# Patient Record
Sex: MALE | Race: WHITE | Employment: UNEMPLOYED | URBAN - METROPOLITAN AREA
[De-identification: names, ages, dates, MRNs, and addresses within clinical notes are randomized per-mention and may not be internally consistent; named-entity substitution may affect disease eponyms.]

---

## 2017-06-16 RX ORDER — ATORVASTATIN CALCIUM 80 MG/1
TABLET, FILM COATED ORAL
Qty: 30 TABLET | Refills: 2 | Status: SHIPPED | OUTPATIENT
Start: 2017-06-16 | End: 2017-06-16

## 2017-06-16 RX ORDER — ATORVASTATIN CALCIUM 80 MG/1
TABLET, FILM COATED ORAL
Qty: 30 TABLET | Refills: 2 | Status: SHIPPED | OUTPATIENT
Start: 2017-06-16

## 2017-06-16 RX ORDER — ATENOLOL 50 MG/1
TABLET ORAL
Qty: 60 TABLET | Refills: 2 | Status: ON HOLD | OUTPATIENT
Start: 2017-06-16 | End: 2019-02-22 | Stop reason: CLARIF

## 2017-06-16 RX ORDER — ATENOLOL 50 MG/1
TABLET ORAL
Qty: 60 TABLET | Refills: 2 | Status: SHIPPED | OUTPATIENT
Start: 2017-06-16 | End: 2017-06-16

## 2017-12-15 ENCOUNTER — HOSPITAL ENCOUNTER (OUTPATIENT)
Dept: PULMONOLOGY | Age: 57
Discharge: HOME | End: 2017-12-15
Attending: FAMILY MEDICINE | Admitting: FAMILY MEDICINE

## 2017-12-15 VITALS — OXYGEN SATURATION: 99 %

## 2017-12-15 DIAGNOSIS — R06.02 SHORTNESS OF BREATH: ICD-10-CM

## 2017-12-15 RX ORDER — ALBUTEROL SULFATE 2.5 MG/3ML
2.5 SOLUTION RESPIRATORY (INHALATION) ONCE
Status: COMPLETED | OUTPATIENT
Start: 2017-12-15 | End: 2017-12-15

## 2017-12-15 RX ADMIN — ALBUTEROL SULFATE 2.5 MG: 2.5 SOLUTION RESPIRATORY (INHALATION) at 14:05

## 2017-12-19 NOTE — PROCEDURES
[address]                  [address] [address]                                PULMONARY FUNCTION    PATIENT NAME: Leydi Colvin SR                 :        1960  MED REC NO:   [id number]                          ROOM:  ACCOUNT NO:   [de-identified]                          ADMIT DATE: 12/15/2017  PROVIDER:     Ar López MD    DATE OF PROCEDURE:  12/15/2017    ORDERING PHYSICIAN:  DO BERNARD Raman DIAGNOSIS:  Shortness of breath. SPIROMETRY:  The FEV1 is 1.65 L or 42% of predicted. The FVC is 2.69 L or  52% of  predicted. The FEV1/FVC ratio is 61%. There is a significant  response to inhaled bronchodilators with improvement in FEV1 by 32%. PLETHYSMOGRAPHY:  The total lung capacity is 7.39 L, which is 97% of  predicted. DIFFUSION CAPACITY:  Diffusion capacity of carbon monoxide is 26.70  mL/min/mmHg, which is 80% of predicted. FLOW VOLUME LOOPS:  The tracings show an obstructive defect pattern. IMPRESSION:  1. There is a severe obstructive lung defect with a significant response  to inhaled bronchodilators. 2.  There is no evidence of a restrictive ventilatory defect. 3.  There is a mild reduction in diffusion capacity. 4.  Overall, his pulmonary function test could be consistent with either  COPD or asthma. Clinical correlation is recommended.         Milli Hirsch MD    D: 2017 12:40:12       T: 2017 14:10:45     [email or web address]  Job#: [id number]     Doc#: [id number]    CC:

## 2017-12-22 ENCOUNTER — OFFICE VISIT (OUTPATIENT)
Dept: CARDIOLOGY CLINIC | Age: 57
End: 2017-12-22

## 2017-12-22 VITALS
HEART RATE: 64 BPM | HEIGHT: 68 IN | DIASTOLIC BLOOD PRESSURE: 84 MMHG | BODY MASS INDEX: 28.79 KG/M2 | SYSTOLIC BLOOD PRESSURE: 120 MMHG | OXYGEN SATURATION: 96 % | WEIGHT: 190 LBS

## 2017-12-22 DIAGNOSIS — E78.00 PURE HYPERCHOLESTEROLEMIA: ICD-10-CM

## 2017-12-22 DIAGNOSIS — I10 ESSENTIAL HYPERTENSION: ICD-10-CM

## 2017-12-22 DIAGNOSIS — I25.10 CORONARY ARTERY DISEASE INVOLVING NATIVE CORONARY ARTERY OF NATIVE HEART WITHOUT ANGINA PECTORIS: Primary | ICD-10-CM

## 2017-12-22 PROCEDURE — 99214 OFFICE O/P EST MOD 30 MIN: CPT | Performed by: INTERNAL MEDICINE

## 2017-12-22 PROCEDURE — G8598 ASA/ANTIPLAT THER USED: HCPCS | Performed by: INTERNAL MEDICINE

## 2017-12-22 PROCEDURE — G8419 CALC BMI OUT NRM PARAM NOF/U: HCPCS | Performed by: INTERNAL MEDICINE

## 2017-12-22 PROCEDURE — G8484 FLU IMMUNIZE NO ADMIN: HCPCS | Performed by: INTERNAL MEDICINE

## 2017-12-22 PROCEDURE — 4004F PT TOBACCO SCREEN RCVD TLK: CPT | Performed by: INTERNAL MEDICINE

## 2017-12-22 PROCEDURE — 3017F COLORECTAL CA SCREEN DOC REV: CPT | Performed by: INTERNAL MEDICINE

## 2017-12-22 PROCEDURE — G8427 DOCREV CUR MEDS BY ELIG CLIN: HCPCS | Performed by: INTERNAL MEDICINE

## 2017-12-22 RX ORDER — HYDROCODONE BITARTRATE AND ACETAMINOPHEN 5; 325 MG/1; MG/1
TABLET ORAL
Status: ON HOLD | COMMUNITY
Start: 2017-11-15 | End: 2019-01-24 | Stop reason: HOSPADM

## 2017-12-22 NOTE — COMMUNICATION BODY
[address]   Cardiac Followup    Referring Provider:  Johann Campbell MD     Chief Complaint   Patient presents with    Coronary Artery Disease        History of Present Illness:    Mr. Loco Cortes presents for CAD, chest pain, HLD. He is feeling well overall. He has restarted smoking. He is not checking his BP at home. He has not been as active and has gained weight. Denies chest pain, shortness of breath, edema, dizziness, palpitations and syncope. Past Medical History:   has a past medical history of Allergic rhinitis; Anxiety; Back pain; CAD (coronary artery disease); CVA (cerebral vascular accident) ([facility] [address] DDD (degenerative disc disease); Depression; Gout; Hypertension; Lumbar radiculopathy; Rheumatoid arthritis(714.0); and Tobacco abuse disorder. Surgical History:   has a past surgical history that includes Appendectomy (1998); Carpal tunnel release (Bilateral, 2000); Patella surgery (Right, 1978); Coronary artery bypass graft (04/22/2016); and Vein Surgery (Left, 04/22/2016). Social History:   reports that he has been smoking Cigarettes. He started smoking about 41 years ago. He has been smoking about 0.00 packs per day for the past 1.00 year. He has never used smokeless tobacco. He reports that he drinks about 1.2 oz of alcohol per week . He reports that he does not use drugs. Family History:  family history includes Cancer in his mother; Glaucoma in his father; High Blood Pressure in his father and mother; Other in his brother. Home Medications:  Prior to Admission medications    Medication Sig Start Date End Date Taking? Authorizing Provider   HYDROcodone-acetaminophen (Duane Gallon) 5-325 MG per tablet TAKE 1 TABLET BY MOUTH TWICE DAILY 11/15/17  Yes Historical Provider, MD   atorvastatin (LIPITOR) 80 MG tablet TAKE 1 TABLET BY MOUTH NIGHTLY 6/16/17  Yes Brandan Malin DO   atenolol (TENORMIN) 50 MG tablet TAKE 1 TABLET BY MOUTH 2 TIMES DAILY.  6/16/17  Yes Brandan Malin DO   VENTOLIN  (90 BASE) MCG/ACT inhaler INHALE 2 PUFFS INTO THE LUNGS EVERY 6 HOURS AS NEEDED SHORTNESS OF BREATH (IF NO RELIEF WITHIN 10 DAYS CALL MD) 4/10/17  Yes Brandan Malin DO   aspirin 325 MG tablet TAKE 1 TABLET BY MOUTH DAILY 3/9/17  Yes Brandan Malin DO   NICOTINE STEP 1 21 MG/24HR PLACE 1 PATCH ONTO THE SKIN DAILY 9/6/16  Yes Brandan Malin DO   allopurinol (ZYLOPRIM) 100 MG tablet TAKE 1 TABLET BY MOUTH DAILY. 6/21/16  Yes Brandan Malin DO   nitroGLYCERIN (NITROSTAT) 0.4 MG SL tablet Place 1 tablet under the tongue every 5 minutes as needed for Chest pain 6/18/16  Yes John Santos MD   traZODone (DESYREL) 100 MG tablet Take 1 tablet by mouth nightly 6/10/16  Yes Brandan Malin DO   gabapentin (NEURONTIN) 300 MG capsule TAKE 1 CAPSULE BY MOUTH 3 TIMES DAILY. 5/13/16  Yes Rosita Calvillo CNP        Allergies:  Tramadol     Review of Systems:   · Constitutional: there has been no unanticipated weight loss. There's been no change in energy level, sleep pattern, or activity level. · Eyes: No visual changes or diplopia. No scleral icterus. · ENT: No Headaches, hearing loss or vertigo. No mouth sores or sore throat. · Cardiovascular: Reviewed in HPI  · Respiratory: No cough or wheezing, no sputum production. No hematemesis. · Gastrointestinal: No abdominal pain, appetite loss, blood in stools. No change in bowel or bladder habits. · Genitourinary: No dysuria, trouble voiding, or hematuria. · Musculoskeletal:  No gait disturbance, weakness or joint complaints. · Integumentary: No rash or pruritis. · Neurological: No headache, diplopia, change in muscle strength, numbness or tingling. No change in gait, balance, coordination, mood, affect, memory, mentation, behavior. · Psychiatric: No anxiety, no depression. · Endocrine: No malaise, fatigue or temperature intolerance. No excessive thirst, fluid intake, or urination. No tremor.   · Hematologic/Lymphatic: No abnormal bruising or bleeding, blood - not recently ck   5. Smoker - started again       Plan:  CMP and Lipid  Smoking cessation discussed  Continue all meds. Increase activity  Monitor BP as OP  F/U with me in 1 year     Thank you for allowing me to participate in the care of this individual.      Janet Palmer.  Ken Santos M.D., Candida Iqbal

## 2017-12-22 NOTE — LETTER
Medication Sig Start Date End Date Taking? Authorizing Provider   HYDROcodone-acetaminophen ([address]) 5-325 MG per tablet TAKE 1 TABLET BY MOUTH TWICE DAILY 11/15/17  Yes Historical Provider, MD   atorvastatin (LIPITOR) 80 MG tablet TAKE 1 TABLET BY MOUTH NIGHTLY 6/16/17  Yes Brandan Malin DO   atenolol (TENORMIN) 50 MG tablet TAKE 1 TABLET BY MOUTH 2 TIMES DAILY. 6/16/17  Yes Brandan Malin DO   VENTOLIN  (90 BASE) MCG/ACT inhaler INHALE 2 PUFFS INTO THE LUNGS EVERY 6 HOURS AS NEEDED SHORTNESS OF BREATH (IF NO RELIEF WITHIN 10 DAYS CALL MD) 4/10/17  Yes Brandan Malin DO   aspirin 325 MG tablet TAKE 1 TABLET BY MOUTH DAILY 3/9/17  Yes Brandan Malin DO   NICOTINE STEP 1 21 MG/24HR PLACE 1 PATCH ONTO THE SKIN DAILY 9/6/16  Yes Brandan Malin DO   allopurinol (ZYLOPRIM) 100 MG tablet TAKE 1 TABLET BY MOUTH DAILY. 6/21/16  Yes Brandan Malin DO   nitroGLYCERIN (NITROSTAT) 0.4 MG SL tablet Place 1 tablet under the tongue every 5 minutes as needed for Chest pain 6/18/16  Yes Zachariah Gayle MD   traZODone (DESYREL) 100 MG tablet Take 1 tablet by mouth nightly 6/10/16  Yes Brandan Malin DO   gabapentin (NEURONTIN) 300 MG capsule TAKE 1 CAPSULE BY MOUTH 3 TIMES DAILY. 5/13/16  Yes Kailash Temple, CHAD        Allergies:  Tramadol     Review of Systems:   · Constitutional: there has been no unanticipated weight loss. There's been no change in energy level, sleep pattern, or activity level. · Eyes: No visual changes or diplopia. No scleral icterus. · ENT: No Headaches, hearing loss or vertigo. No mouth sores or sore throat. · Cardiovascular: Reviewed in HPI  · Respiratory: No cough or wheezing, no sputum production. No hematemesis. · Gastrointestinal: No abdominal pain, appetite loss, blood in stools. No change in bowel or bladder habits. · Genitourinary: No dysuria, trouble voiding, or hematuria. · Musculoskeletal:  No gait disturbance, weakness or joint complaints. · Integumentary: No rash or pruritis. · Neurological: No headache, diplopia, change in muscle strength, numbness or tingling. No change in gait, balance, coordination, mood, affect, memory, mentation, behavior. · Psychiatric: No anxiety, no depression. · Endocrine: No malaise, fatigue or temperature intolerance. No excessive thirst, fluid intake, or urination. No tremor. · Hematologic/Lymphatic: No abnormal bruising or bleeding, blood clots or swollen lymph nodes. · Allergic/Immunologic: No nasal congestion or hives. Physical Examination:    Vitals:    12/22/17 1102   BP: 120/84   Pulse: 64   SpO2: 96%        Constitutional and General Appearance: NAD   Respiratory:  · Normal excursion and expansion without use of accessory muscles  · Resp Auscultation: Normal breath sounds without dullness  Cardiovascular:  · The apical impulses not displaced  · Heart tones are crisp and normal  · Cervical veins are not engorged  · The carotid upstroke is normal in amplitude and contour without delay or bruit  · Normal S1S2, No S3, No Murmur  · Peripheral pulses are symmetrical and full  · There is no clubbing, cyanosis of the extremities. · No edema  · Femoral Arteries: 2+ and equal  · Pedal Pulses: 2+ and equal   Abdomen:  · No masses or tenderness  · Liver/Spleen: No Abnormalities Noted  Neurological/Psychiatric:  · Alert and oriented in all spheres  · Moves all extremities well  · Exhibits normal gait balance and coordination  · No abnormalities of mood, affect, memory, mentation, or behavior are noted    Echo 4/2016   Normal left ventricular systolic function with an estimated ejection   fraction of 55%.   Mild concentric left ventricular hypertrophy.   No regional wall motion abnormalities are seen.   Diastolic filling parameters suggest normal diastolic filing pressure.   MIld mitral and tricuspid regurgitation.   Systolic pulmonary artery pressure (SPAP) is normal and estimated at 28 mmHg   (RA pressure 3 mmHg).  Cath 4/2016  LM 20%  LAD 70% mid  D1 80% mid  Cx 50% prox, 70% mid  OM1 80% prox  RCA 70% mid  LV: anterior hypokinesis, EF 50%  CABG 4/2016  LIMA to LAD, sequential Greater Saphenous VG to Diag 1 then on to OM1, separate single Greater SVG to distal RCA    Assessment:     1. Chest pain - no recurrence   2. NSTEMI (non-ST elevated myocardial infarction) ([address]    3. Coronary artery disease - stable. No angina   4. Pure hypercholesterolemia - not recently ck   5. Smoker - started again       Plan:  CMP and Lipid  Smoking cessation discussed  Continue all meds. Increase activity  Monitor BP as OP  F/U with me in 1 year     Thank you for allowing me to participate in the care of this individual.      Abelardo Briggs. Lolly Caal M.D., Roseanne Nelson      If you have questions, please do not hesitate to call me. I look forward to following Scot along with you.     Sincerely,        Winnie Noland MD

## 2017-12-22 NOTE — PROGRESS NOTES
[address]   Cardiac Followup    Referring Provider:  Dorys Galvan MD     Chief Complaint   Patient presents with    Coronary Artery Disease        History of Present Illness:    Mr. Benny Bull presents for CAD, chest pain, HLD. He is feeling well overall. He has restarted smoking. He is not checking his BP at home. He has not been as active and has gained weight. Denies chest pain, shortness of breath, edema, dizziness, palpitations and syncope. Past Medical History:   has a past medical history of Allergic rhinitis; Anxiety; Back pain; CAD (coronary artery disease); CVA (cerebral vascular accident) ([facility] [address] DDD (degenerative disc disease); Depression; Gout; Hypertension; Lumbar radiculopathy; Rheumatoid arthritis(714.0); and Tobacco abuse disorder. Surgical History:   has a past surgical history that includes Appendectomy (1998); Carpal tunnel release (Bilateral, 2000); Patella surgery (Right, 1978); Coronary artery bypass graft (04/22/2016); and Vein Surgery (Left, 04/22/2016). Social History:   reports that he has been smoking Cigarettes. He started smoking about 41 years ago. He has been smoking about 0.00 packs per day for the past 1.00 year. He has never used smokeless tobacco. He reports that he drinks about 1.2 oz of alcohol per week . He reports that he does not use drugs. Family History:  family history includes Cancer in his mother; Glaucoma in his father; High Blood Pressure in his father and mother; Other in his brother. Home Medications:  Prior to Admission medications    Medication Sig Start Date End Date Taking? Authorizing Provider   HYDROcodone-acetaminophen ([address]) 5-325 MG per tablet TAKE 1 TABLET BY MOUTH TWICE DAILY 11/15/17  Yes Historical Provider, MD   atorvastatin (LIPITOR) 80 MG tablet TAKE 1 TABLET BY MOUTH NIGHTLY 6/16/17  Yes Brandan Malin DO   atenolol (TENORMIN) 50 MG tablet TAKE 1 TABLET BY MOUTH 2 TIMES DAILY.  6/16/17  Yes Brandan Malin DO   VENTOLIN  (90 BASE) MCG/ACT inhaler INHALE 2 PUFFS INTO THE LUNGS EVERY 6 HOURS AS NEEDED SHORTNESS OF BREATH (IF NO RELIEF WITHIN 10 DAYS CALL MD) 4/10/17  Yes Brandan Malin DO   aspirin 325 MG tablet TAKE 1 TABLET BY MOUTH DAILY 3/9/17  Yes Brandan Malin DO   NICOTINE STEP 1 21 MG/24HR PLACE 1 PATCH ONTO THE SKIN DAILY 9/6/16  Yes Brandan Malin DO   allopurinol (ZYLOPRIM) 100 MG tablet TAKE 1 TABLET BY MOUTH DAILY. 6/21/16  Yes Brandan Malin DO   nitroGLYCERIN (NITROSTAT) 0.4 MG SL tablet Place 1 tablet under the tongue every 5 minutes as needed for Chest pain 6/18/16  Yes Lynda Blandon MD   traZODone (DESYREL) 100 MG tablet Take 1 tablet by mouth nightly 6/10/16  Yes Brandan Malin DO   gabapentin (NEURONTIN) 300 MG capsule TAKE 1 CAPSULE BY MOUTH 3 TIMES DAILY. 5/13/16  Yes Parga Haney CNP        Allergies:  Tramadol     Review of Systems:   · Constitutional: there has been no unanticipated weight loss. There's been no change in energy level, sleep pattern, or activity level. · Eyes: No visual changes or diplopia. No scleral icterus. · ENT: No Headaches, hearing loss or vertigo. No mouth sores or sore throat. · Cardiovascular: Reviewed in HPI  · Respiratory: No cough or wheezing, no sputum production. No hematemesis. · Gastrointestinal: No abdominal pain, appetite loss, blood in stools. No change in bowel or bladder habits. · Genitourinary: No dysuria, trouble voiding, or hematuria. · Musculoskeletal:  No gait disturbance, weakness or joint complaints. · Integumentary: No rash or pruritis. · Neurological: No headache, diplopia, change in muscle strength, numbness or tingling. No change in gait, balance, coordination, mood, affect, memory, mentation, behavior. · Psychiatric: No anxiety, no depression. · Endocrine: No malaise, fatigue or temperature intolerance. No excessive thirst, fluid intake, or urination. No tremor.   · Hematologic/Lymphatic: No abnormal bruising or bleeding, blood

## 2018-01-04 ENCOUNTER — HOSPITAL ENCOUNTER (OUTPATIENT)
Dept: OTHER | Age: 58
Discharge: HOME | End: 2018-01-04
Attending: FAMILY MEDICINE | Admitting: FAMILY MEDICINE

## 2018-01-04 DIAGNOSIS — J40 BRONCHITIS: ICD-10-CM

## 2018-06-04 ENCOUNTER — HOSPITAL ENCOUNTER (OUTPATIENT)
Dept: OTHER | Age: 58
Discharge: HOME | End: 2018-06-04
Attending: INTERNAL MEDICINE | Admitting: INTERNAL MEDICINE

## 2018-06-04 DIAGNOSIS — M05.741 RHEUMATOID ARTHRITIS INVOLVING BOTH HANDS WITH POSITIVE RHEUMATOID FACTOR (HCC): ICD-10-CM

## 2018-06-04 DIAGNOSIS — M05.742 RHEUMATOID ARTHRITIS INVOLVING BOTH HANDS WITH POSITIVE RHEUMATOID FACTOR (HCC): ICD-10-CM

## 2018-09-20 DIAGNOSIS — Z80.0 FAMILY HISTORY OF COLON CANCER: Primary | ICD-10-CM

## 2018-10-02 ENCOUNTER — TELEPHONE (OUTPATIENT)
Dept: GASTROENTEROLOGY | Age: 58
End: 2018-10-02

## 2018-10-03 ENCOUNTER — ANESTHESIA (OUTPATIENT)
Dept: ENDOSCOPY | Age: 58
End: 2018-10-03
Payer: MEDICARE

## 2018-10-03 ENCOUNTER — HOSPITAL ENCOUNTER (OUTPATIENT)
Age: 58
Setting detail: OUTPATIENT SURGERY
Discharge: HOME HEALTH | End: 2018-10-03
Attending: INTERNAL MEDICINE | Admitting: INTERNAL MEDICINE
Payer: MEDICARE

## 2018-10-03 ENCOUNTER — ANESTHESIA EVENT (OUTPATIENT)
Dept: ENDOSCOPY | Age: 58
End: 2018-10-03
Payer: MEDICARE

## 2018-10-03 VITALS
DIASTOLIC BLOOD PRESSURE: 80 MMHG | WEIGHT: 192 LBS | BODY MASS INDEX: 26.01 KG/M2 | OXYGEN SATURATION: 99 % | HEART RATE: 55 BPM | RESPIRATION RATE: 18 BRPM | TEMPERATURE: 97.6 F | HEIGHT: 72 IN | SYSTOLIC BLOOD PRESSURE: 139 MMHG

## 2018-10-03 VITALS — OXYGEN SATURATION: 95 % | RESPIRATION RATE: 16 BRPM

## 2018-10-03 PROBLEM — Z12.11 SCREENING FOR COLON CANCER: Status: ACTIVE | Noted: 2018-10-03

## 2018-10-03 PROBLEM — K25.3 ACUTE GASTRIC ULCER WITHOUT HEMORRHAGE OR PERFORATION: Status: RESOLVED | Noted: 2018-10-03 | Resolved: 2018-10-03

## 2018-10-03 PROBLEM — K25.3 ACUTE GASTRIC ULCER WITHOUT HEMORRHAGE OR PERFORATION: Status: ACTIVE | Noted: 2018-10-03

## 2018-10-03 PROBLEM — K29.30 CHRONIC SUPERFICIAL GASTRITIS WITHOUT BLEEDING: Status: ACTIVE | Noted: 2018-10-03

## 2018-10-03 PROBLEM — K63.5 POLYP OF TRANSVERSE COLON: Status: ACTIVE | Noted: 2018-10-03

## 2018-10-03 PROBLEM — K29.30 CHRONIC SUPERFICIAL GASTRITIS WITHOUT BLEEDING: Status: RESOLVED | Noted: 2018-10-03 | Resolved: 2018-10-03

## 2018-10-03 PROCEDURE — 6360000002 HC RX W HCPCS: Performed by: NURSE ANESTHETIST, CERTIFIED REGISTERED

## 2018-10-03 PROCEDURE — 45380 COLONOSCOPY AND BIOPSY: CPT | Performed by: INTERNAL MEDICINE

## 2018-10-03 PROCEDURE — 3700000000 HC ANESTHESIA ATTENDED CARE: Performed by: INTERNAL MEDICINE

## 2018-10-03 PROCEDURE — 3609010300 HC COLONOSCOPY W/BIOPSY SINGLE/MULTIPLE: Performed by: INTERNAL MEDICINE

## 2018-10-03 PROCEDURE — 3700000001 HC ADD 15 MINUTES (ANESTHESIA): Performed by: INTERNAL MEDICINE

## 2018-10-03 PROCEDURE — 2580000003 HC RX 258: Performed by: INTERNAL MEDICINE

## 2018-10-03 PROCEDURE — 88305 TISSUE EXAM BY PATHOLOGIST: CPT

## 2018-10-03 PROCEDURE — 2709999900 HC NON-CHARGEABLE SUPPLY: Performed by: INTERNAL MEDICINE

## 2018-10-03 PROCEDURE — 7100000011 HC PHASE II RECOVERY - ADDTL 15 MIN: Performed by: INTERNAL MEDICINE

## 2018-10-03 PROCEDURE — 2500000003 HC RX 250 WO HCPCS: Performed by: NURSE ANESTHETIST, CERTIFIED REGISTERED

## 2018-10-03 PROCEDURE — 7100000010 HC PHASE II RECOVERY - FIRST 15 MIN: Performed by: INTERNAL MEDICINE

## 2018-10-03 RX ORDER — PROPOFOL 10 MG/ML
INJECTION, EMULSION INTRAVENOUS PRN
Status: DISCONTINUED | OUTPATIENT
Start: 2018-10-03 | End: 2018-10-03 | Stop reason: SDUPTHER

## 2018-10-03 RX ORDER — LIDOCAINE HYDROCHLORIDE 20 MG/ML
INJECTION, SOLUTION EPIDURAL; INFILTRATION; INTRACAUDAL; PERINEURAL PRN
Status: DISCONTINUED | OUTPATIENT
Start: 2018-10-03 | End: 2018-10-03 | Stop reason: SDUPTHER

## 2018-10-03 RX ORDER — SODIUM CHLORIDE, SODIUM LACTATE, POTASSIUM CHLORIDE, CALCIUM CHLORIDE 600; 310; 30; 20 MG/100ML; MG/100ML; MG/100ML; MG/100ML
INJECTION, SOLUTION INTRAVENOUS CONTINUOUS
Status: DISCONTINUED | OUTPATIENT
Start: 2018-10-03 | End: 2018-10-03 | Stop reason: HOSPADM

## 2018-10-03 RX ADMIN — PROPOFOL 200 MG: 10 INJECTION, EMULSION INTRAVENOUS at 10:15

## 2018-10-03 RX ADMIN — SODIUM CHLORIDE, POTASSIUM CHLORIDE, SODIUM LACTATE AND CALCIUM CHLORIDE: 600; 310; 30; 20 INJECTION, SOLUTION INTRAVENOUS at 10:02

## 2018-10-03 RX ADMIN — LIDOCAINE HYDROCHLORIDE 40 MG: 20 INJECTION, SOLUTION EPIDURAL; INFILTRATION; INTRACAUDAL; PERINEURAL at 10:02

## 2018-10-03 RX ADMIN — PROPOFOL 400 MG: 10 INJECTION, EMULSION INTRAVENOUS at 10:02

## 2018-10-03 ASSESSMENT — PAIN - FUNCTIONAL ASSESSMENT: PAIN_FUNCTIONAL_ASSESSMENT: 0-10

## 2018-10-03 ASSESSMENT — PAIN SCALES - GENERAL: PAINLEVEL_OUTOF10: 8

## 2018-10-03 ASSESSMENT — PAIN DESCRIPTION - DESCRIPTORS: DESCRIPTORS: SHARP;ACHING

## 2018-10-03 ASSESSMENT — PAIN DESCRIPTION - PAIN TYPE: TYPE: CHRONIC PAIN

## 2018-10-03 ASSESSMENT — PAIN DESCRIPTION - LOCATION: LOCATION: BACK
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
## 2018-10-03 NOTE — OP NOTE
[address]  [address] [facility]  Phone: [phone]   BRIAN:[phone]    Colonoscopy Procedure Note    Patient: Yovani John  : 1960    Procedure: Colonoscopy with with biopsy    Date:  10/3/2018     Endoscopist:  Rosalinda Scherer MD    Referring Physician:  NENA Guzmán - CNP    Preoperative Diagnosis:  FAMILY HX COLON CA    Postoperative Diagnosis:  See impression    Anesthesia: Anesthesia: MAC  Sedation: see anesthesia notes for details. Start Time: 10:04  Stop Time: 10:32  Withdrawal time: 23 minutes  ASA Class: 2  Mallampati: II (soft palate, uvula, fauces visible)    Indications: This is a 62y.o. year old male who presents today with screening for colon cancer. Patients mother had colon cancer at age 80. Procedure Details  Informed consent was obtained for the procedure, including sedation. Risks of perforation, hemorrhage, adverse drug reaction and aspiration were discussed. The patient was placed in the left lateral decubitus position. Based on the pre-procedure assessment, including review of the patient's medical history, medications, allergies, and review of systems, he had been deemed to be an appropriate candidate for conscious sedation; he was therefore sedated with the medications listed below. The patient was monitored continuously with ECG tracing, pulse oximetry, blood pressure monitoring, and direct observations. rectal examination was performed. The colonoscope was inserted into the rectum and advanced under direct vision to the cecum, which was identified by the ileocecal valve and appendiceal orifice. The quality of the colonic preparation was fair. A careful inspection was made as the colonoscope was withdrawn, including a retroflexed view of the rectum; findings and interventions are described below. Appropriate photodocumentation was obtained. Patient tolerated the procedure well.     Findings:

## 2018-10-04 ENCOUNTER — TELEPHONE (OUTPATIENT)
Dept: GASTROENTEROLOGY | Age: 58
End: 2018-10-04

## 2018-10-15 ENCOUNTER — OFFICE VISIT (OUTPATIENT)
Dept: GASTROENTEROLOGY | Age: 58
End: 2018-10-15
Payer: MEDICARE

## 2018-10-15 VITALS
HEIGHT: 72 IN | DIASTOLIC BLOOD PRESSURE: 90 MMHG | BODY MASS INDEX: 26.84 KG/M2 | SYSTOLIC BLOOD PRESSURE: 150 MMHG | WEIGHT: 198.2 LBS

## 2018-10-15 DIAGNOSIS — R74.8 ABNORMAL LIVER ENZYMES: ICD-10-CM

## 2018-10-15 DIAGNOSIS — D12.3 ADENOMATOUS POLYP OF TRANSVERSE COLON: Primary | ICD-10-CM

## 2018-10-15 PROCEDURE — G8419 CALC BMI OUT NRM PARAM NOF/U: HCPCS | Performed by: INTERNAL MEDICINE

## 2018-10-15 PROCEDURE — 99212 OFFICE O/P EST SF 10 MIN: CPT | Performed by: INTERNAL MEDICINE

## 2018-10-15 PROCEDURE — 4004F PT TOBACCO SCREEN RCVD TLK: CPT | Performed by: INTERNAL MEDICINE

## 2018-10-15 PROCEDURE — G8484 FLU IMMUNIZE NO ADMIN: HCPCS | Performed by: INTERNAL MEDICINE

## 2018-10-15 PROCEDURE — G8427 DOCREV CUR MEDS BY ELIG CLIN: HCPCS | Performed by: INTERNAL MEDICINE

## 2018-10-15 PROCEDURE — 3017F COLORECTAL CA SCREEN DOC REV: CPT | Performed by: INTERNAL MEDICINE

## 2018-10-15 PROCEDURE — G8598 ASA/ANTIPLAT THER USED: HCPCS | Performed by: INTERNAL MEDICINE

## 2018-10-16 ENCOUNTER — TELEPHONE (OUTPATIENT)
Dept: SURGERY | Age: 58
End: 2018-10-16

## 2018-10-16 NOTE — TELEPHONE ENCOUNTER
----- Message from Myra Salmon MD sent at 10/16/2018  9:03 AM EDT -----  HI team    Can you set up an office appointment for Mr. Milton Walsh? Thanks    Mis Evgabbie   ----- Message -----  From: Denise Yu MD  Sent: 10/15/2018   2:56 PM  To: Myra Salmon MD    [address] [place] [address], wanted to refer this pt to you, large transverse colon polyp (tubulovillous adenoma) on screening colonoscopy. Tattooed proximal and distal to polyp, only biopsied. I will place a referral for him. There are pictures in Media section.

## 2018-10-23 ENCOUNTER — TELEPHONE (OUTPATIENT)
Dept: RHEUMATOLOGY | Age: 58
End: 2018-10-23

## 2018-10-24 ENCOUNTER — HOSPITAL ENCOUNTER (OUTPATIENT)
Age: 58
Discharge: HOME | End: 2018-10-24
Payer: MEDICARE

## 2018-10-24 ENCOUNTER — HOSPITAL ENCOUNTER (OUTPATIENT)
Dept: ULTRASOUND IMAGING | Age: 58
Discharge: HOME | End: 2018-10-24
Payer: MEDICARE

## 2018-10-24 DIAGNOSIS — D12.3 ADENOMATOUS POLYP OF TRANSVERSE COLON: ICD-10-CM

## 2018-10-24 DIAGNOSIS — R74.8 ABNORMAL LIVER ENZYMES: ICD-10-CM

## 2018-10-24 LAB
A/G RATIO: 1.5 (ref 1.1–2.2)
ALBUMIN SERPL-MCNC: 4.5 G/DL (ref 3.4–5)
ALP BLD-CCNC: 77 U/L (ref 40–129)
ALT SERPL-CCNC: 34 U/L (ref 10–40)
ANION GAP SERPL CALCULATED.3IONS-SCNC: 14 MMOL/L (ref 3–16)
AST SERPL-CCNC: 31 U/L (ref 15–37)
BILIRUB SERPL-MCNC: 0.7 MG/DL (ref 0–1)
BUN BLDV-MCNC: 8 MG/DL (ref 7–20)
CALCIUM SERPL-MCNC: 9.8 MG/DL (ref 8.3–10.6)
CHLORIDE BLD-SCNC: 96 MMOL/L (ref 99–110)
CO2: 27 MMOL/L (ref 21–32)
CREAT SERPL-MCNC: 0.8 MG/DL (ref 0.9–1.3)
FERRITIN: 153.8 NG/ML (ref 30–400)
GFR AFRICAN AMERICAN: >60
GFR NON-AFRICAN AMERICAN: >60
GLOBULIN: 3 G/DL
GLUCOSE BLD-MCNC: 111 MG/DL (ref 70–99)
HCT VFR BLD CALC: 48.1 % (ref 40.5–52.5)
HEMOGLOBIN: 16 G/DL (ref 13.5–17.5)
IRON SATURATION: 20 % (ref 20–50)
IRON: 70 UG/DL (ref 59–158)
MCH RBC QN AUTO: 33.4 PG (ref 26–34)
MCHC RBC AUTO-ENTMCNC: 33.3 G/DL (ref 31–36)
MCV RBC AUTO: 100.4 FL (ref 80–100)
PDW BLD-RTO: 14 % (ref 12.4–15.4)
PLATELET # BLD: 172 K/UL (ref 135–450)
PMV BLD AUTO: 9.7 FL (ref 5–10.5)
POTASSIUM SERPL-SCNC: 4.1 MMOL/L (ref 3.5–5.1)
RBC # BLD: 4.79 M/UL (ref 4.2–5.9)
SODIUM BLD-SCNC: 137 MMOL/L (ref 136–145)
TOTAL IRON BINDING CAPACITY: 352 UG/DL (ref 260–445)
TOTAL PROTEIN: 7.5 G/DL (ref 6.4–8.2)
WBC # BLD: 6.2 K/UL (ref 4–11)

## 2018-10-24 PROCEDURE — 85027 COMPLETE CBC AUTOMATED: CPT

## 2018-10-24 PROCEDURE — 36415 COLL VENOUS BLD VENIPUNCTURE: CPT

## 2018-10-24 PROCEDURE — 82728 ASSAY OF FERRITIN: CPT

## 2018-10-24 PROCEDURE — 87340 HEPATITIS B SURFACE AG IA: CPT

## 2018-10-24 PROCEDURE — 83550 IRON BINDING TEST: CPT

## 2018-10-24 PROCEDURE — 76705 ECHO EXAM OF ABDOMEN: CPT

## 2018-10-24 PROCEDURE — 86803 HEPATITIS C AB TEST: CPT

## 2018-10-24 PROCEDURE — 80053 COMPREHEN METABOLIC PANEL: CPT

## 2018-10-24 PROCEDURE — 83540 ASSAY OF IRON: CPT

## 2018-10-25 DIAGNOSIS — R76.8 HEPATITIS C ANTIBODY POSITIVE IN BLOOD: Primary | ICD-10-CM

## 2018-10-25 LAB
HEPATITIS B SURFACE ANTIGEN INTERPRETATION: NORMAL
HEPATITIS C ANTIBODY INTERPRETATION: REACTIVE

## 2018-11-02 ENCOUNTER — HOSPITAL ENCOUNTER (OUTPATIENT)
Age: 58
Discharge: HOME | End: 2018-11-02
Payer: MEDICARE

## 2018-11-02 DIAGNOSIS — R76.8 HEPATITIS C ANTIBODY POSITIVE IN BLOOD: ICD-10-CM

## 2018-11-02 PROBLEM — Z12.11 SCREENING FOR COLON CANCER: Status: RESOLVED | Noted: 2018-10-03 | Resolved: 2018-11-02

## 2018-11-02 PROCEDURE — 87902 NFCT AGT GNTYP ALYS HEP C: CPT

## 2018-11-02 PROCEDURE — 87522 HEPATITIS C REVRS TRNSCRPJ: CPT

## 2018-11-02 PROCEDURE — 86704 HEP B CORE ANTIBODY TOTAL: CPT

## 2018-11-04 LAB
HCV QNT BY NAAT IU/ML: NOT DETECTED IU/ML
HCV QNT BY NAAT LOG IU/ML: NOT DETECTED LOG IU/ML
HEPATITIS B CORE TOTAL ANTIBODY: NEGATIVE

## 2018-11-06 LAB — HEPATITIS C GENOTYPE: NORMAL

## 2018-11-19 ENCOUNTER — OFFICE VISIT (OUTPATIENT)
Dept: RHEUMATOLOGY | Age: 58
End: 2018-11-19
Payer: MEDICARE

## 2018-11-19 VITALS
HEIGHT: 72 IN | HEART RATE: 86 BPM | BODY MASS INDEX: 27.5 KG/M2 | DIASTOLIC BLOOD PRESSURE: 84 MMHG | SYSTOLIC BLOOD PRESSURE: 124 MMHG | WEIGHT: 203 LBS | TEMPERATURE: 98.7 F

## 2018-11-19 DIAGNOSIS — Z79.899 HIGH RISK MEDICATION USE: ICD-10-CM

## 2018-11-19 DIAGNOSIS — R76.8 HEPATITIS C ANTIBODY TEST POSITIVE: ICD-10-CM

## 2018-11-19 DIAGNOSIS — M05.79 RHEUMATOID ARTHRITIS INVOLVING MULTIPLE SITES WITH POSITIVE RHEUMATOID FACTOR (HCC): Primary | ICD-10-CM

## 2018-11-19 DIAGNOSIS — M15.9 PRIMARY OSTEOARTHRITIS INVOLVING MULTIPLE JOINTS: ICD-10-CM

## 2018-11-19 PROCEDURE — G8419 CALC BMI OUT NRM PARAM NOF/U: HCPCS | Performed by: INTERNAL MEDICINE

## 2018-11-19 PROCEDURE — 99205 OFFICE O/P NEW HI 60 MIN: CPT | Performed by: INTERNAL MEDICINE

## 2018-11-19 PROCEDURE — 3017F COLORECTAL CA SCREEN DOC REV: CPT | Performed by: INTERNAL MEDICINE

## 2018-11-19 PROCEDURE — G8484 FLU IMMUNIZE NO ADMIN: HCPCS | Performed by: INTERNAL MEDICINE

## 2018-11-19 PROCEDURE — G8427 DOCREV CUR MEDS BY ELIG CLIN: HCPCS | Performed by: INTERNAL MEDICINE

## 2018-11-19 RX ORDER — AMLODIPINE BESYLATE 5 MG/1
5 TABLET ORAL 2 TIMES DAILY
Status: ON HOLD | COMMUNITY
Start: 2017-08-29 | End: 2021-01-01 | Stop reason: HOSPADM

## 2018-11-19 RX ORDER — HYDROXYCHLOROQUINE SULFATE 200 MG/1
200 TABLET, FILM COATED ORAL 2 TIMES DAILY
Qty: 60 TABLET | Refills: 3 | Status: ON HOLD | OUTPATIENT
Start: 2018-11-19 | End: 2019-01-15 | Stop reason: ALTCHOICE
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
## 2018-12-17 NOTE — PROGRESS NOTES
[address]   Cardiac Followup    Referring Provider:  NENA Ventura CNP     Chief Complaint   Patient presents with    1 Year Follow Up     no cardiac complaints        History of Present Illness:    Mr. Dereje Rao presents for CAD, chest pain, HLD. Today he states he has been having leg numbness and leg fatigue when he walks. He has no leg pain. He continues to smoke. He has been trying to quit. He is tolerating his medications. He has not been checking his BP at home. He has dizziness at times, no syncope. Patient currently denies any weight gain, edema, palpitations, chest pain, shortness of breath, and syncope. Past Medical History:   has a past medical history of Allergic rhinitis; Anxiety; Back pain; CAD (coronary artery disease); CVA (cerebral vascular accident) ([facility] [address] DDD (degenerative disc disease); Depression; Gout; Hepatitis C antibody positive in blood; Hyperlipidemia; Hypertension; Lumbar radiculopathy; Rheumatoid arthritis(714.0); and Tobacco abuse disorder. Surgical History:   has a past surgical history that includes Appendectomy (1998); Carpal tunnel release (Bilateral, 2000); Patella surgery (Right, 1978); Coronary artery bypass graft (04/22/2016); Vein Surgery (Left, 04/22/2016); and Colonoscopy (N/A, 10/3/2018). Social History:   reports that he has been smoking Cigarettes. He started smoking about 42 years ago. He has a 0.50 pack-year smoking history. He has never used smokeless tobacco. He reports that he drinks about 8.4 oz of alcohol per week . He reports that he does not use drugs. Family History:  family history includes Cancer in his mother; Glaucoma in his father; High Blood Pressure in his father and mother; Other in his brother. Home Medications:  Prior to Admission medications    Medication Sig Start Date End Date Taking?  Authorizing Provider   amLODIPine (NORVASC) 5 MG tablet Take 5 mg by mouth daily 8/29/17  Yes Historical Provider, MD

## 2018-12-18 ENCOUNTER — OFFICE VISIT (OUTPATIENT)
Dept: CARDIOLOGY CLINIC | Age: 58
End: 2018-12-18
Payer: MEDICARE

## 2018-12-18 VITALS
WEIGHT: 198.4 LBS | DIASTOLIC BLOOD PRESSURE: 78 MMHG | HEIGHT: 71 IN | HEART RATE: 58 BPM | SYSTOLIC BLOOD PRESSURE: 120 MMHG | BODY MASS INDEX: 27.77 KG/M2 | OXYGEN SATURATION: 97 %

## 2018-12-18 DIAGNOSIS — E78.00 PURE HYPERCHOLESTEROLEMIA: ICD-10-CM

## 2018-12-18 DIAGNOSIS — I73.9 CLAUDICATION (HCC): ICD-10-CM

## 2018-12-18 DIAGNOSIS — Z95.1 S/P CABG X 4: ICD-10-CM

## 2018-12-18 DIAGNOSIS — F17.201 TOBACCO DEPENDENCE IN REMISSION: ICD-10-CM

## 2018-12-18 DIAGNOSIS — E34.9 HYPOTESTOSTERONISM: ICD-10-CM

## 2018-12-18 DIAGNOSIS — I25.10 CORONARY ARTERY DISEASE INVOLVING NATIVE CORONARY ARTERY OF NATIVE HEART WITHOUT ANGINA PECTORIS: Primary | ICD-10-CM

## 2018-12-18 PROCEDURE — G8598 ASA/ANTIPLAT THER USED: HCPCS | Performed by: INTERNAL MEDICINE

## 2018-12-18 PROCEDURE — 93922 UPR/L XTREMITY ART 2 LEVELS: CPT | Performed by: INTERNAL MEDICINE

## 2018-12-18 PROCEDURE — G8484 FLU IMMUNIZE NO ADMIN: HCPCS | Performed by: INTERNAL MEDICINE

## 2018-12-18 PROCEDURE — 99214 OFFICE O/P EST MOD 30 MIN: CPT | Performed by: INTERNAL MEDICINE

## 2018-12-18 PROCEDURE — 4004F PT TOBACCO SCREEN RCVD TLK: CPT | Performed by: INTERNAL MEDICINE

## 2018-12-18 PROCEDURE — 3017F COLORECTAL CA SCREEN DOC REV: CPT | Performed by: INTERNAL MEDICINE

## 2018-12-18 PROCEDURE — G8419 CALC BMI OUT NRM PARAM NOF/U: HCPCS | Performed by: INTERNAL MEDICINE

## 2018-12-18 PROCEDURE — G8427 DOCREV CUR MEDS BY ELIG CLIN: HCPCS | Performed by: INTERNAL MEDICINE

## 2018-12-18 NOTE — LETTER
· Eyes: No visual changes or diplopia. No scleral icterus. · ENT: No Headaches, hearing loss or vertigo. No mouth sores or sore throat. · Cardiovascular: Reviewed in HPI  · Respiratory: No cough or wheezing, no sputum production. No hematemesis. · Gastrointestinal: No abdominal pain, appetite loss, blood in stools. No change in bowel or bladder habits. · Genitourinary: No dysuria, trouble voiding, or hematuria. · Musculoskeletal:  No gait disturbance, weakness or joint complaints. · Integumentary: No rash or pruritis. · Neurological: No headache, diplopia, change in muscle strength, numbness or tingling. No change in gait, balance, coordination, mood, affect, memory, mentation, behavior. · Psychiatric: No anxiety, no depression. · Endocrine: No malaise, fatigue or temperature intolerance. No excessive thirst, fluid intake, or urination. No tremor. · Hematologic/Lymphatic: No abnormal bruising or bleeding, blood clots or swollen lymph nodes. · Allergic/Immunologic: No nasal congestion or hives. Physical Examination:    Vitals:    12/18/18 1117   BP: 120/78   Pulse: 58   SpO2: 97%        Constitutional and General Appearance: NAD   Respiratory:  · Normal excursion and expansion without use of accessory muscles  · Resp Auscultation: Normal breath sounds without dullness  Cardiovascular:  · The apical impulses not displaced  · Heart tones are crisp and normal  · Cervical veins are not engorged  · The carotid upstroke is normal in amplitude and contour without delay or bruit  · Normal S1S2, No S3, No Murmur  · Peripheral pulses are symmetrical and full  · There is no clubbing, cyanosis of the extremities.   · No edema  · Femoral Arteries: 2+ and equal  · Pedal Pulses: 2+ and equal   Abdomen:  · No masses or tenderness  · Liver/Spleen: No Abnormalities Noted  Neurological/Psychiatric:  · Alert and oriented in all spheres  · Moves all extremities well  · Exhibits normal gait balance and coordination

## 2018-12-19 DIAGNOSIS — I73.9 CLAUDICATION (HCC): Primary | ICD-10-CM

## 2018-12-31 ENCOUNTER — HOSPITAL ENCOUNTER (OUTPATIENT)
Dept: VASCULAR LAB | Age: 58
Discharge: HOME | End: 2018-12-31
Payer: MEDICARE

## 2018-12-31 ENCOUNTER — HOSPITAL ENCOUNTER (OUTPATIENT)
Age: 58
Discharge: HOME | End: 2018-12-31
Payer: MEDICARE

## 2018-12-31 DIAGNOSIS — I73.9 CLAUDICATION (HCC): ICD-10-CM

## 2018-12-31 DIAGNOSIS — E78.00 PURE HYPERCHOLESTEROLEMIA: ICD-10-CM

## 2018-12-31 LAB
A/G RATIO: 1.5 (ref 1.1–2.2)
ALBUMIN SERPL-MCNC: 4.4 G/DL (ref 3.4–5)
ALP BLD-CCNC: 73 U/L (ref 40–129)
ALT SERPL-CCNC: 39 U/L (ref 10–40)
ANION GAP SERPL CALCULATED.3IONS-SCNC: 12 MMOL/L (ref 3–16)
AST SERPL-CCNC: 37 U/L (ref 15–37)
BILIRUB SERPL-MCNC: 0.6 MG/DL (ref 0–1)
BUN BLDV-MCNC: 6 MG/DL (ref 7–20)
CALCIUM SERPL-MCNC: 9.2 MG/DL (ref 8.3–10.6)
CHLORIDE BLD-SCNC: 97 MMOL/L (ref 99–110)
CHOLESTEROL, TOTAL: 109 MG/DL (ref 0–199)
CO2: 26 MMOL/L (ref 21–32)
CREAT SERPL-MCNC: 0.7 MG/DL (ref 0.9–1.3)
GFR AFRICAN AMERICAN: >60
GFR NON-AFRICAN AMERICAN: >60
GLOBULIN: 2.9 G/DL
GLUCOSE BLD-MCNC: 103 MG/DL (ref 70–99)
HDLC SERPL-MCNC: 44 MG/DL (ref 40–60)
LDL CHOLESTEROL CALCULATED: 43 MG/DL
POTASSIUM SERPL-SCNC: 4.7 MMOL/L (ref 3.5–5.1)
SODIUM BLD-SCNC: 135 MMOL/L (ref 136–145)
TOTAL PROTEIN: 7.3 G/DL (ref 6.4–8.2)
TRIGL SERPL-MCNC: 111 MG/DL (ref 0–150)
VLDLC SERPL CALC-MCNC: 22 MG/DL

## 2018-12-31 PROCEDURE — 93925 LOWER EXTREMITY STUDY: CPT

## 2018-12-31 PROCEDURE — 80061 LIPID PANEL: CPT

## 2018-12-31 PROCEDURE — 80053 COMPREHEN METABOLIC PANEL: CPT

## 2018-12-31 PROCEDURE — 36415 COLL VENOUS BLD VENIPUNCTURE: CPT

## 2019-01-02 ENCOUNTER — TELEPHONE (OUTPATIENT)
Dept: CARDIOLOGY CLINIC | Age: 59
End: 2019-01-02

## 2019-01-02 ENCOUNTER — TELEPHONE (OUTPATIENT)
Dept: GASTROENTEROLOGY | Age: 59
End: 2019-01-02

## 2019-01-02 DIAGNOSIS — R29.91 ABNORMAL FINDING OF LOWER EXTREMITY: Primary | ICD-10-CM

## 2019-01-11 ENCOUNTER — OFFICE VISIT (OUTPATIENT)
Dept: VASCULAR SURGERY | Age: 59
End: 2019-01-11
Payer: MEDICARE

## 2019-01-11 VITALS
WEIGHT: 201 LBS | HEIGHT: 72 IN | BODY MASS INDEX: 27.22 KG/M2 | DIASTOLIC BLOOD PRESSURE: 80 MMHG | SYSTOLIC BLOOD PRESSURE: 138 MMHG

## 2019-01-11 DIAGNOSIS — M79.605 BILATERAL LEG PAIN: Primary | ICD-10-CM

## 2019-01-11 DIAGNOSIS — M79.604 BILATERAL LEG PAIN: Primary | ICD-10-CM

## 2019-01-11 DIAGNOSIS — I73.9 CLAUDICATION (HCC): ICD-10-CM

## 2019-01-11 PROCEDURE — 99243 OFF/OP CNSLTJ NEW/EST LOW 30: CPT | Performed by: SURGERY

## 2019-01-14 ENCOUNTER — TELEPHONE (OUTPATIENT)
Dept: VASCULAR SURGERY | Age: 59
End: 2019-01-14

## 2019-01-15 ENCOUNTER — TELEPHONE (OUTPATIENT)
Dept: VASCULAR SURGERY | Age: 59
End: 2019-01-15

## 2019-01-15 ENCOUNTER — HOSPITAL ENCOUNTER (OUTPATIENT)
Dept: CARDIAC CATH/INVASIVE PROCEDURES | Age: 59
Discharge: HOME | End: 2019-01-15
Attending: SURGERY | Admitting: SURGERY
Payer: MEDICARE

## 2019-01-15 VITALS — WEIGHT: 201 LBS | BODY MASS INDEX: 27.22 KG/M2 | HEIGHT: 72 IN

## 2019-01-15 LAB
ANION GAP SERPL CALCULATED.3IONS-SCNC: 11 MMOL/L (ref 3–16)
BASOPHILS ABSOLUTE: 0.1 K/UL (ref 0–0.2)
BASOPHILS RELATIVE PERCENT: 1 %
BUN BLDV-MCNC: 7 MG/DL (ref 7–20)
CALCIUM SERPL-MCNC: 9.1 MG/DL (ref 8.3–10.6)
CHLORIDE BLD-SCNC: 98 MMOL/L (ref 99–110)
CO2: 27 MMOL/L (ref 21–32)
CREAT SERPL-MCNC: 0.6 MG/DL (ref 0.9–1.3)
EKG ATRIAL RATE: 59 BPM
EKG DIAGNOSIS: NORMAL
EKG P AXIS: -18 DEGREES
EKG P-R INTERVAL: 128 MS
EKG Q-T INTERVAL: 476 MS
EKG QRS DURATION: 84 MS
EKG QTC CALCULATION (BAZETT): 471 MS
EKG R AXIS: 79 DEGREES
EKG T AXIS: 62 DEGREES
EKG VENTRICULAR RATE: 59 BPM
EOSINOPHILS ABSOLUTE: 0.2 K/UL (ref 0–0.6)
EOSINOPHILS RELATIVE PERCENT: 3 %
GFR AFRICAN AMERICAN: >60
GFR NON-AFRICAN AMERICAN: >60
GLUCOSE BLD-MCNC: 102 MG/DL (ref 70–99)
HCT VFR BLD CALC: 48.4 % (ref 40.5–52.5)
HEMOGLOBIN: 16.3 G/DL (ref 13.5–17.5)
INR BLD: 0.85 (ref 0.86–1.14)
LYMPHOCYTES ABSOLUTE: 2.2 K/UL (ref 1–5.1)
LYMPHOCYTES RELATIVE PERCENT: 31.7 %
MCH RBC QN AUTO: 32.9 PG (ref 26–34)
MCHC RBC AUTO-ENTMCNC: 33.6 G/DL (ref 31–36)
MCV RBC AUTO: 97.8 FL (ref 80–100)
MONOCYTES ABSOLUTE: 0.6 K/UL (ref 0–1.3)
MONOCYTES RELATIVE PERCENT: 8.8 %
NEUTROPHILS ABSOLUTE: 3.8 K/UL (ref 1.7–7.7)
NEUTROPHILS RELATIVE PERCENT: 55.5 %
PDW BLD-RTO: 13.6 % (ref 12.4–15.4)
PLATELET # BLD: 183 K/UL (ref 135–450)
PMV BLD AUTO: 8.2 FL (ref 5–10.5)
POTASSIUM SERPL-SCNC: 4.9 MMOL/L (ref 3.5–5.1)
PROTHROMBIN TIME: 9.7 SEC (ref 9.8–13)
RBC # BLD: 4.95 M/UL (ref 4.2–5.9)
SODIUM BLD-SCNC: 136 MMOL/L (ref 136–145)
WBC # BLD: 6.8 K/UL (ref 4–11)

## 2019-01-15 PROCEDURE — 75625 CONTRAST EXAM ABDOMINL AORTA: CPT | Performed by: SURGERY

## 2019-01-15 PROCEDURE — 6360000004 HC RX CONTRAST MEDICATION: Performed by: SURGERY

## 2019-01-15 PROCEDURE — C1887 CATHETER, GUIDING: HCPCS

## 2019-01-15 PROCEDURE — 85025 COMPLETE CBC W/AUTO DIFF WBC: CPT

## 2019-01-15 PROCEDURE — 2709999900 HC NON-CHARGEABLE SUPPLY

## 2019-01-15 PROCEDURE — 85610 PROTHROMBIN TIME: CPT

## 2019-01-15 PROCEDURE — 75716 ARTERY X-RAYS ARMS/LEGS: CPT | Performed by: SURGERY

## 2019-01-15 PROCEDURE — 80048 BASIC METABOLIC PNL TOTAL CA: CPT

## 2019-01-15 PROCEDURE — 6370000000 HC RX 637 (ALT 250 FOR IP)

## 2019-01-15 PROCEDURE — 93010 ELECTROCARDIOGRAM REPORT: CPT | Performed by: INTERNAL MEDICINE

## 2019-01-15 PROCEDURE — 36200 PLACE CATHETER IN AORTA: CPT | Performed by: SURGERY

## 2019-01-15 PROCEDURE — 93005 ELECTROCARDIOGRAM TRACING: CPT | Performed by: SURGERY

## 2019-01-15 PROCEDURE — C1769 GUIDE WIRE: HCPCS

## 2019-01-15 PROCEDURE — 6370000000 HC RX 637 (ALT 250 FOR IP): Performed by: SURGERY

## 2019-01-15 PROCEDURE — 99152 MOD SED SAME PHYS/QHP 5/>YRS: CPT | Performed by: SURGERY

## 2019-01-15 RX ORDER — GABAPENTIN 300 MG/1
300 CAPSULE ORAL 3 TIMES DAILY
Status: DISCONTINUED | OUTPATIENT
Start: 2019-01-15 | End: 2019-01-15 | Stop reason: HOSPADM

## 2019-01-15 RX ORDER — HYDROCODONE BITARTRATE AND ACETAMINOPHEN 5; 325 MG/1; MG/1
1 TABLET ORAL ONCE
Status: COMPLETED | OUTPATIENT
Start: 2019-01-15 | End: 2019-01-15

## 2019-01-15 RX ORDER — NICOTINE 21 MG/24HR
1 PATCH, TRANSDERMAL 24 HOURS TRANSDERMAL DAILY
Status: DISCONTINUED | OUTPATIENT
Start: 2019-01-15 | End: 2019-01-15 | Stop reason: HOSPADM

## 2019-01-15 RX ADMIN — GABAPENTIN 300 MG: 300 CAPSULE ORAL at 13:11

## 2019-01-15 RX ADMIN — HYDROCODONE BITARTRATE AND ACETAMINOPHEN 1 TABLET: 5; 325 TABLET ORAL at 13:11

## 2019-01-15 RX ADMIN — IOPAMIDOL 100 ML: 612 INJECTION, SOLUTION INTRATHECAL at 15:25

## 2019-01-15 ASSESSMENT — PAIN SCALES - GENERAL: PAINLEVEL_OUTOF10: 3

## 2019-01-22 ENCOUNTER — TELEPHONE (OUTPATIENT)
Dept: VASCULAR SURGERY | Age: 59
End: 2019-01-22

## 2019-01-22 ENCOUNTER — ANESTHESIA EVENT (OUTPATIENT)
Dept: OPERATING ROOM | Age: 59
DRG: 181 | End: 2019-01-22
Payer: MEDICARE

## 2019-01-23 ENCOUNTER — ANESTHESIA (OUTPATIENT)
Dept: OPERATING ROOM | Age: 59
DRG: 181 | End: 2019-01-23
Payer: MEDICARE

## 2019-01-23 ENCOUNTER — HOSPITAL ENCOUNTER (INPATIENT)
Age: 59
LOS: 1 days | Discharge: HOME | DRG: 181 | End: 2019-01-24
Attending: SURGERY | Admitting: SURGERY
Payer: MEDICARE

## 2019-01-23 VITALS — SYSTOLIC BLOOD PRESSURE: 146 MMHG | OXYGEN SATURATION: 99 % | DIASTOLIC BLOOD PRESSURE: 94 MMHG

## 2019-01-23 DIAGNOSIS — I73.9 SEVERE CLAUDICATION (HCC): Primary | ICD-10-CM

## 2019-01-23 LAB
ABO/RH: NORMAL
ANTIBODY SCREEN: NORMAL

## 2019-01-23 PROCEDURE — 2580000003 HC RX 258: Performed by: ANESTHESIOLOGY

## 2019-01-23 PROCEDURE — 2580000003 HC RX 258: Performed by: SURGERY

## 2019-01-23 PROCEDURE — 6360000002 HC RX W HCPCS: Performed by: NURSE ANESTHETIST, CERTIFIED REGISTERED

## 2019-01-23 PROCEDURE — 047L04Z DILATION OF LEFT FEMORAL ARTERY WITH DRUG-ELUTING INTRALUMINAL DEVICE, OPEN APPROACH: ICD-10-PCS | Performed by: SURGERY

## 2019-01-23 PROCEDURE — 6370000000 HC RX 637 (ALT 250 FOR IP): Performed by: SURGERY

## 2019-01-23 PROCEDURE — 3700000000 HC ANESTHESIA ATTENDED CARE: Performed by: SURGERY

## 2019-01-23 PROCEDURE — 3600000017 HC SURGERY HYBRID ADDL 15MIN: Performed by: SURGERY

## 2019-01-23 PROCEDURE — 2709999900 HC NON-CHARGEABLE SUPPLY: Performed by: SURGERY

## 2019-01-23 PROCEDURE — 04CL0ZZ EXTIRPATION OF MATTER FROM LEFT FEMORAL ARTERY, OPEN APPROACH: ICD-10-PCS | Performed by: SURGERY

## 2019-01-23 PROCEDURE — C1768 GRAFT, VASCULAR: HCPCS | Performed by: SURGERY

## 2019-01-23 PROCEDURE — 3600000007 HC SURGERY HYBRID BASE: Performed by: SURGERY

## 2019-01-23 PROCEDURE — C1894 INTRO/SHEATH, NON-LASER: HCPCS | Performed by: SURGERY

## 2019-01-23 PROCEDURE — 7100000001 HC PACU RECOVERY - ADDTL 15 MIN: Performed by: SURGERY

## 2019-01-23 PROCEDURE — C1887 CATHETER, GUIDING: HCPCS | Performed by: SURGERY

## 2019-01-23 PROCEDURE — C1769 GUIDE WIRE: HCPCS | Performed by: SURGERY

## 2019-01-23 PROCEDURE — B41F1ZZ FLUOROSCOPY OF RIGHT LOWER EXTREMITY ARTERIES USING LOW OSMOLAR CONTRAST: ICD-10-PCS | Performed by: SURGERY

## 2019-01-23 PROCEDURE — 6360000002 HC RX W HCPCS: Performed by: SURGERY

## 2019-01-23 PROCEDURE — 7100000000 HC PACU RECOVERY - FIRST 15 MIN: Performed by: SURGERY

## 2019-01-23 PROCEDURE — 3700000001 HC ADD 15 MINUTES (ANESTHESIA): Performed by: SURGERY

## 2019-01-23 PROCEDURE — 37226 PR REVSC OPN/PRQ FEM/POP W/STNT/ANGIOP SM VSL: CPT | Performed by: SURGERY

## 2019-01-23 PROCEDURE — 2500000003 HC RX 250 WO HCPCS: Performed by: SURGERY

## 2019-01-23 PROCEDURE — 2500000003 HC RX 250 WO HCPCS: Performed by: NURSE ANESTHETIST, CERTIFIED REGISTERED

## 2019-01-23 PROCEDURE — 86901 BLOOD TYPING SEROLOGIC RH(D): CPT

## 2019-01-23 PROCEDURE — C1725 CATH, TRANSLUMIN NON-LASER: HCPCS | Performed by: SURGERY

## 2019-01-23 PROCEDURE — 2060000000 HC ICU INTERMEDIATE R&B

## 2019-01-23 PROCEDURE — 35371 RECHANNELING OF ARTERY: CPT | Performed by: SURGERY

## 2019-01-23 PROCEDURE — 6360000004 HC RX CONTRAST MEDICATION: Performed by: SURGERY

## 2019-01-23 PROCEDURE — C1876 STENT, NON-COA/NON-COV W/DEL: HCPCS | Performed by: SURGERY

## 2019-01-23 PROCEDURE — 86850 RBC ANTIBODY SCREEN: CPT

## 2019-01-23 PROCEDURE — 04UL0KZ SUPPLEMENT LEFT FEMORAL ARTERY WITH NONAUTOLOGOUS TISSUE SUBSTITUTE, OPEN APPROACH: ICD-10-PCS | Performed by: SURGERY

## 2019-01-23 PROCEDURE — 86900 BLOOD TYPING SEROLOGIC ABO: CPT

## 2019-01-23 DEVICE — IMPLANTABLE DEVICE: Type: IMPLANTABLE DEVICE | Site: GROIN | Status: FUNCTIONAL

## 2019-01-23 DEVICE — XENOSURE BIOLOGIC PATCH, 0.8CM X 8CM, EIFU
Type: IMPLANTABLE DEVICE | Site: GROIN | Status: FUNCTIONAL
Brand: XENOSURE BIOLOGIC PATCH

## 2019-01-23 RX ORDER — ROCURONIUM BROMIDE 10 MG/ML
INJECTION, SOLUTION INTRAVENOUS PRN
Status: DISCONTINUED | OUTPATIENT
Start: 2019-01-23 | End: 2019-01-23 | Stop reason: SDUPTHER

## 2019-01-23 RX ORDER — SODIUM CHLORIDE 0.9 % (FLUSH) 0.9 %
10 SYRINGE (ML) INJECTION PRN
Status: DISCONTINUED | OUTPATIENT
Start: 2019-01-23 | End: 2019-01-24 | Stop reason: HOSPADM

## 2019-01-23 RX ORDER — LABETALOL HYDROCHLORIDE 5 MG/ML
5 INJECTION, SOLUTION INTRAVENOUS EVERY 10 MIN PRN
Status: DISCONTINUED | OUTPATIENT
Start: 2019-01-23 | End: 2019-01-23 | Stop reason: HOSPADM

## 2019-01-23 RX ORDER — ALBUTEROL SULFATE 90 UG/1
2 AEROSOL, METERED RESPIRATORY (INHALATION) EVERY 4 HOURS PRN
Status: DISCONTINUED | OUTPATIENT
Start: 2019-01-23 | End: 2019-01-24 | Stop reason: HOSPADM

## 2019-01-23 RX ORDER — SODIUM CHLORIDE 0.9 % (FLUSH) 0.9 %
10 SYRINGE (ML) INJECTION EVERY 12 HOURS SCHEDULED
Status: DISCONTINUED | OUTPATIENT
Start: 2019-01-23 | End: 2019-01-23 | Stop reason: HOSPADM

## 2019-01-23 RX ORDER — HYDROCODONE BITARTRATE AND ACETAMINOPHEN 5; 325 MG/1; MG/1
2 TABLET ORAL EVERY 4 HOURS PRN
Status: DISCONTINUED | OUTPATIENT
Start: 2019-01-23 | End: 2019-01-24 | Stop reason: HOSPADM

## 2019-01-23 RX ORDER — HYDROMORPHONE HCL 110MG/55ML
PATIENT CONTROLLED ANALGESIA SYRINGE INTRAVENOUS PRN
Status: DISCONTINUED | OUTPATIENT
Start: 2019-01-23 | End: 2019-01-23 | Stop reason: SDUPTHER

## 2019-01-23 RX ORDER — HYDRALAZINE HYDROCHLORIDE 20 MG/ML
5 INJECTION INTRAMUSCULAR; INTRAVENOUS EVERY 10 MIN PRN
Status: DISCONTINUED | OUTPATIENT
Start: 2019-01-23 | End: 2019-01-23 | Stop reason: HOSPADM

## 2019-01-23 RX ORDER — ONDANSETRON 2 MG/ML
4 INJECTION INTRAMUSCULAR; INTRAVENOUS EVERY 6 HOURS PRN
Status: DISCONTINUED | OUTPATIENT
Start: 2019-01-23 | End: 2019-01-24 | Stop reason: HOSPADM

## 2019-01-23 RX ORDER — SODIUM CHLORIDE, SODIUM LACTATE, POTASSIUM CHLORIDE, CALCIUM CHLORIDE 600; 310; 30; 20 MG/100ML; MG/100ML; MG/100ML; MG/100ML
INJECTION, SOLUTION INTRAVENOUS CONTINUOUS
Status: DISCONTINUED | OUTPATIENT
Start: 2019-01-23 | End: 2019-01-23

## 2019-01-23 RX ORDER — ASPIRIN 81 MG/1
81 TABLET ORAL DAILY
Status: DISCONTINUED | OUTPATIENT
Start: 2019-01-23 | End: 2019-01-24 | Stop reason: HOSPADM

## 2019-01-23 RX ORDER — AMLODIPINE BESYLATE 5 MG/1
5 TABLET ORAL 2 TIMES DAILY
Status: DISCONTINUED | OUTPATIENT
Start: 2019-01-23 | End: 2019-01-24 | Stop reason: HOSPADM

## 2019-01-23 RX ORDER — SODIUM CHLORIDE 0.9 % (FLUSH) 0.9 %
10 SYRINGE (ML) INJECTION PRN
Status: DISCONTINUED | OUTPATIENT
Start: 2019-01-23 | End: 2019-01-23 | Stop reason: HOSPADM

## 2019-01-23 RX ORDER — MEPERIDINE HYDROCHLORIDE 50 MG/ML
12.5 INJECTION INTRAMUSCULAR; INTRAVENOUS; SUBCUTANEOUS EVERY 5 MIN PRN
Status: DISCONTINUED | OUTPATIENT
Start: 2019-01-23 | End: 2019-01-23 | Stop reason: HOSPADM

## 2019-01-23 RX ORDER — FENTANYL CITRATE 50 UG/ML
INJECTION, SOLUTION INTRAMUSCULAR; INTRAVENOUS PRN
Status: DISCONTINUED | OUTPATIENT
Start: 2019-01-23 | End: 2019-01-23 | Stop reason: SDUPTHER

## 2019-01-23 RX ORDER — EPHEDRINE SULFATE 50 MG/ML
INJECTION INTRAVENOUS PRN
Status: DISCONTINUED | OUTPATIENT
Start: 2019-01-23 | End: 2019-01-23 | Stop reason: SDUPTHER

## 2019-01-23 RX ORDER — LIDOCAINE HYDROCHLORIDE 20 MG/ML
INJECTION, SOLUTION INFILTRATION; PERINEURAL PRN
Status: DISCONTINUED | OUTPATIENT
Start: 2019-01-23 | End: 2019-01-23 | Stop reason: SDUPTHER

## 2019-01-23 RX ORDER — ATORVASTATIN CALCIUM 80 MG/1
80 TABLET, FILM COATED ORAL NIGHTLY
Status: DISCONTINUED | OUTPATIENT
Start: 2019-01-23 | End: 2019-01-24 | Stop reason: HOSPADM

## 2019-01-23 RX ORDER — LIDOCAINE HYDROCHLORIDE 10 MG/ML
1 INJECTION, SOLUTION EPIDURAL; INFILTRATION; INTRACAUDAL; PERINEURAL
Status: DISCONTINUED | OUTPATIENT
Start: 2019-01-23 | End: 2019-01-23 | Stop reason: HOSPADM

## 2019-01-23 RX ORDER — ACETAMINOPHEN 325 MG/1
650 TABLET ORAL EVERY 4 HOURS PRN
Status: DISCONTINUED | OUTPATIENT
Start: 2019-01-23 | End: 2019-01-24 | Stop reason: HOSPADM

## 2019-01-23 RX ORDER — PROMETHAZINE HYDROCHLORIDE 25 MG/ML
6.25 INJECTION, SOLUTION INTRAMUSCULAR; INTRAVENOUS
Status: DISCONTINUED | OUTPATIENT
Start: 2019-01-23 | End: 2019-01-23 | Stop reason: HOSPADM

## 2019-01-23 RX ORDER — PROPOFOL 10 MG/ML
INJECTION, EMULSION INTRAVENOUS PRN
Status: DISCONTINUED | OUTPATIENT
Start: 2019-01-23 | End: 2019-01-23 | Stop reason: SDUPTHER

## 2019-01-23 RX ORDER — MORPHINE SULFATE 4 MG/ML
4 INJECTION, SOLUTION INTRAMUSCULAR; INTRAVENOUS
Status: DISCONTINUED | OUTPATIENT
Start: 2019-01-23 | End: 2019-01-24 | Stop reason: HOSPADM

## 2019-01-23 RX ORDER — MORPHINE SULFATE 2 MG/ML
2 INJECTION, SOLUTION INTRAMUSCULAR; INTRAVENOUS
Status: DISCONTINUED | OUTPATIENT
Start: 2019-01-23 | End: 2019-01-24 | Stop reason: HOSPADM

## 2019-01-23 RX ORDER — NICOTINE 21 MG/24HR
1 PATCH, TRANSDERMAL 24 HOURS TRANSDERMAL DAILY
Status: DISCONTINUED | OUTPATIENT
Start: 2019-01-23 | End: 2019-01-24 | Stop reason: HOSPADM

## 2019-01-23 RX ORDER — CLOPIDOGREL BISULFATE 75 MG/1
TABLET ORAL
Status: DISPENSED
Start: 2019-01-23 | End: 2019-01-24

## 2019-01-23 RX ORDER — MORPHINE SULFATE 4 MG/ML
2 INJECTION, SOLUTION INTRAMUSCULAR; INTRAVENOUS EVERY 5 MIN PRN
Status: DISCONTINUED | OUTPATIENT
Start: 2019-01-23 | End: 2019-01-23 | Stop reason: HOSPADM

## 2019-01-23 RX ORDER — ONDANSETRON 2 MG/ML
4 INJECTION INTRAMUSCULAR; INTRAVENOUS PRN
Status: DISCONTINUED | OUTPATIENT
Start: 2019-01-23 | End: 2019-01-23 | Stop reason: HOSPADM

## 2019-01-23 RX ORDER — ONDANSETRON 2 MG/ML
INJECTION INTRAMUSCULAR; INTRAVENOUS PRN
Status: DISCONTINUED | OUTPATIENT
Start: 2019-01-23 | End: 2019-01-23 | Stop reason: SDUPTHER

## 2019-01-23 RX ORDER — CLOPIDOGREL BISULFATE 75 MG/1
75 TABLET ORAL DAILY
Status: DISCONTINUED | OUTPATIENT
Start: 2019-01-24 | End: 2019-01-24 | Stop reason: HOSPADM

## 2019-01-23 RX ORDER — HYDROCODONE BITARTRATE AND ACETAMINOPHEN 5; 325 MG/1; MG/1
1 TABLET ORAL EVERY 4 HOURS PRN
Status: DISCONTINUED | OUTPATIENT
Start: 2019-01-23 | End: 2019-01-24 | Stop reason: HOSPADM

## 2019-01-23 RX ORDER — ATENOLOL 25 MG/1
50 TABLET ORAL 2 TIMES DAILY
Status: DISCONTINUED | OUTPATIENT
Start: 2019-01-23 | End: 2019-01-24 | Stop reason: HOSPADM

## 2019-01-23 RX ORDER — HEPARIN SODIUM 1000 [USP'U]/ML
INJECTION, SOLUTION INTRAVENOUS; SUBCUTANEOUS PRN
Status: DISCONTINUED | OUTPATIENT
Start: 2019-01-23 | End: 2019-01-23 | Stop reason: SDUPTHER

## 2019-01-23 RX ORDER — SODIUM CHLORIDE 0.9 % (FLUSH) 0.9 %
10 SYRINGE (ML) INJECTION EVERY 12 HOURS SCHEDULED
Status: DISCONTINUED | OUTPATIENT
Start: 2019-01-23 | End: 2019-01-24 | Stop reason: HOSPADM

## 2019-01-23 RX ORDER — TRAZODONE HYDROCHLORIDE 50 MG/1
150 TABLET ORAL NIGHTLY
Status: DISCONTINUED | OUTPATIENT
Start: 2019-01-23 | End: 2019-01-24 | Stop reason: HOSPADM

## 2019-01-23 RX ORDER — MIDAZOLAM HYDROCHLORIDE 1 MG/ML
INJECTION INTRAMUSCULAR; INTRAVENOUS PRN
Status: DISCONTINUED | OUTPATIENT
Start: 2019-01-23 | End: 2019-01-23 | Stop reason: SDUPTHER

## 2019-01-23 RX ORDER — LABETALOL HYDROCHLORIDE 5 MG/ML
10 INJECTION, SOLUTION INTRAVENOUS
Status: DISCONTINUED | OUTPATIENT
Start: 2019-01-23 | End: 2019-01-24 | Stop reason: HOSPADM

## 2019-01-23 RX ORDER — MORPHINE SULFATE 4 MG/ML
1 INJECTION, SOLUTION INTRAMUSCULAR; INTRAVENOUS EVERY 5 MIN PRN
Status: DISCONTINUED | OUTPATIENT
Start: 2019-01-23 | End: 2019-01-23 | Stop reason: HOSPADM

## 2019-01-23 RX ORDER — CLOPIDOGREL BISULFATE 75 MG/1
150 TABLET ORAL ONCE
Status: DISCONTINUED | OUTPATIENT
Start: 2019-01-23 | End: 2019-01-23 | Stop reason: HOSPADM

## 2019-01-23 RX ORDER — OXYCODONE HYDROCHLORIDE AND ACETAMINOPHEN 5; 325 MG/1; MG/1
2 TABLET ORAL PRN
Status: DISCONTINUED | OUTPATIENT
Start: 2019-01-23 | End: 2019-01-23 | Stop reason: HOSPADM

## 2019-01-23 RX ORDER — CLONIDINE HYDROCHLORIDE 0.1 MG/1
0.1 TABLET ORAL
Status: DISCONTINUED | OUTPATIENT
Start: 2019-01-23 | End: 2019-01-24 | Stop reason: HOSPADM

## 2019-01-23 RX ORDER — GABAPENTIN 300 MG/1
300 CAPSULE ORAL 3 TIMES DAILY
Status: DISCONTINUED | OUTPATIENT
Start: 2019-01-23 | End: 2019-01-24 | Stop reason: HOSPADM

## 2019-01-23 RX ORDER — DIPHENHYDRAMINE HYDROCHLORIDE 50 MG/ML
12.5 INJECTION INTRAMUSCULAR; INTRAVENOUS
Status: DISCONTINUED | OUTPATIENT
Start: 2019-01-23 | End: 2019-01-23 | Stop reason: HOSPADM

## 2019-01-23 RX ORDER — SODIUM CHLORIDE 9 MG/ML
INJECTION, SOLUTION INTRAVENOUS CONTINUOUS
Status: DISCONTINUED | OUTPATIENT
Start: 2019-01-23 | End: 2019-01-24

## 2019-01-23 RX ORDER — OXYCODONE HYDROCHLORIDE AND ACETAMINOPHEN 5; 325 MG/1; MG/1
1 TABLET ORAL PRN
Status: DISCONTINUED | OUTPATIENT
Start: 2019-01-23 | End: 2019-01-23 | Stop reason: HOSPADM

## 2019-01-23 RX ORDER — DEXAMETHASONE SODIUM PHOSPHATE 4 MG/ML
INJECTION, SOLUTION INTRA-ARTICULAR; INTRALESIONAL; INTRAMUSCULAR; INTRAVENOUS; SOFT TISSUE PRN
Status: DISCONTINUED | OUTPATIENT
Start: 2019-01-23 | End: 2019-01-23 | Stop reason: SDUPTHER

## 2019-01-23 RX ORDER — NITROGLYCERIN 0.4 MG/1
0.4 TABLET SUBLINGUAL EVERY 5 MIN PRN
Status: DISCONTINUED | OUTPATIENT
Start: 2019-01-23 | End: 2019-01-24 | Stop reason: HOSPADM

## 2019-01-23 RX ADMIN — ATORVASTATIN CALCIUM 80 MG: 80 TABLET, FILM COATED ORAL at 20:55

## 2019-01-23 RX ADMIN — AMLODIPINE BESYLATE 5 MG: 5 TABLET ORAL at 12:25

## 2019-01-23 RX ADMIN — SUGAMMADEX 200 MG: 100 INJECTION, SOLUTION INTRAVENOUS at 09:32

## 2019-01-23 RX ADMIN — MIDAZOLAM HYDROCHLORIDE 2 MG: 2 INJECTION, SOLUTION INTRAMUSCULAR; INTRAVENOUS at 07:30

## 2019-01-23 RX ADMIN — EPHEDRINE SULFATE 5 MG: 50 INJECTION INTRAVENOUS at 08:33

## 2019-01-23 RX ADMIN — EPHEDRINE SULFATE 5 MG: 50 INJECTION INTRAVENOUS at 09:04

## 2019-01-23 RX ADMIN — DEXAMETHASONE SODIUM PHOSPHATE 8 MG: 4 INJECTION, SOLUTION INTRAMUSCULAR; INTRAVENOUS at 07:47

## 2019-01-23 RX ADMIN — ROCURONIUM BROMIDE 50 MG: 10 SOLUTION INTRAVENOUS at 07:37

## 2019-01-23 RX ADMIN — LIDOCAINE HYDROCHLORIDE 50 MG: 20 INJECTION, SOLUTION INFILTRATION; PERINEURAL at 07:37

## 2019-01-23 RX ADMIN — SODIUM CHLORIDE, POTASSIUM CHLORIDE, SODIUM LACTATE AND CALCIUM CHLORIDE: 600; 310; 30; 20 INJECTION, SOLUTION INTRAVENOUS at 08:49

## 2019-01-23 RX ADMIN — HYDROCODONE BITARTRATE AND ACETAMINOPHEN 1 TABLET: 5; 325 TABLET ORAL at 12:25

## 2019-01-23 RX ADMIN — PROPOFOL 200 MG: 10 INJECTION, EMULSION INTRAVENOUS at 07:37

## 2019-01-23 RX ADMIN — AMLODIPINE BESYLATE 5 MG: 5 TABLET ORAL at 20:55

## 2019-01-23 RX ADMIN — ROCURONIUM BROMIDE 10 MG: 10 SOLUTION INTRAVENOUS at 08:49

## 2019-01-23 RX ADMIN — ROCURONIUM BROMIDE 10 MG: 10 SOLUTION INTRAVENOUS at 09:03

## 2019-01-23 RX ADMIN — SODIUM CHLORIDE: 9 INJECTION, SOLUTION INTRAVENOUS at 12:33

## 2019-01-23 RX ADMIN — CLOPIDOGREL BISULFATE 150 MG: 75 TABLET, FILM COATED ORAL at 12:30

## 2019-01-23 RX ADMIN — ATENOLOL 50 MG: 25 TABLET ORAL at 20:55

## 2019-01-23 RX ADMIN — EPHEDRINE SULFATE 5 MG: 50 INJECTION INTRAVENOUS at 08:42

## 2019-01-23 RX ADMIN — MORPHINE SULFATE 4 MG: 4 INJECTION INTRAVENOUS at 15:08

## 2019-01-23 RX ADMIN — HYDROCODONE BITARTRATE AND ACETAMINOPHEN 2 TABLET: 5; 325 TABLET ORAL at 21:16

## 2019-01-23 RX ADMIN — ROCURONIUM BROMIDE 10 MG: 10 SOLUTION INTRAVENOUS at 08:54

## 2019-01-23 RX ADMIN — GABAPENTIN 300 MG: 300 CAPSULE ORAL at 14:00

## 2019-01-23 RX ADMIN — GABAPENTIN 300 MG: 300 CAPSULE ORAL at 20:56

## 2019-01-23 RX ADMIN — FENTANYL CITRATE 100 MCG: 50 INJECTION INTRAMUSCULAR; INTRAVENOUS at 07:37

## 2019-01-23 RX ADMIN — SODIUM CHLORIDE, POTASSIUM CHLORIDE, SODIUM LACTATE AND CALCIUM CHLORIDE: 600; 310; 30; 20 INJECTION, SOLUTION INTRAVENOUS at 07:31

## 2019-01-23 RX ADMIN — HEPARIN SODIUM 5000 UNITS: 1000 INJECTION, SOLUTION INTRAVENOUS; SUBCUTANEOUS at 08:08

## 2019-01-23 RX ADMIN — TRAZODONE HYDROCHLORIDE 150 MG: 50 TABLET ORAL at 23:28

## 2019-01-23 RX ADMIN — ASPIRIN 81 MG: 81 TABLET, COATED ORAL at 12:25

## 2019-01-23 RX ADMIN — HYDROMORPHONE HYDROCHLORIDE 0.5 MG: 2 INJECTION INTRAMUSCULAR; INTRAVENOUS; SUBCUTANEOUS at 07:55

## 2019-01-23 RX ADMIN — HYDROMORPHONE HYDROCHLORIDE 0.5 MG: 2 INJECTION INTRAMUSCULAR; INTRAVENOUS; SUBCUTANEOUS at 08:54

## 2019-01-23 RX ADMIN — ONDANSETRON 4 MG: 2 INJECTION INTRAMUSCULAR; INTRAVENOUS at 07:47

## 2019-01-23 RX ADMIN — HEPARIN SODIUM 1000 UNITS: 1000 INJECTION, SOLUTION INTRAVENOUS; SUBCUTANEOUS at 08:54

## 2019-01-23 RX ADMIN — Medication 2 G: at 07:47

## 2019-01-23 ASSESSMENT — PULMONARY FUNCTION TESTS
PIF_VALUE: 24
PIF_VALUE: 1
PIF_VALUE: 0
PIF_VALUE: 19
PIF_VALUE: 17
PIF_VALUE: 20
PIF_VALUE: 19
PIF_VALUE: 20
PIF_VALUE: 19
PIF_VALUE: 19
PIF_VALUE: 20
PIF_VALUE: 20
PIF_VALUE: 19
PIF_VALUE: 20
PIF_VALUE: 19
PIF_VALUE: 1
PIF_VALUE: 20
PIF_VALUE: 19
PIF_VALUE: 20
PIF_VALUE: 1
PIF_VALUE: 14
PIF_VALUE: 19
PIF_VALUE: 1
PIF_VALUE: 20
PIF_VALUE: 20
PIF_VALUE: 19
PIF_VALUE: 14
PIF_VALUE: 20
PIF_VALUE: 20
PIF_VALUE: 17
PIF_VALUE: 1
PIF_VALUE: 19
PIF_VALUE: 19
PIF_VALUE: 18
PIF_VALUE: 20
PIF_VALUE: 20
PIF_VALUE: 18
PIF_VALUE: 19
PIF_VALUE: 14
PIF_VALUE: 19
PIF_VALUE: 20
PIF_VALUE: 19
PIF_VALUE: 20
PIF_VALUE: 19
PIF_VALUE: 0
PIF_VALUE: 20
PIF_VALUE: 20
PIF_VALUE: 19
PIF_VALUE: 20
PIF_VALUE: 18
PIF_VALUE: 20
PIF_VALUE: 20
PIF_VALUE: 19
PIF_VALUE: 19
PIF_VALUE: 1
PIF_VALUE: 20
PIF_VALUE: 19
PIF_VALUE: 19
PIF_VALUE: 31
PIF_VALUE: 18
PIF_VALUE: 18
PIF_VALUE: 20
PIF_VALUE: 20
PIF_VALUE: 19
PIF_VALUE: 20
PIF_VALUE: 19
PIF_VALUE: 20
PIF_VALUE: 19
PIF_VALUE: 20
PIF_VALUE: 19
PIF_VALUE: 20
PIF_VALUE: 18
PIF_VALUE: 17
PIF_VALUE: 0
PIF_VALUE: 19
PIF_VALUE: 19
PIF_VALUE: 2
PIF_VALUE: 19
PIF_VALUE: 18
PIF_VALUE: 19
PIF_VALUE: 20
PIF_VALUE: 19
PIF_VALUE: 20
PIF_VALUE: 19
PIF_VALUE: 20
PIF_VALUE: 19
PIF_VALUE: 20
PIF_VALUE: 20
PIF_VALUE: 0
PIF_VALUE: 19
PIF_VALUE: 20
PIF_VALUE: 19
PIF_VALUE: 15
PIF_VALUE: 19
PIF_VALUE: 20
PIF_VALUE: 19
PIF_VALUE: 20
PIF_VALUE: 20
PIF_VALUE: 21
PIF_VALUE: 19
PIF_VALUE: 20
PIF_VALUE: 20
PIF_VALUE: 21
PIF_VALUE: 16
PIF_VALUE: 0
PIF_VALUE: 19

## 2019-01-23 ASSESSMENT — PAIN SCALES - GENERAL
PAINLEVEL_OUTOF10: 4
PAINLEVEL_OUTOF10: 7
PAINLEVEL_OUTOF10: 0
PAINLEVEL_OUTOF10: 4
PAINLEVEL_OUTOF10: 4
PAINLEVEL_OUTOF10: 7
PAINLEVEL_OUTOF10: 6
PAINLEVEL_OUTOF10: 7

## 2019-01-23 ASSESSMENT — PAIN DESCRIPTION - PAIN TYPE
TYPE: CHRONIC PAIN
TYPE: ACUTE PAIN

## 2019-01-23 ASSESSMENT — PAIN - FUNCTIONAL ASSESSMENT: PAIN_FUNCTIONAL_ASSESSMENT: 0-10

## 2019-01-23 ASSESSMENT — PAIN DESCRIPTION - ORIENTATION: ORIENTATION: MID

## 2019-01-23 ASSESSMENT — PAIN DESCRIPTION - DESCRIPTORS: DESCRIPTORS: ACHING;DULL

## 2019-01-23 ASSESSMENT — PAIN DESCRIPTION - FREQUENCY: FREQUENCY: CONTINUOUS

## 2019-01-23 ASSESSMENT — PAIN DESCRIPTION - LOCATION
LOCATION: BACK
LOCATION: GROIN

## 2019-01-24 VITALS
HEART RATE: 65 BPM | SYSTOLIC BLOOD PRESSURE: 118 MMHG | BODY MASS INDEX: 27.22 KG/M2 | OXYGEN SATURATION: 96 % | HEIGHT: 72 IN | WEIGHT: 201 LBS | RESPIRATION RATE: 18 BRPM | DIASTOLIC BLOOD PRESSURE: 79 MMHG | TEMPERATURE: 98.4 F

## 2019-01-24 LAB
HCT VFR BLD CALC: 39.4 % (ref 40.5–52.5)
HEMOGLOBIN: 13.3 G/DL (ref 13.5–17.5)
MCH RBC QN AUTO: 33.2 PG (ref 26–34)
MCHC RBC AUTO-ENTMCNC: 33.7 G/DL (ref 31–36)
MCV RBC AUTO: 98.4 FL (ref 80–100)
PDW BLD-RTO: 14.2 % (ref 12.4–15.4)
PLATELET # BLD: 158 K/UL (ref 135–450)
PMV BLD AUTO: 9.1 FL (ref 5–10.5)
RBC # BLD: 4 M/UL (ref 4.2–5.9)
WBC # BLD: 9.5 K/UL (ref 4–11)

## 2019-01-24 PROCEDURE — 2580000003 HC RX 258: Performed by: SURGERY

## 2019-01-24 PROCEDURE — 99406 BEHAV CHNG SMOKING 3-10 MIN: CPT

## 2019-01-24 PROCEDURE — 6370000000 HC RX 637 (ALT 250 FOR IP): Performed by: SURGERY

## 2019-01-24 PROCEDURE — 36415 COLL VENOUS BLD VENIPUNCTURE: CPT

## 2019-01-24 PROCEDURE — 85027 COMPLETE CBC AUTOMATED: CPT

## 2019-01-24 PROCEDURE — 94664 DEMO&/EVAL PT USE INHALER: CPT

## 2019-01-24 RX ORDER — CLOPIDOGREL BISULFATE 75 MG/1
75 TABLET ORAL DAILY
Qty: 30 TABLET | Refills: 3 | Status: ON HOLD | OUTPATIENT
Start: 2019-01-24 | End: 2019-02-22 | Stop reason: CLARIF

## 2019-01-24 RX ORDER — ASPIRIN 81 MG/1
81 TABLET ORAL DAILY
Qty: 30 TABLET | Refills: 3 | COMMUNITY
Start: 2019-01-25 | End: 2021-01-01

## 2019-01-24 RX ORDER — HYDROCODONE BITARTRATE AND ACETAMINOPHEN 5; 325 MG/1; MG/1
1 TABLET ORAL EVERY 4 HOURS PRN
Qty: 30 TABLET | Refills: 0 | Status: SHIPPED | OUTPATIENT
Start: 2019-01-24 | End: 2019-01-31

## 2019-01-24 RX ADMIN — ASPIRIN 81 MG: 81 TABLET, COATED ORAL at 08:26

## 2019-01-24 RX ADMIN — CLOPIDOGREL BISULFATE 75 MG: 75 TABLET ORAL at 08:27

## 2019-01-24 RX ADMIN — SODIUM CHLORIDE: 9 INJECTION, SOLUTION INTRAVENOUS at 01:38

## 2019-01-24 RX ADMIN — HYDROCODONE BITARTRATE AND ACETAMINOPHEN 1 TABLET: 5; 325 TABLET ORAL at 11:27

## 2019-01-24 RX ADMIN — AMLODIPINE BESYLATE 5 MG: 5 TABLET ORAL at 08:26

## 2019-01-24 RX ADMIN — GABAPENTIN 300 MG: 300 CAPSULE ORAL at 14:41

## 2019-01-24 RX ADMIN — ATENOLOL 50 MG: 25 TABLET ORAL at 08:26

## 2019-01-24 RX ADMIN — HYDROCODONE BITARTRATE AND ACETAMINOPHEN 2 TABLET: 5; 325 TABLET ORAL at 04:51

## 2019-01-24 RX ADMIN — GABAPENTIN 300 MG: 300 CAPSULE ORAL at 08:26

## 2019-01-24 RX ADMIN — HYDROCODONE BITARTRATE AND ACETAMINOPHEN 1 TABLET: 5; 325 TABLET ORAL at 17:28

## 2019-01-24 ASSESSMENT — PAIN SCALES - GENERAL
PAINLEVEL_OUTOF10: 6
PAINLEVEL_OUTOF10: 5
PAINLEVEL_OUTOF10: 5
PAINLEVEL_OUTOF10: 7
PAINLEVEL_OUTOF10: 7
PAINLEVEL_OUTOF10: 5

## 2019-01-24 ASSESSMENT — PAIN DESCRIPTION - LOCATION
LOCATION: GROIN
LOCATION: GROIN

## 2019-01-24 ASSESSMENT — PAIN DESCRIPTION - ORIENTATION
ORIENTATION: RIGHT;LEFT
ORIENTATION: RIGHT;LEFT

## 2019-01-24 ASSESSMENT — PAIN DESCRIPTION - PAIN TYPE
TYPE: CHRONIC PAIN
TYPE: CHRONIC PAIN

## 2019-01-31 ENCOUNTER — TELEPHONE (OUTPATIENT)
Dept: SURGERY | Age: 59
End: 2019-01-31

## 2019-02-01 ENCOUNTER — OFFICE VISIT (OUTPATIENT)
Dept: VASCULAR SURGERY | Age: 59
End: 2019-02-01

## 2019-02-01 VITALS
WEIGHT: 201 LBS | DIASTOLIC BLOOD PRESSURE: 82 MMHG | BODY MASS INDEX: 27.22 KG/M2 | SYSTOLIC BLOOD PRESSURE: 140 MMHG | HEIGHT: 72 IN

## 2019-02-01 DIAGNOSIS — Z09 POSTOP CHECK: ICD-10-CM

## 2019-02-01 DIAGNOSIS — I73.9 CLAUDICATION IN PERIPHERAL VASCULAR DISEASE (HCC): Primary | ICD-10-CM

## 2019-02-01 PROCEDURE — 99024 POSTOP FOLLOW-UP VISIT: CPT | Performed by: SURGERY

## 2019-02-01 RX ORDER — HYDROCODONE BITARTRATE AND ACETAMINOPHEN 5; 325 MG/1; MG/1
1 TABLET ORAL EVERY 4 HOURS PRN
Qty: 30 TABLET | Refills: 0 | Status: SHIPPED | OUTPATIENT
Start: 2019-02-01 | End: 2019-02-08 | Stop reason: SDUPTHER

## 2019-02-08 ENCOUNTER — OFFICE VISIT (OUTPATIENT)
Dept: VASCULAR SURGERY | Age: 59
End: 2019-02-08

## 2019-02-08 VITALS
WEIGHT: 201 LBS | DIASTOLIC BLOOD PRESSURE: 80 MMHG | HEIGHT: 72 IN | SYSTOLIC BLOOD PRESSURE: 170 MMHG | BODY MASS INDEX: 27.22 KG/M2

## 2019-02-08 DIAGNOSIS — Z09 POSTOP CHECK: Primary | ICD-10-CM

## 2019-02-08 DIAGNOSIS — I73.9 CLAUDICATION IN PERIPHERAL VASCULAR DISEASE (HCC): ICD-10-CM

## 2019-02-08 PROCEDURE — 99024 POSTOP FOLLOW-UP VISIT: CPT | Performed by: SURGERY

## 2019-02-08 RX ORDER — HYDROCODONE BITARTRATE AND ACETAMINOPHEN 5; 325 MG/1; MG/1
1 TABLET ORAL EVERY 4 HOURS PRN
Qty: 30 TABLET | Refills: 0 | Status: SHIPPED | OUTPATIENT
Start: 2019-02-08 | End: 2019-02-15

## 2019-02-08 RX ORDER — CEPHALEXIN 250 MG/1
500 CAPSULE ORAL 4 TIMES DAILY
Qty: 28 CAPSULE | Refills: 0 | Status: ON HOLD | OUTPATIENT
Start: 2019-02-08 | End: 2019-02-22 | Stop reason: CLARIF

## 2019-02-15 ENCOUNTER — OFFICE VISIT (OUTPATIENT)
Dept: VASCULAR SURGERY | Age: 59
End: 2019-02-15

## 2019-02-15 VITALS
HEIGHT: 72 IN | DIASTOLIC BLOOD PRESSURE: 80 MMHG | BODY MASS INDEX: 27.22 KG/M2 | WEIGHT: 201 LBS | SYSTOLIC BLOOD PRESSURE: 120 MMHG

## 2019-02-15 DIAGNOSIS — S31.104D NON-HEALING OPEN WOUND OF LEFT GROIN, SUBSEQUENT ENCOUNTER: Primary | ICD-10-CM

## 2019-02-15 PROCEDURE — 99024 POSTOP FOLLOW-UP VISIT: CPT | Performed by: SURGERY

## 2019-02-15 RX ORDER — HYDROCODONE BITARTRATE AND ACETAMINOPHEN 5; 325 MG/1; MG/1
1 TABLET ORAL EVERY 4 HOURS PRN
Qty: 30 TABLET | Refills: 0 | Status: ON HOLD | OUTPATIENT
Start: 2019-02-15 | End: 2019-02-26 | Stop reason: HOSPADM

## 2019-02-20 ENCOUNTER — TELEPHONE (OUTPATIENT)
Dept: VASCULAR SURGERY | Age: 59
End: 2019-02-20

## 2019-02-21 ENCOUNTER — APPOINTMENT (OUTPATIENT)
Dept: CT IMAGING | Age: 59
End: 2019-02-21
Payer: MEDICARE

## 2019-02-21 ENCOUNTER — HOSPITAL ENCOUNTER (EMERGENCY)
Age: 59
Discharge: ANOTHER HOSPITAL | End: 2019-02-21
Attending: EMERGENCY MEDICINE
Payer: MEDICARE

## 2019-02-21 ENCOUNTER — HOSPITAL ENCOUNTER (INPATIENT)
Age: 59
LOS: 5 days | Discharge: HOME | DRG: 721 | End: 2019-02-26
Attending: FAMILY MEDICINE | Admitting: INTERNAL MEDICINE
Payer: MEDICARE

## 2019-02-21 ENCOUNTER — ANESTHESIA EVENT (OUTPATIENT)
Dept: OPERATING ROOM | Age: 59
DRG: 721 | End: 2019-02-21
Payer: MEDICARE

## 2019-02-21 ENCOUNTER — HOSPITAL ENCOUNTER (OUTPATIENT)
Dept: VASCULAR LAB | Age: 59
Discharge: HOME | End: 2019-02-21
Payer: MEDICARE

## 2019-02-21 ENCOUNTER — ANESTHESIA (OUTPATIENT)
Dept: OPERATING ROOM | Age: 59
DRG: 721 | End: 2019-02-21
Payer: MEDICARE

## 2019-02-21 VITALS
OXYGEN SATURATION: 100 % | DIASTOLIC BLOOD PRESSURE: 68 MMHG | SYSTOLIC BLOOD PRESSURE: 104 MMHG | RESPIRATION RATE: 4 BRPM

## 2019-02-21 VITALS
DIASTOLIC BLOOD PRESSURE: 88 MMHG | TEMPERATURE: 98.7 F | BODY MASS INDEX: 27.09 KG/M2 | OXYGEN SATURATION: 99 % | HEIGHT: 72 IN | RESPIRATION RATE: 16 BRPM | HEART RATE: 63 BPM | WEIGHT: 200 LBS | SYSTOLIC BLOOD PRESSURE: 139 MMHG

## 2019-02-21 DIAGNOSIS — S31.109A WOUND OF LEFT GROIN, INITIAL ENCOUNTER: Primary | ICD-10-CM

## 2019-02-21 DIAGNOSIS — B99.9 INFECTION: ICD-10-CM

## 2019-02-21 DIAGNOSIS — S31.104A NON-HEALING OPEN WOUND OF LEFT GROIN, INITIAL ENCOUNTER: ICD-10-CM

## 2019-02-21 DIAGNOSIS — S31.104D NON-HEALING OPEN WOUND OF LEFT GROIN, SUBSEQUENT ENCOUNTER: ICD-10-CM

## 2019-02-21 DIAGNOSIS — L03.314 CELLULITIS OF LEFT GROIN: Primary | ICD-10-CM

## 2019-02-21 DIAGNOSIS — T81.49XA POST-OPERATIVE WOUND ABSCESS: ICD-10-CM

## 2019-02-21 LAB
A/G RATIO: 1.2 (ref 1.1–2.2)
ALBUMIN SERPL-MCNC: 3.8 G/DL (ref 3.4–5)
ALP BLD-CCNC: 77 U/L (ref 40–129)
ALT SERPL-CCNC: 18 U/L (ref 10–40)
ANION GAP SERPL CALCULATED.3IONS-SCNC: 8 MMOL/L (ref 3–16)
AST SERPL-CCNC: 25 U/L (ref 15–37)
BASOPHILS ABSOLUTE: 0.1 K/UL (ref 0–0.2)
BASOPHILS RELATIVE PERCENT: 1.1 %
BILIRUB SERPL-MCNC: 0.9 MG/DL (ref 0–1)
BUN BLDV-MCNC: 8 MG/DL (ref 7–20)
CALCIUM SERPL-MCNC: 9 MG/DL (ref 8.3–10.6)
CHLORIDE BLD-SCNC: 100 MMOL/L (ref 99–110)
CO2: 23 MMOL/L (ref 21–32)
CREAT SERPL-MCNC: 0.8 MG/DL (ref 0.9–1.3)
EOSINOPHILS ABSOLUTE: 0.2 K/UL (ref 0–0.6)
EOSINOPHILS RELATIVE PERCENT: 2.3 %
GFR AFRICAN AMERICAN: >60
GFR NON-AFRICAN AMERICAN: >60
GLOBULIN: 3.3 G/DL
GLUCOSE BLD-MCNC: 133 MG/DL (ref 70–99)
HCT VFR BLD CALC: 48.2 % (ref 40.5–52.5)
HEMOGLOBIN: 16.2 G/DL (ref 13.5–17.5)
LACTIC ACID: 1.3 MMOL/L (ref 0.4–2)
LYMPHOCYTES ABSOLUTE: 1.7 K/UL (ref 1–5.1)
LYMPHOCYTES RELATIVE PERCENT: 24.6 %
MCH RBC QN AUTO: 33.1 PG (ref 26–34)
MCHC RBC AUTO-ENTMCNC: 33.7 G/DL (ref 31–36)
MCV RBC AUTO: 98.3 FL (ref 80–100)
MONOCYTES ABSOLUTE: 0.7 K/UL (ref 0–1.3)
MONOCYTES RELATIVE PERCENT: 9.7 %
NEUTROPHILS ABSOLUTE: 4.3 K/UL (ref 1.7–7.7)
NEUTROPHILS RELATIVE PERCENT: 62.3 %
PDW BLD-RTO: 14.5 % (ref 12.4–15.4)
PLATELET # BLD: 181 K/UL (ref 135–450)
PMV BLD AUTO: 8.5 FL (ref 5–10.5)
POTASSIUM SERPL-SCNC: 4.8 MMOL/L (ref 3.5–5.1)
RBC # BLD: 4.9 M/UL (ref 4.2–5.9)
SODIUM BLD-SCNC: 131 MMOL/L (ref 136–145)
TOTAL PROTEIN: 7.1 G/DL (ref 6.4–8.2)
WBC # BLD: 6.8 K/UL (ref 4–11)

## 2019-02-21 PROCEDURE — 96365 THER/PROPH/DIAG IV INF INIT: CPT

## 2019-02-21 PROCEDURE — 97605 NEG PRS WND THER DME<=50SQCM: CPT | Performed by: SURGERY

## 2019-02-21 PROCEDURE — 0JBM0ZZ EXCISION OF LEFT UPPER LEG SUBCUTANEOUS TISSUE AND FASCIA, OPEN APPROACH: ICD-10-PCS | Performed by: SURGERY

## 2019-02-21 PROCEDURE — 6360000002 HC RX W HCPCS: Performed by: PHYSICIAN ASSISTANT

## 2019-02-21 PROCEDURE — 2580000003 HC RX 258

## 2019-02-21 PROCEDURE — 6360000002 HC RX W HCPCS: Performed by: NURSE ANESTHETIST, CERTIFIED REGISTERED

## 2019-02-21 PROCEDURE — 36415 COLL VENOUS BLD VENIPUNCTURE: CPT

## 2019-02-21 PROCEDURE — 99285 EMERGENCY DEPT VISIT HI MDM: CPT

## 2019-02-21 PROCEDURE — 7100000000 HC PACU RECOVERY - FIRST 15 MIN: Performed by: SURGERY

## 2019-02-21 PROCEDURE — 6360000002 HC RX W HCPCS: Performed by: SURGERY

## 2019-02-21 PROCEDURE — 96375 TX/PRO/DX INJ NEW DRUG ADDON: CPT

## 2019-02-21 PROCEDURE — 6370000000 HC RX 637 (ALT 250 FOR IP): Performed by: SURGERY

## 2019-02-21 PROCEDURE — 83605 ASSAY OF LACTIC ACID: CPT

## 2019-02-21 PROCEDURE — 85025 COMPLETE CBC W/AUTO DIFF WBC: CPT

## 2019-02-21 PROCEDURE — 6370000000 HC RX 637 (ALT 250 FOR IP): Performed by: ANESTHESIOLOGY

## 2019-02-21 PROCEDURE — 87075 CULTR BACTERIA EXCEPT BLOOD: CPT

## 2019-02-21 PROCEDURE — 3700000001 HC ADD 15 MINUTES (ANESTHESIA): Performed by: SURGERY

## 2019-02-21 PROCEDURE — 6370000000 HC RX 637 (ALT 250 FOR IP): Performed by: PHYSICIAN ASSISTANT

## 2019-02-21 PROCEDURE — 6360000002 HC RX W HCPCS: Performed by: ANESTHESIOLOGY

## 2019-02-21 PROCEDURE — 74177 CT ABD & PELVIS W/CONTRAST: CPT

## 2019-02-21 PROCEDURE — 7100000001 HC PACU RECOVERY - ADDTL 15 MIN: Performed by: SURGERY

## 2019-02-21 PROCEDURE — 96367 TX/PROPH/DG ADDL SEQ IV INF: CPT

## 2019-02-21 PROCEDURE — 2580000003 HC RX 258: Performed by: SURGERY

## 2019-02-21 PROCEDURE — 2709999900 HC NON-CHARGEABLE SUPPLY: Performed by: SURGERY

## 2019-02-21 PROCEDURE — 3600000003 HC SURGERY LEVEL 3 BASE: Performed by: SURGERY

## 2019-02-21 PROCEDURE — 3700000000 HC ANESTHESIA ATTENDED CARE: Performed by: SURGERY

## 2019-02-21 PROCEDURE — 6360000004 HC RX CONTRAST MEDICATION: Performed by: EMERGENCY MEDICINE

## 2019-02-21 PROCEDURE — 80053 COMPREHEN METABOLIC PANEL: CPT

## 2019-02-21 PROCEDURE — 2500000003 HC RX 250 WO HCPCS: Performed by: NURSE ANESTHETIST, CERTIFIED REGISTERED

## 2019-02-21 PROCEDURE — 87040 BLOOD CULTURE FOR BACTERIA: CPT

## 2019-02-21 PROCEDURE — 87070 CULTURE OTHR SPECIMN AEROBIC: CPT

## 2019-02-21 PROCEDURE — 11042 DBRDMT SUBQ TIS 1ST 20SQCM/<: CPT | Performed by: SURGERY

## 2019-02-21 PROCEDURE — 2580000003 HC RX 258: Performed by: PHYSICIAN ASSISTANT

## 2019-02-21 PROCEDURE — 3600000013 HC SURGERY LEVEL 3 ADDTL 15MIN: Performed by: SURGERY

## 2019-02-21 PROCEDURE — 2060000000 HC ICU INTERMEDIATE R&B

## 2019-02-21 PROCEDURE — 87205 SMEAR GRAM STAIN: CPT

## 2019-02-21 PROCEDURE — 93926 LOWER EXTREMITY STUDY: CPT

## 2019-02-21 PROCEDURE — 11045 DBRDMT SUBQ TISS EACH ADDL: CPT | Performed by: SURGERY

## 2019-02-21 RX ORDER — ONDANSETRON 2 MG/ML
INJECTION INTRAMUSCULAR; INTRAVENOUS PRN
Status: DISCONTINUED | OUTPATIENT
Start: 2019-02-21 | End: 2019-02-21 | Stop reason: SDUPTHER

## 2019-02-21 RX ORDER — PROPOFOL 10 MG/ML
INJECTION, EMULSION INTRAVENOUS PRN
Status: DISCONTINUED | OUTPATIENT
Start: 2019-02-21 | End: 2019-02-21 | Stop reason: SDUPTHER

## 2019-02-21 RX ORDER — ONDANSETRON 2 MG/ML
4 INJECTION INTRAMUSCULAR; INTRAVENOUS EVERY 6 HOURS PRN
Status: DISCONTINUED | OUTPATIENT
Start: 2019-02-21 | End: 2019-02-26 | Stop reason: HOSPADM

## 2019-02-21 RX ORDER — MEPERIDINE HYDROCHLORIDE 50 MG/ML
12.5 INJECTION INTRAMUSCULAR; INTRAVENOUS; SUBCUTANEOUS EVERY 5 MIN PRN
Status: DISCONTINUED | OUTPATIENT
Start: 2019-02-21 | End: 2019-02-21 | Stop reason: HOSPADM

## 2019-02-21 RX ORDER — LABETALOL HYDROCHLORIDE 5 MG/ML
10 INJECTION, SOLUTION INTRAVENOUS
Status: DISCONTINUED | OUTPATIENT
Start: 2019-02-21 | End: 2019-02-26 | Stop reason: HOSPADM

## 2019-02-21 RX ORDER — GENTAMICIN SULFATE 100 MG/100ML
4 INJECTION, SOLUTION INTRAVENOUS EVERY 8 HOURS
Status: DISCONTINUED | OUTPATIENT
Start: 2019-02-21 | End: 2019-02-21

## 2019-02-21 RX ORDER — MORPHINE SULFATE 2 MG/ML
2 INJECTION, SOLUTION INTRAMUSCULAR; INTRAVENOUS
Status: DISCONTINUED | OUTPATIENT
Start: 2019-02-21 | End: 2019-02-26 | Stop reason: HOSPADM

## 2019-02-21 RX ORDER — DIPHENHYDRAMINE HYDROCHLORIDE 50 MG/ML
12.5 INJECTION INTRAMUSCULAR; INTRAVENOUS
Status: DISCONTINUED | OUTPATIENT
Start: 2019-02-21 | End: 2019-02-21 | Stop reason: HOSPADM

## 2019-02-21 RX ORDER — CLONIDINE HYDROCHLORIDE 0.1 MG/1
0.1 TABLET ORAL
Status: DISCONTINUED | OUTPATIENT
Start: 2019-02-21 | End: 2019-02-26 | Stop reason: HOSPADM

## 2019-02-21 RX ORDER — SODIUM CHLORIDE 0.9 % (FLUSH) 0.9 %
10 SYRINGE (ML) INJECTION EVERY 12 HOURS SCHEDULED
Status: DISCONTINUED | OUTPATIENT
Start: 2019-02-21 | End: 2019-02-26 | Stop reason: HOSPADM

## 2019-02-21 RX ORDER — NICOTINE 21 MG/24HR
1 PATCH, TRANSDERMAL 24 HOURS TRANSDERMAL DAILY
Status: DISCONTINUED | OUTPATIENT
Start: 2019-02-21 | End: 2019-02-21 | Stop reason: HOSPADM

## 2019-02-21 RX ORDER — FENTANYL CITRATE 50 UG/ML
INJECTION, SOLUTION INTRAMUSCULAR; INTRAVENOUS PRN
Status: DISCONTINUED | OUTPATIENT
Start: 2019-02-21 | End: 2019-02-21 | Stop reason: SDUPTHER

## 2019-02-21 RX ORDER — SODIUM CHLORIDE 0.9 % (FLUSH) 0.9 %
SYRINGE (ML) INJECTION
Status: DISCONTINUED
Start: 2019-02-21 | End: 2019-02-21

## 2019-02-21 RX ORDER — 0.9 % SODIUM CHLORIDE 0.9 %
1000 INTRAVENOUS SOLUTION INTRAVENOUS ONCE
Status: COMPLETED | OUTPATIENT
Start: 2019-02-21 | End: 2019-02-21

## 2019-02-21 RX ORDER — LIDOCAINE HYDROCHLORIDE 10 MG/ML
INJECTION, SOLUTION INFILTRATION; PERINEURAL PRN
Status: DISCONTINUED | OUTPATIENT
Start: 2019-02-21 | End: 2019-02-21 | Stop reason: SDUPTHER

## 2019-02-21 RX ORDER — SODIUM CHLORIDE 0.9 % (FLUSH) 0.9 %
10 SYRINGE (ML) INJECTION PRN
Status: DISCONTINUED | OUTPATIENT
Start: 2019-02-21 | End: 2019-02-26 | Stop reason: HOSPADM

## 2019-02-21 RX ORDER — OXYCODONE HYDROCHLORIDE AND ACETAMINOPHEN 5; 325 MG/1; MG/1
2 TABLET ORAL PRN
Status: COMPLETED | OUTPATIENT
Start: 2019-02-21 | End: 2019-02-21

## 2019-02-21 RX ORDER — PROMETHAZINE HYDROCHLORIDE 25 MG/ML
6.25 INJECTION, SOLUTION INTRAMUSCULAR; INTRAVENOUS
Status: DISCONTINUED | OUTPATIENT
Start: 2019-02-21 | End: 2019-02-21 | Stop reason: HOSPADM

## 2019-02-21 RX ORDER — MORPHINE SULFATE 4 MG/ML
4 INJECTION, SOLUTION INTRAMUSCULAR; INTRAVENOUS
Status: DISCONTINUED | OUTPATIENT
Start: 2019-02-21 | End: 2019-02-26 | Stop reason: HOSPADM

## 2019-02-21 RX ORDER — HYDROMORPHONE HCL 110MG/55ML
PATIENT CONTROLLED ANALGESIA SYRINGE INTRAVENOUS PRN
Status: DISCONTINUED | OUTPATIENT
Start: 2019-02-21 | End: 2019-02-21 | Stop reason: SDUPTHER

## 2019-02-21 RX ORDER — DEXAMETHASONE SODIUM PHOSPHATE 4 MG/ML
INJECTION, SOLUTION INTRA-ARTICULAR; INTRALESIONAL; INTRAMUSCULAR; INTRAVENOUS; SOFT TISSUE PRN
Status: DISCONTINUED | OUTPATIENT
Start: 2019-02-21 | End: 2019-02-21 | Stop reason: SDUPTHER

## 2019-02-21 RX ORDER — SODIUM CHLORIDE, SODIUM LACTATE, POTASSIUM CHLORIDE, CALCIUM CHLORIDE 600; 310; 30; 20 MG/100ML; MG/100ML; MG/100ML; MG/100ML
INJECTION, SOLUTION INTRAVENOUS CONTINUOUS
Status: DISCONTINUED | OUTPATIENT
Start: 2019-02-21 | End: 2019-02-21

## 2019-02-21 RX ORDER — OXYCODONE HYDROCHLORIDE AND ACETAMINOPHEN 5; 325 MG/1; MG/1
1 TABLET ORAL PRN
Status: COMPLETED | OUTPATIENT
Start: 2019-02-21 | End: 2019-02-21

## 2019-02-21 RX ORDER — HYDROCODONE BITARTRATE AND ACETAMINOPHEN 5; 325 MG/1; MG/1
1 TABLET ORAL EVERY 4 HOURS PRN
Status: DISCONTINUED | OUTPATIENT
Start: 2019-02-21 | End: 2019-02-26 | Stop reason: HOSPADM

## 2019-02-21 RX ORDER — GLYCOPYRROLATE 0.2 MG/ML
INJECTION INTRAMUSCULAR; INTRAVENOUS PRN
Status: DISCONTINUED | OUTPATIENT
Start: 2019-02-21 | End: 2019-02-21 | Stop reason: SDUPTHER

## 2019-02-21 RX ORDER — SODIUM CHLORIDE, SODIUM LACTATE, POTASSIUM CHLORIDE, CALCIUM CHLORIDE 600; 310; 30; 20 MG/100ML; MG/100ML; MG/100ML; MG/100ML
INJECTION, SOLUTION INTRAVENOUS
Status: COMPLETED
Start: 2019-02-21 | End: 2019-02-21

## 2019-02-21 RX ORDER — MORPHINE SULFATE 4 MG/ML
4 INJECTION, SOLUTION INTRAMUSCULAR; INTRAVENOUS EVERY 30 MIN PRN
Status: DISCONTINUED | OUTPATIENT
Start: 2019-02-21 | End: 2019-02-21 | Stop reason: HOSPADM

## 2019-02-21 RX ORDER — MORPHINE SULFATE 4 MG/ML
1 INJECTION, SOLUTION INTRAMUSCULAR; INTRAVENOUS EVERY 5 MIN PRN
Status: DISCONTINUED | OUTPATIENT
Start: 2019-02-21 | End: 2019-02-21 | Stop reason: HOSPADM

## 2019-02-21 RX ORDER — ACETAMINOPHEN 325 MG/1
650 TABLET ORAL EVERY 4 HOURS PRN
Status: DISCONTINUED | OUTPATIENT
Start: 2019-02-21 | End: 2019-02-26 | Stop reason: HOSPADM

## 2019-02-21 RX ORDER — SODIUM CHLORIDE 9 MG/ML
INJECTION, SOLUTION INTRAVENOUS CONTINUOUS
Status: DISCONTINUED | OUTPATIENT
Start: 2019-02-21 | End: 2019-02-24

## 2019-02-21 RX ORDER — LABETALOL HYDROCHLORIDE 5 MG/ML
5 INJECTION, SOLUTION INTRAVENOUS EVERY 10 MIN PRN
Status: DISCONTINUED | OUTPATIENT
Start: 2019-02-21 | End: 2019-02-21 | Stop reason: HOSPADM

## 2019-02-21 RX ORDER — MORPHINE SULFATE 4 MG/ML
2 INJECTION, SOLUTION INTRAMUSCULAR; INTRAVENOUS EVERY 5 MIN PRN
Status: DISCONTINUED | OUTPATIENT
Start: 2019-02-21 | End: 2019-02-21 | Stop reason: HOSPADM

## 2019-02-21 RX ORDER — HYDRALAZINE HYDROCHLORIDE 20 MG/ML
5 INJECTION INTRAMUSCULAR; INTRAVENOUS EVERY 10 MIN PRN
Status: DISCONTINUED | OUTPATIENT
Start: 2019-02-21 | End: 2019-02-21 | Stop reason: HOSPADM

## 2019-02-21 RX ORDER — ONDANSETRON 2 MG/ML
4 INJECTION INTRAMUSCULAR; INTRAVENOUS
Status: DISCONTINUED | OUTPATIENT
Start: 2019-02-21 | End: 2019-02-21 | Stop reason: HOSPADM

## 2019-02-21 RX ORDER — HYDROCODONE BITARTRATE AND ACETAMINOPHEN 5; 325 MG/1; MG/1
2 TABLET ORAL EVERY 4 HOURS PRN
Status: DISCONTINUED | OUTPATIENT
Start: 2019-02-21 | End: 2019-02-26 | Stop reason: HOSPADM

## 2019-02-21 RX ORDER — ONDANSETRON 2 MG/ML
4 INJECTION INTRAMUSCULAR; INTRAVENOUS ONCE
Status: COMPLETED | OUTPATIENT
Start: 2019-02-21 | End: 2019-02-21

## 2019-02-21 RX ORDER — ROCURONIUM BROMIDE 10 MG/ML
INJECTION, SOLUTION INTRAVENOUS PRN
Status: DISCONTINUED | OUTPATIENT
Start: 2019-02-21 | End: 2019-02-21 | Stop reason: SDUPTHER

## 2019-02-21 RX ADMIN — HYDROMORPHONE HYDROCHLORIDE 0.5 MG: 2 INJECTION INTRAMUSCULAR; INTRAVENOUS; SUBCUTANEOUS at 14:31

## 2019-02-21 RX ADMIN — LIDOCAINE HYDROCHLORIDE 40 MG: 10 INJECTION, SOLUTION INFILTRATION; PERINEURAL at 14:57

## 2019-02-21 RX ADMIN — SODIUM CHLORIDE, POTASSIUM CHLORIDE, SODIUM LACTATE AND CALCIUM CHLORIDE: 600; 310; 30; 20 INJECTION, SOLUTION INTRAVENOUS at 14:39

## 2019-02-21 RX ADMIN — ROCURONIUM BROMIDE 40 MG: 10 SOLUTION INTRAVENOUS at 14:57

## 2019-02-21 RX ADMIN — GLYCOPYRROLATE 0.2 MG: 0.2 INJECTION, SOLUTION INTRAMUSCULAR; INTRAVENOUS at 15:10

## 2019-02-21 RX ADMIN — ONDANSETRON 4 MG: 2 INJECTION INTRAMUSCULAR; INTRAVENOUS at 15:04

## 2019-02-21 RX ADMIN — VANCOMYCIN HYDROCHLORIDE 1000 MG: 1 INJECTION, POWDER, LYOPHILIZED, FOR SOLUTION INTRAVENOUS at 10:50

## 2019-02-21 RX ADMIN — HYDROCODONE BITARTRATE AND ACETAMINOPHEN 2 TABLET: 5; 325 TABLET ORAL at 21:15

## 2019-02-21 RX ADMIN — HYDROMORPHONE HYDROCHLORIDE 0.5 MG: 2 INJECTION INTRAMUSCULAR; INTRAVENOUS; SUBCUTANEOUS at 15:15

## 2019-02-21 RX ADMIN — PIPERACILLIN SODIUM AND TAZOBACTAM SODIUM 3.38 G: 3; .375 INJECTION, POWDER, LYOPHILIZED, FOR SOLUTION INTRAVENOUS at 10:11

## 2019-02-21 RX ADMIN — DEXAMETHASONE SODIUM PHOSPHATE 4 MG: 4 INJECTION, SOLUTION INTRAMUSCULAR; INTRAVENOUS at 15:04

## 2019-02-21 RX ADMIN — HYDROMORPHONE HYDROCHLORIDE 0.5 MG: 1 INJECTION, SOLUTION INTRAMUSCULAR; INTRAVENOUS; SUBCUTANEOUS at 16:07

## 2019-02-21 RX ADMIN — SODIUM CHLORIDE: 9 INJECTION, SOLUTION INTRAVENOUS at 17:58

## 2019-02-21 RX ADMIN — SUGAMMADEX 200 MG: 100 INJECTION, SOLUTION INTRAVENOUS at 15:31

## 2019-02-21 RX ADMIN — IOPAMIDOL 75 ML: 755 INJECTION, SOLUTION INTRAVENOUS at 12:25

## 2019-02-21 RX ADMIN — ONDANSETRON 4 MG: 2 INJECTION INTRAMUSCULAR; INTRAVENOUS at 10:10

## 2019-02-21 RX ADMIN — PIPERACILLIN SODIUM,TAZOBACTAM SODIUM 3.38 G: 3; .375 INJECTION, POWDER, FOR SOLUTION INTRAVENOUS at 17:57

## 2019-02-21 RX ADMIN — SODIUM CHLORIDE, SODIUM LACTATE, POTASSIUM CHLORIDE, CALCIUM CHLORIDE: 600; 310; 30; 20 INJECTION, SOLUTION INTRAVENOUS at 14:39

## 2019-02-21 RX ADMIN — OXYCODONE AND ACETAMINOPHEN 2 TABLET: 5; 325 TABLET ORAL at 16:14

## 2019-02-21 RX ADMIN — HYDROMORPHONE HYDROCHLORIDE 0.5 MG: 1 INJECTION, SOLUTION INTRAMUSCULAR; INTRAVENOUS; SUBCUTANEOUS at 15:59

## 2019-02-21 RX ADMIN — FENTANYL CITRATE 100 MCG: 50 INJECTION INTRAMUSCULAR; INTRAVENOUS at 14:57

## 2019-02-21 RX ADMIN — PROPOFOL 200 MG: 10 INJECTION, EMULSION INTRAVENOUS at 14:57

## 2019-02-21 RX ADMIN — MORPHINE SULFATE 4 MG: 4 INJECTION INTRAVENOUS at 10:10

## 2019-02-21 RX ADMIN — SODIUM CHLORIDE 1000 ML: 9 INJECTION, SOLUTION INTRAVENOUS at 10:10

## 2019-02-21 ASSESSMENT — PULMONARY FUNCTION TESTS
PIF_VALUE: 14
PIF_VALUE: 11
PIF_VALUE: 14
PIF_VALUE: 5
PIF_VALUE: 15
PIF_VALUE: 14
PIF_VALUE: 0
PIF_VALUE: 14
PIF_VALUE: 15
PIF_VALUE: 14
PIF_VALUE: 15
PIF_VALUE: 14
PIF_VALUE: 0
PIF_VALUE: 0
PIF_VALUE: 14
PIF_VALUE: 14
PIF_VALUE: 0
PIF_VALUE: 16
PIF_VALUE: 0
PIF_VALUE: 1
PIF_VALUE: 14
PIF_VALUE: 14
PIF_VALUE: 13
PIF_VALUE: 14
PIF_VALUE: 11
PIF_VALUE: 2
PIF_VALUE: 15
PIF_VALUE: 3
PIF_VALUE: 18
PIF_VALUE: 14
PIF_VALUE: 2
PIF_VALUE: 14
PIF_VALUE: 16
PIF_VALUE: 13

## 2019-02-21 ASSESSMENT — PAIN DESCRIPTION - PAIN TYPE
TYPE: ACUTE PAIN
TYPE: SURGICAL PAIN
TYPE: ACUTE PAIN
TYPE: SURGICAL PAIN;CHRONIC PAIN
TYPE: ACUTE PAIN

## 2019-02-21 ASSESSMENT — PAIN SCALES - GENERAL
PAINLEVEL_OUTOF10: 5
PAINLEVEL_OUTOF10: 8
PAINLEVEL_OUTOF10: 7
PAINLEVEL_OUTOF10: 5
PAINLEVEL_OUTOF10: 7
PAINLEVEL_OUTOF10: 8
PAINLEVEL_OUTOF10: 5
PAINLEVEL_OUTOF10: 6

## 2019-02-21 ASSESSMENT — PAIN DESCRIPTION - LOCATION
LOCATION: GROIN
LOCATION: LEG
LOCATION: LEG
LOCATION: GROIN
LOCATION: GROIN;GENERALIZED

## 2019-02-21 ASSESSMENT — PAIN DESCRIPTION - ORIENTATION
ORIENTATION: LEFT
ORIENTATION: LEFT;INNER;UPPER
ORIENTATION: LEFT;UPPER;INNER

## 2019-02-21 ASSESSMENT — ENCOUNTER SYMPTOMS
RHINORRHEA: 0
VOMITING: 0
DIARRHEA: 0
SHORTNESS OF BREATH: 0
NAUSEA: 0
COUGH: 0
ABDOMINAL PAIN: 0

## 2019-02-21 ASSESSMENT — PAIN DESCRIPTION - PROGRESSION: CLINICAL_PROGRESSION: NOT CHANGED

## 2019-02-22 LAB
HCT VFR BLD CALC: 41.8 % (ref 40.5–52.5)
HEMOGLOBIN: 14.2 G/DL (ref 13.5–17.5)
MCH RBC QN AUTO: 33.2 PG (ref 26–34)
MCHC RBC AUTO-ENTMCNC: 34 G/DL (ref 31–36)
MCV RBC AUTO: 97.6 FL (ref 80–100)
PDW BLD-RTO: 14.2 % (ref 12.4–15.4)
PLATELET # BLD: 176 K/UL (ref 135–450)
PMV BLD AUTO: 8.6 FL (ref 5–10.5)
RBC # BLD: 4.28 M/UL (ref 4.2–5.9)
VANCOMYCIN TROUGH: 10.5 UG/ML (ref 10–20)
WBC # BLD: 7.4 K/UL (ref 4–11)

## 2019-02-22 PROCEDURE — 6370000000 HC RX 637 (ALT 250 FOR IP): Performed by: INTERNAL MEDICINE

## 2019-02-22 PROCEDURE — 36415 COLL VENOUS BLD VENIPUNCTURE: CPT

## 2019-02-22 PROCEDURE — 85027 COMPLETE CBC AUTOMATED: CPT

## 2019-02-22 PROCEDURE — 94640 AIRWAY INHALATION TREATMENT: CPT

## 2019-02-22 PROCEDURE — 80202 ASSAY OF VANCOMYCIN: CPT

## 2019-02-22 PROCEDURE — 2060000000 HC ICU INTERMEDIATE R&B

## 2019-02-22 PROCEDURE — 6370000000 HC RX 637 (ALT 250 FOR IP): Performed by: SURGERY

## 2019-02-22 PROCEDURE — 6360000002 HC RX W HCPCS: Performed by: SURGERY

## 2019-02-22 PROCEDURE — 2580000003 HC RX 258: Performed by: SURGERY

## 2019-02-22 PROCEDURE — 2500000003 HC RX 250 WO HCPCS: Performed by: SURGERY

## 2019-02-22 PROCEDURE — 97606 NEG PRS WND THER DME>50 SQCM: CPT

## 2019-02-22 RX ORDER — NITROGLYCERIN 0.4 MG/1
0.4 TABLET SUBLINGUAL EVERY 5 MIN PRN
Status: DISCONTINUED | OUTPATIENT
Start: 2019-02-22 | End: 2019-02-26 | Stop reason: HOSPADM

## 2019-02-22 RX ORDER — CLOPIDOGREL BISULFATE 75 MG/1
75 TABLET ORAL DAILY
Status: DISCONTINUED | OUTPATIENT
Start: 2019-02-22 | End: 2019-02-23

## 2019-02-22 RX ORDER — CALCIUM CARBONATE 200(500)MG
500 TABLET,CHEWABLE ORAL 3 TIMES DAILY PRN
Status: DISCONTINUED | OUTPATIENT
Start: 2019-02-22 | End: 2019-02-26 | Stop reason: HOSPADM

## 2019-02-22 RX ORDER — ATORVASTATIN CALCIUM 80 MG/1
80 TABLET, FILM COATED ORAL NIGHTLY
Status: DISCONTINUED | OUTPATIENT
Start: 2019-02-22 | End: 2019-02-26 | Stop reason: HOSPADM

## 2019-02-22 RX ORDER — HYDROXYCHLOROQUINE SULFATE 200 MG/1
200 TABLET, FILM COATED ORAL 2 TIMES DAILY
Status: ON HOLD | COMMUNITY
End: 2021-01-01

## 2019-02-22 RX ORDER — NICOTINE 21 MG/24HR
1 PATCH, TRANSDERMAL 24 HOURS TRANSDERMAL DAILY
Status: DISCONTINUED | OUTPATIENT
Start: 2019-02-22 | End: 2019-02-26 | Stop reason: HOSPADM

## 2019-02-22 RX ORDER — ALBUTEROL SULFATE 90 UG/1
2 AEROSOL, METERED RESPIRATORY (INHALATION)
COMMUNITY
End: 2021-01-01

## 2019-02-22 RX ORDER — HEPARIN SODIUM 5000 [USP'U]/ML
5000 INJECTION, SOLUTION INTRAVENOUS; SUBCUTANEOUS EVERY 8 HOURS SCHEDULED
Status: DISCONTINUED | OUTPATIENT
Start: 2019-02-23 | End: 2019-02-26 | Stop reason: HOSPADM

## 2019-02-22 RX ORDER — AMLODIPINE BESYLATE 5 MG/1
5 TABLET ORAL 2 TIMES DAILY
Status: DISCONTINUED | OUTPATIENT
Start: 2019-02-22 | End: 2019-02-26 | Stop reason: HOSPADM

## 2019-02-22 RX ORDER — NICOTINE 21 MG/24HR
1 PATCH, TRANSDERMAL 24 HOURS TRANSDERMAL DAILY
Status: DISCONTINUED | OUTPATIENT
Start: 2019-02-22 | End: 2019-02-22

## 2019-02-22 RX ORDER — ASPIRIN 81 MG/1
81 TABLET ORAL DAILY
Status: DISCONTINUED | OUTPATIENT
Start: 2019-02-23 | End: 2019-02-26 | Stop reason: HOSPADM

## 2019-02-22 RX ORDER — GABAPENTIN 300 MG/1
300 CAPSULE ORAL 3 TIMES DAILY
Status: DISCONTINUED | OUTPATIENT
Start: 2019-02-22 | End: 2019-02-26 | Stop reason: HOSPADM

## 2019-02-22 RX ORDER — ATENOLOL 25 MG/1
50 TABLET ORAL DAILY
Status: DISCONTINUED | OUTPATIENT
Start: 2019-02-22 | End: 2019-02-26 | Stop reason: HOSPADM

## 2019-02-22 RX ORDER — TRAZODONE HYDROCHLORIDE 50 MG/1
150 TABLET ORAL NIGHTLY PRN
Status: DISCONTINUED | OUTPATIENT
Start: 2019-02-22 | End: 2019-02-26 | Stop reason: HOSPADM

## 2019-02-22 RX ORDER — ATENOLOL 25 MG/1
25 TABLET ORAL DAILY
Status: ON HOLD | COMMUNITY
End: 2019-02-26

## 2019-02-22 RX ORDER — POLYETHYLENE GLYCOL 3350 17 G
4 POWDER IN PACKET (EA) ORAL PRN
COMMUNITY
End: 2020-02-20 | Stop reason: ALTCHOICE

## 2019-02-22 RX ORDER — VARENICLINE TARTRATE 1 MG/1
1 TABLET, FILM COATED ORAL 2 TIMES DAILY
Status: ON HOLD | COMMUNITY
End: 2019-02-22 | Stop reason: CLARIF

## 2019-02-22 RX ADMIN — HYDROCODONE BITARTRATE AND ACETAMINOPHEN 2 TABLET: 5; 325 TABLET ORAL at 02:46

## 2019-02-22 RX ADMIN — GABAPENTIN 300 MG: 300 CAPSULE ORAL at 12:24

## 2019-02-22 RX ADMIN — PIPERACILLIN SODIUM,TAZOBACTAM SODIUM 3.38 G: 3; .375 INJECTION, POWDER, FOR SOLUTION INTRAVENOUS at 09:35

## 2019-02-22 RX ADMIN — HYDROCODONE BITARTRATE AND ACETAMINOPHEN 2 TABLET: 5; 325 TABLET ORAL at 08:48

## 2019-02-22 RX ADMIN — VANCOMYCIN HYDROCHLORIDE 1250 MG: 10 INJECTION, POWDER, LYOPHILIZED, FOR SOLUTION INTRAVENOUS at 23:24

## 2019-02-22 RX ADMIN — Medication 10 ML: at 20:57

## 2019-02-22 RX ADMIN — PIPERACILLIN SODIUM,TAZOBACTAM SODIUM 3.38 G: 3; .375 INJECTION, POWDER, FOR SOLUTION INTRAVENOUS at 02:43

## 2019-02-22 RX ADMIN — HYDROCODONE BITARTRATE AND ACETAMINOPHEN 2 TABLET: 5; 325 TABLET ORAL at 20:55

## 2019-02-22 RX ADMIN — CLOPIDOGREL BISULFATE 75 MG: 75 TABLET ORAL at 12:25

## 2019-02-22 RX ADMIN — VANCOMYCIN HYDROCHLORIDE 1250 MG: 10 INJECTION, POWDER, LYOPHILIZED, FOR SOLUTION INTRAVENOUS at 00:17

## 2019-02-22 RX ADMIN — Medication 10 ML: at 09:16

## 2019-02-22 RX ADMIN — ATORVASTATIN CALCIUM 80 MG: 80 TABLET, FILM COATED ORAL at 20:55

## 2019-02-22 RX ADMIN — MICONAZOLE NITRATE: 2 POWDER TOPICAL at 09:37

## 2019-02-22 RX ADMIN — Medication 2 PUFF: at 21:32

## 2019-02-22 RX ADMIN — AMLODIPINE BESYLATE 5 MG: 5 TABLET ORAL at 20:55

## 2019-02-22 RX ADMIN — GABAPENTIN 300 MG: 300 CAPSULE ORAL at 20:55

## 2019-02-22 RX ADMIN — HYDROCODONE BITARTRATE AND ACETAMINOPHEN 2 TABLET: 5; 325 TABLET ORAL at 14:22

## 2019-02-22 RX ADMIN — PIPERACILLIN SODIUM,TAZOBACTAM SODIUM 3.38 G: 3; .375 INJECTION, POWDER, FOR SOLUTION INTRAVENOUS at 17:48

## 2019-02-22 RX ADMIN — MICONAZOLE NITRATE: 2 POWDER TOPICAL at 00:17

## 2019-02-22 RX ADMIN — VANCOMYCIN HYDROCHLORIDE 1250 MG: 10 INJECTION, POWDER, LYOPHILIZED, FOR SOLUTION INTRAVENOUS at 12:06

## 2019-02-22 ASSESSMENT — PAIN DESCRIPTION - PAIN TYPE
TYPE: SURGICAL PAIN;CHRONIC PAIN
TYPE: SURGICAL PAIN;CHRONIC PAIN

## 2019-02-22 ASSESSMENT — PAIN SCALES - GENERAL
PAINLEVEL_OUTOF10: 7
PAINLEVEL_OUTOF10: 7
PAINLEVEL_OUTOF10: 5
PAINLEVEL_OUTOF10: 6
PAINLEVEL_OUTOF10: 7

## 2019-02-22 ASSESSMENT — PAIN DESCRIPTION - LOCATION
LOCATION: GROIN;GENERALIZED
LOCATION: GROIN;GENERALIZED

## 2019-02-22 ASSESSMENT — PAIN DESCRIPTION - ORIENTATION: ORIENTATION: LEFT

## 2019-02-23 LAB
ALBUMIN SERPL-MCNC: 3.3 G/DL (ref 3.4–5)
ANION GAP SERPL CALCULATED.3IONS-SCNC: 10 MMOL/L (ref 3–16)
BUN BLDV-MCNC: 7 MG/DL (ref 7–20)
CALCIUM SERPL-MCNC: 8 MG/DL (ref 8.3–10.6)
CHLORIDE BLD-SCNC: 92 MMOL/L (ref 99–110)
CO2: 25 MMOL/L (ref 21–32)
CREAT SERPL-MCNC: 0.8 MG/DL (ref 0.9–1.3)
GFR AFRICAN AMERICAN: >60
GFR NON-AFRICAN AMERICAN: >60
GLUCOSE BLD-MCNC: 115 MG/DL (ref 70–99)
PHOSPHORUS: 2.6 MG/DL (ref 2.5–4.9)
POTASSIUM SERPL-SCNC: 3.8 MMOL/L (ref 3.5–5.1)
SODIUM BLD-SCNC: 127 MMOL/L (ref 136–145)

## 2019-02-23 PROCEDURE — 2060000000 HC ICU INTERMEDIATE R&B

## 2019-02-23 PROCEDURE — 6370000000 HC RX 637 (ALT 250 FOR IP): Performed by: SURGERY

## 2019-02-23 PROCEDURE — 6370000000 HC RX 637 (ALT 250 FOR IP): Performed by: INTERNAL MEDICINE

## 2019-02-23 PROCEDURE — 36415 COLL VENOUS BLD VENIPUNCTURE: CPT

## 2019-02-23 PROCEDURE — 80069 RENAL FUNCTION PANEL: CPT

## 2019-02-23 PROCEDURE — 94640 AIRWAY INHALATION TREATMENT: CPT

## 2019-02-23 PROCEDURE — 6360000002 HC RX W HCPCS: Performed by: SURGERY

## 2019-02-23 PROCEDURE — 6360000002 HC RX W HCPCS: Performed by: INTERNAL MEDICINE

## 2019-02-23 PROCEDURE — 2580000003 HC RX 258: Performed by: SURGERY

## 2019-02-23 RX ADMIN — HYDROCODONE BITARTRATE AND ACETAMINOPHEN 2 TABLET: 5; 325 TABLET ORAL at 21:55

## 2019-02-23 RX ADMIN — GABAPENTIN 300 MG: 300 CAPSULE ORAL at 15:04

## 2019-02-23 RX ADMIN — AMLODIPINE BESYLATE 5 MG: 5 TABLET ORAL at 07:59

## 2019-02-23 RX ADMIN — SODIUM CHLORIDE: 9 INJECTION, SOLUTION INTRAVENOUS at 18:22

## 2019-02-23 RX ADMIN — PIPERACILLIN SODIUM,TAZOBACTAM SODIUM 3.38 G: 3; .375 INJECTION, POWDER, FOR SOLUTION INTRAVENOUS at 11:52

## 2019-02-23 RX ADMIN — HYDROCODONE BITARTRATE AND ACETAMINOPHEN 2 TABLET: 5; 325 TABLET ORAL at 07:59

## 2019-02-23 RX ADMIN — GABAPENTIN 300 MG: 300 CAPSULE ORAL at 07:59

## 2019-02-23 RX ADMIN — VANCOMYCIN HYDROCHLORIDE 1250 MG: 10 INJECTION, POWDER, LYOPHILIZED, FOR SOLUTION INTRAVENOUS at 10:12

## 2019-02-23 RX ADMIN — HEPARIN SODIUM 5000 UNITS: 5000 INJECTION INTRAVENOUS; SUBCUTANEOUS at 21:54

## 2019-02-23 RX ADMIN — ATORVASTATIN CALCIUM 80 MG: 80 TABLET, FILM COATED ORAL at 21:55

## 2019-02-23 RX ADMIN — ASPIRIN 81 MG: 81 TABLET, COATED ORAL at 07:59

## 2019-02-23 RX ADMIN — TIOTROPIUM BROMIDE 18 MCG: 18 CAPSULE ORAL; RESPIRATORY (INHALATION) at 08:35

## 2019-02-23 RX ADMIN — ACETAMINOPHEN 650 MG: 325 TABLET ORAL at 03:14

## 2019-02-23 RX ADMIN — HYDROCODONE BITARTRATE AND ACETAMINOPHEN 2 TABLET: 5; 325 TABLET ORAL at 17:27

## 2019-02-23 RX ADMIN — AMLODIPINE BESYLATE 5 MG: 5 TABLET ORAL at 21:54

## 2019-02-23 RX ADMIN — HYDROCODONE BITARTRATE AND ACETAMINOPHEN 2 TABLET: 5; 325 TABLET ORAL at 03:24

## 2019-02-23 RX ADMIN — PIPERACILLIN SODIUM,TAZOBACTAM SODIUM 3.38 G: 3; .375 INJECTION, POWDER, FOR SOLUTION INTRAVENOUS at 03:09

## 2019-02-23 RX ADMIN — Medication 2 PUFF: at 08:35

## 2019-02-23 RX ADMIN — HEPARIN SODIUM 5000 UNITS: 5000 INJECTION INTRAVENOUS; SUBCUTANEOUS at 15:04

## 2019-02-23 RX ADMIN — ATENOLOL 50 MG: 25 TABLET ORAL at 07:59

## 2019-02-23 RX ADMIN — SODIUM CHLORIDE: 9 INJECTION, SOLUTION INTRAVENOUS at 01:20

## 2019-02-23 RX ADMIN — HEPARIN SODIUM 5000 UNITS: 5000 INJECTION INTRAVENOUS; SUBCUTANEOUS at 05:04

## 2019-02-23 RX ADMIN — PIPERACILLIN SODIUM,TAZOBACTAM SODIUM 3.38 G: 3; .375 INJECTION, POWDER, FOR SOLUTION INTRAVENOUS at 17:27

## 2019-02-23 RX ADMIN — HYDROCODONE BITARTRATE AND ACETAMINOPHEN 2 TABLET: 5; 325 TABLET ORAL at 13:17

## 2019-02-23 RX ADMIN — VANCOMYCIN HYDROCHLORIDE 1250 MG: 10 INJECTION, POWDER, LYOPHILIZED, FOR SOLUTION INTRAVENOUS at 21:55

## 2019-02-23 RX ADMIN — GABAPENTIN 300 MG: 300 CAPSULE ORAL at 21:54

## 2019-02-23 RX ADMIN — MICONAZOLE NITRATE: 2 POWDER TOPICAL at 08:05

## 2019-02-23 RX ADMIN — Medication 2 PUFF: at 19:18

## 2019-02-23 ASSESSMENT — PAIN SCALES - GENERAL
PAINLEVEL_OUTOF10: 7
PAINLEVEL_OUTOF10: 4
PAINLEVEL_OUTOF10: 7
PAINLEVEL_OUTOF10: 4
PAINLEVEL_OUTOF10: 3
PAINLEVEL_OUTOF10: 7
PAINLEVEL_OUTOF10: 0
PAINLEVEL_OUTOF10: 7
PAINLEVEL_OUTOF10: 7

## 2019-02-23 ASSESSMENT — PAIN DESCRIPTION - DESCRIPTORS: DESCRIPTORS: ACHING

## 2019-02-23 ASSESSMENT — PAIN DESCRIPTION - FREQUENCY
FREQUENCY: CONTINUOUS
FREQUENCY: CONTINUOUS

## 2019-02-23 ASSESSMENT — PAIN DESCRIPTION - ORIENTATION: ORIENTATION: LOWER

## 2019-02-23 ASSESSMENT — PAIN DESCRIPTION - PAIN TYPE
TYPE: CHRONIC PAIN

## 2019-02-23 ASSESSMENT — PAIN DESCRIPTION - PROGRESSION: CLINICAL_PROGRESSION: NOT CHANGED

## 2019-02-23 ASSESSMENT — PAIN DESCRIPTION - LOCATION
LOCATION: SHOULDER
LOCATION: BACK;SHOULDER
LOCATION: SHOULDER
LOCATION: BACK;SHOULDER
LOCATION: BACK;SHOULDER

## 2019-02-24 LAB
ALBUMIN SERPL-MCNC: 3 G/DL (ref 3.4–5)
ANION GAP SERPL CALCULATED.3IONS-SCNC: 10 MMOL/L (ref 3–16)
BUN BLDV-MCNC: 5 MG/DL (ref 7–20)
CALCIUM SERPL-MCNC: 7.9 MG/DL (ref 8.3–10.6)
CHLORIDE BLD-SCNC: 103 MMOL/L (ref 99–110)
CO2: 23 MMOL/L (ref 21–32)
CREAT SERPL-MCNC: 0.6 MG/DL (ref 0.9–1.3)
GFR AFRICAN AMERICAN: >60
GFR NON-AFRICAN AMERICAN: >60
GLUCOSE BLD-MCNC: 110 MG/DL (ref 70–99)
PHOSPHORUS: 2.8 MG/DL (ref 2.5–4.9)
POTASSIUM SERPL-SCNC: 3.5 MMOL/L (ref 3.5–5.1)
SODIUM BLD-SCNC: 136 MMOL/L (ref 136–145)

## 2019-02-24 PROCEDURE — 80069 RENAL FUNCTION PANEL: CPT

## 2019-02-24 PROCEDURE — 6360000002 HC RX W HCPCS: Performed by: SURGERY

## 2019-02-24 PROCEDURE — 6370000000 HC RX 637 (ALT 250 FOR IP): Performed by: INTERNAL MEDICINE

## 2019-02-24 PROCEDURE — 36415 COLL VENOUS BLD VENIPUNCTURE: CPT

## 2019-02-24 PROCEDURE — 2060000000 HC ICU INTERMEDIATE R&B

## 2019-02-24 PROCEDURE — 6370000000 HC RX 637 (ALT 250 FOR IP): Performed by: SURGERY

## 2019-02-24 PROCEDURE — 94640 AIRWAY INHALATION TREATMENT: CPT

## 2019-02-24 PROCEDURE — 6360000002 HC RX W HCPCS: Performed by: INTERNAL MEDICINE

## 2019-02-24 PROCEDURE — 2580000003 HC RX 258: Performed by: SURGERY

## 2019-02-24 RX ADMIN — TIOTROPIUM BROMIDE 18 MCG: 18 CAPSULE ORAL; RESPIRATORY (INHALATION) at 09:16

## 2019-02-24 RX ADMIN — ATENOLOL 50 MG: 25 TABLET ORAL at 08:00

## 2019-02-24 RX ADMIN — HYDROCODONE BITARTRATE AND ACETAMINOPHEN 2 TABLET: 5; 325 TABLET ORAL at 08:00

## 2019-02-24 RX ADMIN — ATORVASTATIN CALCIUM 80 MG: 80 TABLET, FILM COATED ORAL at 21:51

## 2019-02-24 RX ADMIN — HEPARIN SODIUM 5000 UNITS: 5000 INJECTION INTRAVENOUS; SUBCUTANEOUS at 05:32

## 2019-02-24 RX ADMIN — PIPERACILLIN SODIUM,TAZOBACTAM SODIUM 3.38 G: 3; .375 INJECTION, POWDER, FOR SOLUTION INTRAVENOUS at 02:42

## 2019-02-24 RX ADMIN — HYDROCODONE BITARTRATE AND ACETAMINOPHEN 2 TABLET: 5; 325 TABLET ORAL at 02:43

## 2019-02-24 RX ADMIN — ASPIRIN 81 MG: 81 TABLET, COATED ORAL at 08:01

## 2019-02-24 RX ADMIN — Medication 10 ML: at 21:54

## 2019-02-24 RX ADMIN — Medication 2 PUFF: at 09:16

## 2019-02-24 RX ADMIN — Medication 2 PUFF: at 20:01

## 2019-02-24 RX ADMIN — AMLODIPINE BESYLATE 5 MG: 5 TABLET ORAL at 08:00

## 2019-02-24 RX ADMIN — MICONAZOLE NITRATE: 2 POWDER TOPICAL at 08:01

## 2019-02-24 RX ADMIN — HYDROCODONE BITARTRATE AND ACETAMINOPHEN 2 TABLET: 5; 325 TABLET ORAL at 21:52

## 2019-02-24 RX ADMIN — HEPARIN SODIUM 5000 UNITS: 5000 INJECTION INTRAVENOUS; SUBCUTANEOUS at 14:42

## 2019-02-24 RX ADMIN — GABAPENTIN 300 MG: 300 CAPSULE ORAL at 08:00

## 2019-02-24 RX ADMIN — GABAPENTIN 300 MG: 300 CAPSULE ORAL at 14:42

## 2019-02-24 RX ADMIN — HEPARIN SODIUM 5000 UNITS: 5000 INJECTION INTRAVENOUS; SUBCUTANEOUS at 21:52

## 2019-02-24 RX ADMIN — HYDROCODONE BITARTRATE AND ACETAMINOPHEN 2 TABLET: 5; 325 TABLET ORAL at 13:55

## 2019-02-24 RX ADMIN — AMLODIPINE BESYLATE 5 MG: 5 TABLET ORAL at 21:51

## 2019-02-24 RX ADMIN — GABAPENTIN 300 MG: 300 CAPSULE ORAL at 21:51

## 2019-02-24 ASSESSMENT — PAIN SCALES - GENERAL
PAINLEVEL_OUTOF10: 5
PAINLEVEL_OUTOF10: 6
PAINLEVEL_OUTOF10: 0
PAINLEVEL_OUTOF10: 7
PAINLEVEL_OUTOF10: 6
PAINLEVEL_OUTOF10: 6
PAINLEVEL_OUTOF10: 5
PAINLEVEL_OUTOF10: 5
PAINLEVEL_OUTOF10: 6
PAINLEVEL_OUTOF10: 5
PAINLEVEL_OUTOF10: 8

## 2019-02-24 ASSESSMENT — PAIN DESCRIPTION - LOCATION: LOCATION: SHOULDER;BACK;INCISION

## 2019-02-24 ASSESSMENT — PAIN DESCRIPTION - PAIN TYPE: TYPE: CHRONIC PAIN;SURGICAL PAIN

## 2019-02-25 LAB
ALBUMIN SERPL-MCNC: 3.2 G/DL (ref 3.4–5)
ANION GAP SERPL CALCULATED.3IONS-SCNC: 7 MMOL/L (ref 3–16)
BUN BLDV-MCNC: 3 MG/DL (ref 7–20)
CALCIUM SERPL-MCNC: 8.3 MG/DL (ref 8.3–10.6)
CHLORIDE BLD-SCNC: 104 MMOL/L (ref 99–110)
CO2: 23 MMOL/L (ref 21–32)
CREAT SERPL-MCNC: <0.5 MG/DL (ref 0.9–1.3)
GFR AFRICAN AMERICAN: >60
GFR NON-AFRICAN AMERICAN: >60
GLUCOSE BLD-MCNC: 110 MG/DL (ref 70–99)
HCT VFR BLD CALC: 38.4 % (ref 40.5–52.5)
HEMOGLOBIN: 12.4 G/DL (ref 13.5–17.5)
MCH RBC QN AUTO: 32.1 PG (ref 26–34)
MCHC RBC AUTO-ENTMCNC: 32.3 G/DL (ref 31–36)
MCV RBC AUTO: 99.3 FL (ref 80–100)
PDW BLD-RTO: 14.8 % (ref 12.4–15.4)
PHOSPHORUS: 3 MG/DL (ref 2.5–4.9)
PLATELET # BLD: 147 K/UL (ref 135–450)
PMV BLD AUTO: 9.3 FL (ref 5–10.5)
POTASSIUM SERPL-SCNC: 3.9 MMOL/L (ref 3.5–5.1)
RBC # BLD: 3.87 M/UL (ref 4.2–5.9)
SODIUM BLD-SCNC: 134 MMOL/L (ref 136–145)
WBC # BLD: 4 K/UL (ref 4–11)

## 2019-02-25 PROCEDURE — 6360000002 HC RX W HCPCS: Performed by: INTERNAL MEDICINE

## 2019-02-25 PROCEDURE — 2060000000 HC ICU INTERMEDIATE R&B

## 2019-02-25 PROCEDURE — 80069 RENAL FUNCTION PANEL: CPT

## 2019-02-25 PROCEDURE — 97162 PT EVAL MOD COMPLEX 30 MIN: CPT

## 2019-02-25 PROCEDURE — 6370000000 HC RX 637 (ALT 250 FOR IP): Performed by: INTERNAL MEDICINE

## 2019-02-25 PROCEDURE — 97167 OT EVAL HIGH COMPLEX 60 MIN: CPT

## 2019-02-25 PROCEDURE — 36415 COLL VENOUS BLD VENIPUNCTURE: CPT

## 2019-02-25 PROCEDURE — 94640 AIRWAY INHALATION TREATMENT: CPT

## 2019-02-25 PROCEDURE — 97535 SELF CARE MNGMENT TRAINING: CPT

## 2019-02-25 PROCEDURE — 2580000003 HC RX 258: Performed by: SURGERY

## 2019-02-25 PROCEDURE — 6370000000 HC RX 637 (ALT 250 FOR IP): Performed by: SURGERY

## 2019-02-25 PROCEDURE — 85027 COMPLETE CBC AUTOMATED: CPT

## 2019-02-25 PROCEDURE — 97530 THERAPEUTIC ACTIVITIES: CPT

## 2019-02-25 PROCEDURE — 6360000002 HC RX W HCPCS: Performed by: SURGERY

## 2019-02-25 RX ADMIN — HEPARIN SODIUM 5000 UNITS: 5000 INJECTION INTRAVENOUS; SUBCUTANEOUS at 05:41

## 2019-02-25 RX ADMIN — ATORVASTATIN CALCIUM 80 MG: 80 TABLET, FILM COATED ORAL at 22:08

## 2019-02-25 RX ADMIN — HYDROCODONE BITARTRATE AND ACETAMINOPHEN 2 TABLET: 5; 325 TABLET ORAL at 11:17

## 2019-02-25 RX ADMIN — ATENOLOL 50 MG: 25 TABLET ORAL at 08:29

## 2019-02-25 RX ADMIN — Medication 10 ML: at 08:33

## 2019-02-25 RX ADMIN — Medication 10 ML: at 22:09

## 2019-02-25 RX ADMIN — HYDROCODONE BITARTRATE AND ACETAMINOPHEN 2 TABLET: 5; 325 TABLET ORAL at 05:40

## 2019-02-25 RX ADMIN — GABAPENTIN 300 MG: 300 CAPSULE ORAL at 22:08

## 2019-02-25 RX ADMIN — Medication 2 PUFF: at 21:07

## 2019-02-25 RX ADMIN — GABAPENTIN 300 MG: 300 CAPSULE ORAL at 08:29

## 2019-02-25 RX ADMIN — HEPARIN SODIUM 5000 UNITS: 5000 INJECTION INTRAVENOUS; SUBCUTANEOUS at 23:39

## 2019-02-25 RX ADMIN — Medication 2 PUFF: at 08:57

## 2019-02-25 RX ADMIN — AMLODIPINE BESYLATE 5 MG: 5 TABLET ORAL at 08:29

## 2019-02-25 RX ADMIN — GABAPENTIN 300 MG: 300 CAPSULE ORAL at 14:29

## 2019-02-25 RX ADMIN — MORPHINE SULFATE 2 MG: 2 INJECTION, SOLUTION INTRAMUSCULAR; INTRAVENOUS at 09:31

## 2019-02-25 RX ADMIN — HYDROCODONE BITARTRATE AND ACETAMINOPHEN 2 TABLET: 5; 325 TABLET ORAL at 22:03

## 2019-02-25 RX ADMIN — AMLODIPINE BESYLATE 5 MG: 5 TABLET ORAL at 22:08

## 2019-02-25 RX ADMIN — MICONAZOLE NITRATE: 2 POWDER TOPICAL at 08:30

## 2019-02-25 RX ADMIN — HEPARIN SODIUM 5000 UNITS: 5000 INJECTION INTRAVENOUS; SUBCUTANEOUS at 14:29

## 2019-02-25 RX ADMIN — TIOTROPIUM BROMIDE 18 MCG: 18 CAPSULE ORAL; RESPIRATORY (INHALATION) at 08:57

## 2019-02-25 RX ADMIN — HYDROCODONE BITARTRATE AND ACETAMINOPHEN 2 TABLET: 5; 325 TABLET ORAL at 16:05

## 2019-02-25 RX ADMIN — ASPIRIN 81 MG: 81 TABLET, COATED ORAL at 08:29

## 2019-02-25 ASSESSMENT — PAIN SCALES - GENERAL
PAINLEVEL_OUTOF10: 4
PAINLEVEL_OUTOF10: 6
PAINLEVEL_OUTOF10: 3
PAINLEVEL_OUTOF10: 6
PAINLEVEL_OUTOF10: 0
PAINLEVEL_OUTOF10: 4
PAINLEVEL_OUTOF10: 7
PAINLEVEL_OUTOF10: 6
PAINLEVEL_OUTOF10: 7
PAINLEVEL_OUTOF10: 2
PAINLEVEL_OUTOF10: 7
PAINLEVEL_OUTOF10: 7
PAINLEVEL_OUTOF10: 5
PAINLEVEL_OUTOF10: 8

## 2019-02-25 ASSESSMENT — PAIN DESCRIPTION - DESCRIPTORS: DESCRIPTORS: BURNING

## 2019-02-25 ASSESSMENT — PAIN DESCRIPTION - PAIN TYPE: TYPE: SURGICAL PAIN

## 2019-02-26 VITALS
HEART RATE: 60 BPM | SYSTOLIC BLOOD PRESSURE: 129 MMHG | RESPIRATION RATE: 17 BRPM | HEIGHT: 72 IN | WEIGHT: 202.38 LBS | BODY MASS INDEX: 27.41 KG/M2 | TEMPERATURE: 98.2 F | DIASTOLIC BLOOD PRESSURE: 76 MMHG | OXYGEN SATURATION: 97 %

## 2019-02-26 LAB
ALBUMIN SERPL-MCNC: 3.5 G/DL (ref 3.4–5)
ANAEROBIC CULTURE: NORMAL
ANION GAP SERPL CALCULATED.3IONS-SCNC: 10 MMOL/L (ref 3–16)
BLOOD CULTURE, ROUTINE: NORMAL
BUN BLDV-MCNC: 6 MG/DL (ref 7–20)
CALCIUM SERPL-MCNC: 8.3 MG/DL (ref 8.3–10.6)
CHLORIDE BLD-SCNC: 101 MMOL/L (ref 99–110)
CO2: 23 MMOL/L (ref 21–32)
CREAT SERPL-MCNC: 0.6 MG/DL (ref 0.9–1.3)
CULTURE SURGICAL: NORMAL
CULTURE, BLOOD 2: NORMAL
GFR AFRICAN AMERICAN: >60
GFR NON-AFRICAN AMERICAN: >60
GLUCOSE BLD-MCNC: 115 MG/DL (ref 70–99)
GRAM STAIN RESULT: NORMAL
PHOSPHORUS: 3.1 MG/DL (ref 2.5–4.9)
POTASSIUM SERPL-SCNC: 3.5 MMOL/L (ref 3.5–5.1)
SODIUM BLD-SCNC: 134 MMOL/L (ref 136–145)

## 2019-02-26 PROCEDURE — 80069 RENAL FUNCTION PANEL: CPT

## 2019-02-26 PROCEDURE — 6370000000 HC RX 637 (ALT 250 FOR IP): Performed by: INTERNAL MEDICINE

## 2019-02-26 PROCEDURE — 6370000000 HC RX 637 (ALT 250 FOR IP): Performed by: SURGERY

## 2019-02-26 PROCEDURE — 94640 AIRWAY INHALATION TREATMENT: CPT

## 2019-02-26 PROCEDURE — 6360000002 HC RX W HCPCS: Performed by: INTERNAL MEDICINE

## 2019-02-26 PROCEDURE — 36415 COLL VENOUS BLD VENIPUNCTURE: CPT

## 2019-02-26 PROCEDURE — 2580000003 HC RX 258: Performed by: SURGERY

## 2019-02-26 PROCEDURE — 2709999900 HC NON-CHARGEABLE SUPPLY

## 2019-02-26 RX ORDER — NICOTINE 21 MG/24HR
1 PATCH, TRANSDERMAL 24 HOURS TRANSDERMAL DAILY
Qty: 30 PATCH | Refills: 3 | Status: SHIPPED | OUTPATIENT
Start: 2019-02-27 | End: 2021-01-01

## 2019-02-26 RX ORDER — ATENOLOL 50 MG/1
50 TABLET ORAL DAILY
Qty: 30 TABLET | Refills: 3 | Status: ON HOLD | OUTPATIENT
Start: 2019-02-26 | End: 2021-01-01

## 2019-02-26 RX ORDER — HYDROCODONE BITARTRATE AND ACETAMINOPHEN 5; 325 MG/1; MG/1
1 TABLET ORAL EVERY 6 HOURS PRN
Qty: 12 TABLET | Refills: 0 | Status: SHIPPED | OUTPATIENT
Start: 2019-02-26 | End: 2019-03-01

## 2019-02-26 RX ADMIN — GABAPENTIN 300 MG: 300 CAPSULE ORAL at 09:27

## 2019-02-26 RX ADMIN — HYDROCODONE BITARTRATE AND ACETAMINOPHEN 2 TABLET: 5; 325 TABLET ORAL at 09:27

## 2019-02-26 RX ADMIN — GABAPENTIN 300 MG: 300 CAPSULE ORAL at 15:56

## 2019-02-26 RX ADMIN — HYDROCODONE BITARTRATE AND ACETAMINOPHEN 2 TABLET: 5; 325 TABLET ORAL at 04:55

## 2019-02-26 RX ADMIN — MICONAZOLE NITRATE: 2 POWDER TOPICAL at 09:28

## 2019-02-26 RX ADMIN — HEPARIN SODIUM 5000 UNITS: 5000 INJECTION INTRAVENOUS; SUBCUTANEOUS at 07:53

## 2019-02-26 RX ADMIN — ASPIRIN 81 MG: 81 TABLET, COATED ORAL at 09:27

## 2019-02-26 RX ADMIN — HEPARIN SODIUM 5000 UNITS: 5000 INJECTION INTRAVENOUS; SUBCUTANEOUS at 15:57

## 2019-02-26 RX ADMIN — Medication 2 PUFF: at 09:03

## 2019-02-26 RX ADMIN — TIOTROPIUM BROMIDE 18 MCG: 18 CAPSULE ORAL; RESPIRATORY (INHALATION) at 09:03

## 2019-02-26 RX ADMIN — AMLODIPINE BESYLATE 5 MG: 5 TABLET ORAL at 09:27

## 2019-02-26 RX ADMIN — Medication 10 ML: at 09:27

## 2019-02-26 RX ADMIN — HYDROCODONE BITARTRATE AND ACETAMINOPHEN 2 TABLET: 5; 325 TABLET ORAL at 15:56

## 2019-02-26 RX ADMIN — ATENOLOL 50 MG: 25 TABLET ORAL at 09:27

## 2019-02-26 ASSESSMENT — PAIN SCALES - GENERAL
PAINLEVEL_OUTOF10: 7
PAINLEVEL_OUTOF10: 5

## 2019-02-26 ASSESSMENT — PAIN DESCRIPTION - ORIENTATION
ORIENTATION: LEFT
ORIENTATION: LEFT

## 2019-02-26 ASSESSMENT — PAIN DESCRIPTION - PAIN TYPE
TYPE: ACUTE PAIN
TYPE: ACUTE PAIN

## 2019-02-26 ASSESSMENT — PAIN DESCRIPTION - LOCATION
LOCATION: RIB CAGE
LOCATION: LEG

## 2019-03-01 ENCOUNTER — OFFICE VISIT (OUTPATIENT)
Dept: VASCULAR SURGERY | Age: 59
End: 2019-03-01

## 2019-03-01 VITALS
BODY MASS INDEX: 27.22 KG/M2 | SYSTOLIC BLOOD PRESSURE: 140 MMHG | HEIGHT: 72 IN | WEIGHT: 201 LBS | DIASTOLIC BLOOD PRESSURE: 80 MMHG

## 2019-03-01 DIAGNOSIS — S31.109D OPEN WOUND OF GROIN, SUBSEQUENT ENCOUNTER: Primary | ICD-10-CM

## 2019-03-01 PROCEDURE — 99024 POSTOP FOLLOW-UP VISIT: CPT | Performed by: SURGERY

## 2019-03-01 RX ORDER — HYDROCODONE BITARTRATE AND ACETAMINOPHEN 5; 325 MG/1; MG/1
1 TABLET ORAL EVERY 6 HOURS PRN
Qty: 28 TABLET | Refills: 0 | Status: SHIPPED | OUTPATIENT
Start: 2019-03-01 | End: 2019-03-08

## 2019-03-22 ENCOUNTER — OFFICE VISIT (OUTPATIENT)
Dept: VASCULAR SURGERY | Age: 59
End: 2019-03-22

## 2019-03-22 VITALS
SYSTOLIC BLOOD PRESSURE: 140 MMHG | WEIGHT: 199 LBS | BODY MASS INDEX: 26.95 KG/M2 | HEIGHT: 72 IN | DIASTOLIC BLOOD PRESSURE: 80 MMHG

## 2019-03-22 DIAGNOSIS — S31.109D WOUND OF LEFT GROIN, SUBSEQUENT ENCOUNTER: Primary | ICD-10-CM

## 2019-03-22 DIAGNOSIS — Z09 POSTOP CHECK: ICD-10-CM

## 2019-03-22 PROCEDURE — 99024 POSTOP FOLLOW-UP VISIT: CPT | Performed by: SURGERY

## 2019-03-25 ENCOUNTER — TELEPHONE (OUTPATIENT)
Dept: VASCULAR SURGERY | Age: 59
End: 2019-03-25

## 2019-04-05 ENCOUNTER — OFFICE VISIT (OUTPATIENT)
Dept: VASCULAR SURGERY | Age: 59
End: 2019-04-05

## 2019-04-05 VITALS
SYSTOLIC BLOOD PRESSURE: 120 MMHG | DIASTOLIC BLOOD PRESSURE: 72 MMHG | HEIGHT: 72 IN | WEIGHT: 199 LBS | BODY MASS INDEX: 26.95 KG/M2

## 2019-04-05 DIAGNOSIS — Z98.890 STATUS POST VASCULAR SURGERY: Primary | ICD-10-CM

## 2019-04-05 PROCEDURE — 99024 POSTOP FOLLOW-UP VISIT: CPT | Performed by: SURGERY

## 2019-04-05 NOTE — PROGRESS NOTES
Outpatient Post-Op Visit  PATIENT NAME: Scot IBARRA Sr Bobby Mendoza DATE: 4/5/2019    SUBJECTIVE:    Pt seen in f/u after LLE revasc and chronic groin wound. .     OBJECTIVE:   VITALS:  /72 (Site: Left Upper Arm)   Ht 6' (1.829 m)   Wt 199 lb (90.3 kg)   BMI 26.99 kg/m²                   INCISION: L groin nearly completely healed. Palpable PT pulse          ASSESSMENT AND PLAN:  62 y.o. male status post LLE revasc. Will schedule for Duplex of LLE in 3 months. Ok to leave incision open to air.       Bobby Crow MD, FACS

## 2019-08-22 ENCOUNTER — TELEPHONE (OUTPATIENT)
Dept: VASCULAR SURGERY | Age: 59
End: 2019-08-22

## 2019-08-23 ENCOUNTER — OFFICE VISIT (OUTPATIENT)
Dept: GASTROENTEROLOGY | Age: 59
End: 2019-08-23
Payer: MEDICARE

## 2019-08-23 VITALS
HEIGHT: 72 IN | BODY MASS INDEX: 26.28 KG/M2 | WEIGHT: 194 LBS | DIASTOLIC BLOOD PRESSURE: 64 MMHG | SYSTOLIC BLOOD PRESSURE: 118 MMHG

## 2019-08-23 DIAGNOSIS — D12.3 ADENOMATOUS POLYP OF TRANSVERSE COLON: Primary | ICD-10-CM

## 2019-08-23 PROCEDURE — 4004F PT TOBACCO SCREEN RCVD TLK: CPT | Performed by: INTERNAL MEDICINE

## 2019-08-23 PROCEDURE — G8419 CALC BMI OUT NRM PARAM NOF/U: HCPCS | Performed by: INTERNAL MEDICINE

## 2019-08-23 PROCEDURE — G8427 DOCREV CUR MEDS BY ELIG CLIN: HCPCS | Performed by: INTERNAL MEDICINE

## 2019-08-23 PROCEDURE — G8598 ASA/ANTIPLAT THER USED: HCPCS | Performed by: INTERNAL MEDICINE

## 2019-08-23 PROCEDURE — 3017F COLORECTAL CA SCREEN DOC REV: CPT | Performed by: INTERNAL MEDICINE

## 2019-08-23 PROCEDURE — 99213 OFFICE O/P EST LOW 20 MIN: CPT | Performed by: INTERNAL MEDICINE
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
## 2019-08-28 ENCOUNTER — HOSPITAL ENCOUNTER (OUTPATIENT)
Dept: ENDOSCOPY | Age: 59
Discharge: HOME | End: 2019-08-28
Payer: MEDICARE

## 2019-08-28 PROCEDURE — 91200 LIVER ELASTOGRAPHY: CPT | Performed by: INTERNAL MEDICINE

## 2019-08-28 PROCEDURE — 91200 LIVER ELASTOGRAPHY: CPT

## 2019-08-30 ENCOUNTER — TELEPHONE (OUTPATIENT)
Dept: GASTROENTEROLOGY | Age: 59
End: 2019-08-30

## 2020-02-14 ENCOUNTER — OFFICE VISIT (OUTPATIENT)
Dept: SURGERY | Age: 60
End: 2020-02-14
Payer: MEDICARE

## 2020-02-14 VITALS — HEIGHT: 69 IN | WEIGHT: 192.4 LBS | BODY MASS INDEX: 28.5 KG/M2

## 2020-02-14 PROCEDURE — G8427 DOCREV CUR MEDS BY ELIG CLIN: HCPCS | Performed by: SURGERY

## 2020-02-14 PROCEDURE — G8484 FLU IMMUNIZE NO ADMIN: HCPCS | Performed by: SURGERY

## 2020-02-14 PROCEDURE — G8419 CALC BMI OUT NRM PARAM NOF/U: HCPCS | Performed by: SURGERY

## 2020-02-14 PROCEDURE — 99244 OFF/OP CNSLTJ NEW/EST MOD 40: CPT | Performed by: SURGERY

## 2020-02-14 NOTE — PROGRESS NOTES
Type of Exam: Initial       FINDINGS:   Lower Chest: No evidence of pneumonia or other acute findings.       Organs: No acute abnormality of the organs of the abdomen.  No evidence of   pancreatitis.  No ureteral stone or hydronephrosis.       GI/Bowel: No obstruction.  Sigmoid diverticulosis is noted.  No acute   findings.       Pelvis: No acute abnormality of the pelvis. Normal appearance of the bladder.       Peritoneum/Retroperitoneum: No free air, free fluid or abscess.       Bones/Soft Tissues: In the soft tissues of the left anterior thigh just   superficial to the femoral artery stent there is a fluid collection measuring   5.1 x 3.6 cm within it enhancement. \"    CBC 2- reviewed : slight anemia     Dr. Lida Odom OP NOTE reviewed from 10-3-2018 and showed distal transverse polyp. Mr. Cate Martinez brought color photos with him as well which were reviewed and show a polyp        Assessment:     Colon polyp    Plan:     Extensive discussion regarding treatment of colon polyp with patient. Reviewed our handout \"Laparoscopic Colorectal Surgery\". Discussed risks include chance of leak requiring take back and temporary ostomy. Discussed he is at higher risk of leak given tobacco use and vascular disease. Discussed risks of alcohol withdrawal and delirium tremens. Discussed risks listed within our handout which include bleeding, infection, damage to nearby organs/structures. Discussed risks of heart attack, stroke, death. Discussed usual period of time for ileostomy would be 3-6 months but sometimes longer if complications. Discussed chance he needs additional treatment if there is cancer. Discussed laparoscopic approach and explained occasional need for open / larger incision. All questions answered.  Patient elects to proceed    Sabina Hernandez M.D.  2/14/20   1:01 PM

## 2020-02-18 ENCOUNTER — TELEPHONE (OUTPATIENT)
Dept: SURGERY | Age: 60
End: 2020-02-18

## 2020-02-18 ENCOUNTER — TELEPHONE (OUTPATIENT)
Dept: CARDIOLOGY CLINIC | Age: 60
End: 2020-02-18

## 2020-02-18 RX ORDER — NEOMYCIN SULFATE 500 MG/1
TABLET ORAL
Qty: 6 TABLET | Refills: 0 | Status: ON HOLD | OUTPATIENT
Start: 2020-02-18 | End: 2020-04-01 | Stop reason: HOSPADM

## 2020-02-18 RX ORDER — METRONIDAZOLE 500 MG/1
TABLET ORAL
Qty: 3 TABLET | Refills: 0 | Status: ON HOLD | OUTPATIENT
Start: 2020-02-18 | End: 2020-04-01 | Stop reason: HOSPADM

## 2020-02-19 NOTE — PROGRESS NOTES
[facility] [employer] / [facility] ([facility]) [address] [facility], [address]    Acknowledgment of Informed Consent for Surgical or Medical Procedure and Sedation  I agree to allow doctor(s) Jong Pérez and his/her associates or assistants, including residents and/or other qualified medical practitioner to perform the following medical treatment or procedure and to administer or direct the administration of sedation as necessary:  Procedure(s): [address]  My doctor has explained the following regarding the proposed procedure:   the explanation of the procedure   the benefits of the procedure   the potential problems that might occur during recuperation   the risks and side effects of the procedure which could include but are not limited to severe blood loss, infection, stroke or death   the benefits, risks and side effect of alternative procedures including the consequences of declining this procedure or any alternative procedures   the likelihood of achieving satisfactory results. I acknowledge no guarantee or assurance has been made to me regarding the results. I understand that during the course of this treatment/procedure, unforeseen conditions can occur which require an additional or different procedure. I agree to allow my physician or assistants to perform such extension of the original procedure as they may find necessary. I understand that sedation will often result in temporary impairment of memory and fine motor skills and that sedation can occasionally progress to a state of deep sedation or general anesthesia. I understand the risks of anesthesia for surgery include, but are not limited to, sore throat, hoarseness, injury to face, mouth, or teeth; nausea; headache; injury to blood vessels or nerves; death, brain damage, or paralysis.     I understand that if I have a Limitation of Treatment order in effect during my hospitalization, the order may or may not be in effect during this procedure. I give my doctor permission to give me blood or blood products. I understand that there are risks with receiving blood such as hepatitis, AIDS, fever, or allergic reaction. I acknowledge that the risks, benefits, and alternatives of this treatment have been explained to me and that no express or implied warranty has been given by the hospital, any blood bank, or any person or entity as to the blood or blood components transfused. At the discretion of my doctor, I agree to allow observers, equipment/product representatives and allow photographing, and/or televising of the procedure, provided my name or identity is maintained confidentially. I agree the hospital may dispose of or use for scientific or educational purposes any tissue, fluid, or body parts which may be removed.     ________________________________Date________Time______ am/pm  ([place])  Patient or Signature of Closest Relative or Legal Guardian    ________________________________Date________Time______am/pm      Page 1 of  1  Witness

## 2020-02-20 RX ORDER — HYDROCODONE BITARTRATE AND ACETAMINOPHEN 7.5; 3 MG/1; MG/1
7.5 TABLET ORAL 3 TIMES DAILY PRN
Status: ON HOLD | COMMUNITY
End: 2021-01-01 | Stop reason: SDUPTHER

## 2020-02-20 NOTE — PROGRESS NOTES
[facility] PRE-SURGICAL TESTING INSTRUCTIONS                              PRIOR TO PROCEDURE DATE:  1. Please follow any guidelines/instructions prior to your procedure as advised by your surgeon. 2. Arrange for someone to drive you home and be with you for the first 24 hours after discharge for your safety after your procedure for which you received sedation. Ensure it is someone we can share information with regarding your discharge. 3. You must contact your surgeon for instructions IF:   You are taking any blood thinners, aspirin, anti-inflammatory or vitamin E.   There is a change in your physical condition such as a cold, fever, rash, cuts, sores or any other infection, especially near your surgical site. 4. Do not drink alcohol the day before or day of your procedure. 5. A Pre-op History and Physical for surgery MUST be completed by your Physician or Urgent Care within 30 days of your procedure date. Please bring a copy with you on the day of your procedure and along with any other testing performed. THE DAY OF YOUR PROCEDURE:  1. Follow instructions for ARRIVAL TIME as DIRECTED BY YOUR SURGEON. I    2. Enter the MAIN entrance from [place] and follow the signs to the free [employer] parking (offered free of charge 6am-5pm). 3. Enter the Main Entrance of the hospital (do not enter from the lower level of the parking garage). Upon entrance, check in with the  at the main desk on your left. If no one is available at the desk, proceed into the [facility] Waiting Room and go through the door directly into the [facility]. There is a Check-in desk ACROSS from Room 5 (marked with a sign hanging from the ceiling). The phone number for the surgery center is [phone]. 4. Please call [phone] option #2 option #2 if you have not been preregistered yet. On the day of your procedure bring your insurance card and photo ID.  You will be

## 2020-03-11 ENCOUNTER — OFFICE VISIT (OUTPATIENT)
Dept: CARDIOLOGY CLINIC | Age: 60
End: 2020-03-11
Payer: MEDICARE

## 2020-03-11 VITALS
BODY MASS INDEX: 28.41 KG/M2 | SYSTOLIC BLOOD PRESSURE: 114 MMHG | DIASTOLIC BLOOD PRESSURE: 72 MMHG | HEART RATE: 75 BPM | OXYGEN SATURATION: 98 % | WEIGHT: 191.8 LBS | HEIGHT: 69 IN

## 2020-03-11 PROCEDURE — 99214 OFFICE O/P EST MOD 30 MIN: CPT | Performed by: INTERNAL MEDICINE

## 2020-03-11 PROCEDURE — G8419 CALC BMI OUT NRM PARAM NOF/U: HCPCS | Performed by: INTERNAL MEDICINE

## 2020-03-11 PROCEDURE — 4004F PT TOBACCO SCREEN RCVD TLK: CPT | Performed by: INTERNAL MEDICINE

## 2020-03-11 PROCEDURE — 93000 ELECTROCARDIOGRAM COMPLETE: CPT | Performed by: INTERNAL MEDICINE

## 2020-03-11 PROCEDURE — 3017F COLORECTAL CA SCREEN DOC REV: CPT | Performed by: INTERNAL MEDICINE

## 2020-03-11 PROCEDURE — G8484 FLU IMMUNIZE NO ADMIN: HCPCS | Performed by: INTERNAL MEDICINE

## 2020-03-11 PROCEDURE — G8427 DOCREV CUR MEDS BY ELIG CLIN: HCPCS | Performed by: INTERNAL MEDICINE

## 2020-03-11 NOTE — PROGRESS NOTES
Murmur  · Peripheral pulses are symmetrical and full  · There is no clubbing, cyanosis of the extremities. · No edema  · Femoral Arteries: 2+ and equal  · Pedal Pulses: 2+ and equal   Abdomen:  · No masses or tenderness  · Liver/Spleen: No Abnormalities Noted  Neurological/Psychiatric:  · Alert and oriented in all spheres  · Moves all extremities well  · Exhibits normal gait balance and coordination  · No abnormalities of mood, affect, memory, mentation, or behavior are noted    Echo 4/2016   Normal left ventricular systolic function with an estimated ejection   fraction of 55%.   Mild concentric left ventricular hypertrophy.   No regional wall motion abnormalities are seen.   Diastolic filling parameters suggest normal diastolic filing pressure.   MIld mitral and tricuspid regurgitation.   Systolic pulmonary artery pressure (SPAP) is normal and estimated at 28 mmHg   (RA pressure 3 mmHg).    Cath 4/2016  LM 20%  LAD 70% mid  D1 80% mid  Cx 50% prox, 70% mid  OM1 80% prox  RCA 70% mid  LV: anterior hypokinesis, EF 50%    CABG 4/2016  LIMA to LAD, sequential Greater Saphenous VG to Diag 1 then on to OM1, separate single Greater SVG to distal RCA    Arterial dopplers 2/21/19  Summary    1. There is no evidence of a pseudoaneurysm present in the left groin. 2. There is no evidence of deep venous thrombosis in the left common femoral  vein. 3. There is a large fluid collection seen in the left groin. Assessment:     1. Chest pain - no recurrence   2. NSTEMI (non-ST elevated myocardial infarction) ([address]    3. Coronary artery disease - stable. No angina   4. Pure hypercholesterolemia - not recently ck   5. Smoker - started again   Bradycardia - resolved  Preoperative cardiac risk assessment for colectomy.  Colon CA    Plan:  Smoking cessation discussed  Lipid panel  Echocardiogram prior to clearance for surgery   Continue current medications   Check blood pressure at home weekly  Regular exercise and following a healthy diet encouraged   Follow up with me in 1 year     Scribe's attestation: This note was scribed in the presence of Dr. Ras Mcclendon M.D. By Shannen Bradley RN    The scribes documentation has been prepared under my direction and personally reviewed by me in its entirety. I confirm that the note above accurately reflects all work, treatment, procedures, and medical decision making performed by me. Dr. Ras Mcclendon MD      Thank you for allowing me to participate in the care of this individual.        Gordon Shah.  Jignesh Brar M.D., [place]

## 2020-03-12 ENCOUNTER — TELEPHONE (OUTPATIENT)
Dept: CARDIOLOGY CLINIC | Age: 60
End: 2020-03-12

## 2020-03-12 ENCOUNTER — HOSPITAL ENCOUNTER (OUTPATIENT)
Dept: CARDIOLOGY | Age: 60
Discharge: HOME | End: 2020-03-12
Payer: MEDICARE

## 2020-03-12 ENCOUNTER — TELEPHONE (OUTPATIENT)
Dept: SURGERY | Age: 60
End: 2020-03-12

## 2020-03-12 ENCOUNTER — ANESTHESIA EVENT (OUTPATIENT)
Dept: OPERATING ROOM | Age: 60
DRG: 231 | End: 2020-03-12
Payer: MEDICARE

## 2020-03-12 LAB
LV EF: 58 %
LVEF MODALITY: NORMAL

## 2020-03-12 PROCEDURE — C8929 TTE W OR WO FOL WCON,DOPPLER: HCPCS

## 2020-03-12 PROCEDURE — 6360000004 HC RX CONTRAST MEDICATION: Performed by: NURSE PRACTITIONER

## 2020-03-12 RX ADMIN — PERFLUTREN 2.2 MG: 6.52 INJECTION, SUSPENSION INTRAVENOUS at 12:27

## 2020-03-12 NOTE — TELEPHONE ENCOUNTER
Spoke with PAT. Per [facility]- echo looks good. Heart function is normal. Patient is ok for surgery. Patient called to make aware.

## 2020-03-12 NOTE — TELEPHONE ENCOUNTER
Spoke with Rich Chaney. They can fit patient in at 11:45 for an echo today. I left a message for patient to be here at 11:30 and ask that he call back to confirm that he can be here.

## 2020-03-13 ENCOUNTER — HOSPITAL ENCOUNTER (INPATIENT)
Age: 60
LOS: 19 days | Discharge: HOME HEALTH | DRG: 231 | End: 2020-04-01
Attending: SURGERY | Admitting: SURGERY
Payer: MEDICARE

## 2020-03-13 ENCOUNTER — ANESTHESIA (OUTPATIENT)
Dept: OPERATING ROOM | Age: 60
DRG: 231 | End: 2020-03-13
Payer: MEDICARE

## 2020-03-13 VITALS — OXYGEN SATURATION: 100 % | DIASTOLIC BLOOD PRESSURE: 70 MMHG | TEMPERATURE: 97.9 F | SYSTOLIC BLOOD PRESSURE: 115 MMHG

## 2020-03-13 PROBLEM — C18.9 COLON CANCER (HCC): Status: ACTIVE | Noted: 2020-03-13

## 2020-03-13 LAB
A/G RATIO: 1.4 (ref 1.1–2.2)
ABO/RH: NORMAL
ALBUMIN SERPL-MCNC: 4.3 G/DL (ref 3.4–5)
ALP BLD-CCNC: 85 U/L (ref 40–129)
ALT SERPL-CCNC: 29 U/L (ref 10–40)
ANION GAP SERPL CALCULATED.3IONS-SCNC: 18 MMOL/L (ref 3–16)
ANTIBODY SCREEN: NORMAL
AST SERPL-CCNC: 31 U/L (ref 15–37)
BILIRUB SERPL-MCNC: 1.2 MG/DL (ref 0–1)
BUN BLDV-MCNC: 5 MG/DL (ref 7–20)
CALCIUM SERPL-MCNC: 9.2 MG/DL (ref 8.3–10.6)
CHLORIDE BLD-SCNC: 97 MMOL/L (ref 99–110)
CO2: 19 MMOL/L (ref 21–32)
CREAT SERPL-MCNC: 0.7 MG/DL (ref 0.9–1.3)
GFR AFRICAN AMERICAN: >60
GFR NON-AFRICAN AMERICAN: >60
GLOBULIN: 3.1 G/DL
GLUCOSE BLD-MCNC: 120 MG/DL (ref 70–99)
HCT VFR BLD CALC: 45.6 % (ref 40.5–52.5)
HEMOGLOBIN: 15.3 G/DL (ref 13.5–17.5)
INR BLD: 0.94 (ref 0.86–1.14)
MCH RBC QN AUTO: 33.3 PG (ref 26–34)
MCHC RBC AUTO-ENTMCNC: 33.5 G/DL (ref 31–36)
MCV RBC AUTO: 99.4 FL (ref 80–100)
PDW BLD-RTO: 13.2 % (ref 12.4–15.4)
PLATELET # BLD: 186 K/UL (ref 135–450)
PMV BLD AUTO: 8.6 FL (ref 5–10.5)
POTASSIUM SERPL-SCNC: 4.1 MMOL/L (ref 3.5–5.1)
PROTHROMBIN TIME: 10.9 SEC (ref 10–13.2)
RBC # BLD: 4.58 M/UL (ref 4.2–5.9)
SODIUM BLD-SCNC: 134 MMOL/L (ref 136–145)
TOTAL PROTEIN: 7.4 G/DL (ref 6.4–8.2)
WBC # BLD: 6.5 K/UL (ref 4–11)

## 2020-03-13 PROCEDURE — 2720000010 HC SURG SUPPLY STERILE: Performed by: SURGERY

## 2020-03-13 PROCEDURE — 2580000003 HC RX 258: Performed by: ANESTHESIOLOGY

## 2020-03-13 PROCEDURE — 6360000002 HC RX W HCPCS: Performed by: ANESTHESIOLOGY

## 2020-03-13 PROCEDURE — 86901 BLOOD TYPING SEROLOGIC RH(D): CPT

## 2020-03-13 PROCEDURE — 2500000003 HC RX 250 WO HCPCS: Performed by: NURSE ANESTHETIST, CERTIFIED REGISTERED

## 2020-03-13 PROCEDURE — 2500000003 HC RX 250 WO HCPCS: Performed by: SURGERY

## 2020-03-13 PROCEDURE — 94667 MNPJ CHEST WALL 1ST: CPT

## 2020-03-13 PROCEDURE — 2580000003 HC RX 258: Performed by: SURGERY

## 2020-03-13 PROCEDURE — 44213 LAP MOBIL SPLENIC FL ADD-ON: CPT | Performed by: SURGERY

## 2020-03-13 PROCEDURE — 44204 LAPARO PARTIAL COLECTOMY: CPT | Performed by: SURGERY

## 2020-03-13 PROCEDURE — 3700000001 HC ADD 15 MINUTES (ANESTHESIA): Performed by: SURGERY

## 2020-03-13 PROCEDURE — 6360000002 HC RX W HCPCS: Performed by: NURSE ANESTHETIST, CERTIFIED REGISTERED

## 2020-03-13 PROCEDURE — 6360000002 HC RX W HCPCS: Performed by: SURGERY

## 2020-03-13 PROCEDURE — 85610 PROTHROMBIN TIME: CPT

## 2020-03-13 PROCEDURE — 86900 BLOOD TYPING SEROLOGIC ABO: CPT

## 2020-03-13 PROCEDURE — 2500000003 HC RX 250 WO HCPCS: Performed by: ANESTHESIOLOGY

## 2020-03-13 PROCEDURE — 3700000000 HC ANESTHESIA ATTENDED CARE: Performed by: SURGERY

## 2020-03-13 PROCEDURE — 6360000002 HC RX W HCPCS

## 2020-03-13 PROCEDURE — C1758 CATHETER, URETERAL: HCPCS | Performed by: SURGERY

## 2020-03-13 PROCEDURE — 86850 RBC ANTIBODY SCREEN: CPT

## 2020-03-13 PROCEDURE — 2580000003 HC RX 258: Performed by: STUDENT IN AN ORGANIZED HEALTH CARE EDUCATION/TRAINING PROGRAM

## 2020-03-13 PROCEDURE — 94761 N-INVAS EAR/PLS OXIMETRY MLT: CPT

## 2020-03-13 PROCEDURE — 49905 OMENTAL FLAP INTRA-ABDOM: CPT | Performed by: SURGERY

## 2020-03-13 PROCEDURE — 80053 COMPREHEN METABOLIC PANEL: CPT

## 2020-03-13 PROCEDURE — 3600000014 HC SURGERY LEVEL 4 ADDTL 15MIN: Performed by: SURGERY

## 2020-03-13 PROCEDURE — 2709999900 HC NON-CHARGEABLE SUPPLY: Performed by: SURGERY

## 2020-03-13 PROCEDURE — 94640 AIRWAY INHALATION TREATMENT: CPT

## 2020-03-13 PROCEDURE — 0DBL4ZZ EXCISION OF TRANSVERSE COLON, PERCUTANEOUS ENDOSCOPIC APPROACH: ICD-10-PCS | Performed by: SURGERY

## 2020-03-13 PROCEDURE — 6360000002 HC RX W HCPCS: Performed by: NURSE PRACTITIONER

## 2020-03-13 PROCEDURE — 1200000000 HC SEMI PRIVATE

## 2020-03-13 PROCEDURE — 7100000000 HC PACU RECOVERY - FIRST 15 MIN: Performed by: SURGERY

## 2020-03-13 PROCEDURE — 7100000001 HC PACU RECOVERY - ADDTL 15 MIN: Performed by: SURGERY

## 2020-03-13 PROCEDURE — 3600000004 HC SURGERY LEVEL 4 BASE: Performed by: SURGERY

## 2020-03-13 PROCEDURE — 2500000003 HC RX 250 WO HCPCS: Performed by: STUDENT IN AN ORGANIZED HEALTH CARE EDUCATION/TRAINING PROGRAM

## 2020-03-13 PROCEDURE — 85027 COMPLETE CBC AUTOMATED: CPT

## 2020-03-13 PROCEDURE — 88307 TISSUE EXAM BY PATHOLOGIST: CPT

## 2020-03-13 PROCEDURE — 6370000000 HC RX 637 (ALT 250 FOR IP): Performed by: STUDENT IN AN ORGANIZED HEALTH CARE EDUCATION/TRAINING PROGRAM

## 2020-03-13 RX ORDER — SODIUM CHLORIDE, SODIUM LACTATE, POTASSIUM CHLORIDE, AND CALCIUM CHLORIDE .6; .31; .03; .02 G/100ML; G/100ML; G/100ML; G/100ML
IRRIGANT IRRIGATION PRN
Status: DISCONTINUED | OUTPATIENT
Start: 2020-03-13 | End: 2020-03-13 | Stop reason: ALTCHOICE

## 2020-03-13 RX ORDER — MIDAZOLAM HYDROCHLORIDE 1 MG/ML
INJECTION INTRAMUSCULAR; INTRAVENOUS PRN
Status: DISCONTINUED | OUTPATIENT
Start: 2020-03-13 | End: 2020-03-13 | Stop reason: SDUPTHER

## 2020-03-13 RX ORDER — SODIUM CHLORIDE 0.9 % (FLUSH) 0.9 %
10 SYRINGE (ML) INJECTION PRN
Status: DISCONTINUED | OUTPATIENT
Start: 2020-03-13 | End: 2020-03-13 | Stop reason: HOSPADM

## 2020-03-13 RX ORDER — ONDANSETRON 2 MG/ML
4 INJECTION INTRAMUSCULAR; INTRAVENOUS ONCE
Status: COMPLETED | OUTPATIENT
Start: 2020-03-13 | End: 2020-03-13

## 2020-03-13 RX ORDER — GABAPENTIN 300 MG/1
300 CAPSULE ORAL 3 TIMES DAILY
Status: DISCONTINUED | OUTPATIENT
Start: 2020-03-13 | End: 2020-04-01 | Stop reason: HOSPADM

## 2020-03-13 RX ORDER — LIDOCAINE HYDROCHLORIDE 10 MG/ML
1 INJECTION, SOLUTION EPIDURAL; INFILTRATION; INTRACAUDAL; PERINEURAL
Status: DISCONTINUED | OUTPATIENT
Start: 2020-03-13 | End: 2020-03-13 | Stop reason: HOSPADM

## 2020-03-13 RX ORDER — ALBUTEROL SULFATE 2.5 MG/3ML
2.5 SOLUTION RESPIRATORY (INHALATION) ONCE
Status: COMPLETED | OUTPATIENT
Start: 2020-03-13 | End: 2020-03-13

## 2020-03-13 RX ORDER — KETAMINE HYDROCHLORIDE 50 MG/ML
INJECTION, SOLUTION, CONCENTRATE INTRAMUSCULAR; INTRAVENOUS PRN
Status: DISCONTINUED | OUTPATIENT
Start: 2020-03-13 | End: 2020-03-13 | Stop reason: SDUPTHER

## 2020-03-13 RX ORDER — FENTANYL CITRATE 50 UG/ML
INJECTION, SOLUTION INTRAMUSCULAR; INTRAVENOUS PRN
Status: DISCONTINUED | OUTPATIENT
Start: 2020-03-13 | End: 2020-03-13 | Stop reason: SDUPTHER

## 2020-03-13 RX ORDER — ONDANSETRON 2 MG/ML
INJECTION INTRAMUSCULAR; INTRAVENOUS PRN
Status: DISCONTINUED | OUTPATIENT
Start: 2020-03-13 | End: 2020-03-13 | Stop reason: SDUPTHER

## 2020-03-13 RX ORDER — DEXAMETHASONE SODIUM PHOSPHATE 4 MG/ML
INJECTION, SOLUTION INTRA-ARTICULAR; INTRALESIONAL; INTRAMUSCULAR; INTRAVENOUS; SOFT TISSUE PRN
Status: DISCONTINUED | OUTPATIENT
Start: 2020-03-13 | End: 2020-03-13 | Stop reason: SDUPTHER

## 2020-03-13 RX ORDER — SODIUM CHLORIDE, SODIUM LACTATE, POTASSIUM CHLORIDE, CALCIUM CHLORIDE 600; 310; 30; 20 MG/100ML; MG/100ML; MG/100ML; MG/100ML
INJECTION, SOLUTION INTRAVENOUS CONTINUOUS
Status: DISCONTINUED | OUTPATIENT
Start: 2020-03-13 | End: 2020-03-13

## 2020-03-13 RX ORDER — SODIUM CHLORIDE 0.9 % (FLUSH) 0.9 %
10 SYRINGE (ML) INJECTION EVERY 12 HOURS SCHEDULED
Status: DISCONTINUED | OUTPATIENT
Start: 2020-03-13 | End: 2020-04-01 | Stop reason: HOSPADM

## 2020-03-13 RX ORDER — ALBUTEROL SULFATE 2.5 MG/3ML
2.5 SOLUTION RESPIRATORY (INHALATION) EVERY 4 HOURS PRN
Status: DISCONTINUED | OUTPATIENT
Start: 2020-03-13 | End: 2020-04-01 | Stop reason: HOSPADM

## 2020-03-13 RX ORDER — SODIUM CHLORIDE, SODIUM LACTATE, POTASSIUM CHLORIDE, CALCIUM CHLORIDE 600; 310; 30; 20 MG/100ML; MG/100ML; MG/100ML; MG/100ML
INJECTION, SOLUTION INTRAVENOUS CONTINUOUS
Status: DISCONTINUED | OUTPATIENT
Start: 2020-03-13 | End: 2020-03-14

## 2020-03-13 RX ORDER — SODIUM CHLORIDE 0.9 % (FLUSH) 0.9 %
10 SYRINGE (ML) INJECTION EVERY 12 HOURS SCHEDULED
Status: DISCONTINUED | OUTPATIENT
Start: 2020-03-13 | End: 2020-03-13 | Stop reason: HOSPADM

## 2020-03-13 RX ORDER — METOPROLOL TARTRATE 5 MG/5ML
5 INJECTION INTRAVENOUS EVERY 6 HOURS
Status: DISCONTINUED | OUTPATIENT
Start: 2020-03-13 | End: 2020-03-14

## 2020-03-13 RX ORDER — ONDANSETRON 2 MG/ML
4 INJECTION INTRAMUSCULAR; INTRAVENOUS
Status: DISCONTINUED | OUTPATIENT
Start: 2020-03-13 | End: 2020-03-13 | Stop reason: HOSPADM

## 2020-03-13 RX ORDER — FENTANYL CITRATE 50 UG/ML
50 INJECTION, SOLUTION INTRAMUSCULAR; INTRAVENOUS EVERY 5 MIN PRN
Status: DISCONTINUED | OUTPATIENT
Start: 2020-03-13 | End: 2020-03-13 | Stop reason: HOSPADM

## 2020-03-13 RX ORDER — BUPIVACAINE HYDROCHLORIDE AND EPINEPHRINE 5; 5 MG/ML; UG/ML
INJECTION, SOLUTION EPIDURAL; INTRACAUDAL; PERINEURAL PRN
Status: DISCONTINUED | OUTPATIENT
Start: 2020-03-13 | End: 2020-03-13 | Stop reason: ALTCHOICE

## 2020-03-13 RX ORDER — PROPOFOL 10 MG/ML
INJECTION, EMULSION INTRAVENOUS PRN
Status: DISCONTINUED | OUTPATIENT
Start: 2020-03-13 | End: 2020-03-13 | Stop reason: SDUPTHER

## 2020-03-13 RX ORDER — HYDROMORPHONE HCL 110MG/55ML
PATIENT CONTROLLED ANALGESIA SYRINGE INTRAVENOUS PRN
Status: DISCONTINUED | OUTPATIENT
Start: 2020-03-13 | End: 2020-03-13 | Stop reason: SDUPTHER

## 2020-03-13 RX ORDER — ACETAMINOPHEN 500 MG
1000 TABLET ORAL EVERY 6 HOURS
Status: DISCONTINUED | OUTPATIENT
Start: 2020-03-13 | End: 2020-03-18

## 2020-03-13 RX ORDER — NEOSTIGMINE METHYLSULFATE 5 MG/5 ML
SYRINGE (ML) INTRAVENOUS PRN
Status: DISCONTINUED | OUTPATIENT
Start: 2020-03-13 | End: 2020-03-13 | Stop reason: SDUPTHER

## 2020-03-13 RX ORDER — LABETALOL 20 MG/4 ML (5 MG/ML) INTRAVENOUS SYRINGE
5 EVERY 10 MIN PRN
Status: DISCONTINUED | OUTPATIENT
Start: 2020-03-13 | End: 2020-03-13 | Stop reason: HOSPADM

## 2020-03-13 RX ORDER — FENTANYL CITRATE 50 UG/ML
25 INJECTION, SOLUTION INTRAMUSCULAR; INTRAVENOUS EVERY 5 MIN PRN
Status: DISCONTINUED | OUTPATIENT
Start: 2020-03-13 | End: 2020-03-13 | Stop reason: HOSPADM

## 2020-03-13 RX ORDER — LORAZEPAM 2 MG/ML
0.25 INJECTION INTRAMUSCULAR
Status: DISCONTINUED | OUTPATIENT
Start: 2020-03-13 | End: 2020-03-13 | Stop reason: HOSPADM

## 2020-03-13 RX ORDER — GLYCOPYRROLATE 1 MG/5 ML
SYRINGE (ML) INTRAVENOUS PRN
Status: DISCONTINUED | OUTPATIENT
Start: 2020-03-13 | End: 2020-03-13 | Stop reason: SDUPTHER

## 2020-03-13 RX ORDER — SODIUM CHLORIDE 9 MG/ML
INJECTION, SOLUTION INTRAVENOUS CONTINUOUS
Status: DISCONTINUED | OUTPATIENT
Start: 2020-03-13 | End: 2020-03-13

## 2020-03-13 RX ORDER — LORAZEPAM 2 MG/ML
INJECTION INTRAMUSCULAR
Status: COMPLETED
Start: 2020-03-13 | End: 2020-03-13

## 2020-03-13 RX ORDER — ENALAPRILAT 2.5 MG/2ML
1.25 INJECTION INTRAVENOUS
Status: DISCONTINUED | OUTPATIENT
Start: 2020-03-13 | End: 2020-03-13 | Stop reason: HOSPADM

## 2020-03-13 RX ORDER — SODIUM CHLORIDE 0.9 % (FLUSH) 0.9 %
10 SYRINGE (ML) INJECTION PRN
Status: DISCONTINUED | OUTPATIENT
Start: 2020-03-13 | End: 2020-04-01 | Stop reason: HOSPADM

## 2020-03-13 RX ORDER — MIDAZOLAM HYDROCHLORIDE 1 MG/ML
2 INJECTION INTRAMUSCULAR; INTRAVENOUS
Status: COMPLETED | OUTPATIENT
Start: 2020-03-13 | End: 2020-03-13

## 2020-03-13 RX ORDER — DEXAMETHASONE SODIUM PHOSPHATE 4 MG/ML
4 INJECTION, SOLUTION INTRA-ARTICULAR; INTRALESIONAL; INTRAMUSCULAR; INTRAVENOUS; SOFT TISSUE ONCE
Status: COMPLETED | OUTPATIENT
Start: 2020-03-13 | End: 2020-03-13

## 2020-03-13 RX ORDER — SUCCINYLCHOLINE CHLORIDE 20 MG/ML
INJECTION INTRAMUSCULAR; INTRAVENOUS PRN
Status: DISCONTINUED | OUTPATIENT
Start: 2020-03-13 | End: 2020-03-13 | Stop reason: SDUPTHER

## 2020-03-13 RX ORDER — ROCURONIUM BROMIDE 10 MG/ML
INJECTION, SOLUTION INTRAVENOUS PRN
Status: DISCONTINUED | OUTPATIENT
Start: 2020-03-13 | End: 2020-03-13 | Stop reason: SDUPTHER

## 2020-03-13 RX ORDER — HYDRALAZINE HYDROCHLORIDE 20 MG/ML
5 INJECTION INTRAMUSCULAR; INTRAVENOUS EVERY 5 MIN PRN
Status: DISCONTINUED | OUTPATIENT
Start: 2020-03-13 | End: 2020-03-13 | Stop reason: HOSPADM

## 2020-03-13 RX ORDER — GLYCOPYRROLATE 0.2 MG/ML
0.2 INJECTION INTRAMUSCULAR; INTRAVENOUS ONCE
Status: COMPLETED | OUTPATIENT
Start: 2020-03-13 | End: 2020-03-13

## 2020-03-13 RX ORDER — ONDANSETRON 2 MG/ML
4 INJECTION INTRAMUSCULAR; INTRAVENOUS EVERY 6 HOURS PRN
Status: DISCONTINUED | OUTPATIENT
Start: 2020-03-13 | End: 2020-04-01 | Stop reason: HOSPADM

## 2020-03-13 RX ORDER — LIDOCAINE HYDROCHLORIDE 20 MG/ML
INJECTION, SOLUTION INTRAVENOUS PRN
Status: DISCONTINUED | OUTPATIENT
Start: 2020-03-13 | End: 2020-03-13 | Stop reason: SDUPTHER

## 2020-03-13 RX ADMIN — PROPOFOL 200 MG: 10 INJECTION, EMULSION INTRAVENOUS at 14:32

## 2020-03-13 RX ADMIN — KETAMINE HYDROCHLORIDE 40 MG: 50 INJECTION, SOLUTION INTRAMUSCULAR; INTRAVENOUS at 15:27

## 2020-03-13 RX ADMIN — PROPOFOL 50 MG: 10 INJECTION, EMULSION INTRAVENOUS at 15:29

## 2020-03-13 RX ADMIN — ROCURONIUM BROMIDE 5 MG: 10 INJECTION, SOLUTION INTRAVENOUS at 14:32

## 2020-03-13 RX ADMIN — ALBUTEROL SULFATE 2.5 MG: 2.5 SOLUTION RESPIRATORY (INHALATION) at 12:20

## 2020-03-13 RX ADMIN — TRAZODONE HYDROCHLORIDE 150 MG: 50 TABLET ORAL at 22:12

## 2020-03-13 RX ADMIN — SUCCINYLCHOLINE CHLORIDE 180 MG: 20 INJECTION, SOLUTION INTRAMUSCULAR; INTRAVENOUS; PARENTERAL at 14:32

## 2020-03-13 RX ADMIN — HYDROMORPHONE HYDROCHLORIDE 2 MG: 2 INJECTION, SOLUTION INTRAMUSCULAR; INTRAVENOUS; SUBCUTANEOUS at 14:54

## 2020-03-13 RX ADMIN — SODIUM CHLORIDE, POTASSIUM CHLORIDE, SODIUM LACTATE AND CALCIUM CHLORIDE: 600; 310; 30; 20 INJECTION, SOLUTION INTRAVENOUS at 18:47

## 2020-03-13 RX ADMIN — LORAZEPAM 0.25 MG: 2 INJECTION INTRAMUSCULAR; INTRAVENOUS at 18:10

## 2020-03-13 RX ADMIN — SODIUM CHLORIDE, SODIUM LACTATE, POTASSIUM CHLORIDE, AND CALCIUM CHLORIDE: 600; 310; 30; 20 INJECTION, SOLUTION INTRAVENOUS at 14:27

## 2020-03-13 RX ADMIN — LORAZEPAM 0.25 MG: 2 INJECTION INTRAMUSCULAR; INTRAVENOUS at 18:25

## 2020-03-13 RX ADMIN — FENTANYL CITRATE 200 MCG: 50 INJECTION INTRAMUSCULAR; INTRAVENOUS at 14:40

## 2020-03-13 RX ADMIN — FENTANYL CITRATE 50 MCG: 50 INJECTION INTRAMUSCULAR; INTRAVENOUS at 17:32

## 2020-03-13 RX ADMIN — AMLODIPINE BESYLATE 7.5 MG: 5 TABLET ORAL at 22:09

## 2020-03-13 RX ADMIN — LORAZEPAM 0.25 MG: 2 INJECTION INTRAMUSCULAR; INTRAVENOUS at 19:04

## 2020-03-13 RX ADMIN — HYDROMORPHONE HYDROCHLORIDE 0.25 MG: 1 INJECTION, SOLUTION INTRAMUSCULAR; INTRAVENOUS; SUBCUTANEOUS at 19:09

## 2020-03-13 RX ADMIN — Medication 5 MG: at 17:40

## 2020-03-13 RX ADMIN — ROCURONIUM BROMIDE 45 MG: 10 INJECTION, SOLUTION INTRAVENOUS at 14:39

## 2020-03-13 RX ADMIN — ACETAMINOPHEN 1000 MG: 500 TABLET ORAL at 22:09

## 2020-03-13 RX ADMIN — ONDANSETRON 4 MG: 2 INJECTION INTRAMUSCULAR; INTRAVENOUS at 14:47

## 2020-03-13 RX ADMIN — MIDAZOLAM HYDROCHLORIDE 2 MG: 2 INJECTION, SOLUTION INTRAMUSCULAR; INTRAVENOUS at 14:27

## 2020-03-13 RX ADMIN — CEFOXITIN 2 G: 2 INJECTION, POWDER, FOR SOLUTION INTRAVENOUS at 17:01

## 2020-03-13 RX ADMIN — ONDANSETRON 4 MG: 2 INJECTION INTRAMUSCULAR; INTRAVENOUS at 13:01

## 2020-03-13 RX ADMIN — MIDAZOLAM 2 MG: 1 INJECTION INTRAMUSCULAR; INTRAVENOUS at 12:58

## 2020-03-13 RX ADMIN — CEFOXITIN 2 G: 2 INJECTION, POWDER, FOR SOLUTION INTRAVENOUS at 14:39

## 2020-03-13 RX ADMIN — METOPROLOL TARTRATE 5 MG: 5 INJECTION INTRAVENOUS at 22:08

## 2020-03-13 RX ADMIN — GABAPENTIN 300 MG: 300 CAPSULE ORAL at 22:12

## 2020-03-13 RX ADMIN — ENOXAPARIN SODIUM 30 MG: 30 INJECTION SUBCUTANEOUS at 13:10

## 2020-03-13 RX ADMIN — FENTANYL CITRATE 100 MCG: 50 INJECTION INTRAMUSCULAR; INTRAVENOUS at 16:21

## 2020-03-13 RX ADMIN — GLYCOPYRROLATE 0.2 MG: 0.2 INJECTION, SOLUTION INTRAMUSCULAR; INTRAVENOUS at 13:06

## 2020-03-13 RX ADMIN — LIDOCAINE HYDROCHLORIDE 100 MG: 20 INJECTION, SOLUTION INTRAVENOUS at 14:32

## 2020-03-13 RX ADMIN — SODIUM CHLORIDE, POTASSIUM CHLORIDE, SODIUM LACTATE AND CALCIUM CHLORIDE: 600; 310; 30; 20 INJECTION, SOLUTION INTRAVENOUS at 12:10

## 2020-03-13 RX ADMIN — FAMOTIDINE 20 MG: 10 INJECTION, SOLUTION INTRAVENOUS at 14:47

## 2020-03-13 RX ADMIN — SODIUM CHLORIDE, SODIUM LACTATE, POTASSIUM CHLORIDE, AND CALCIUM CHLORIDE: 600; 310; 30; 20 INJECTION, SOLUTION INTRAVENOUS at 16:21

## 2020-03-13 RX ADMIN — Medication 0.8 MG: at 17:40

## 2020-03-13 RX ADMIN — DEXAMETHASONE SODIUM PHOSPHATE 4 MG: 4 INJECTION, SOLUTION INTRAMUSCULAR; INTRAVENOUS at 13:04

## 2020-03-13 RX ADMIN — DEXAMETHASONE SODIUM PHOSPHATE 8 MG: 4 INJECTION, SOLUTION INTRAMUSCULAR; INTRAVENOUS at 14:47

## 2020-03-13 RX ADMIN — FENTANYL CITRATE 50 MCG: 50 INJECTION INTRAMUSCULAR; INTRAVENOUS at 17:22

## 2020-03-13 RX ADMIN — Medication 10 ML: at 13:11

## 2020-03-13 ASSESSMENT — PULMONARY FUNCTION TESTS
PIF_VALUE: 17
PIF_VALUE: 19
PIF_VALUE: 18
PIF_VALUE: 15
PIF_VALUE: 24
PIF_VALUE: 15
PIF_VALUE: 18
PIF_VALUE: 6
PIF_VALUE: 12
PIF_VALUE: 1
PIF_VALUE: 24
PIF_VALUE: 18
PIF_VALUE: 15
PIF_VALUE: 24
PIF_VALUE: 18
PIF_VALUE: 13
PIF_VALUE: 18
PIF_VALUE: 18
PIF_VALUE: 17
PIF_VALUE: 24
PIF_VALUE: 2
PIF_VALUE: 26
PIF_VALUE: 17
PIF_VALUE: 17
PIF_VALUE: 20
PIF_VALUE: 17
PIF_VALUE: 24
PIF_VALUE: 17
PIF_VALUE: 18
PIF_VALUE: 24
PIF_VALUE: 24
PIF_VALUE: 23
PIF_VALUE: 18
PIF_VALUE: 17
PIF_VALUE: 24
PIF_VALUE: 24
PIF_VALUE: 13
PIF_VALUE: 18
PIF_VALUE: 24
PIF_VALUE: 20
PIF_VALUE: 17
PIF_VALUE: 0
PIF_VALUE: 24
PIF_VALUE: 15
PIF_VALUE: 18
PIF_VALUE: 9
PIF_VALUE: 18
PIF_VALUE: 15
PIF_VALUE: 17
PIF_VALUE: 15
PIF_VALUE: 18
PIF_VALUE: 18
PIF_VALUE: 17
PIF_VALUE: 17
PIF_VALUE: 25
PIF_VALUE: 15
PIF_VALUE: 15
PIF_VALUE: 17
PIF_VALUE: 17
PIF_VALUE: 5
PIF_VALUE: 18
PIF_VALUE: 18
PIF_VALUE: 24
PIF_VALUE: 13
PIF_VALUE: 21
PIF_VALUE: 23
PIF_VALUE: 24
PIF_VALUE: 15
PIF_VALUE: 17
PIF_VALUE: 15
PIF_VALUE: 15
PIF_VALUE: 2
PIF_VALUE: 17
PIF_VALUE: 19
PIF_VALUE: 0
PIF_VALUE: 18
PIF_VALUE: 24
PIF_VALUE: 18
PIF_VALUE: 17
PIF_VALUE: 15
PIF_VALUE: 17
PIF_VALUE: 18
PIF_VALUE: 17
PIF_VALUE: 18
PIF_VALUE: 15
PIF_VALUE: 15
PIF_VALUE: 14
PIF_VALUE: 15
PIF_VALUE: 17
PIF_VALUE: 15
PIF_VALUE: 18
PIF_VALUE: 15
PIF_VALUE: 13
PIF_VALUE: 18
PIF_VALUE: 24
PIF_VALUE: 18
PIF_VALUE: 0
PIF_VALUE: 18
PIF_VALUE: 15
PIF_VALUE: 16
PIF_VALUE: 18
PIF_VALUE: 24
PIF_VALUE: 24
PIF_VALUE: 15
PIF_VALUE: 13
PIF_VALUE: 18
PIF_VALUE: 15
PIF_VALUE: 24
PIF_VALUE: 18
PIF_VALUE: 18
PIF_VALUE: 1
PIF_VALUE: 24
PIF_VALUE: 18
PIF_VALUE: 19
PIF_VALUE: 18
PIF_VALUE: 15
PIF_VALUE: 24
PIF_VALUE: 17
PIF_VALUE: 18
PIF_VALUE: 18
PIF_VALUE: 17
PIF_VALUE: 17
PIF_VALUE: 18
PIF_VALUE: 15
PIF_VALUE: 18
PIF_VALUE: 2
PIF_VALUE: 22
PIF_VALUE: 24
PIF_VALUE: 15
PIF_VALUE: 15
PIF_VALUE: 18
PIF_VALUE: 15
PIF_VALUE: 17
PIF_VALUE: 18
PIF_VALUE: 18
PIF_VALUE: 15
PIF_VALUE: 15
PIF_VALUE: 18
PIF_VALUE: 0
PIF_VALUE: 17
PIF_VALUE: 18
PIF_VALUE: 20
PIF_VALUE: 19
PIF_VALUE: 1
PIF_VALUE: 17
PIF_VALUE: 18
PIF_VALUE: 17
PIF_VALUE: 20
PIF_VALUE: 15
PIF_VALUE: 18
PIF_VALUE: 15
PIF_VALUE: 1
PIF_VALUE: 24
PIF_VALUE: 15
PIF_VALUE: 18
PIF_VALUE: 2
PIF_VALUE: 18
PIF_VALUE: 24
PIF_VALUE: 25
PIF_VALUE: 18
PIF_VALUE: 18
PIF_VALUE: 19
PIF_VALUE: 18
PIF_VALUE: 24
PIF_VALUE: 19
PIF_VALUE: 20
PIF_VALUE: 18
PIF_VALUE: 2
PIF_VALUE: 24
PIF_VALUE: 24
PIF_VALUE: 15
PIF_VALUE: 18
PIF_VALUE: 18
PIF_VALUE: 19
PIF_VALUE: 24
PIF_VALUE: 15
PIF_VALUE: 13
PIF_VALUE: 24
PIF_VALUE: 6
PIF_VALUE: 15
PIF_VALUE: 13
PIF_VALUE: 17
PIF_VALUE: 18
PIF_VALUE: 17
PIF_VALUE: 14
PIF_VALUE: 24
PIF_VALUE: 18
PIF_VALUE: 18
PIF_VALUE: 24
PIF_VALUE: 17
PIF_VALUE: 15
PIF_VALUE: 24
PIF_VALUE: 15
PIF_VALUE: 15
PIF_VALUE: 17

## 2020-03-13 ASSESSMENT — PAIN DESCRIPTION - FREQUENCY: FREQUENCY: CONTINUOUS

## 2020-03-13 ASSESSMENT — PAIN DESCRIPTION - ORIENTATION
ORIENTATION: RIGHT;LEFT
ORIENTATION: LOWER;RIGHT;LEFT

## 2020-03-13 ASSESSMENT — PAIN DESCRIPTION - LOCATION
LOCATION: ABDOMEN
LOCATION: BACK

## 2020-03-13 ASSESSMENT — PAIN DESCRIPTION - PAIN TYPE
TYPE: SURGICAL PAIN
TYPE: CHRONIC PAIN

## 2020-03-13 ASSESSMENT — PAIN SCALES - GENERAL
PAINLEVEL_OUTOF10: 5
PAINLEVEL_OUTOF10: 6
PAINLEVEL_OUTOF10: 0
PAINLEVEL_OUTOF10: 5

## 2020-03-13 ASSESSMENT — PAIN DESCRIPTION - ONSET: ONSET: ON-GOING

## 2020-03-13 ASSESSMENT — PAIN DESCRIPTION - DESCRIPTORS: DESCRIPTORS: ACHING

## 2020-03-13 ASSESSMENT — PAIN DESCRIPTION - PROGRESSION: CLINICAL_PROGRESSION: GRADUALLY WORSENING
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
## 2020-03-13 NOTE — OP NOTE
Operative Note  ______________________________________________________________    Patient: Kaden Thomson  YOB: 1960  MRN: [id number]  Date of Procedure: 3/13/2020    Pre-Op Diagnosis: Polyp of transverse colon, unspecified type [D12.3]    Post-Op Diagnosis: Same       Procedure(s):  LAPAROSCOPIC TRANSVERSE COLECTOMY  LAPAROSCOPIC MOBILIZATION SPLENIC FLEXURE  OMENTAL PEDICLE FLAP    Anesthesia: General    Surgeon(s):  Nadeen Falk MD    Assistant: Celestine Posey PGY 5 MD Brandi Talavera PGY 3 MD    Estimated Blood Loss (mL): 355 cc    Complications: none     Specimens:   ID Type Source Tests Collected by Time Destination   A : TRANSVERSE COLON AND OMENTUM Tissue Tissue SURGICAL PATHOLOGY Nadeen Falk MD 3/13/2020 1726          Drains:   [REMOVED] Negative Pressure Wound Therapy Groin Left (Removed)       Findings: see full note. Transverse colectomy with stapled side to side anastomosis    Use of wound proctector / additional wound protector: YES: Hand port used for extraction site; sufficient for duration of case     PROCEDURE DETAILS:    After informed consent was obtained, the patient was taken to the operating room. General anesthesia was given. The patient was placed in the lithotomy position with appropriate padding. Cerrato catheter placed under sterile conditions. Rectal exam was performed and the rectum was irrigated. The patient was prepped and draped in the usual sterile fashion. The patient had received oral antibiotic and mechanical bowel prep as well as IV antibiotics. We began by calling time out to confirm the key components of the operation. We then placed a laparoscopic port via AmSafe in the left upper quadrant but after attempting insufflation it was pre peritoneal. We cut down at the navel. Due to our attempt at [facility] the peritoneum had been pushed away. We then placed hemostats on the peritoneum and elevated it into the wound.  We placed a port the fascia with a looped 0 PDS suture. We then irrigated skin. Skin was closed with staples and port sites closed with 4-0 Monocryl. All instrument counts were correct at the end of the case. Dr. Chuy Villafana was present and scrubbed throughout.        Ziyad Negrete MD  Date: 3/13/2020  Time: 5:40 PM

## 2020-03-14 LAB
ALBUMIN SERPL-MCNC: 3.5 G/DL (ref 3.4–5)
ANION GAP SERPL CALCULATED.3IONS-SCNC: 10 MMOL/L (ref 3–16)
BASOPHILS ABSOLUTE: 0 K/UL (ref 0–0.2)
BASOPHILS RELATIVE PERCENT: 0.4 %
BUN BLDV-MCNC: 5 MG/DL (ref 7–20)
CALCIUM SERPL-MCNC: 8.9 MG/DL (ref 8.3–10.6)
CHLORIDE BLD-SCNC: 98 MMOL/L (ref 99–110)
CO2: 24 MMOL/L (ref 21–32)
CREAT SERPL-MCNC: 0.6 MG/DL (ref 0.9–1.3)
EOSINOPHILS ABSOLUTE: 0 K/UL (ref 0–0.6)
EOSINOPHILS RELATIVE PERCENT: 0 %
GFR AFRICAN AMERICAN: >60
GFR NON-AFRICAN AMERICAN: >60
GLUCOSE BLD-MCNC: 135 MG/DL (ref 70–99)
HCT VFR BLD CALC: 41.2 % (ref 40.5–52.5)
HEMOGLOBIN: 14 G/DL (ref 13.5–17.5)
LYMPHOCYTES ABSOLUTE: 0.6 K/UL (ref 1–5.1)
LYMPHOCYTES RELATIVE PERCENT: 6.3 %
MAGNESIUM: 1.8 MG/DL (ref 1.8–2.4)
MCH RBC QN AUTO: 34.2 PG (ref 26–34)
MCHC RBC AUTO-ENTMCNC: 34 G/DL (ref 31–36)
MCV RBC AUTO: 100.5 FL (ref 80–100)
MONOCYTES ABSOLUTE: 0.6 K/UL (ref 0–1.3)
MONOCYTES RELATIVE PERCENT: 6.1 %
NEUTROPHILS ABSOLUTE: 8.6 K/UL (ref 1.7–7.7)
NEUTROPHILS RELATIVE PERCENT: 87.2 %
PDW BLD-RTO: 13.1 % (ref 12.4–15.4)
PHOSPHORUS: 3.4 MG/DL (ref 2.5–4.9)
PLATELET # BLD: 157 K/UL (ref 135–450)
PMV BLD AUTO: 9 FL (ref 5–10.5)
POTASSIUM SERPL-SCNC: 4.8 MMOL/L (ref 3.5–5.1)
RBC # BLD: 4.1 M/UL (ref 4.2–5.9)
SODIUM BLD-SCNC: 132 MMOL/L (ref 136–145)
WBC # BLD: 9.8 K/UL (ref 4–11)

## 2020-03-14 PROCEDURE — 97116 GAIT TRAINING THERAPY: CPT

## 2020-03-14 PROCEDURE — 2500000003 HC RX 250 WO HCPCS: Performed by: STUDENT IN AN ORGANIZED HEALTH CARE EDUCATION/TRAINING PROGRAM

## 2020-03-14 PROCEDURE — 1200000000 HC SEMI PRIVATE

## 2020-03-14 PROCEDURE — 94640 AIRWAY INHALATION TREATMENT: CPT

## 2020-03-14 PROCEDURE — 6370000000 HC RX 637 (ALT 250 FOR IP): Performed by: STUDENT IN AN ORGANIZED HEALTH CARE EDUCATION/TRAINING PROGRAM

## 2020-03-14 PROCEDURE — 94760 N-INVAS EAR/PLS OXIMETRY 1: CPT

## 2020-03-14 PROCEDURE — 80069 RENAL FUNCTION PANEL: CPT

## 2020-03-14 PROCEDURE — 97165 OT EVAL LOW COMPLEX 30 MIN: CPT

## 2020-03-14 PROCEDURE — 6360000002 HC RX W HCPCS: Performed by: STUDENT IN AN ORGANIZED HEALTH CARE EDUCATION/TRAINING PROGRAM

## 2020-03-14 PROCEDURE — 36415 COLL VENOUS BLD VENIPUNCTURE: CPT

## 2020-03-14 PROCEDURE — 97535 SELF CARE MNGMENT TRAINING: CPT

## 2020-03-14 PROCEDURE — 85025 COMPLETE CBC W/AUTO DIFF WBC: CPT

## 2020-03-14 PROCEDURE — 83735 ASSAY OF MAGNESIUM: CPT

## 2020-03-14 PROCEDURE — 97162 PT EVAL MOD COMPLEX 30 MIN: CPT

## 2020-03-14 PROCEDURE — 2580000003 HC RX 258: Performed by: STUDENT IN AN ORGANIZED HEALTH CARE EDUCATION/TRAINING PROGRAM

## 2020-03-14 RX ORDER — BUDESONIDE AND FORMOTEROL FUMARATE DIHYDRATE 80; 4.5 UG/1; UG/1
2 AEROSOL RESPIRATORY (INHALATION) 2 TIMES DAILY
Status: DISCONTINUED | OUTPATIENT
Start: 2020-03-14 | End: 2020-03-14 | Stop reason: CLARIF

## 2020-03-14 RX ORDER — ARFORMOTEROL TARTRATE 15 UG/2ML
15 SOLUTION RESPIRATORY (INHALATION) 2 TIMES DAILY
Status: DISCONTINUED | OUTPATIENT
Start: 2020-03-14 | End: 2020-04-01 | Stop reason: HOSPADM

## 2020-03-14 RX ORDER — BUDESONIDE 0.5 MG/2ML
0.5 INHALANT ORAL 2 TIMES DAILY
Status: DISCONTINUED | OUTPATIENT
Start: 2020-03-14 | End: 2020-04-01 | Stop reason: HOSPADM

## 2020-03-14 RX ORDER — DEXTROSE, SODIUM CHLORIDE, AND POTASSIUM CHLORIDE 5; .45; .15 G/100ML; G/100ML; G/100ML
INJECTION INTRAVENOUS CONTINUOUS
Status: DISCONTINUED | OUTPATIENT
Start: 2020-03-14 | End: 2020-03-16

## 2020-03-14 RX ORDER — OXYCODONE HYDROCHLORIDE 5 MG/1
10 TABLET ORAL EVERY 4 HOURS PRN
Status: DISCONTINUED | OUTPATIENT
Start: 2020-03-14 | End: 2020-03-18

## 2020-03-14 RX ORDER — NICOTINE 21 MG/24HR
1 PATCH, TRANSDERMAL 24 HOURS TRANSDERMAL DAILY
Status: DISCONTINUED | OUTPATIENT
Start: 2020-03-14 | End: 2020-03-29

## 2020-03-14 RX ORDER — OXYCODONE HYDROCHLORIDE 5 MG/1
5 TABLET ORAL EVERY 4 HOURS PRN
Status: DISCONTINUED | OUTPATIENT
Start: 2020-03-14 | End: 2020-03-18

## 2020-03-14 RX ORDER — ATENOLOL 50 MG/1
50 TABLET ORAL DAILY
Status: DISCONTINUED | OUTPATIENT
Start: 2020-03-14 | End: 2020-03-18

## 2020-03-14 RX ORDER — MAGNESIUM SULFATE IN WATER 40 MG/ML
2 INJECTION, SOLUTION INTRAVENOUS ONCE
Status: COMPLETED | OUTPATIENT
Start: 2020-03-14 | End: 2020-03-14

## 2020-03-14 RX ADMIN — HYDROMORPHONE HYDROCHLORIDE 0.5 MG: 1 INJECTION, SOLUTION INTRAMUSCULAR; INTRAVENOUS; SUBCUTANEOUS at 19:29

## 2020-03-14 RX ADMIN — ACETAMINOPHEN 1000 MG: 500 TABLET ORAL at 03:30

## 2020-03-14 RX ADMIN — AMLODIPINE BESYLATE 7.5 MG: 5 TABLET ORAL at 08:30

## 2020-03-14 RX ADMIN — METOPROLOL TARTRATE 5 MG: 5 INJECTION INTRAVENOUS at 08:26

## 2020-03-14 RX ADMIN — TIOTROPIUM BROMIDE INHALATION SPRAY 2 PUFF: 3.12 SPRAY, METERED RESPIRATORY (INHALATION) at 16:03

## 2020-03-14 RX ADMIN — HYDROMORPHONE HYDROCHLORIDE 0.5 MG: 1 INJECTION, SOLUTION INTRAMUSCULAR; INTRAVENOUS; SUBCUTANEOUS at 08:33

## 2020-03-14 RX ADMIN — ATENOLOL 50 MG: 50 TABLET ORAL at 15:50

## 2020-03-14 RX ADMIN — HYDROMORPHONE HYDROCHLORIDE 0.5 MG: 1 INJECTION, SOLUTION INTRAMUSCULAR; INTRAVENOUS; SUBCUTANEOUS at 03:35

## 2020-03-14 RX ADMIN — ENOXAPARIN SODIUM 40 MG: 40 INJECTION SUBCUTANEOUS at 08:31

## 2020-03-14 RX ADMIN — TRAZODONE HYDROCHLORIDE 150 MG: 50 TABLET ORAL at 20:16

## 2020-03-14 RX ADMIN — Medication 10 ML: at 08:32

## 2020-03-14 RX ADMIN — GABAPENTIN 300 MG: 300 CAPSULE ORAL at 08:30

## 2020-03-14 RX ADMIN — GABAPENTIN 300 MG: 300 CAPSULE ORAL at 20:16

## 2020-03-14 RX ADMIN — METOPROLOL TARTRATE 5 MG: 5 INJECTION INTRAVENOUS at 03:31

## 2020-03-14 RX ADMIN — OXYCODONE 10 MG: 5 TABLET ORAL at 12:49

## 2020-03-14 RX ADMIN — MAGNESIUM SULFATE HEPTAHYDRATE 2 G: 40 INJECTION, SOLUTION INTRAVENOUS at 10:51

## 2020-03-14 RX ADMIN — AMLODIPINE BESYLATE 7.5 MG: 5 TABLET ORAL at 20:16

## 2020-03-14 RX ADMIN — SODIUM CHLORIDE, POTASSIUM CHLORIDE, SODIUM LACTATE AND CALCIUM CHLORIDE: 600; 310; 30; 20 INJECTION, SOLUTION INTRAVENOUS at 04:43

## 2020-03-14 RX ADMIN — ACETAMINOPHEN 1000 MG: 500 TABLET ORAL at 15:49

## 2020-03-14 RX ADMIN — GABAPENTIN 300 MG: 300 CAPSULE ORAL at 15:49

## 2020-03-14 RX ADMIN — OXYCODONE 10 MG: 5 TABLET ORAL at 17:20

## 2020-03-14 RX ADMIN — ACETAMINOPHEN 1000 MG: 500 TABLET ORAL at 20:16

## 2020-03-14 RX ADMIN — POTASSIUM CHLORIDE, DEXTROSE MONOHYDRATE AND SODIUM CHLORIDE: 150; 5; 450 INJECTION, SOLUTION INTRAVENOUS at 08:28

## 2020-03-14 RX ADMIN — OXYCODONE 10 MG: 5 TABLET ORAL at 22:32

## 2020-03-14 ASSESSMENT — PAIN DESCRIPTION - LOCATION
LOCATION: ABDOMEN

## 2020-03-14 ASSESSMENT — PAIN DESCRIPTION - ORIENTATION
ORIENTATION: RIGHT;LEFT
ORIENTATION: MID
ORIENTATION: MID
ORIENTATION: RIGHT;LEFT
ORIENTATION: RIGHT;LEFT
ORIENTATION: MID

## 2020-03-14 ASSESSMENT — PAIN DESCRIPTION - DESCRIPTORS
DESCRIPTORS: ACHING

## 2020-03-14 ASSESSMENT — PAIN DESCRIPTION - FREQUENCY
FREQUENCY: CONTINUOUS

## 2020-03-14 ASSESSMENT — PAIN SCALES - GENERAL
PAINLEVEL_OUTOF10: 3
PAINLEVEL_OUTOF10: 7
PAINLEVEL_OUTOF10: 8
PAINLEVEL_OUTOF10: 8
PAINLEVEL_OUTOF10: 7
PAINLEVEL_OUTOF10: 8
PAINLEVEL_OUTOF10: 4
PAINLEVEL_OUTOF10: 4
PAINLEVEL_OUTOF10: 6
PAINLEVEL_OUTOF10: 8
PAINLEVEL_OUTOF10: 7
PAINLEVEL_OUTOF10: 7

## 2020-03-14 ASSESSMENT — PAIN DESCRIPTION - PAIN TYPE
TYPE: SURGICAL PAIN

## 2020-03-14 ASSESSMENT — PAIN DESCRIPTION - ONSET
ONSET: ON-GOING

## 2020-03-14 ASSESSMENT — PAIN DESCRIPTION - PROGRESSION
CLINICAL_PROGRESSION: NOT CHANGED
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
## 2020-03-14 NOTE — PROGRESS NOTES
Gout, Hepatitis C antibody positive in blood, History of blood transfusion, Hyperlipidemia, Hypertension, Lumbar radiculopathy, MI (myocardial infarction) ([facility] Polyp of colon, PVD (peripheral vascular disease) ([facility] Rheumatoid arthritis(714.0), and Tobacco abuse disorder. has a past surgical history that includes Appendectomy (1998); Carpal tunnel release (Bilateral, 2000); Patella surgery (Right, 1978); Coronary artery bypass graft (04/22/2016); Vein Surgery (Left, 04/22/2016); Colonoscopy (N/A, 10/3/2018); femoral bypass (Left, 1/23/2019); incision and drainage (Left, 2/21/2019); and Cardiac surgery. Restrictions  Position Activity Restriction  Other position/activity restrictions: ambulate pt      Vision/Hearing  Vision: Impaired  Vision Exceptions: Wears glasses for reading(reading and driving)  Hearing: Within functional limits       Subjective  General  Additional Pertinent Hx: 60 y/o M with hx of colon cancer presents s/p lap transverse colectomy, primary stapled anastomosis. Response To Previous Treatment: Not applicable  Family / Caregiver Present: No  Referring Practitioner: Prem Johnson MD  Referral Date : 03/13/20  Diagnosis: Polyp of the colon   Follows Commands: Within Functional Limits  General Comment  Comments: Pt resting in chair upon PT arrival.   Subjective  Subjective: Pt agreeable to PT evaluation.    Pain Screening  Patient Currently in Pain: Yes(unrated - states he just had pain meds)          Orientation  Orientation  Overall Orientation Status: Within Normal Limits  Social/Functional History  Social/Functional History  Lives With: Other (comment)(lives with 3 roommates)  Type of Home: House  Home Layout: One level  Home Access: Stairs to enter with rails  Entrance Stairs - Number of Steps: 3 ELLIOT  Entrance Stairs - Rails: None  Bathroom Shower/Tub: Tub/Shower unit  Bathroom Toilet: Standard  Bathroom Equipment: Grab bars in shower, Grab bars around toilet  Home Equipment: notified  Restraints  Initially in place: No            AM-PAC Score  AM-PAC Inpatient Mobility Raw Score : 20 (03/14/20 1430)  AM-PAC Inpatient T-Scale Score : 47.67 (03/14/20 1430)  Mobility Inpatient CMS 0-100% Score: 35.83 (03/14/20 1430)  Mobility Inpatient CMS G-Code Modifier : CJ (03/14/20 1430)          Goals  Short term goals  Time Frame for Short term goals: DC   Short term goal 1: Pt will complete functional txfs independently. Short term goal 2: Pt will ambulate greater than 250' independently  Short term goal 3: Pt will tolerate stair assessment. Short term goal 4: Pt will participate in a formal standardized balance assessment tool. Patient Goals   Patient goals :  To return home       Therapy Time   Individual Concurrent Group Co-treatment   Time In 1309         Time Out 1333         Minutes 24         Timed Code Treatment Minutes: [address] Ashlee Mancini, PT

## 2020-03-14 NOTE — PLAN OF CARE
Problem: Pain:  Goal: Control of acute pain  Description: Control of acute pain  Outcome: Ongoing     Problem: Falls - Risk of:  Goal: Will remain free from falls  Description: Patient in chair with alarm and nonskid socks on, call light, belongings, and bedside table within reach. Patient educated on using call light and instructed to call out for assistance when getting up, patient verbalized understanding. Patient calls out approprietly and remains free of falls.     3/14/2020 1241 by Elham Feng RN  Outcome: Ongoing

## 2020-03-14 NOTE — PLAN OF CARE
Problem: Pain:  Goal: Pain level will decrease  Description: Pain level will decrease  Outcome: Ongoing   Pt denies pain at this time, scheduled tylenol given. Will continue to monitor. Problem: Falls - Risk of:  Goal: Will remain free from falls  Description: Will remain free from falls  Outcome: Ongoing   Fall precaution in place, bed alarm on, call light and bedside table within reach, pt wearing non-skid socks.

## 2020-03-14 NOTE — PROGRESS NOTES
Surgery Daily Progress Note      CC: Laproscopic transvere solectomy, laproscopic mobilization splenic flexure omental pedicle flap  Subjective :  Pain well controlled, no N/V, some flatus, small bowel movement        Objective     14 point ROS negative except as mentioned    Exam:  Vitals:    03/13/20 2003 03/13/20 2200 03/13/20 2303 03/14/20 0320   BP: (!) 150/86  (!) 144/82 132/81   Pulse: 73  63 66   Resp: 14 18 18 18   Temp: 98.1 °F (36.7 °C)  97.6 °F (36.4 °C) 98.8 °F (37.1 °C)   TempSrc: Oral  Oral Oral   SpO2: 97% 96% 97% 98%   Weight:       Height:           General appearance: alert, no acute distress, grooming appropriate  Chest/Lungs: CTAB, no crackles/rales, wheezes/rhonchi, normal effort  Cardiovascular: RRR, no murmurs/gallops/rubs  Abdomen: soft, non-tender, non-distended, +BS, no guarding/rigidity  Extremities: no edema, no cyanosis  Neuro: A&Ox3, no focal deficits, sensation intact      ASSESSMENT/PLAN:   This is a 61 y.o. male s/p Laproscopic transvere solectomy, laproscopic mobilization splenic flexure omental pedicle flap POD1    - Continue monitoring fro BP  - Diet: DIET CLEAR LIQUID;  - VTE PPx: Lovenox and SCDs  - Pulmonary toilet: IS bedside, encourage frequent use (minimum 10x/hr)  - Activity: Encourage frequent ambulation and out of bed to chair    Nigel Still MS3  03/14/20  5:41 AM

## 2020-03-14 NOTE — PROGRESS NOTES
Occupational Therapy   Occupational Therapy Initial Assessment / Discharge  Date: 3/14/2020   Patient Name: Ron Shaffer  MRN: [id number]     : 1960    Date of Service: 3/14/2020    Discharge Recommendations: Ron Shaffer scored a 22/24 on the AM-PAC ADL Inpatient form. Current research shows that an AM-PAC score of 18 or greater is typically associated with a discharge to the patient's home setting. OT Equipment Recommendations  Equipment Needed: No    Assessment      Assessment: Pt is a 61 y.o. M presenting from home for planned colectomy. Pt previously indepenent w/all ADLs and is near his baseline status. Pt donned pants w/SBA and completed toileting w/SBA. Pt required CGA-SBA for functional mobility and functional transfers. Pt reports no further OT-related concerns upon discharge. Pt states he can receive assistance from his roommates at discharge if necessary. Recommend home w/assistance as needed and no HHOT. No futher acute OT needs identified. DC from acute OT    Decision Making: Low Complexity  OT Education: OT Role;Plan of Care  Patient Education: Pt verb understanding  REQUIRES OT FOLLOW UP: No  Activity Tolerance  Activity Tolerance: Patient Tolerated treatment well  Activity Tolerance: Minimal SOB noted at end of session in addition to minor hand tremors. Pt states tremors are normal and he is also going through tobacco withdrawal  Safety Devices  Safety Devices in place: Yes  Type of devices: Call light within reach; Chair alarm in place; Left in chair           Patient Diagnosis(es): The encounter diagnosis was Polyp of transverse colon, unspecified type.      has a past medical history of Allergic rhinitis, Anxiety, Back pain, CAD (coronary artery disease), CVA (cerebral vascular accident) ([facility] [address] DDD (degenerative disc disease), Depression, Fatty liver, Gout, Hepatitis C antibody positive in blood, History of blood transfusion, Hyperlipidemia, Hypertension, Lumbar

## 2020-03-14 NOTE — ANESTHESIA POSTPROCEDURE EVALUATION
Department of Anesthesiology  Postprocedure Note    Patient: Jacque Thompson  MRN: [id number]  YOB: 1960  Date of evaluation: 3/13/2020  Time:  10:33 PM     Procedure Summary     Date:  03/13/20 Room / Location:  [address] [place] [address]    Anesthesia Start:  8491 Anesthesia Stop:  1800    Procedure:  LAPAROSCOPIC TRANSVERSE COLECTOMY (N/A ) Diagnosis:       Polyp of transverse colon, unspecified type      (Polyp of transverse colon, unspecified type [D12.3])    Surgeon:  Vania Murrell MD Responsible Provider:  Sintia Michelle MD    Anesthesia Type:  general ASA Status:  3          Anesthesia Type: general    Farshad Phase I: Farshad Score: 9    Farshad Phase II:      Last vitals: Reviewed and per EMR flowsheets.        Anesthesia Post Evaluation    Patient location during evaluation: PACU  Patient participation: complete - patient participated  Level of consciousness: awake  Pain score: 2  Airway patency: patent  Nausea & Vomiting: no nausea and no vomiting  Complications: no  Cardiovascular status: hemodynamically stable  Respiratory status: acceptable  Hydration status: euvolemic

## 2020-03-15 LAB
ALBUMIN SERPL-MCNC: 3.4 G/DL (ref 3.4–5)
ANION GAP SERPL CALCULATED.3IONS-SCNC: 10 MMOL/L (ref 3–16)
BASOPHILS ABSOLUTE: 0.1 K/UL (ref 0–0.2)
BASOPHILS RELATIVE PERCENT: 0.7 %
BUN BLDV-MCNC: 4 MG/DL (ref 7–20)
CALCIUM SERPL-MCNC: 9.1 MG/DL (ref 8.3–10.6)
CHLORIDE BLD-SCNC: 97 MMOL/L (ref 99–110)
CO2: 26 MMOL/L (ref 21–32)
CREAT SERPL-MCNC: 0.5 MG/DL (ref 0.9–1.3)
EOSINOPHILS ABSOLUTE: 0.1 K/UL (ref 0–0.6)
EOSINOPHILS RELATIVE PERCENT: 0.6 %
GFR AFRICAN AMERICAN: >60
GFR NON-AFRICAN AMERICAN: >60
GLUCOSE BLD-MCNC: 116 MG/DL (ref 70–99)
HCT VFR BLD CALC: 42.7 % (ref 40.5–52.5)
HEMOGLOBIN: 14.3 G/DL (ref 13.5–17.5)
LYMPHOCYTES ABSOLUTE: 1.5 K/UL (ref 1–5.1)
LYMPHOCYTES RELATIVE PERCENT: 17.1 %
MAGNESIUM: 2.1 MG/DL (ref 1.8–2.4)
MCH RBC QN AUTO: 33.7 PG (ref 26–34)
MCHC RBC AUTO-ENTMCNC: 33.5 G/DL (ref 31–36)
MCV RBC AUTO: 100.7 FL (ref 80–100)
MONOCYTES ABSOLUTE: 0.8 K/UL (ref 0–1.3)
MONOCYTES RELATIVE PERCENT: 9.2 %
NEUTROPHILS ABSOLUTE: 6.3 K/UL (ref 1.7–7.7)
NEUTROPHILS RELATIVE PERCENT: 72.4 %
PDW BLD-RTO: 13.1 % (ref 12.4–15.4)
PHOSPHORUS: 2.4 MG/DL (ref 2.5–4.9)
PLATELET # BLD: 167 K/UL (ref 135–450)
PMV BLD AUTO: 8.7 FL (ref 5–10.5)
POTASSIUM SERPL-SCNC: 4.2 MMOL/L (ref 3.5–5.1)
RBC # BLD: 4.24 M/UL (ref 4.2–5.9)
SODIUM BLD-SCNC: 133 MMOL/L (ref 136–145)
WBC # BLD: 8.7 K/UL (ref 4–11)

## 2020-03-15 PROCEDURE — 85025 COMPLETE CBC W/AUTO DIFF WBC: CPT

## 2020-03-15 PROCEDURE — 83735 ASSAY OF MAGNESIUM: CPT

## 2020-03-15 PROCEDURE — 6370000000 HC RX 637 (ALT 250 FOR IP): Performed by: STUDENT IN AN ORGANIZED HEALTH CARE EDUCATION/TRAINING PROGRAM

## 2020-03-15 PROCEDURE — 6360000002 HC RX W HCPCS: Performed by: STUDENT IN AN ORGANIZED HEALTH CARE EDUCATION/TRAINING PROGRAM

## 2020-03-15 PROCEDURE — 36415 COLL VENOUS BLD VENIPUNCTURE: CPT

## 2020-03-15 PROCEDURE — 1200000000 HC SEMI PRIVATE

## 2020-03-15 PROCEDURE — 94669 MECHANICAL CHEST WALL OSCILL: CPT

## 2020-03-15 PROCEDURE — 80069 RENAL FUNCTION PANEL: CPT

## 2020-03-15 PROCEDURE — 94640 AIRWAY INHALATION TREATMENT: CPT

## 2020-03-15 PROCEDURE — 2580000003 HC RX 258: Performed by: STUDENT IN AN ORGANIZED HEALTH CARE EDUCATION/TRAINING PROGRAM

## 2020-03-15 RX ADMIN — ACETAMINOPHEN 1000 MG: 500 TABLET ORAL at 20:09

## 2020-03-15 RX ADMIN — OXYCODONE 10 MG: 5 TABLET ORAL at 19:16

## 2020-03-15 RX ADMIN — ARFORMOTEROL TARTRATE 15 MCG: 15 SOLUTION RESPIRATORY (INHALATION) at 22:08

## 2020-03-15 RX ADMIN — TRAZODONE HYDROCHLORIDE 150 MG: 50 TABLET ORAL at 20:09

## 2020-03-15 RX ADMIN — HYDROMORPHONE HYDROCHLORIDE 0.5 MG: 1 INJECTION, SOLUTION INTRAMUSCULAR; INTRAVENOUS; SUBCUTANEOUS at 09:39

## 2020-03-15 RX ADMIN — AMLODIPINE BESYLATE 7.5 MG: 5 TABLET ORAL at 20:09

## 2020-03-15 RX ADMIN — TIOTROPIUM BROMIDE INHALATION SPRAY 2 PUFF: 3.12 SPRAY, METERED RESPIRATORY (INHALATION) at 09:09

## 2020-03-15 RX ADMIN — ENOXAPARIN SODIUM 40 MG: 40 INJECTION SUBCUTANEOUS at 09:27

## 2020-03-15 RX ADMIN — AMLODIPINE BESYLATE 7.5 MG: 5 TABLET ORAL at 09:27

## 2020-03-15 RX ADMIN — HYDROMORPHONE HYDROCHLORIDE 0.5 MG: 1 INJECTION, SOLUTION INTRAMUSCULAR; INTRAVENOUS; SUBCUTANEOUS at 15:03

## 2020-03-15 RX ADMIN — OXYCODONE 10 MG: 5 TABLET ORAL at 12:48

## 2020-03-15 RX ADMIN — GABAPENTIN 300 MG: 300 CAPSULE ORAL at 15:02

## 2020-03-15 RX ADMIN — ATENOLOL 50 MG: 50 TABLET ORAL at 09:27

## 2020-03-15 RX ADMIN — HYDROMORPHONE HYDROCHLORIDE 0.5 MG: 1 INJECTION, SOLUTION INTRAMUSCULAR; INTRAVENOUS; SUBCUTANEOUS at 22:31

## 2020-03-15 RX ADMIN — DIBASIC SODIUM PHOSPHATE, MONOBASIC POTASSIUM PHOSPHATE AND MONOBASIC SODIUM PHOSPHATE 2 TABLET: 852; 155; 130 TABLET ORAL at 09:39

## 2020-03-15 RX ADMIN — GABAPENTIN 300 MG: 300 CAPSULE ORAL at 20:09

## 2020-03-15 RX ADMIN — GABAPENTIN 300 MG: 300 CAPSULE ORAL at 09:27

## 2020-03-15 RX ADMIN — ACETAMINOPHEN 1000 MG: 500 TABLET ORAL at 15:03

## 2020-03-15 RX ADMIN — BUDESONIDE 500 MCG: 0.5 INHALANT RESPIRATORY (INHALATION) at 22:08

## 2020-03-15 RX ADMIN — ACETAMINOPHEN 1000 MG: 500 TABLET ORAL at 09:27

## 2020-03-15 RX ADMIN — Medication 10 ML: at 20:10

## 2020-03-15 RX ADMIN — Medication 10 ML: at 09:29

## 2020-03-15 RX ADMIN — OXYCODONE 10 MG: 5 TABLET ORAL at 06:09

## 2020-03-15 RX ADMIN — DIBASIC SODIUM PHOSPHATE, MONOBASIC POTASSIUM PHOSPHATE AND MONOBASIC SODIUM PHOSPHATE 2 TABLET: 852; 155; 130 TABLET ORAL at 20:09

## 2020-03-15 ASSESSMENT — PAIN DESCRIPTION - LOCATION
LOCATION: ABDOMEN

## 2020-03-15 ASSESSMENT — PAIN DESCRIPTION - ONSET
ONSET: ON-GOING

## 2020-03-15 ASSESSMENT — PAIN DESCRIPTION - FREQUENCY
FREQUENCY: CONTINUOUS

## 2020-03-15 ASSESSMENT — PAIN DESCRIPTION - PAIN TYPE
TYPE: SURGICAL PAIN

## 2020-03-15 ASSESSMENT — PAIN DESCRIPTION - DESCRIPTORS
DESCRIPTORS: ACHING

## 2020-03-15 ASSESSMENT — PAIN SCALES - GENERAL
PAINLEVEL_OUTOF10: 8
PAINLEVEL_OUTOF10: 9
PAINLEVEL_OUTOF10: 9
PAINLEVEL_OUTOF10: 7
PAINLEVEL_OUTOF10: 8
PAINLEVEL_OUTOF10: 8
PAINLEVEL_OUTOF10: 7

## 2020-03-15 ASSESSMENT — PAIN DESCRIPTION - PROGRESSION
CLINICAL_PROGRESSION: NOT CHANGED

## 2020-03-15 ASSESSMENT — PAIN DESCRIPTION - ORIENTATION
ORIENTATION: MID

## 2020-03-15 NOTE — PROGRESS NOTES
Pt alert and oriented. VSS. Pt pain is controlled with ordered pain medication, see MAR. Pt sat up in chair at beginning of shift. Pt voiding adequately. Pt tolerating diet without issue. Pt now resting comfortably in bed. Pt has call light within reach, bed in lowest position with wheels locked, 2/4 side rails up, and pt is up ad tye. Will continue to monitor.

## 2020-03-15 NOTE — PLAN OF CARE
Problem: Pain:  Goal: Pain level will decrease  Description: Pain level will decrease  Outcome: Ongoing   Pt has reported pain that is being controlled with ordered pain medication, see MAR.

## 2020-03-16 LAB
ALBUMIN SERPL-MCNC: 3.4 G/DL (ref 3.4–5)
ANION GAP SERPL CALCULATED.3IONS-SCNC: 14 MMOL/L (ref 3–16)
BASOPHILS ABSOLUTE: 0.1 K/UL (ref 0–0.2)
BASOPHILS RELATIVE PERCENT: 0.7 %
BUN BLDV-MCNC: 5 MG/DL (ref 7–20)
CALCIUM SERPL-MCNC: 9.1 MG/DL (ref 8.3–10.6)
CHLORIDE BLD-SCNC: 93 MMOL/L (ref 99–110)
CO2: 23 MMOL/L (ref 21–32)
CREAT SERPL-MCNC: 0.5 MG/DL (ref 0.9–1.3)
EOSINOPHILS ABSOLUTE: 0.1 K/UL (ref 0–0.6)
EOSINOPHILS RELATIVE PERCENT: 1.2 %
GFR AFRICAN AMERICAN: >60
GFR NON-AFRICAN AMERICAN: >60
GLUCOSE BLD-MCNC: 137 MG/DL (ref 70–99)
HCT VFR BLD CALC: 44.2 % (ref 40.5–52.5)
HEMOGLOBIN: 14.7 G/DL (ref 13.5–17.5)
LYMPHOCYTES ABSOLUTE: 1.1 K/UL (ref 1–5.1)
LYMPHOCYTES RELATIVE PERCENT: 13.3 %
MAGNESIUM: 1.8 MG/DL (ref 1.8–2.4)
MCH RBC QN AUTO: 33.4 PG (ref 26–34)
MCHC RBC AUTO-ENTMCNC: 33.2 G/DL (ref 31–36)
MCV RBC AUTO: 100.7 FL (ref 80–100)
MONOCYTES ABSOLUTE: 0.7 K/UL (ref 0–1.3)
MONOCYTES RELATIVE PERCENT: 8.9 %
NEUTROPHILS ABSOLUTE: 6.4 K/UL (ref 1.7–7.7)
NEUTROPHILS RELATIVE PERCENT: 75.9 %
PDW BLD-RTO: 12.7 % (ref 12.4–15.4)
PHOSPHORUS: 3.7 MG/DL (ref 2.5–4.9)
PLATELET # BLD: 179 K/UL (ref 135–450)
PMV BLD AUTO: 9.1 FL (ref 5–10.5)
POTASSIUM SERPL-SCNC: 4.4 MMOL/L (ref 3.5–5.1)
RBC # BLD: 4.39 M/UL (ref 4.2–5.9)
SODIUM BLD-SCNC: 130 MMOL/L (ref 136–145)
WBC # BLD: 8.4 K/UL (ref 4–11)

## 2020-03-16 PROCEDURE — 80069 RENAL FUNCTION PANEL: CPT

## 2020-03-16 PROCEDURE — 6370000000 HC RX 637 (ALT 250 FOR IP): Performed by: SURGERY

## 2020-03-16 PROCEDURE — 6360000002 HC RX W HCPCS: Performed by: STUDENT IN AN ORGANIZED HEALTH CARE EDUCATION/TRAINING PROGRAM

## 2020-03-16 PROCEDURE — 36415 COLL VENOUS BLD VENIPUNCTURE: CPT

## 2020-03-16 PROCEDURE — 83735 ASSAY OF MAGNESIUM: CPT

## 2020-03-16 PROCEDURE — 94669 MECHANICAL CHEST WALL OSCILL: CPT

## 2020-03-16 PROCEDURE — 6370000000 HC RX 637 (ALT 250 FOR IP): Performed by: STUDENT IN AN ORGANIZED HEALTH CARE EDUCATION/TRAINING PROGRAM

## 2020-03-16 PROCEDURE — 2580000003 HC RX 258: Performed by: STUDENT IN AN ORGANIZED HEALTH CARE EDUCATION/TRAINING PROGRAM

## 2020-03-16 PROCEDURE — 85025 COMPLETE CBC W/AUTO DIFF WBC: CPT

## 2020-03-16 PROCEDURE — 94640 AIRWAY INHALATION TREATMENT: CPT

## 2020-03-16 PROCEDURE — 1200000000 HC SEMI PRIVATE

## 2020-03-16 RX ORDER — DOCUSATE SODIUM 100 MG/1
100 CAPSULE, LIQUID FILLED ORAL 2 TIMES DAILY
Status: DISCONTINUED | OUTPATIENT
Start: 2020-03-16 | End: 2020-03-18

## 2020-03-16 RX ORDER — METOCLOPRAMIDE HYDROCHLORIDE 5 MG/ML
10 INJECTION INTRAMUSCULAR; INTRAVENOUS ONCE
Status: COMPLETED | OUTPATIENT
Start: 2020-03-16 | End: 2020-03-16

## 2020-03-16 RX ORDER — MAGNESIUM SULFATE IN WATER 40 MG/ML
2 INJECTION, SOLUTION INTRAVENOUS ONCE
Status: COMPLETED | OUTPATIENT
Start: 2020-03-16 | End: 2020-03-16

## 2020-03-16 RX ADMIN — Medication 10 ML: at 09:59

## 2020-03-16 RX ADMIN — TRAZODONE HYDROCHLORIDE 150 MG: 50 TABLET ORAL at 23:23

## 2020-03-16 RX ADMIN — GABAPENTIN 300 MG: 300 CAPSULE ORAL at 09:53

## 2020-03-16 RX ADMIN — METOCLOPRAMIDE HYDROCHLORIDE 10 MG: 5 INJECTION INTRAMUSCULAR; INTRAVENOUS at 09:50

## 2020-03-16 RX ADMIN — AMLODIPINE BESYLATE 7.5 MG: 5 TABLET ORAL at 09:52

## 2020-03-16 RX ADMIN — MAGNESIUM SULFATE HEPTAHYDRATE 2 G: 40 INJECTION, SOLUTION INTRAVENOUS at 09:54

## 2020-03-16 RX ADMIN — GABAPENTIN 300 MG: 300 CAPSULE ORAL at 15:08

## 2020-03-16 RX ADMIN — ONDANSETRON 4 MG: 2 INJECTION INTRAMUSCULAR; INTRAVENOUS at 06:12

## 2020-03-16 RX ADMIN — OXYCODONE 10 MG: 5 TABLET ORAL at 06:12

## 2020-03-16 RX ADMIN — GABAPENTIN 300 MG: 300 CAPSULE ORAL at 21:18

## 2020-03-16 RX ADMIN — Medication 10 ML: at 21:18

## 2020-03-16 RX ADMIN — AMLODIPINE BESYLATE 7.5 MG: 5 TABLET ORAL at 21:18

## 2020-03-16 RX ADMIN — DOCUSATE SODIUM 100 MG: 100 CAPSULE, LIQUID FILLED ORAL at 21:18

## 2020-03-16 RX ADMIN — ACETAMINOPHEN 1000 MG: 500 TABLET ORAL at 09:51

## 2020-03-16 RX ADMIN — OXYCODONE 10 MG: 5 TABLET ORAL at 11:21

## 2020-03-16 RX ADMIN — BUDESONIDE 500 MCG: 0.5 INHALANT RESPIRATORY (INHALATION) at 20:06

## 2020-03-16 RX ADMIN — ARFORMOTEROL TARTRATE 15 MCG: 15 SOLUTION RESPIRATORY (INHALATION) at 20:06

## 2020-03-16 RX ADMIN — ENOXAPARIN SODIUM 40 MG: 40 INJECTION SUBCUTANEOUS at 09:53

## 2020-03-16 RX ADMIN — OXYCODONE 10 MG: 5 TABLET ORAL at 18:11

## 2020-03-16 RX ADMIN — OXYCODONE 10 MG: 5 TABLET ORAL at 23:23

## 2020-03-16 RX ADMIN — ACETAMINOPHEN 1000 MG: 500 TABLET ORAL at 15:08

## 2020-03-16 RX ADMIN — ATENOLOL 50 MG: 50 TABLET ORAL at 09:52

## 2020-03-16 RX ADMIN — DOCUSATE SODIUM 100 MG: 100 CAPSULE, LIQUID FILLED ORAL at 09:51

## 2020-03-16 RX ADMIN — HYDROMORPHONE HYDROCHLORIDE 0.5 MG: 1 INJECTION, SOLUTION INTRAMUSCULAR; INTRAVENOUS; SUBCUTANEOUS at 03:54

## 2020-03-16 ASSESSMENT — PAIN DESCRIPTION - PROGRESSION
CLINICAL_PROGRESSION: NOT CHANGED

## 2020-03-16 ASSESSMENT — PAIN DESCRIPTION - LOCATION
LOCATION: ABDOMEN
LOCATION: GENERALIZED
LOCATION: ABDOMEN
LOCATION: ABDOMEN

## 2020-03-16 ASSESSMENT — PAIN DESCRIPTION - DESCRIPTORS
DESCRIPTORS: ACHING

## 2020-03-16 ASSESSMENT — PAIN DESCRIPTION - ONSET
ONSET: ON-GOING

## 2020-03-16 ASSESSMENT — PAIN DESCRIPTION - PAIN TYPE
TYPE: SURGICAL PAIN
TYPE: CHRONIC PAIN
TYPE: CHRONIC PAIN
TYPE: SURGICAL PAIN
TYPE: SURGICAL PAIN
TYPE: CHRONIC PAIN

## 2020-03-16 ASSESSMENT — PAIN DESCRIPTION - ORIENTATION
ORIENTATION: MID

## 2020-03-16 ASSESSMENT — PAIN SCALES - GENERAL
PAINLEVEL_OUTOF10: 7
PAINLEVEL_OUTOF10: 8
PAINLEVEL_OUTOF10: 6
PAINLEVEL_OUTOF10: 8
PAINLEVEL_OUTOF10: 7

## 2020-03-16 ASSESSMENT — PAIN DESCRIPTION - FREQUENCY
FREQUENCY: CONTINUOUS

## 2020-03-16 NOTE — PROGRESS NOTES
Surgery Daily Progress Note      CC:  Laproscopic transvere solectomy, laproscopic mobilization splenic flexure omental pedicle flap    Subjective :  No acute events over night. Afebrile, No vomiting, some flatulence,Felt nauseous after regular diet yesterday, no BM, voiding, ambulatory, pain 7/10 in fingers and shoulders secondary to arthritis, pain well controlled near incision, blood around incision site, wet dressing last change this am.    Objective     ROS:   A 14 point review of systems was conducted, significant findings as noted in HPI. All other systems negative. Exam:  Vitals:    03/15/20 2208 03/15/20 2213 03/15/20 2230 03/16/20 0349   BP:   112/77 125/82   Pulse:   72 74   Resp: 15 15 16 16   Temp:   98.7 °F (37.1 °C) 97.9 °F (36.6 °C)   TempSrc:   Oral Oral   SpO2: 98% 98% 94% 95%   Weight:       Height:           General appearance: alert, NAD  Chest/Lungs: normal effort, no adventitious breath sounds, on RA, tender lower ribs  Cardiovascular: RRR  Abdomen: soft, appropriately tender, minimally-distended, midline incision with some oozing from the inferior aspect, ecchymosis 6x4 cm present RLQ  Extremities: no edema, no cyanosis, peripheral IV site slight bruising. Neuro: A&Ox3, no focal deficits, sensation intact      ASSESSMENT/PLAN:   This is a 61 y.o. male s/p  Laproscopic transvere colectomy, laproscopic mobilization splenic flexure omental pedicle flap POD3    - Diet: General  - PO pain meds  - Colace for bowel reg  - Pulmonary toilet: IS bedside, encourage frequent use (minimum 10x/hr)  - Activity: Encourage frequent ambulation and out of bed to chair  - Expected D/C in 1-2 days    LOC Thompson  03/16/20  5:25 AM     I have reviewed and discussed with student about the patient and agree with his physical exam findings and assessment/plan. Appropriate changes made above.     NENA Moreau-CNP  6:43 AM  3/16/2020    I performed a history and physical examination of the patient and discussed management with the resident. I reviewed the resident's note and agree with the documented findings and plan of care. Some distension. No fever. WBC 8. No peritonitis on exam. HR okay. Continue ambulation.  Awaiting return of bowel function    Margaert Chakraborty M.D.  3/16/20   1:09 PM

## 2020-03-16 NOTE — PLAN OF CARE
Problem: Pain:  Goal: Pain level will decrease  Description: Pain level will decrease  Outcome: Ongoing  Note: Pt encouraged to use call light to notify staff when pain rises beyond tolerable. Pt educated on pain scale and was using call light appropriately. Problem: Infection - Surgical Site:  Goal: Will show no infection signs and symptoms  Description: Will show no infection signs and symptoms  Outcome: Ongoing  Note: Will continue monitor pt vs, labs, surgical sites, and other indictors of infection.

## 2020-03-16 NOTE — CARE COORDINATION
Case Management Assessment           Initial Evaluation                Date / Time of Evaluation: 3/16/2020 4:08 PM                 Assessment Completed by: Gabino Alvarado    Patient Name: Kaden Thomson     YOB: 1960  Diagnosis: Polyp of transverse colon, unspecified type [D12.3]  Colon cancer [facility]) [C18.9]     Date / Time: 3/13/2020 11:15 AM    Patient Admission Status: Inpatient    If patient is discharged prior to next notation, then this note serves as note for discharge by case management. Current PCP: NENA Ma CNP  Clinic Patient: No    Chart Reviewed: Yes  Patient/ Family Interviewed: Yes    Initial assessment completed at bedside with: pt    Hospitalization in the last 30 days: No    Emergency Contacts:  Extended Emergency Contact Information  Primary Emergency Contact: Vasu Moctezuma [address] Phone: [phone]  Relation: Child    Advance Directives:   Code Status: Full [address]: No    Financial  Payor: Izabel Faustin / Plan: Izabel Faustin / Product Type: *No Product type* /     Pre-cert required for SNF: Yes    Pharmacy    [facility], [address] [facility] [address] [facility] [phone] [facility] [phone]  [address] [place] [address]  Phone: [phone] Fax: [phone]    [address]  [address]  Phone: [phone] Fax: [phone]      Potential assistance Purchasing Medications:    Does Patient want to participate in local refill/ meds to beds program?:      Meds To Beds General Rules:  1. Can ONLY be done Monday- Friday between 8:30am-5pm  2. Prescription(s) must be in pharmacy by 3pm to be filled same day  3. Copy of patient's insurance/ prescription drug card and patient face sheet must be sent along with the was provided with basic dialogue that supports the patient's individualized plan of care/goals and shares the quality data associated with the providers.  Yes      Care Transition patient: Yes    Brenton Escoto RN  [facility]  Case Management Department  Ph: [phone]   Fax: [phone]

## 2020-03-17 LAB
ALBUMIN SERPL-MCNC: 3.2 G/DL (ref 3.4–5)
ANION GAP SERPL CALCULATED.3IONS-SCNC: 11 MMOL/L (ref 3–16)
BASOPHILS ABSOLUTE: 0 K/UL (ref 0–0.2)
BASOPHILS RELATIVE PERCENT: 0.4 %
BUN BLDV-MCNC: 7 MG/DL (ref 7–20)
CALCIUM SERPL-MCNC: 9.3 MG/DL (ref 8.3–10.6)
CHLORIDE BLD-SCNC: 95 MMOL/L (ref 99–110)
CO2: 27 MMOL/L (ref 21–32)
CREAT SERPL-MCNC: 0.6 MG/DL (ref 0.9–1.3)
EOSINOPHILS ABSOLUTE: 0.2 K/UL (ref 0–0.6)
EOSINOPHILS RELATIVE PERCENT: 2.1 %
GFR AFRICAN AMERICAN: >60
GFR NON-AFRICAN AMERICAN: >60
GLUCOSE BLD-MCNC: 122 MG/DL (ref 70–99)
HCT VFR BLD CALC: 43.4 % (ref 40.5–52.5)
HEMOGLOBIN: 14.6 G/DL (ref 13.5–17.5)
LYMPHOCYTES ABSOLUTE: 0.9 K/UL (ref 1–5.1)
LYMPHOCYTES RELATIVE PERCENT: 12.4 %
MAGNESIUM: 2 MG/DL (ref 1.8–2.4)
MCH RBC QN AUTO: 33.3 PG (ref 26–34)
MCHC RBC AUTO-ENTMCNC: 33.7 G/DL (ref 31–36)
MCV RBC AUTO: 98.7 FL (ref 80–100)
MONOCYTES ABSOLUTE: 0.7 K/UL (ref 0–1.3)
MONOCYTES RELATIVE PERCENT: 9.8 %
NEUTROPHILS ABSOLUTE: 5.7 K/UL (ref 1.7–7.7)
NEUTROPHILS RELATIVE PERCENT: 75.3 %
PDW BLD-RTO: 13 % (ref 12.4–15.4)
PHOSPHORUS: 2.9 MG/DL (ref 2.5–4.9)
PLATELET # BLD: 192 K/UL (ref 135–450)
PMV BLD AUTO: 9.2 FL (ref 5–10.5)
POTASSIUM SERPL-SCNC: 4 MMOL/L (ref 3.5–5.1)
RBC # BLD: 4.4 M/UL (ref 4.2–5.9)
SODIUM BLD-SCNC: 133 MMOL/L (ref 136–145)
WBC # BLD: 7.6 K/UL (ref 4–11)

## 2020-03-17 PROCEDURE — 6360000002 HC RX W HCPCS: Performed by: STUDENT IN AN ORGANIZED HEALTH CARE EDUCATION/TRAINING PROGRAM

## 2020-03-17 PROCEDURE — 6370000000 HC RX 637 (ALT 250 FOR IP): Performed by: STUDENT IN AN ORGANIZED HEALTH CARE EDUCATION/TRAINING PROGRAM

## 2020-03-17 PROCEDURE — 97530 THERAPEUTIC ACTIVITIES: CPT

## 2020-03-17 PROCEDURE — 1200000000 HC SEMI PRIVATE

## 2020-03-17 PROCEDURE — 2580000003 HC RX 258: Performed by: STUDENT IN AN ORGANIZED HEALTH CARE EDUCATION/TRAINING PROGRAM

## 2020-03-17 PROCEDURE — 85025 COMPLETE CBC W/AUTO DIFF WBC: CPT

## 2020-03-17 PROCEDURE — 83735 ASSAY OF MAGNESIUM: CPT

## 2020-03-17 PROCEDURE — 80069 RENAL FUNCTION PANEL: CPT

## 2020-03-17 PROCEDURE — 94640 AIRWAY INHALATION TREATMENT: CPT

## 2020-03-17 PROCEDURE — 97116 GAIT TRAINING THERAPY: CPT

## 2020-03-17 PROCEDURE — 94669 MECHANICAL CHEST WALL OSCILL: CPT

## 2020-03-17 PROCEDURE — 36415 COLL VENOUS BLD VENIPUNCTURE: CPT

## 2020-03-17 PROCEDURE — 6360000002 HC RX W HCPCS: Performed by: SURGERY

## 2020-03-17 PROCEDURE — 6370000000 HC RX 637 (ALT 250 FOR IP): Performed by: SURGERY

## 2020-03-17 RX ORDER — METOCLOPRAMIDE HYDROCHLORIDE 5 MG/ML
10 INJECTION INTRAMUSCULAR; INTRAVENOUS ONCE
Status: COMPLETED | OUTPATIENT
Start: 2020-03-17 | End: 2020-03-17

## 2020-03-17 RX ORDER — POLYETHYLENE GLYCOL 3350 17 G/17G
17 POWDER, FOR SOLUTION ORAL 2 TIMES DAILY
Status: DISCONTINUED | OUTPATIENT
Start: 2020-03-17 | End: 2020-03-18

## 2020-03-17 RX ADMIN — POLYETHYLENE GLYCOL 3350 17 G: 17 POWDER, FOR SOLUTION ORAL at 21:15

## 2020-03-17 RX ADMIN — ARFORMOTEROL TARTRATE 15 MCG: 15 SOLUTION RESPIRATORY (INHALATION) at 22:33

## 2020-03-17 RX ADMIN — GABAPENTIN 300 MG: 300 CAPSULE ORAL at 21:14

## 2020-03-17 RX ADMIN — ACETAMINOPHEN 1000 MG: 500 TABLET ORAL at 13:39

## 2020-03-17 RX ADMIN — DOCUSATE SODIUM 100 MG: 100 CAPSULE, LIQUID FILLED ORAL at 21:14

## 2020-03-17 RX ADMIN — OXYCODONE 10 MG: 5 TABLET ORAL at 15:06

## 2020-03-17 RX ADMIN — OXYCODONE 10 MG: 5 TABLET ORAL at 06:16

## 2020-03-17 RX ADMIN — Medication 10 ML: at 21:15

## 2020-03-17 RX ADMIN — BUDESONIDE 500 MCG: 0.5 INHALANT RESPIRATORY (INHALATION) at 22:33

## 2020-03-17 RX ADMIN — Medication 10 ML: at 07:46

## 2020-03-17 RX ADMIN — OXYCODONE 10 MG: 5 TABLET ORAL at 19:24

## 2020-03-17 RX ADMIN — AMLODIPINE BESYLATE 7.5 MG: 5 TABLET ORAL at 21:14

## 2020-03-17 RX ADMIN — ATENOLOL 50 MG: 50 TABLET ORAL at 07:51

## 2020-03-17 RX ADMIN — OXYCODONE 10 MG: 5 TABLET ORAL at 10:35

## 2020-03-17 RX ADMIN — DOCUSATE SODIUM 100 MG: 100 CAPSULE, LIQUID FILLED ORAL at 07:43

## 2020-03-17 RX ADMIN — METOCLOPRAMIDE 10 MG: 5 INJECTION, SOLUTION INTRAMUSCULAR; INTRAVENOUS at 07:44

## 2020-03-17 RX ADMIN — GABAPENTIN 300 MG: 300 CAPSULE ORAL at 13:39

## 2020-03-17 RX ADMIN — ACETAMINOPHEN 1000 MG: 500 TABLET ORAL at 07:43

## 2020-03-17 RX ADMIN — TRAZODONE HYDROCHLORIDE 150 MG: 50 TABLET ORAL at 21:14

## 2020-03-17 RX ADMIN — ENOXAPARIN SODIUM 40 MG: 40 INJECTION SUBCUTANEOUS at 07:44

## 2020-03-17 RX ADMIN — GABAPENTIN 300 MG: 300 CAPSULE ORAL at 07:43

## 2020-03-17 RX ADMIN — AMLODIPINE BESYLATE 7.5 MG: 5 TABLET ORAL at 07:44

## 2020-03-17 ASSESSMENT — PAIN DESCRIPTION - LOCATION
LOCATION: GENERALIZED;ABDOMEN
LOCATION: ABDOMEN
LOCATION: ABDOMEN
LOCATION: GENERALIZED
LOCATION: ABDOMEN
LOCATION: ABDOMEN

## 2020-03-17 ASSESSMENT — PAIN DESCRIPTION - PAIN TYPE
TYPE: SURGICAL PAIN
TYPE: SURGICAL PAIN
TYPE: CHRONIC PAIN;ACUTE PAIN
TYPE: CHRONIC PAIN
TYPE: SURGICAL PAIN

## 2020-03-17 ASSESSMENT — PAIN DESCRIPTION - FREQUENCY
FREQUENCY: CONTINUOUS

## 2020-03-17 ASSESSMENT — PAIN SCALES - GENERAL
PAINLEVEL_OUTOF10: 9
PAINLEVEL_OUTOF10: 8
PAINLEVEL_OUTOF10: 7
PAINLEVEL_OUTOF10: 6
PAINLEVEL_OUTOF10: 6
PAINLEVEL_OUTOF10: 8
PAINLEVEL_OUTOF10: 9
PAINLEVEL_OUTOF10: 9
PAINLEVEL_OUTOF10: 7
PAINLEVEL_OUTOF10: 9

## 2020-03-17 ASSESSMENT — PAIN DESCRIPTION - DESCRIPTORS
DESCRIPTORS: PRESSURE
DESCRIPTORS: JABBING;BURNING
DESCRIPTORS: BURNING;JABBING
DESCRIPTORS: PRESSURE

## 2020-03-17 ASSESSMENT — PAIN DESCRIPTION - ONSET
ONSET: ON-GOING
ONSET: PROGRESSIVE

## 2020-03-17 ASSESSMENT — PAIN DESCRIPTION - PROGRESSION
CLINICAL_PROGRESSION: GRADUALLY WORSENING
CLINICAL_PROGRESSION: NOT CHANGED
CLINICAL_PROGRESSION: NOT CHANGED

## 2020-03-17 ASSESSMENT — PAIN - FUNCTIONAL ASSESSMENT
PAIN_FUNCTIONAL_ASSESSMENT: ACTIVITIES ARE NOT PREVENTED
PAIN_FUNCTIONAL_ASSESSMENT: ACTIVITIES ARE NOT PREVENTED
PAIN_FUNCTIONAL_ASSESSMENT: PREVENTS OR INTERFERES SOME ACTIVE ACTIVITIES AND ADLS

## 2020-03-17 ASSESSMENT — PAIN DESCRIPTION - ORIENTATION
ORIENTATION: LOWER;MID
ORIENTATION: LOWER;MID

## 2020-03-17 ASSESSMENT — PAIN DESCRIPTION - DIRECTION: RADIATING_TOWARDS: N /A

## 2020-03-17 NOTE — PROGRESS NOTES
VSS. Alert+orientedx4. Pt up in chair and complains of severe pain (9/10). Reports feeling like he has been \"hit by a truck. \" Given Reglan and Oxycodone had been given just prior to shift change w/ no improvement. Pt unable to eat currently. Sx sites are CDI, but there is a lot of bruising on R lower side/quadrant of abdomen. Will continue to monitor.  Electronically signed by Marci Montano RN on 3/17/2020 at 8:12 AM

## 2020-03-17 NOTE — DISCHARGE SUMMARY
abdominal series was obtained which showed no pneumoperitoneum. Overnight pt had low grade fever with abdomen distention. NG tube was placed with high gastric output. 03/25/2020 POD 12 - Urine output remained low. Lactic acid was elevated to 5. NG tube remained to suction. Fluid boluses was given with normalization of lactic acid. Cerrato catheter was inserted for strict I/Os. CT scan with IV and rectal contrast showed no evidence of leak, however pt has severe ileus. PICC line was inserted and TPN was started. 03/26/2020 POD 13 - TPN was advanced to goal. Patient with intermittent confusion but re orients without difficulty. Has PICC/TPN. NGT remains to CLWS    POD 14 - Patients wound vac changed at the bedside. Patient states that he is passing flatus but has not had a bowel movement. NG tube with 1.4 liters of bilious output over the last 24 hours. Patient was up to the chair for most of the day yesterday. Confusion is improving     POD 15 - Resting well overnight. Seems more oriented this AM. Abdomen appears improved. Passing a small amount of flatus. Gastrographin administered at 9:30am, AXR 4 hours later. POD 16 - Fever yesterday to 101.1, resolved with tylenol. Gastrographin administered yesterday, since then has had multiple episodes of diarrhea. NG output slowed down. Voiding without difficulty, ambulating. Passing flatus. No nausea or emesis. Wound vac removed with noted fat necrosis to edges of the wound that was debrided with scissors in the subcutaneous fat. Base of wound with granulation tissue. Wound probed without noted pockets of purulence or undrained seroma. POD 17 - Patient with increased confusion throughout the day. Given Seroquel x1 with some improvement. He is passing flatus and having bowel movements. Wound vac removed and wet to dry in place. Denies nausea or vomiting. Patient confused and removed PICC line overnight. POD 18 - Patient remains afebrile.

## 2020-03-17 NOTE — PLAN OF CARE
Problem: Pain:  Goal: Control of acute pain  Description: Control of acute pain  Note: Pt's surgical pain is tolerable with PRN oxycodone. Does experience generalized chronic pain from arthritis-per pt. Reinforcing pt education r/t appropriate pain management and expectations. Will continue to monitor for changes in status and for the need to adjust interventions. Problem: SKIN INTEGRITY  Goal: Skin integrity is maintained or improved  Note: Surgical sites to abdomen remain CDI. Sid Alfonso in place. Up independently, skin otherwise intact.

## 2020-03-17 NOTE — PROGRESS NOTES
Pt A&O, VSS. Has been resting well through the night. Dressing changed to abdomen, staples intact. States pain is surgical and chronic from arthritis-oxycodone helps just a little bit per pt. Intermittent nausea still occurring, however, denies needing antiemetic. Tolerating water through night. Flatus (+). Voiding w/o issues. Saline locked. Up independently. No other requests, will continue to monitor.

## 2020-03-17 NOTE — PROGRESS NOTES
(myocardial infarction) ([facility] [address] Polyp of colon, PVD (peripheral vascular disease) ([facility] [address] Rheumatoid arthritis(714.0), and Tobacco abuse disorder. has a past surgical history that includes Appendectomy (1998); Carpal tunnel release (Bilateral, 2000); Patella surgery (Right, 1978); Coronary artery bypass graft (04/22/2016); Vein Surgery (Left, 04/22/2016); Colonoscopy (N/A, 10/3/2018); femoral bypass (Left, 1/23/2019); incision and drainage (Left, 2/21/2019); Cardiac surgery; and Small intestine surgery (N/A, 3/13/2020). Restrictions  Position Activity Restriction  Other position/activity restrictions: ambulate pt      Subjective   General  Chart Reviewed: Yes  Additional Pertinent Hx: 62 y/o M with hx of colon cancer presents s/p lap transverse colectomy, primary stapled anastomosis. Family / Caregiver Present: No  Subjective  Subjective: Pt found up in chair and agreeable to PT. \"I'm worried about my landlord.  \"  Pain Screening  Patient Currently in Pain: Yes(rates incision site pain 7/10, RN aware)         Orientation  Orientation  Overall Orientation Status: Within Normal Limits       Objective      Transfers  Sit to Stand: Supervision  Stand to sit: Supervision     Ambulation  Device: No Device  Assistance: Supervision;Contact guard assistance  Quality of Gait: slow sanford with slight forward posture with increased TREVON and wt shift side to side as pt fatigued; no overt LOB noted but pt fatigued quickly  Distance: 225 ft  Stairs/Curb  Stairs?: (up/down 8 steps with rail SBA; pt demo reciprocal pattern without issue)                        AM-PAC Score  AM-PAC Inpatient Mobility Raw Score : 20 (03/17/20 1458)  AM-PAC Inpatient T-Scale Score : 47.67 (03/17/20 1458)  Mobility Inpatient CMS 0-100% Score: 35.83 (03/17/20 1458)  Mobility Inpatient CMS G-Code Modifier : CJ (03/17/20 1458)          Goals  Short term goals  Time Frame for Short term goals: DC   Short term goal 1: Pt will complete functional txfs

## 2020-03-18 ENCOUNTER — ANESTHESIA (OUTPATIENT)
Dept: OPERATING ROOM | Age: 60
DRG: 231 | End: 2020-03-18
Payer: MEDICARE

## 2020-03-18 ENCOUNTER — ANESTHESIA EVENT (OUTPATIENT)
Dept: OPERATING ROOM | Age: 60
DRG: 231 | End: 2020-03-18
Payer: MEDICARE

## 2020-03-18 ENCOUNTER — APPOINTMENT (OUTPATIENT)
Dept: CT IMAGING | Age: 60
DRG: 231 | End: 2020-03-18
Attending: SURGERY
Payer: MEDICARE

## 2020-03-18 VITALS — DIASTOLIC BLOOD PRESSURE: 90 MMHG | SYSTOLIC BLOOD PRESSURE: 187 MMHG | TEMPERATURE: 67.3 F | OXYGEN SATURATION: 74 %

## 2020-03-18 LAB
ABO/RH: NORMAL
ALBUMIN SERPL-MCNC: 3.5 G/DL (ref 3.4–5)
ANION GAP SERPL CALCULATED.3IONS-SCNC: 14 MMOL/L (ref 3–16)
ANTIBODY SCREEN: NORMAL
BANDED NEUTROPHILS RELATIVE PERCENT: 5 % (ref 0–7)
BASOPHILS ABSOLUTE: 0 K/UL (ref 0–0.2)
BASOPHILS RELATIVE PERCENT: 0 %
BUN BLDV-MCNC: 14 MG/DL (ref 7–20)
CALCIUM SERPL-MCNC: 9.7 MG/DL (ref 8.3–10.6)
CHLORIDE BLD-SCNC: 90 MMOL/L (ref 99–110)
CO2: 27 MMOL/L (ref 21–32)
CREAT SERPL-MCNC: 0.9 MG/DL (ref 0.9–1.3)
EOSINOPHILS ABSOLUTE: 0 K/UL (ref 0–0.6)
EOSINOPHILS RELATIVE PERCENT: 0 %
GFR AFRICAN AMERICAN: >60
GFR NON-AFRICAN AMERICAN: >60
GLUCOSE BLD-MCNC: 150 MG/DL (ref 70–99)
HCT VFR BLD CALC: 43 % (ref 40.5–52.5)
HEMOGLOBIN: 14.4 G/DL (ref 13.5–17.5)
LYMPHOCYTES ABSOLUTE: 0.6 K/UL (ref 1–5.1)
LYMPHOCYTES RELATIVE PERCENT: 19 %
MAGNESIUM: 1.8 MG/DL (ref 1.8–2.4)
MCH RBC QN AUTO: 33.5 PG (ref 26–34)
MCHC RBC AUTO-ENTMCNC: 33.6 G/DL (ref 31–36)
MCV RBC AUTO: 99.9 FL (ref 80–100)
MONOCYTES ABSOLUTE: 0.6 K/UL (ref 0–1.3)
MONOCYTES RELATIVE PERCENT: 18 %
NEUTROPHILS ABSOLUTE: 2.1 K/UL (ref 1.7–7.7)
NEUTROPHILS RELATIVE PERCENT: 58 %
PDW BLD-RTO: 12.6 % (ref 12.4–15.4)
PHOSPHORUS: 3.3 MG/DL (ref 2.5–4.9)
PLATELET # BLD: 190 K/UL (ref 135–450)
PMV BLD AUTO: 9 FL (ref 5–10.5)
POTASSIUM SERPL-SCNC: 5.2 MMOL/L (ref 3.5–5.1)
RBC # BLD: 4.3 M/UL (ref 4.2–5.9)
SODIUM BLD-SCNC: 131 MMOL/L (ref 136–145)
WBC # BLD: 3.4 K/UL (ref 4–11)

## 2020-03-18 PROCEDURE — 2580000003 HC RX 258: Performed by: SURGERY

## 2020-03-18 PROCEDURE — 6360000002 HC RX W HCPCS

## 2020-03-18 PROCEDURE — 6360000002 HC RX W HCPCS: Performed by: SURGERY

## 2020-03-18 PROCEDURE — C1781 MESH (IMPLANTABLE): HCPCS | Performed by: SURGERY

## 2020-03-18 PROCEDURE — 2500000003 HC RX 250 WO HCPCS: Performed by: SURGERY

## 2020-03-18 PROCEDURE — 94669 MECHANICAL CHEST WALL OSCILL: CPT

## 2020-03-18 PROCEDURE — 74177 CT ABD & PELVIS W/CONTRAST: CPT

## 2020-03-18 PROCEDURE — 7100000000 HC PACU RECOVERY - FIRST 15 MIN: Performed by: SURGERY

## 2020-03-18 PROCEDURE — 86900 BLOOD TYPING SEROLOGIC ABO: CPT

## 2020-03-18 PROCEDURE — 6360000002 HC RX W HCPCS: Performed by: STUDENT IN AN ORGANIZED HEALTH CARE EDUCATION/TRAINING PROGRAM

## 2020-03-18 PROCEDURE — 85025 COMPLETE CBC W/AUTO DIFF WBC: CPT

## 2020-03-18 PROCEDURE — 7100000001 HC PACU RECOVERY - ADDTL 15 MIN: Performed by: SURGERY

## 2020-03-18 PROCEDURE — 86850 RBC ANTIBODY SCREEN: CPT

## 2020-03-18 PROCEDURE — 2500000003 HC RX 250 WO HCPCS: Performed by: NURSE ANESTHETIST, CERTIFIED REGISTERED

## 2020-03-18 PROCEDURE — 2580000003 HC RX 258: Performed by: STUDENT IN AN ORGANIZED HEALTH CARE EDUCATION/TRAINING PROGRAM

## 2020-03-18 PROCEDURE — 86901 BLOOD TYPING SEROLOGIC RH(D): CPT

## 2020-03-18 PROCEDURE — 2709999900 HC NON-CHARGEABLE SUPPLY: Performed by: SURGERY

## 2020-03-18 PROCEDURE — 6360000004 HC RX CONTRAST MEDICATION: Performed by: STUDENT IN AN ORGANIZED HEALTH CARE EDUCATION/TRAINING PROGRAM

## 2020-03-18 PROCEDURE — 3600000004 HC SURGERY LEVEL 4 BASE: Performed by: SURGERY

## 2020-03-18 PROCEDURE — 6360000002 HC RX W HCPCS: Performed by: ANESTHESIOLOGY

## 2020-03-18 PROCEDURE — 3600000014 HC SURGERY LEVEL 4 ADDTL 15MIN: Performed by: SURGERY

## 2020-03-18 PROCEDURE — 3700000001 HC ADD 15 MINUTES (ANESTHESIA): Performed by: SURGERY

## 2020-03-18 PROCEDURE — 6370000000 HC RX 637 (ALT 250 FOR IP): Performed by: STUDENT IN AN ORGANIZED HEALTH CARE EDUCATION/TRAINING PROGRAM

## 2020-03-18 PROCEDURE — 3700000000 HC ANESTHESIA ATTENDED CARE: Performed by: SURGERY

## 2020-03-18 PROCEDURE — 83735 ASSAY OF MAGNESIUM: CPT

## 2020-03-18 PROCEDURE — 36415 COLL VENOUS BLD VENIPUNCTURE: CPT

## 2020-03-18 PROCEDURE — 49561 PR REPAIR INCISIONAL HERNIA,STRANG: CPT | Performed by: SURGERY

## 2020-03-18 PROCEDURE — 80069 RENAL FUNCTION PANEL: CPT

## 2020-03-18 PROCEDURE — 2580000003 HC RX 258: Performed by: NURSE ANESTHETIST, CERTIFIED REGISTERED

## 2020-03-18 PROCEDURE — 6370000000 HC RX 637 (ALT 250 FOR IP): Performed by: SURGERY

## 2020-03-18 PROCEDURE — 6360000002 HC RX W HCPCS: Performed by: NURSE ANESTHETIST, CERTIFIED REGISTERED

## 2020-03-18 PROCEDURE — 94640 AIRWAY INHALATION TREATMENT: CPT

## 2020-03-18 PROCEDURE — 0WUF0JZ SUPPLEMENT ABDOMINAL WALL WITH SYNTHETIC SUBSTITUTE, OPEN APPROACH: ICD-10-PCS | Performed by: SURGERY

## 2020-03-18 PROCEDURE — 1200000000 HC SEMI PRIVATE

## 2020-03-18 DEVICE — IMPLANTABLE DEVICE: Type: IMPLANTABLE DEVICE | Site: ABDOMEN | Status: FUNCTIONAL

## 2020-03-18 RX ORDER — HYDRALAZINE HYDROCHLORIDE 20 MG/ML
5 INJECTION INTRAMUSCULAR; INTRAVENOUS EVERY 10 MIN PRN
Status: DISCONTINUED | OUTPATIENT
Start: 2020-03-18 | End: 2020-03-18 | Stop reason: HOSPADM

## 2020-03-18 RX ORDER — METOPROLOL TARTRATE 5 MG/5ML
5 INJECTION INTRAVENOUS EVERY 6 HOURS
Status: DISCONTINUED | OUTPATIENT
Start: 2020-03-18 | End: 2020-03-23

## 2020-03-18 RX ORDER — ONDANSETRON 2 MG/ML
INJECTION INTRAMUSCULAR; INTRAVENOUS PRN
Status: DISCONTINUED | OUTPATIENT
Start: 2020-03-18 | End: 2020-03-18 | Stop reason: SDUPTHER

## 2020-03-18 RX ORDER — HYDROCODONE BITARTRATE AND ACETAMINOPHEN 5; 325 MG/1; MG/1
1 TABLET ORAL
Status: DISCONTINUED | OUTPATIENT
Start: 2020-03-18 | End: 2020-03-18 | Stop reason: HOSPADM

## 2020-03-18 RX ORDER — SUCCINYLCHOLINE CHLORIDE 20 MG/ML
INJECTION INTRAMUSCULAR; INTRAVENOUS PRN
Status: DISCONTINUED | OUTPATIENT
Start: 2020-03-18 | End: 2020-03-18 | Stop reason: SDUPTHER

## 2020-03-18 RX ORDER — LIDOCAINE HYDROCHLORIDE 20 MG/ML
INJECTION, SOLUTION EPIDURAL; INFILTRATION; INTRACAUDAL; PERINEURAL PRN
Status: DISCONTINUED | OUTPATIENT
Start: 2020-03-18 | End: 2020-03-18 | Stop reason: SDUPTHER

## 2020-03-18 RX ORDER — ONDANSETRON 2 MG/ML
4 INJECTION INTRAMUSCULAR; INTRAVENOUS
Status: DISCONTINUED | OUTPATIENT
Start: 2020-03-18 | End: 2020-03-18 | Stop reason: HOSPADM

## 2020-03-18 RX ORDER — FENTANYL CITRATE 50 UG/ML
INJECTION, SOLUTION INTRAMUSCULAR; INTRAVENOUS PRN
Status: DISCONTINUED | OUTPATIENT
Start: 2020-03-18 | End: 2020-03-18 | Stop reason: SDUPTHER

## 2020-03-18 RX ORDER — PROPOFOL 10 MG/ML
INJECTION, EMULSION INTRAVENOUS PRN
Status: DISCONTINUED | OUTPATIENT
Start: 2020-03-18 | End: 2020-03-18 | Stop reason: SDUPTHER

## 2020-03-18 RX ORDER — SODIUM CHLORIDE 9 MG/ML
INJECTION, SOLUTION INTRAVENOUS CONTINUOUS
Status: DISCONTINUED | OUTPATIENT
Start: 2020-03-18 | End: 2020-03-23

## 2020-03-18 RX ORDER — MEPERIDINE HYDROCHLORIDE 25 MG/ML
12.5 INJECTION INTRAMUSCULAR; INTRAVENOUS; SUBCUTANEOUS EVERY 5 MIN PRN
Status: DISCONTINUED | OUTPATIENT
Start: 2020-03-18 | End: 2020-03-18 | Stop reason: HOSPADM

## 2020-03-18 RX ORDER — 0.9 % SODIUM CHLORIDE 0.9 %
500 INTRAVENOUS SOLUTION INTRAVENOUS
Status: DISCONTINUED | OUTPATIENT
Start: 2020-03-18 | End: 2020-03-18 | Stop reason: HOSPADM

## 2020-03-18 RX ORDER — LORAZEPAM 2 MG/ML
INJECTION INTRAMUSCULAR
Status: COMPLETED
Start: 2020-03-18 | End: 2020-03-18

## 2020-03-18 RX ORDER — 0.9 % SODIUM CHLORIDE 0.9 %
1000 INTRAVENOUS SOLUTION INTRAVENOUS ONCE
Status: COMPLETED | OUTPATIENT
Start: 2020-03-18 | End: 2020-03-18

## 2020-03-18 RX ORDER — BISACODYL 10 MG
10 SUPPOSITORY, RECTAL RECTAL ONCE
Status: DISCONTINUED | OUTPATIENT
Start: 2020-03-18 | End: 2020-03-18

## 2020-03-18 RX ORDER — SODIUM CHLORIDE, SODIUM LACTATE, POTASSIUM CHLORIDE, CALCIUM CHLORIDE 600; 310; 30; 20 MG/100ML; MG/100ML; MG/100ML; MG/100ML
INJECTION, SOLUTION INTRAVENOUS CONTINUOUS PRN
Status: DISCONTINUED | OUTPATIENT
Start: 2020-03-18 | End: 2020-03-18 | Stop reason: SDUPTHER

## 2020-03-18 RX ORDER — HYDROMORPHONE HCL 110MG/55ML
PATIENT CONTROLLED ANALGESIA SYRINGE INTRAVENOUS PRN
Status: DISCONTINUED | OUTPATIENT
Start: 2020-03-18 | End: 2020-03-18 | Stop reason: SDUPTHER

## 2020-03-18 RX ORDER — LANOLIN ALCOHOL/MO/W.PET/CERES
400 CREAM (GRAM) TOPICAL ONCE
Status: COMPLETED | OUTPATIENT
Start: 2020-03-18 | End: 2020-03-18

## 2020-03-18 RX ORDER — LORAZEPAM 2 MG/ML
0.25 INJECTION INTRAMUSCULAR
Status: COMPLETED | OUTPATIENT
Start: 2020-03-18 | End: 2020-03-18

## 2020-03-18 RX ORDER — PROCHLORPERAZINE EDISYLATE 5 MG/ML
5 INJECTION INTRAMUSCULAR; INTRAVENOUS
Status: DISCONTINUED | OUTPATIENT
Start: 2020-03-18 | End: 2020-03-18 | Stop reason: HOSPADM

## 2020-03-18 RX ORDER — ROCURONIUM BROMIDE 10 MG/ML
INJECTION, SOLUTION INTRAVENOUS PRN
Status: DISCONTINUED | OUTPATIENT
Start: 2020-03-18 | End: 2020-03-18 | Stop reason: SDUPTHER

## 2020-03-18 RX ORDER — DIPHENHYDRAMINE HYDROCHLORIDE 50 MG/ML
12.5 INJECTION INTRAMUSCULAR; INTRAVENOUS
Status: DISCONTINUED | OUTPATIENT
Start: 2020-03-18 | End: 2020-03-18 | Stop reason: HOSPADM

## 2020-03-18 RX ADMIN — SODIUM CHLORIDE, SODIUM LACTATE, POTASSIUM CHLORIDE, AND CALCIUM CHLORIDE: 600; 310; 30; 20 INJECTION, SOLUTION INTRAVENOUS at 13:47

## 2020-03-18 RX ADMIN — PROPOFOL 120 MG: 10 INJECTION, EMULSION INTRAVENOUS at 13:47

## 2020-03-18 RX ADMIN — TIOTROPIUM BROMIDE INHALATION SPRAY 2 PUFF: 3.12 SPRAY, METERED RESPIRATORY (INHALATION) at 09:00

## 2020-03-18 RX ADMIN — IOPAMIDOL 80 ML: 755 INJECTION, SOLUTION INTRAVENOUS at 08:30

## 2020-03-18 RX ADMIN — PIPERACILLIN AND TAZOBACTAM 3.38 MG OF PIPERACILLIN: 3; .375 INJECTION, POWDER, LYOPHILIZED, FOR SOLUTION INTRAVENOUS at 13:39

## 2020-03-18 RX ADMIN — Medication 400 MG: at 08:01

## 2020-03-18 RX ADMIN — LORAZEPAM 0.25 MG: 2 INJECTION INTRAMUSCULAR; INTRAVENOUS at 16:40

## 2020-03-18 RX ADMIN — BUDESONIDE 500 MCG: 0.5 INHALANT RESPIRATORY (INHALATION) at 19:59

## 2020-03-18 RX ADMIN — LORAZEPAM 0.25 MG: 2 INJECTION INTRAMUSCULAR; INTRAVENOUS at 17:10

## 2020-03-18 RX ADMIN — ATENOLOL 50 MG: 50 TABLET ORAL at 08:00

## 2020-03-18 RX ADMIN — ARFORMOTEROL TARTRATE 15 MCG: 15 SOLUTION RESPIRATORY (INHALATION) at 09:00

## 2020-03-18 RX ADMIN — HYDRALAZINE HYDROCHLORIDE 5 MG: 20 INJECTION INTRAMUSCULAR; INTRAVENOUS at 17:05

## 2020-03-18 RX ADMIN — HYDROMORPHONE HYDROCHLORIDE 0.5 MG: 1 INJECTION, SOLUTION INTRAMUSCULAR; INTRAVENOUS; SUBCUTANEOUS at 17:27

## 2020-03-18 RX ADMIN — OXYCODONE 10 MG: 5 TABLET ORAL at 05:10

## 2020-03-18 RX ADMIN — SODIUM CHLORIDE: 9 INJECTION, SOLUTION INTRAVENOUS at 09:58

## 2020-03-18 RX ADMIN — HYDROMORPHONE HYDROCHLORIDE 0.5 MG: 1 INJECTION, SOLUTION INTRAMUSCULAR; INTRAVENOUS; SUBCUTANEOUS at 18:35

## 2020-03-18 RX ADMIN — GABAPENTIN 300 MG: 300 CAPSULE ORAL at 08:00

## 2020-03-18 RX ADMIN — BUDESONIDE 500 MCG: 0.5 INHALANT RESPIRATORY (INHALATION) at 09:00

## 2020-03-18 RX ADMIN — LORAZEPAM 0.25 MG: 2 INJECTION INTRAMUSCULAR; INTRAVENOUS at 16:55

## 2020-03-18 RX ADMIN — OXYCODONE 10 MG: 5 TABLET ORAL at 00:17

## 2020-03-18 RX ADMIN — HYDROMORPHONE HYDROCHLORIDE 1 MG: 2 INJECTION, SOLUTION INTRAMUSCULAR; INTRAVENOUS; SUBCUTANEOUS at 14:16

## 2020-03-18 RX ADMIN — HYDROMORPHONE HYDROCHLORIDE 1 MG: 2 INJECTION, SOLUTION INTRAMUSCULAR; INTRAVENOUS; SUBCUTANEOUS at 15:21

## 2020-03-18 RX ADMIN — IOHEXOL 50 ML: 240 INJECTION, SOLUTION INTRATHECAL; INTRAVASCULAR; INTRAVENOUS; ORAL at 08:29

## 2020-03-18 RX ADMIN — ONDANSETRON 4 MG: 2 INJECTION INTRAMUSCULAR; INTRAVENOUS at 14:28

## 2020-03-18 RX ADMIN — SODIUM CHLORIDE 1000 ML: 9 INJECTION, SOLUTION INTRAVENOUS at 08:05

## 2020-03-18 RX ADMIN — HYDROMORPHONE HYDROCHLORIDE 1 MG: 2 INJECTION, SOLUTION INTRAMUSCULAR; INTRAVENOUS; SUBCUTANEOUS at 14:35

## 2020-03-18 RX ADMIN — ROCURONIUM BROMIDE 5 MG: 10 INJECTION, SOLUTION INTRAVENOUS at 13:47

## 2020-03-18 RX ADMIN — ROCURONIUM BROMIDE 45 MG: 10 INJECTION, SOLUTION INTRAVENOUS at 13:53

## 2020-03-18 RX ADMIN — HYDROMORPHONE HYDROCHLORIDE 0.5 MG: 1 INJECTION, SOLUTION INTRAMUSCULAR; INTRAVENOUS; SUBCUTANEOUS at 17:32

## 2020-03-18 RX ADMIN — SODIUM CHLORIDE: 9 INJECTION, SOLUTION INTRAVENOUS at 21:27

## 2020-03-18 RX ADMIN — LIDOCAINE HYDROCHLORIDE 100 MG: 20 INJECTION, SOLUTION EPIDURAL; INFILTRATION; INTRACAUDAL; PERINEURAL at 13:47

## 2020-03-18 RX ADMIN — LORAZEPAM 0.25 MG: 2 INJECTION INTRAMUSCULAR; INTRAVENOUS at 17:25

## 2020-03-18 RX ADMIN — FENTANYL CITRATE 50 MCG: 50 INJECTION INTRAMUSCULAR; INTRAVENOUS at 13:58

## 2020-03-18 RX ADMIN — ENOXAPARIN SODIUM 40 MG: 40 INJECTION SUBCUTANEOUS at 10:36

## 2020-03-18 RX ADMIN — ACETAMINOPHEN 1000 MG: 500 TABLET ORAL at 08:01

## 2020-03-18 RX ADMIN — METOPROLOL TARTRATE 5 MG: 5 INJECTION INTRAVENOUS at 18:35

## 2020-03-18 RX ADMIN — HYDROMORPHONE HYDROCHLORIDE 0.5 MG: 1 INJECTION, SOLUTION INTRAMUSCULAR; INTRAVENOUS; SUBCUTANEOUS at 09:58

## 2020-03-18 RX ADMIN — ARFORMOTEROL TARTRATE 15 MCG: 15 SOLUTION RESPIRATORY (INHALATION) at 20:00

## 2020-03-18 RX ADMIN — SUGAMMADEX 200 MG: 100 INJECTION, SOLUTION INTRAVENOUS at 16:03

## 2020-03-18 RX ADMIN — AMLODIPINE BESYLATE 7.5 MG: 5 TABLET ORAL at 08:01

## 2020-03-18 RX ADMIN — SUCCINYLCHOLINE CHLORIDE 80 MG: 20 INJECTION, SOLUTION INTRAMUSCULAR; INTRAVENOUS; PARENTERAL at 13:48

## 2020-03-18 RX ADMIN — FENTANYL CITRATE 50 MCG: 50 INJECTION INTRAMUSCULAR; INTRAVENOUS at 13:47

## 2020-03-18 RX ADMIN — DOCUSATE SODIUM 100 MG: 100 CAPSULE, LIQUID FILLED ORAL at 08:01

## 2020-03-18 RX ADMIN — HYDROMORPHONE HYDROCHLORIDE 0.5 MG: 1 INJECTION, SOLUTION INTRAMUSCULAR; INTRAVENOUS; SUBCUTANEOUS at 21:30

## 2020-03-18 RX ADMIN — HYDRALAZINE HYDROCHLORIDE 5 MG: 20 INJECTION INTRAMUSCULAR; INTRAVENOUS at 17:22

## 2020-03-18 RX ADMIN — ROCURONIUM BROMIDE 25 MG: 10 INJECTION, SOLUTION INTRAVENOUS at 15:03

## 2020-03-18 ASSESSMENT — PULMONARY FUNCTION TESTS
PIF_VALUE: 1
PIF_VALUE: 19
PIF_VALUE: 20
PIF_VALUE: 21
PIF_VALUE: 21
PIF_VALUE: 1
PIF_VALUE: 20
PIF_VALUE: 8
PIF_VALUE: 21
PIF_VALUE: 21
PIF_VALUE: 20
PIF_VALUE: 16
PIF_VALUE: 0
PIF_VALUE: 18
PIF_VALUE: 20
PIF_VALUE: 0
PIF_VALUE: 21
PIF_VALUE: 21
PIF_VALUE: 20
PIF_VALUE: 21
PIF_VALUE: 20
PIF_VALUE: 21
PIF_VALUE: 20
PIF_VALUE: 18
PIF_VALUE: 20
PIF_VALUE: 21
PIF_VALUE: 20
PIF_VALUE: 0
PIF_VALUE: 20
PIF_VALUE: 19
PIF_VALUE: 20
PIF_VALUE: 1
PIF_VALUE: 20
PIF_VALUE: 0
PIF_VALUE: 20
PIF_VALUE: 21
PIF_VALUE: 21
PIF_VALUE: 20
PIF_VALUE: 21
PIF_VALUE: 20
PIF_VALUE: 18
PIF_VALUE: 20
PIF_VALUE: 21
PIF_VALUE: 20
PIF_VALUE: 0
PIF_VALUE: 20
PIF_VALUE: 20
PIF_VALUE: 19
PIF_VALUE: 21
PIF_VALUE: 21
PIF_VALUE: 19
PIF_VALUE: 16
PIF_VALUE: 20
PIF_VALUE: 21
PIF_VALUE: 20
PIF_VALUE: 21
PIF_VALUE: 21
PIF_VALUE: 20
PIF_VALUE: 1
PIF_VALUE: 20
PIF_VALUE: 0
PIF_VALUE: 20
PIF_VALUE: 1
PIF_VALUE: 20
PIF_VALUE: 18
PIF_VALUE: 20
PIF_VALUE: 20
PIF_VALUE: 19
PIF_VALUE: 19
PIF_VALUE: 21
PIF_VALUE: 21
PIF_VALUE: 20
PIF_VALUE: 16
PIF_VALUE: 20
PIF_VALUE: 0
PIF_VALUE: 1
PIF_VALUE: 21
PIF_VALUE: 0
PIF_VALUE: 15
PIF_VALUE: 20
PIF_VALUE: 20
PIF_VALUE: 1
PIF_VALUE: 0
PIF_VALUE: 20
PIF_VALUE: 20
PIF_VALUE: 12
PIF_VALUE: 20
PIF_VALUE: 21
PIF_VALUE: 2
PIF_VALUE: 0
PIF_VALUE: 20
PIF_VALUE: 21
PIF_VALUE: 20
PIF_VALUE: 21
PIF_VALUE: 1
PIF_VALUE: 20
PIF_VALUE: 0
PIF_VALUE: 21
PIF_VALUE: 20
PIF_VALUE: 0
PIF_VALUE: 21
PIF_VALUE: 20
PIF_VALUE: 21
PIF_VALUE: 20
PIF_VALUE: 20
PIF_VALUE: 1
PIF_VALUE: 20
PIF_VALUE: 20
PIF_VALUE: 23
PIF_VALUE: 14
PIF_VALUE: 21
PIF_VALUE: 20
PIF_VALUE: 18
PIF_VALUE: 21
PIF_VALUE: 20
PIF_VALUE: 21
PIF_VALUE: 20
PIF_VALUE: 20
PIF_VALUE: 21
PIF_VALUE: 21
PIF_VALUE: 1
PIF_VALUE: 21
PIF_VALUE: 20
PIF_VALUE: 19
PIF_VALUE: 19
PIF_VALUE: 20
PIF_VALUE: 21
PIF_VALUE: 20
PIF_VALUE: 20
PIF_VALUE: 0
PIF_VALUE: 18
PIF_VALUE: 21
PIF_VALUE: 20
PIF_VALUE: 14
PIF_VALUE: 20

## 2020-03-18 ASSESSMENT — PAIN DESCRIPTION - ONSET
ONSET: ON-GOING

## 2020-03-18 ASSESSMENT — PAIN SCALES - GENERAL
PAINLEVEL_OUTOF10: 0
PAINLEVEL_OUTOF10: 7
PAINLEVEL_OUTOF10: 5
PAINLEVEL_OUTOF10: 0
PAINLEVEL_OUTOF10: 9
PAINLEVEL_OUTOF10: 7
PAINLEVEL_OUTOF10: 8
PAINLEVEL_OUTOF10: 5
PAINLEVEL_OUTOF10: 7
PAINLEVEL_OUTOF10: 7
PAINLEVEL_OUTOF10: 4
PAINLEVEL_OUTOF10: 8
PAINLEVEL_OUTOF10: 7
PAINLEVEL_OUTOF10: 7
PAINLEVEL_OUTOF10: 8

## 2020-03-18 ASSESSMENT — PAIN DESCRIPTION - PROGRESSION: CLINICAL_PROGRESSION: NOT CHANGED

## 2020-03-18 ASSESSMENT — PAIN DESCRIPTION - ORIENTATION
ORIENTATION: LOWER;MID
ORIENTATION: MID
ORIENTATION: LOWER;MID
ORIENTATION: LOWER;MID

## 2020-03-18 ASSESSMENT — PAIN DESCRIPTION - PAIN TYPE
TYPE: SURGICAL PAIN
TYPE: SURGICAL PAIN;ACUTE PAIN

## 2020-03-18 ASSESSMENT — PAIN DESCRIPTION - DESCRIPTORS
DESCRIPTORS: PRESSURE
DESCRIPTORS: PRESSURE
DESCRIPTORS: ACHING
DESCRIPTORS: PRESSURE

## 2020-03-18 ASSESSMENT — PAIN DESCRIPTION - LOCATION
LOCATION: ABDOMEN
LOCATION: ABDOMEN;BACK

## 2020-03-18 ASSESSMENT — PAIN DESCRIPTION - FREQUENCY
FREQUENCY: INTERMITTENT
FREQUENCY: CONTINUOUS

## 2020-03-18 ASSESSMENT — LIFESTYLE VARIABLES: SMOKING_STATUS: 0

## 2020-03-18 NOTE — PROGRESS NOTES
Admitted to pacu at [phone] from [address]. Pt awake but disoriented to place and time. Speech inappropriate at times ie pulse ox is a cigarette. Report given by CRNA. Pt with high BP. Denies pain. Will medicate as needed. Other VSS.

## 2020-03-18 NOTE — PROGRESS NOTES
Pt A&O, VSS. Abdomen distended, causing increased pain for pt. Flatus (+), no BM. Staples intact. Encouraging pt to walk, and reminding often. Pt states walking, however, was not seen by staff walking. Tolerating diet, however, not eating much d/i distention and discomfort. Intermittent nausea self resolves. No other requests at this time, will continue to monitor.

## 2020-03-18 NOTE — PROGRESS NOTES
PACU Transfer Note    Vitals:    03/18/20 1745   BP: (!) 157/74    WNL   Pulse: 85   Resp: 25   Temp: 99.6 °F (37.6 °C)   SpO2: 94%       In: 287 [I.V.:287]  Out: 1150 [Urine:100]    Pain assessment:   Pain Level: 4    Report given to Receiving unit RN. No family here    3/18/2020 6:10 PM

## 2020-03-18 NOTE — PROGRESS NOTES
Physical Therapy  Discharge note    Pt currently back in OR this date. Will sign off. Please re-refer post op when pt stable for PT. RN aware.        Luis Linton, PT

## 2020-03-18 NOTE — PLAN OF CARE
Problem: Infection - Surgical Site:  Goal: Will show no infection signs and symptoms  Description: Will show no infection signs and symptoms  3/17/2020 2233 by Valerie Cantrell RN  Note: Abd incision remains covered w/ dry dressing d/t serosanguineous drainage. Staples still intact. VSS, no temp. Same bruising around incision, no additional red streaking. Reinforcing pt on wound care and S&S of infection. Will continue to monitor for changes in status. Problem: Pain:  Goal: Control of acute pain  Description: Control of acute pain  Note: Pt has experienced increased pain throughout the day starting this morning. Has not had BM yet, abd distended. Coughing occasionally, causing discomfort and pressure as well. Education on splinting provided. PRN oxycodone utilized. Continuing to monitor for changes in status and for the need to adjust interventions. Problem: ELIMINATION  Goal: Elimination patterns are normal or improving  Description: Elimination patterns return to pre-surgery normal patterns  Note: Pt voiding w/o issues. Still has not had a BM which is causing a lot of discomfort for the pt. New laxative (see MAR) started tonight to encourage bowel function.
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
## 2020-03-18 NOTE — PROGRESS NOTES
Department of Surgery:  Post-op Note    CC: abdominal wall dehiscence     Procedure(s) Performed: Ex lap, abdominal washout, incisional hernia repair with mesh and wound vac    Subjective:   Patient complains of bladder discomfort and urges to urinate. He also removed his NG tube right after transport, requiring reinsertion. NG tube insterted at 68 cm at the nare, secured with bridle. Put out 100 cc bilious output. He denies nausea or vomiting. Garrido in place. Objective:  Anesthesia type: General      I/O    Intra op    Post op     Fluids   2500 mL 500 mL     EBL 50 mL 0 mL     Urine 100 mL 100 mL     Exam:  Vitals:    03/18/20 1722 03/18/20 1730 03/18/20 1745 03/18/20 1836   BP: (!) 175/81 (!) 168/75 (!) 157/74 (!) 152/83   Pulse:  89 85 83   Resp:  18 25    Temp:   99.6 °F (37.6 °C) 97 °F (36.1 °C)   TempSrc:   Temporal Oral   SpO2:  94% 94% 96%   Weight:       Height:           General appearance: alert, no acute distress, grooming appropriate  Chest/Lungs: normal effort  Cardiovascular: RRR  Abdomen: soft, appropriately tender, non-distended, incision with wound vac, good suction, 100 cc bloody output in the canister. NGT with bilious output  : garrido with clear urine  Extremities: no edema, no cyanosis  Neuro: A&Ox3, no focal deficits, sensation intact    Assessment and Plan  This is a 61y.o. year old male s/p Ex lap, abdominal washout, incisional hernia repair with mesh and wound vac POD #0.  Laproscopic transvere colectomy, laproscopic mobilization splenic flexure omental pedicle flap POD5    Pain management: dilaudid PRN  Cardiovascular: hold home meds, start metoprolol injection   Respiratory: extubated, encourage hourly incentive spirometry and deep breathing  FEN:  Fluids: , Diet: NPO  : Urine output is adequate  Wound: local care, wound vac care  Ambulation: OOB to chair, encourage ambulation  Prophylaxis: maira Beal MD  PGY-2 General Surgery  03/18/20  6:53

## 2020-03-18 NOTE — OP NOTE
Operative Note  ______________________________________________________________    Patient: Bryanna Borjas  YOB: 1960  MRN: [id number]  Date of Procedure: 3/18/2020    Pre-Op Diagnosis: ABDOMINAL WALL DEHISCENCE    Post-Op Diagnosis: Same       Procedure(s):  EXPLORATORY LAPAROTOMY, ABDOMINAL [address] OUT, ABDOMINAL WALL CLOSURE AND VENTRAL HERNIA  REPAIR    Anesthesia: General    Surgeon(s):  Arielle Martin MD    Co-Surgeon:   Sreekanth Dumont MD    Assistant: Rosa Hernandez. Steven PGY 2 MD    Estimated Blood Loss (mL): 50    Complications: None    Drains:   Closed/Suction Drain Midline Abdomen Bulb 15 Czech (Active)       Urethral Catheter (Active)       [REMOVED] Negative Pressure Wound Therapy Groin Left (Removed)       [REMOVED] Urethral Catheter Straight-tip 16 fr (Removed)   Catheter Indications Perioperative use in selected surgeries including but not limited to urologic, pelvic or need for intraoperative monitoring of urinary output due to prolonged surgery, large volume infusion or need for diuretic therapy in surgery 3/14/2020  3:20 AM   Site Assessment No urethral drainage 3/14/2020  3:20 AM   Urine Color Yellow 3/14/2020  3:20 AM   Urine Appearance Clear 3/14/2020  3:20 AM   Output (mL) 800 mL 3/14/2020  3:20 AM       Findings: incisional hernia. Posterior sheath closed primarily. 20 x 30 BioA placed on top of peritoneum and posterior sheath and secured with Ethibond    Indications: post op bowel obstruction, CT showed fascial dehiscence and hernia    OPERATIVE NOTE    After informed consent was obtained the patient was taken to the operating room. The patient was placed in the supine position. General anesthesia was given. The patient was prepped and draped in the usual sterile fashion. We began by calling time out to confirm the key components of the operation. We then removed the prior staples and immediately saw slightly dilated but otherwise healthy appearing small bowel.  There were no real

## 2020-03-19 LAB
ALBUMIN SERPL-MCNC: 3 G/DL (ref 3.4–5)
ANION GAP SERPL CALCULATED.3IONS-SCNC: 16 MMOL/L (ref 3–16)
BASOPHILS ABSOLUTE: 0 K/UL (ref 0–0.2)
BASOPHILS RELATIVE PERCENT: 0.4 %
BUN BLDV-MCNC: 10 MG/DL (ref 7–20)
CALCIUM SERPL-MCNC: 8.3 MG/DL (ref 8.3–10.6)
CHLORIDE BLD-SCNC: 93 MMOL/L (ref 99–110)
CO2: 24 MMOL/L (ref 21–32)
CREAT SERPL-MCNC: 0.7 MG/DL (ref 0.9–1.3)
EOSINOPHILS ABSOLUTE: 0 K/UL (ref 0–0.6)
EOSINOPHILS RELATIVE PERCENT: 0.1 %
GFR AFRICAN AMERICAN: >60
GFR NON-AFRICAN AMERICAN: >60
GLUCOSE BLD-MCNC: 144 MG/DL (ref 70–99)
HCT VFR BLD CALC: 37.7 % (ref 40.5–52.5)
HEMOGLOBIN: 12.9 G/DL (ref 13.5–17.5)
LYMPHOCYTES ABSOLUTE: 0.3 K/UL (ref 1–5.1)
LYMPHOCYTES RELATIVE PERCENT: 10 %
MAGNESIUM: 1.6 MG/DL (ref 1.8–2.4)
MCH RBC QN AUTO: 33.6 PG (ref 26–34)
MCHC RBC AUTO-ENTMCNC: 34.3 G/DL (ref 31–36)
MCV RBC AUTO: 98 FL (ref 80–100)
MONOCYTES ABSOLUTE: 0.7 K/UL (ref 0–1.3)
MONOCYTES RELATIVE PERCENT: 20.2 %
NEUTROPHILS ABSOLUTE: 2.3 K/UL (ref 1.7–7.7)
NEUTROPHILS RELATIVE PERCENT: 69.3 %
PDW BLD-RTO: 12.7 % (ref 12.4–15.4)
PHOSPHORUS: 2.6 MG/DL (ref 2.5–4.9)
PLATELET # BLD: 187 K/UL (ref 135–450)
PMV BLD AUTO: 8.8 FL (ref 5–10.5)
POTASSIUM SERPL-SCNC: 3.7 MMOL/L (ref 3.5–5.1)
RBC # BLD: 3.85 M/UL (ref 4.2–5.9)
SODIUM BLD-SCNC: 133 MMOL/L (ref 136–145)
WBC # BLD: 3.4 K/UL (ref 4–11)

## 2020-03-19 PROCEDURE — 83735 ASSAY OF MAGNESIUM: CPT

## 2020-03-19 PROCEDURE — 6360000002 HC RX W HCPCS: Performed by: STUDENT IN AN ORGANIZED HEALTH CARE EDUCATION/TRAINING PROGRAM

## 2020-03-19 PROCEDURE — 6360000002 HC RX W HCPCS: Performed by: SURGERY

## 2020-03-19 PROCEDURE — 36415 COLL VENOUS BLD VENIPUNCTURE: CPT

## 2020-03-19 PROCEDURE — 97535 SELF CARE MNGMENT TRAINING: CPT

## 2020-03-19 PROCEDURE — 97164 PT RE-EVAL EST PLAN CARE: CPT

## 2020-03-19 PROCEDURE — 2500000003 HC RX 250 WO HCPCS: Performed by: STUDENT IN AN ORGANIZED HEALTH CARE EDUCATION/TRAINING PROGRAM

## 2020-03-19 PROCEDURE — 97168 OT RE-EVAL EST PLAN CARE: CPT

## 2020-03-19 PROCEDURE — 6370000000 HC RX 637 (ALT 250 FOR IP): Performed by: SURGERY

## 2020-03-19 PROCEDURE — 2580000003 HC RX 258: Performed by: STUDENT IN AN ORGANIZED HEALTH CARE EDUCATION/TRAINING PROGRAM

## 2020-03-19 PROCEDURE — 97530 THERAPEUTIC ACTIVITIES: CPT

## 2020-03-19 PROCEDURE — 94640 AIRWAY INHALATION TREATMENT: CPT

## 2020-03-19 PROCEDURE — 2580000003 HC RX 258: Performed by: SURGERY

## 2020-03-19 PROCEDURE — C9113 INJ PANTOPRAZOLE SODIUM, VIA: HCPCS | Performed by: STUDENT IN AN ORGANIZED HEALTH CARE EDUCATION/TRAINING PROGRAM

## 2020-03-19 PROCEDURE — 97116 GAIT TRAINING THERAPY: CPT

## 2020-03-19 PROCEDURE — 2500000003 HC RX 250 WO HCPCS: Performed by: SURGERY

## 2020-03-19 PROCEDURE — 1200000000 HC SEMI PRIVATE

## 2020-03-19 PROCEDURE — 80069 RENAL FUNCTION PANEL: CPT

## 2020-03-19 PROCEDURE — 85025 COMPLETE CBC W/AUTO DIFF WBC: CPT

## 2020-03-19 PROCEDURE — 94761 N-INVAS EAR/PLS OXIMETRY MLT: CPT

## 2020-03-19 RX ORDER — POTASSIUM CHLORIDE 7.45 MG/ML
10 INJECTION INTRAVENOUS
Status: COMPLETED | OUTPATIENT
Start: 2020-03-19 | End: 2020-03-19

## 2020-03-19 RX ORDER — ACETAMINOPHEN 10 MG/ML
1000 INJECTION, SOLUTION INTRAVENOUS EVERY 6 HOURS PRN
Status: DISPENSED | OUTPATIENT
Start: 2020-03-19 | End: 2020-03-20

## 2020-03-19 RX ORDER — PANTOPRAZOLE SODIUM 40 MG/10ML
40 INJECTION, POWDER, LYOPHILIZED, FOR SOLUTION INTRAVENOUS DAILY
Status: DISCONTINUED | OUTPATIENT
Start: 2020-03-19 | End: 2020-03-24

## 2020-03-19 RX ORDER — MAGNESIUM SULFATE IN WATER 40 MG/ML
4 INJECTION, SOLUTION INTRAVENOUS ONCE
Status: COMPLETED | OUTPATIENT
Start: 2020-03-19 | End: 2020-03-19

## 2020-03-19 RX ADMIN — METOPROLOL TARTRATE 5 MG: 5 INJECTION INTRAVENOUS at 01:31

## 2020-03-19 RX ADMIN — ACETAMINOPHEN 1000 MG: 10 INJECTION, SOLUTION INTRAVENOUS at 05:48

## 2020-03-19 RX ADMIN — HYDROMORPHONE HYDROCHLORIDE 0.5 MG: 1 INJECTION, SOLUTION INTRAMUSCULAR; INTRAVENOUS; SUBCUTANEOUS at 08:53

## 2020-03-19 RX ADMIN — ACETAMINOPHEN 1000 MG: 10 INJECTION, SOLUTION INTRAVENOUS at 12:26

## 2020-03-19 RX ADMIN — HYDROMORPHONE HYDROCHLORIDE 0.5 MG: 1 INJECTION, SOLUTION INTRAMUSCULAR; INTRAVENOUS; SUBCUTANEOUS at 20:05

## 2020-03-19 RX ADMIN — SODIUM CHLORIDE: 9 INJECTION, SOLUTION INTRAVENOUS at 17:09

## 2020-03-19 RX ADMIN — Medication 10 ML: at 08:08

## 2020-03-19 RX ADMIN — MAGNESIUM SULFATE IN WATER 4 G: 40 INJECTION, SOLUTION INTRAVENOUS at 08:14

## 2020-03-19 RX ADMIN — BUDESONIDE 500 MCG: 0.5 INHALANT RESPIRATORY (INHALATION) at 08:56

## 2020-03-19 RX ADMIN — POTASSIUM CHLORIDE 10 MEQ: 10 INJECTION, SOLUTION INTRAVENOUS at 08:14

## 2020-03-19 RX ADMIN — PANTOPRAZOLE SODIUM 40 MG: 40 INJECTION, POWDER, FOR SOLUTION INTRAVENOUS at 08:08

## 2020-03-19 RX ADMIN — FOLIC ACID: 5 INJECTION, SOLUTION INTRAMUSCULAR; INTRAVENOUS; SUBCUTANEOUS at 12:42

## 2020-03-19 RX ADMIN — METOPROLOL TARTRATE 5 MG: 5 INJECTION INTRAVENOUS at 06:53

## 2020-03-19 RX ADMIN — ENOXAPARIN SODIUM 40 MG: 40 INJECTION SUBCUTANEOUS at 08:08

## 2020-03-19 RX ADMIN — METOPROLOL TARTRATE 5 MG: 5 INJECTION INTRAVENOUS at 17:59

## 2020-03-19 RX ADMIN — TIOTROPIUM BROMIDE INHALATION SPRAY 2 PUFF: 3.12 SPRAY, METERED RESPIRATORY (INHALATION) at 08:56

## 2020-03-19 RX ADMIN — SODIUM PHOSPHATE, MONOBASIC, MONOHYDRATE 10 MMOL: 276; 142 INJECTION, SOLUTION INTRAVENOUS at 13:47

## 2020-03-19 RX ADMIN — METOPROLOL TARTRATE 5 MG: 5 INJECTION INTRAVENOUS at 23:58

## 2020-03-19 RX ADMIN — METOPROLOL TARTRATE 5 MG: 5 INJECTION INTRAVENOUS at 13:04

## 2020-03-19 RX ADMIN — POTASSIUM CHLORIDE 10 MEQ: 10 INJECTION, SOLUTION INTRAVENOUS at 09:26

## 2020-03-19 RX ADMIN — ARFORMOTEROL TARTRATE 15 MCG: 15 SOLUTION RESPIRATORY (INHALATION) at 09:00

## 2020-03-19 RX ADMIN — POTASSIUM CHLORIDE 10 MEQ: 10 INJECTION, SOLUTION INTRAVENOUS at 10:33

## 2020-03-19 ASSESSMENT — PAIN DESCRIPTION - PAIN TYPE
TYPE: SURGICAL PAIN
TYPE: ACUTE PAIN

## 2020-03-19 ASSESSMENT — PAIN DESCRIPTION - FREQUENCY
FREQUENCY: INTERMITTENT

## 2020-03-19 ASSESSMENT — PAIN DESCRIPTION - DESCRIPTORS
DESCRIPTORS: ACHING

## 2020-03-19 ASSESSMENT — PAIN DESCRIPTION - PROGRESSION
CLINICAL_PROGRESSION: NOT CHANGED
CLINICAL_PROGRESSION: GRADUALLY WORSENING
CLINICAL_PROGRESSION: NOT CHANGED

## 2020-03-19 ASSESSMENT — PAIN SCALES - GENERAL
PAINLEVEL_OUTOF10: 0
PAINLEVEL_OUTOF10: 4
PAINLEVEL_OUTOF10: 0
PAINLEVEL_OUTOF10: 0
PAINLEVEL_OUTOF10: 7
PAINLEVEL_OUTOF10: 6
PAINLEVEL_OUTOF10: 8
PAINLEVEL_OUTOF10: 5

## 2020-03-19 ASSESSMENT — PAIN DESCRIPTION - ONSET
ONSET: ON-GOING

## 2020-03-19 ASSESSMENT — PAIN DESCRIPTION - ORIENTATION
ORIENTATION: MID

## 2020-03-19 ASSESSMENT — PAIN DESCRIPTION - LOCATION
LOCATION: ABDOMEN

## 2020-03-19 NOTE — PROGRESS NOTES
LAPAROSCOPIC TRANSVERSE COLECTOMY         3/18/2020 EXPLORATORY LAPAROTOMY, ABDOMINAL [address] OUT, ABDOMINAL WALL CLOSURE AND VENTRAL HERNIA  REPAIR      PMHx: HTN, hyperlipidemia, DDD, CVA, CAD, RA, PVD, MI, hep C, depression, anxiety, CABG x4 (4/2016), bilateral TR, appy, femoral bypass  Family / Caregiver Present: No  Referring Practitioner: ALLI Cardoza APRN-CNP  Diagnosis: polyp of transferse colon, unspecified type  Subjective  Subjective: Pt in bed upon entry. Pt agreeable to activity.   Patient Currently in Pain: Yes  Pain Assessment  Pain Assessment: 0-10  Pain Level: 7   Nurse aware  Social/Functional History  Social/Functional History  Lives With: Other (comment)(lives with 3 roommates)  Type of Home: House  Home Layout: One level  Home Access: Stairs to enter with rails  Entrance Stairs - Number of Steps: 3 ELLIOT  Entrance Stairs - Rails: None  Bathroom Shower/Tub: Tub/Shower unit  Bathroom Toilet: Standard  Bathroom Equipment: Grab bars in shower, Grab bars around toilet  Home Equipment: [facility]  ADL Assistance: [facility]: Independent  Homemaking Responsibilities: Yes(shares with roommates)  Ambulation Assistance: Independent  Transfer Assistance: Independent  Active : Yes  Type of occupation: on disability  Leisure & Hobbies: fishing, riding his motorcycle  Additional Comments: roommates have their own health issues (he was taking care of one roommate who recently had a stroke)       Objective   Vision: Impaired  Vision Exceptions: Wears glasses for reading(reading and driving)  Hearing: Within functional limits    Orientation  Overall Orientation Status: Impaired  Orientation Level: Oriented to person;Oriented to time;Disoriented to situation(hospital)     Balance  Sitting Balance: Supervision  Standing Balance: Minimal assistance(to CG)  Standing Balance  Time: ~3 min  Activity: bathroom mobility/activity  Comment: tendency toward posterior lean  Functional Mobility  Functional - Mobility Device: No device  Activity: Other; To/from bathroom  Assist Level: Minimal assistance(to CG)  Toilet Transfers  Toilet - Technique: Ambulating  Equipment Used: Standard toilet(grab bar)  Toilet Transfer: Contact guard assistance(+cues)  ADL  Grooming: Contact guard assistance(to wash hands and rinse mouth standing at sink)  LE Dressing: Minimal assistance(to don brief)  Toileting: Contact guard assistance  Tone RUE  RUE Tone: Normotonic  Tone LUE  LUE Tone: Normotonic  Coordination  Movements Are Fluid And Coordinated: Yes     Bed mobility  Sit to Supine: Contact guard assistance  Scooting: Stand by assistance  Transfers  Stand Step Transfers: Contact guard assistance(+cues)  Sit to stand: Contact guard assistance(+cues)  Stand to sit: Contact guard assistance(+cues)     Cognition  Overall Cognitive Status: Exceptions  Safety Judgement: Decreased awareness of need for assistance;Decreased awareness of need for safety  Insights: Decreased awareness of deficits                 LUE AROM (degrees)  LUE AROM : WFL  Left Hand AROM (degrees)  Left Hand AROM: WFL  RUE AROM (degrees)  RUE AROM : WFL  LUE Strength  Gross LUE Strength: WFL  RUE Strength  Gross RUE Strength: WFL           Treatment included ADL and transfer training.         Plan   Plan  Times per week: 2-5  Times per day: Daily  Current Treatment Recommendations: Functional Mobility Training, Endurance Training, Self-Care / ADL, Balance Training    AM-PAC Score        AM-[facility] Inpatient Daily Activity Raw Score: 20 (03/19/20 0940)  AM-PAC Inpatient ADL T-Scale Score : 42.03 (03/19/20 0940)  ADL Inpatient CMS 0-100% Score: 38.32 (03/19/20 0940)  ADL Inpatient CMS G-Code Modifier : India Ferris (03/19/20 0940)    Goals                          No goals met  Short term goals  Time Frame for Short term goals: Discharge  Short term goal 1: Transfer to/from toilet with SBA  Short term goal 2: Stance with SBA x5 min while engaging in ADL/functional mobility  Short term

## 2020-03-19 NOTE — PROGRESS NOTES
VSS. Temp 100.8. Pt has been sleeping for very short intervals. Pt oriented to person, place, but forgets what NG and garrido are for. Pt easily reoriented to surroundings. Pt assisted oob to BR with aide of 2 RNs and gait belt. Pt's gait a little unsteady at this time. No bm or flatus, pt reports \"rumbling\" in his stomach. Pt resting in bed now with call light in reach.    Electronically signed by Sascha Jones RN on 3/19/20 at 3:58 AM EDT

## 2020-03-19 NOTE — PROGRESS NOTES
Patient was transferred to [address] [place] [address] from [phone]. Report was taken from Isadora Catherine at the bedside. Will continue to monitor.

## 2020-03-19 NOTE — PROGRESS NOTES
VSS, SR on telemetry. Pt has been oriented to person and now place since returning from surgery earlier in evening. Pt with NG tube bridled in place and garrido in place. Pt reminded frequently of both tubes importance and to keep hands off of them. Pt is cooperative, but very forgetful right now. Wound vac to abdomen intact, serosanguinous drainage. Pt c/o abd pain. Medicated with prn dilaudid with good relief. Mouth swabs provided. This RN sat with pt for periods of time. Avasys in place and bed alarm remains on. Will continue close monitoring.   Electronically signed by Stella Willams RN on 3/19/20 at 12:29 AM EDT

## 2020-03-20 LAB
ALBUMIN SERPL-MCNC: 2.8 G/DL (ref 3.4–5)
ANION GAP SERPL CALCULATED.3IONS-SCNC: 16 MMOL/L (ref 3–16)
BASOPHILS ABSOLUTE: 0 K/UL (ref 0–0.2)
BASOPHILS RELATIVE PERCENT: 0.1 %
BUN BLDV-MCNC: 11 MG/DL (ref 7–20)
CALCIUM SERPL-MCNC: 8 MG/DL (ref 8.3–10.6)
CHLORIDE BLD-SCNC: 98 MMOL/L (ref 99–110)
CO2: 23 MMOL/L (ref 21–32)
CREAT SERPL-MCNC: 0.6 MG/DL (ref 0.9–1.3)
EOSINOPHILS ABSOLUTE: 0 K/UL (ref 0–0.6)
EOSINOPHILS RELATIVE PERCENT: 0.1 %
GFR AFRICAN AMERICAN: >60
GFR NON-AFRICAN AMERICAN: >60
GLUCOSE BLD-MCNC: 121 MG/DL (ref 70–99)
HCT VFR BLD CALC: 35.9 % (ref 40.5–52.5)
HEMOGLOBIN: 11.4 G/DL (ref 13.5–17.5)
LYMPHOCYTES ABSOLUTE: 0.4 K/UL (ref 1–5.1)
LYMPHOCYTES RELATIVE PERCENT: 8.1 %
MAGNESIUM: 2.2 MG/DL (ref 1.8–2.4)
MCH RBC QN AUTO: 31.3 PG (ref 26–34)
MCHC RBC AUTO-ENTMCNC: 31.7 G/DL (ref 31–36)
MCV RBC AUTO: 98.6 FL (ref 80–100)
MONOCYTES ABSOLUTE: 1 K/UL (ref 0–1.3)
MONOCYTES RELATIVE PERCENT: 19.9 %
NEUTROPHILS ABSOLUTE: 3.7 K/UL (ref 1.7–7.7)
NEUTROPHILS RELATIVE PERCENT: 71.8 %
PDW BLD-RTO: 12.9 % (ref 12.4–15.4)
PHOSPHORUS: 2.4 MG/DL (ref 2.5–4.9)
PLATELET # BLD: 176 K/UL (ref 135–450)
PMV BLD AUTO: 8.7 FL (ref 5–10.5)
POTASSIUM SERPL-SCNC: 3.2 MMOL/L (ref 3.5–5.1)
RBC # BLD: 3.65 M/UL (ref 4.2–5.9)
SODIUM BLD-SCNC: 137 MMOL/L (ref 136–145)
WBC # BLD: 5.1 K/UL (ref 4–11)

## 2020-03-20 PROCEDURE — 6360000002 HC RX W HCPCS: Performed by: SURGERY

## 2020-03-20 PROCEDURE — 97530 THERAPEUTIC ACTIVITIES: CPT

## 2020-03-20 PROCEDURE — 6360000002 HC RX W HCPCS: Performed by: STUDENT IN AN ORGANIZED HEALTH CARE EDUCATION/TRAINING PROGRAM

## 2020-03-20 PROCEDURE — 94640 AIRWAY INHALATION TREATMENT: CPT

## 2020-03-20 PROCEDURE — 97110 THERAPEUTIC EXERCISES: CPT

## 2020-03-20 PROCEDURE — 2580000003 HC RX 258: Performed by: SURGERY

## 2020-03-20 PROCEDURE — 97116 GAIT TRAINING THERAPY: CPT

## 2020-03-20 PROCEDURE — 6370000000 HC RX 637 (ALT 250 FOR IP): Performed by: SURGERY

## 2020-03-20 PROCEDURE — 80069 RENAL FUNCTION PANEL: CPT

## 2020-03-20 PROCEDURE — 85025 COMPLETE CBC W/AUTO DIFF WBC: CPT

## 2020-03-20 PROCEDURE — 83735 ASSAY OF MAGNESIUM: CPT

## 2020-03-20 PROCEDURE — 1200000000 HC SEMI PRIVATE

## 2020-03-20 PROCEDURE — 2580000003 HC RX 258: Performed by: STUDENT IN AN ORGANIZED HEALTH CARE EDUCATION/TRAINING PROGRAM

## 2020-03-20 PROCEDURE — 94761 N-INVAS EAR/PLS OXIMETRY MLT: CPT

## 2020-03-20 PROCEDURE — 36415 COLL VENOUS BLD VENIPUNCTURE: CPT

## 2020-03-20 PROCEDURE — C9113 INJ PANTOPRAZOLE SODIUM, VIA: HCPCS | Performed by: STUDENT IN AN ORGANIZED HEALTH CARE EDUCATION/TRAINING PROGRAM

## 2020-03-20 PROCEDURE — 2500000003 HC RX 250 WO HCPCS: Performed by: STUDENT IN AN ORGANIZED HEALTH CARE EDUCATION/TRAINING PROGRAM

## 2020-03-20 PROCEDURE — 2500000003 HC RX 250 WO HCPCS: Performed by: SURGERY

## 2020-03-20 PROCEDURE — 94669 MECHANICAL CHEST WALL OSCILL: CPT

## 2020-03-20 RX ORDER — POTASSIUM CHLORIDE 7.45 MG/ML
10 INJECTION INTRAVENOUS
Status: COMPLETED | OUTPATIENT
Start: 2020-03-20 | End: 2020-03-20

## 2020-03-20 RX ORDER — SODIUM CHLORIDE FOR INHALATION 3 %
15 VIAL, NEBULIZER (ML) INHALATION EVERY 6 HOURS
Status: DISCONTINUED | OUTPATIENT
Start: 2020-03-21 | End: 2020-03-25

## 2020-03-20 RX ORDER — SODIUM CHLORIDE FOR INHALATION 3 %
15 VIAL, NEBULIZER (ML) INHALATION EVERY 6 HOURS
Status: DISCONTINUED | OUTPATIENT
Start: 2020-03-20 | End: 2020-03-20

## 2020-03-20 RX ORDER — ALBUTEROL SULFATE 2.5 MG/3ML
2.5 SOLUTION RESPIRATORY (INHALATION) EVERY 6 HOURS PRN
Status: DISCONTINUED | OUTPATIENT
Start: 2020-03-20 | End: 2020-03-25

## 2020-03-20 RX ORDER — LORAZEPAM 2 MG/ML
2 INJECTION INTRAMUSCULAR ONCE
Status: COMPLETED | OUTPATIENT
Start: 2020-03-20 | End: 2020-03-20

## 2020-03-20 RX ADMIN — METOPROLOL TARTRATE 5 MG: 5 INJECTION INTRAVENOUS at 14:31

## 2020-03-20 RX ADMIN — METOPROLOL TARTRATE 5 MG: 5 INJECTION INTRAVENOUS at 17:46

## 2020-03-20 RX ADMIN — ALBUTEROL SULFATE 2.5 MG: 2.5 SOLUTION RESPIRATORY (INHALATION) at 19:51

## 2020-03-20 RX ADMIN — ENOXAPARIN SODIUM 40 MG: 40 INJECTION SUBCUTANEOUS at 08:22

## 2020-03-20 RX ADMIN — SODIUM CHLORIDE: 9 INJECTION, SOLUTION INTRAVENOUS at 01:05

## 2020-03-20 RX ADMIN — POTASSIUM CHLORIDE 10 MEQ: 10 INJECTION, SOLUTION INTRAVENOUS at 13:31

## 2020-03-20 RX ADMIN — PANTOPRAZOLE SODIUM 40 MG: 40 INJECTION, POWDER, FOR SOLUTION INTRAVENOUS at 08:21

## 2020-03-20 RX ADMIN — POTASSIUM CHLORIDE 10 MEQ: 10 INJECTION, SOLUTION INTRAVENOUS at 08:22

## 2020-03-20 RX ADMIN — SODIUM CHLORIDE: 9 INJECTION, SOLUTION INTRAVENOUS at 19:12

## 2020-03-20 RX ADMIN — POTASSIUM CHLORIDE 10 MEQ: 10 INJECTION, SOLUTION INTRAVENOUS at 11:43

## 2020-03-20 RX ADMIN — POTASSIUM CHLORIDE 10 MEQ: 10 INJECTION, SOLUTION INTRAVENOUS at 09:57

## 2020-03-20 RX ADMIN — Medication 10 ML: at 09:06

## 2020-03-20 RX ADMIN — LORAZEPAM 2 MG: 2 INJECTION INTRAMUSCULAR; INTRAVENOUS at 11:28

## 2020-03-20 RX ADMIN — POTASSIUM PHOSPHATE, MONOBASIC AND POTASSIUM PHOSPHATE, DIBASIC 15 MMOL: 224; 236 INJECTION, SOLUTION INTRAVENOUS at 14:53

## 2020-03-20 RX ADMIN — METOPROLOL TARTRATE 5 MG: 5 INJECTION INTRAVENOUS at 07:22

## 2020-03-20 RX ADMIN — FOLIC ACID: 5 INJECTION, SOLUTION INTRAMUSCULAR; INTRAVENOUS; SUBCUTANEOUS at 08:29

## 2020-03-20 RX ADMIN — SODIUM CHLORIDE: 9 INJECTION, SOLUTION INTRAVENOUS at 09:56

## 2020-03-20 RX ADMIN — BUDESONIDE 500 MCG: 0.5 INHALANT RESPIRATORY (INHALATION) at 19:50

## 2020-03-20 RX ADMIN — ARFORMOTEROL TARTRATE 15 MCG: 15 SOLUTION RESPIRATORY (INHALATION) at 19:50

## 2020-03-20 ASSESSMENT — PAIN SCALES - GENERAL
PAINLEVEL_OUTOF10: 0

## 2020-03-20 NOTE — PROGRESS NOTES
PVD, MI, hep C, depression, anxiety, CABG x4 (4/2016), bilateral TR, appy, femoral bypass  Family / Caregiver Present: No  Referring Practitioner: ALLI BARRETT-CNP  Diagnosis: polyp of transferse colon, unspecified type  Subjective  Subjective: Pt up in chair upon entry. I just don't feel good. I need a drink or a cigarette to calm me down.   Vital Signs  Patient Currently in Pain: Yes(co hip pain, nurse aware, chair cusion provided)   Orientation  Orientation  Overall Orientation Status: Impaired  Orientation Level: Oriented to person  Objective    ADL  Grooming: Setup(to comb hair seated in chair)        Balance  Sitting Balance: Stand by assistance  Standing Balance: Contact guard assistance  Standing Balance  Time: 1 min, 30 sec, 45 sec  Activity: static standing     Transfers  Sit to stand: Contact guard assistance(+cues)  Stand to sit: Contact guard assistance(+cues)                       Cognition  Overall Cognitive Status: Exceptions  Safety Judgement: Decreased awareness of need for assistance;Decreased awareness of need for safety  Insights: Decreased awareness of deficits  Cognition Comment: Pt confused                    Type of ROM/Therapeutic Exercise  Type of ROM/Therapeutic Exercise: AROM  Exercises  Shoulder Flexion: 2 sets of 5  Shoulder AB/ADduction: 2 sets of 5  Horizontal AB/ADduction: 2 sets of 5  Elbow Flexion/Extension: 2 sets of 10                    Plan   Plan  Times per week: 2-5  Times per day: Daily  Current Treatment Recommendations: Functional Mobility Training, Endurance Training, Self-Care / ADL, Balance Training  AM-PAC Score        AM-[facility] Inpatient Daily Activity Raw Score: 20 (03/19/20 0940)  AM-PAC Inpatient ADL T-Scale Score : 42.03 (03/19/20 0940)  ADL Inpatient CMS 0-100% Score: 38.32 (03/19/20 0940)  ADL Inpatient CMS G-Code Modifier : CJ (03/19/20 0940)    Goals                        No goals met  Short term goals  Time Frame for Short term goals: Discharge  Short

## 2020-03-20 NOTE — PROGRESS NOTES
his baseline. Treatment Diagnosis: Impaired balance due to colectomy/hernia repair  Prognosis: Good  PT Education: PT Role;General Safety;Gait Training  Patient Education: Pt educated on PT role, importance of OOB mobility and he verbalized understanding but will need reinforcement. .  REQUIRES PT FOLLOW UP: Yes  Activity Tolerance  Activity Tolerance: Patient limited by cognitive status  Activity Tolerance: appeared slightly SOB with activity; O2 sats 93% on RA. Patient Diagnosis(es): The encounter diagnosis was Polyp of transverse colon, unspecified type. has a past medical history of Allergic rhinitis, Anxiety, Back pain, CAD (coronary artery disease), CVA (cerebral vascular accident) ([address] DDD (degenerative disc disease), Depression, Fatty liver, Gout, Hepatitis C antibody positive in blood, History of blood transfusion, Hyperlipidemia, Hypertension, Lumbar radiculopathy, MI (myocardial infarction) ([address] Polyp of colon, PVD (peripheral vascular disease) ([facility] [address] Rheumatoid arthritis(714.0), and Tobacco abuse disorder. has a past surgical history that includes Appendectomy (1998); Carpal tunnel release (Bilateral, 2000); Patella surgery (Right, 1978); Coronary artery bypass graft (04/22/2016); Vein Surgery (Left, 04/22/2016); Colonoscopy (N/A, 10/3/2018); femoral bypass (Left, 1/23/2019); incision and drainage (Left, 2/21/2019); Cardiac surgery; Small intestine surgery (N/A, 3/13/2020); and hemicolectomy (N/A, 3/18/2020). Restrictions  Position Activity Restriction  Other position/activity restrictions: ambulate pt, seizure precaution  Subjective   General  Chart Reviewed: Yes  Additional Pertinent Hx: 62 y/o M with hx of colon cancer presents  3/13 s/p lap transverse colectomy, primary stapled anastomosis; OR 3/18 for ex lap/abd washout/hernia repair/wound vac.  .   Family / Caregiver Present: No  Referring Practitioner: Félix Hassan MD  Subjective  Subjective: Pt found supine in bed and

## 2020-03-20 NOTE — CARE COORDINATION
Called [facility] [phone] to see if they could provide care for this patient at d/c. They are in network and will have staffing.      Electronically signed by Mirna Almonte RN on 3/20/2020 at 3:15 PM  [phone]

## 2020-03-20 NOTE — PROGRESS NOTES
NUTRITION ASSESSMENT  Admission Date: 3/13/2020     Type and Reason for Visit: Initial    NUTRITION RECOMMENDATIONS:   **NPO- monitor ability to resume po diet, order ONS when po resumes    NUTRITION ASSESSMENT:  Patient assessed for LOS, noted AMS and now NPO. Patient had varied po intake prior to NPO status. Will monitor ability to advance diet vs need for alternative nutrition. MALNUTRITION ASSESSMENT  Context: Acute illness or injury   Malnutrition Status:  At risk for malnutrition  Findings of the 6 clinical characteristics of malnutrition (Minimum of 2 out of 6 clinical characteristics is required to make the diagnosis of moderate or severe Protein Calorie Malnutrition based on AND/ASPEN Guidelines):   Energy Intake %: Less than or equal to 75% of estimated energy requirement   Energy Intake Time: Greater than or equal to 5 days   Interpretation of Weight Loss %: No significant weight loss   Interpretation of Weight Loss Time: in 1 year   Body Fat Status: Unable to assess       Muscle Mass Status: Unable to assess       Fluid Accumulation Status: No significant fluid accumulation       Reduced  Strength: Not measured    NUTRITION DIAGNOSIS   Problem: Inadequate Oral Intake  Etiology: Cognitive or neurological impairment  Signs & Symptoms: NPO status due to medical condition    NUTRITION INTERVENTION  Food and/or Nutrient Delivery:Continue NPO  Nutrition education/counseling/coordination of care: Continue Inpatient Monitoring     NUTRITION MONITORING & EVALUATION:  Evaluation:Goals set   Goals: Patient will tolerate diet advancement and consume 50% or greater of all meals and supplements  Monitoring: Nutrition Progression      OBJECTIVE DATA:  · Nutrition-Focused Physical Findings: No BM recorded  · Wounds Surgical Wound      Past Medical History:   Diagnosis Date    Allergic rhinitis     Anxiety 9/3/2014    Back pain     CAD (coronary artery disease)     H/O CABG    CVA (cerebral

## 2020-03-20 NOTE — ADT AUTH CERT
Patient Demographics     Name Patient ID SSN Gender Identity Birth Date   Page Manual [id number]  Male [birth year] (61 yrs)   Address Phone Email Employer    Derek Olivera  [address] [address] (U)  [phone] (F)  [phone] (M)  NOT EMPLOYED  [place]  [address] [facility] [employer] Occupation Emp Status    [address] [employer] - Not Employed    Reg Status PCP Date Last Verified Next Review Date    Verified Gordo NesbittNENA - CNP  [phone] 03/11/20 03/31/20    Admission Date Discharge Date Admitting Provider     03/13/20  Galina Bose MD     Marital Status Jehovah's witness       Non-Oriental orthodox      Emergency Contact Donaldo [facility])  [address]  [phone] (H)         [address] Adult-General Surgical (3/17/2020) by Lida Verma RN         Review Status Review Entered   In Primary 3/19/2020 15:53       Criteria Review   REVIEW SUMMARY     Patient: Linda Toussaint SR  Review Number: [id number]  Review Status: In Primary     Condition Specific: Yes        OUTCOMES  Outcome Type: Primary           REVIEW DETAILS     Service Date: 03/17/2020  Product: [employer] Adult  Subset: General Surgical      (Symptom or finding within 24h)         (Excludes PO medications unless noted)     Version: [employer]® 2019.1  Alexis Vicente  © 2019 [employer] and/or one of its [place]. All Rights Reserved. CPT only © 2018 American Medical Association. All Rights Reserved.    Additional Notes   3/17/2020    Vitals: 99.3/78/18 XX=407/72  Pain 7 on scale 1/10    PO=94% RA           IV reglan 10mg x1    SQ Lovenox 40mg daily   PO Neurontin 300mg tid    PO Norvasc 7.5mg bid    Brovana 15mcg neb   PO Tenormin 50mg daily   Pulmicort neb  bid    PO Oxycodone 5mg prn x 4          Sodium: 133 (L)   Chloride: 95 (L)   Creatinine: 0.6 (L)   Glucose: 122 (H)   Albumin: 3.2 (L)               Per General surgeon progress note   This is a 61 y.o. male s/p  Laproscopic transvere colectomy, laproscopic mobilization splenic flexure omental pedicle flap POD4       - [place] hyponatremia- will continue to monitor with daily labs   - Continue general diet   - PO pain meds   - Colace for bowel reg   - Pulmonary toilet: IS bedside, encourage frequent use (minimum 10x/hr)   - Activity: Encourage frequent ambulation and out of bed to chair   - Expected D/C in 1-2 days   - Surgical path pending

## 2020-03-20 NOTE — CARE COORDINATION
CM following, [address] order faxed to [facility] at [facility] [phone], spoke with [facility] [phone] she will watch for fax and get approval.  Electronically signed by Roberta Lindo RN on 3/20/2020 at 2:20 PM  [phone]

## 2020-03-20 NOTE — PROGRESS NOTES
General Surgery  Wound Care Note    Patients wound vac changed at the bedside. Wound base was healthy with healthy bleeding noted. There is a patch of subcutaneous fat at the base of the wound. Skin edges without necrosis. Wound measured 13 x 6 x 3 cm. Drape applied to the edges of the wound for skin protection. Black foam to the base of the wound. Track pad applied with adequate suction at 125 mmHg. Patient tolerated wound vac change well. Will plan to change again on Monday 3/23/2020.       Marie Betancur MD  3/20/2020  9:58 AM  [phone]

## 2020-03-21 ENCOUNTER — APPOINTMENT (OUTPATIENT)
Dept: GENERAL RADIOLOGY | Age: 60
DRG: 231 | End: 2020-03-21
Attending: SURGERY
Payer: MEDICARE

## 2020-03-21 LAB
ALBUMIN SERPL-MCNC: 2.6 G/DL (ref 3.4–5)
ANION GAP SERPL CALCULATED.3IONS-SCNC: 12 MMOL/L (ref 3–16)
BASOPHILS ABSOLUTE: 0 K/UL (ref 0–0.2)
BASOPHILS RELATIVE PERCENT: 0.2 %
BUN BLDV-MCNC: 10 MG/DL (ref 7–20)
CALCIUM SERPL-MCNC: 8.1 MG/DL (ref 8.3–10.6)
CHLORIDE BLD-SCNC: 104 MMOL/L (ref 99–110)
CO2: 23 MMOL/L (ref 21–32)
CREAT SERPL-MCNC: 0.6 MG/DL (ref 0.9–1.3)
EOSINOPHILS ABSOLUTE: 0 K/UL (ref 0–0.6)
EOSINOPHILS RELATIVE PERCENT: 0.3 %
GFR AFRICAN AMERICAN: >60
GFR NON-AFRICAN AMERICAN: >60
GLUCOSE BLD-MCNC: 105 MG/DL (ref 70–99)
HCT VFR BLD CALC: 33 % (ref 40.5–52.5)
HEMOGLOBIN: 11.1 G/DL (ref 13.5–17.5)
LYMPHOCYTES ABSOLUTE: 0.9 K/UL (ref 1–5.1)
LYMPHOCYTES RELATIVE PERCENT: 13.5 %
MAGNESIUM: 2.2 MG/DL (ref 1.8–2.4)
MCH RBC QN AUTO: 33.2 PG (ref 26–34)
MCHC RBC AUTO-ENTMCNC: 33.6 G/DL (ref 31–36)
MCV RBC AUTO: 98.8 FL (ref 80–100)
MONOCYTES ABSOLUTE: 1.3 K/UL (ref 0–1.3)
MONOCYTES RELATIVE PERCENT: 19.6 %
NEUTROPHILS ABSOLUTE: 4.3 K/UL (ref 1.7–7.7)
NEUTROPHILS RELATIVE PERCENT: 66.4 %
PDW BLD-RTO: 13.1 % (ref 12.4–15.4)
PHOSPHORUS: 2.4 MG/DL (ref 2.5–4.9)
PLATELET # BLD: 169 K/UL (ref 135–450)
PMV BLD AUTO: 8.9 FL (ref 5–10.5)
POTASSIUM SERPL-SCNC: 3.8 MMOL/L (ref 3.5–5.1)
RBC # BLD: 3.34 M/UL (ref 4.2–5.9)
SODIUM BLD-SCNC: 139 MMOL/L (ref 136–145)
WBC # BLD: 6.4 K/UL (ref 4–11)

## 2020-03-21 PROCEDURE — 2500000003 HC RX 250 WO HCPCS: Performed by: STUDENT IN AN ORGANIZED HEALTH CARE EDUCATION/TRAINING PROGRAM

## 2020-03-21 PROCEDURE — 1200000000 HC SEMI PRIVATE

## 2020-03-21 PROCEDURE — 85025 COMPLETE CBC W/AUTO DIFF WBC: CPT

## 2020-03-21 PROCEDURE — 2580000003 HC RX 258: Performed by: SURGERY

## 2020-03-21 PROCEDURE — 94669 MECHANICAL CHEST WALL OSCILL: CPT

## 2020-03-21 PROCEDURE — 80069 RENAL FUNCTION PANEL: CPT

## 2020-03-21 PROCEDURE — 2580000003 HC RX 258: Performed by: STUDENT IN AN ORGANIZED HEALTH CARE EDUCATION/TRAINING PROGRAM

## 2020-03-21 PROCEDURE — 94640 AIRWAY INHALATION TREATMENT: CPT

## 2020-03-21 PROCEDURE — 6360000002 HC RX W HCPCS: Performed by: SURGERY

## 2020-03-21 PROCEDURE — 2500000003 HC RX 250 WO HCPCS: Performed by: SURGERY

## 2020-03-21 PROCEDURE — 83735 ASSAY OF MAGNESIUM: CPT

## 2020-03-21 PROCEDURE — 6360000002 HC RX W HCPCS: Performed by: STUDENT IN AN ORGANIZED HEALTH CARE EDUCATION/TRAINING PROGRAM

## 2020-03-21 PROCEDURE — 36415 COLL VENOUS BLD VENIPUNCTURE: CPT

## 2020-03-21 PROCEDURE — 94150 VITAL CAPACITY TEST: CPT

## 2020-03-21 PROCEDURE — C9113 INJ PANTOPRAZOLE SODIUM, VIA: HCPCS | Performed by: STUDENT IN AN ORGANIZED HEALTH CARE EDUCATION/TRAINING PROGRAM

## 2020-03-21 PROCEDURE — 74018 RADEX ABDOMEN 1 VIEW: CPT

## 2020-03-21 PROCEDURE — 6370000000 HC RX 637 (ALT 250 FOR IP): Performed by: SURGERY

## 2020-03-21 RX ADMIN — SODIUM CHLORIDE 30 MG/ML INHALATION SOLUTION 15 ML: 30 SOLUTION INHALANT at 16:59

## 2020-03-21 RX ADMIN — ARFORMOTEROL TARTRATE 15 MCG: 15 SOLUTION RESPIRATORY (INHALATION) at 19:53

## 2020-03-21 RX ADMIN — ALBUTEROL SULFATE 2.5 MG: 2.5 SOLUTION RESPIRATORY (INHALATION) at 02:09

## 2020-03-21 RX ADMIN — ARFORMOTEROL TARTRATE 15 MCG: 15 SOLUTION RESPIRATORY (INHALATION) at 09:06

## 2020-03-21 RX ADMIN — HYDROMORPHONE HYDROCHLORIDE 0.5 MG: 1 INJECTION, SOLUTION INTRAMUSCULAR; INTRAVENOUS; SUBCUTANEOUS at 19:39

## 2020-03-21 RX ADMIN — SODIUM CHLORIDE 30 MG/ML INHALATION SOLUTION 15 ML: 30 SOLUTION INHALANT at 09:06

## 2020-03-21 RX ADMIN — METOPROLOL TARTRATE 5 MG: 5 INJECTION INTRAVENOUS at 00:06

## 2020-03-21 RX ADMIN — HYDROMORPHONE HYDROCHLORIDE 0.5 MG: 1 INJECTION, SOLUTION INTRAMUSCULAR; INTRAVENOUS; SUBCUTANEOUS at 12:21

## 2020-03-21 RX ADMIN — ENOXAPARIN SODIUM 40 MG: 40 INJECTION SUBCUTANEOUS at 08:35

## 2020-03-21 RX ADMIN — HYDROMORPHONE HYDROCHLORIDE 0.5 MG: 1 INJECTION, SOLUTION INTRAMUSCULAR; INTRAVENOUS; SUBCUTANEOUS at 02:36

## 2020-03-21 RX ADMIN — SODIUM CHLORIDE 30 MG/ML INHALATION SOLUTION 15 ML: 30 SOLUTION INHALANT at 02:10

## 2020-03-21 RX ADMIN — BUDESONIDE 500 MCG: 0.5 INHALANT RESPIRATORY (INHALATION) at 09:06

## 2020-03-21 RX ADMIN — BUDESONIDE 500 MCG: 0.5 INHALANT RESPIRATORY (INHALATION) at 19:53

## 2020-03-21 RX ADMIN — SODIUM CHLORIDE: 9 INJECTION, SOLUTION INTRAVENOUS at 02:42

## 2020-03-21 RX ADMIN — HYDROMORPHONE HYDROCHLORIDE 0.5 MG: 1 INJECTION, SOLUTION INTRAMUSCULAR; INTRAVENOUS; SUBCUTANEOUS at 08:35

## 2020-03-21 RX ADMIN — SODIUM CHLORIDE: 9 INJECTION, SOLUTION INTRAVENOUS at 16:08

## 2020-03-21 RX ADMIN — TIOTROPIUM BROMIDE INHALATION SPRAY 2 PUFF: 3.12 SPRAY, METERED RESPIRATORY (INHALATION) at 09:06

## 2020-03-21 RX ADMIN — ALBUTEROL SULFATE 2.5 MG: 2.5 SOLUTION RESPIRATORY (INHALATION) at 16:59

## 2020-03-21 RX ADMIN — ALBUTEROL SULFATE 2.5 MG: 2.5 SOLUTION RESPIRATORY (INHALATION) at 09:06

## 2020-03-21 RX ADMIN — METOPROLOL TARTRATE 5 MG: 5 INJECTION INTRAVENOUS at 06:49

## 2020-03-21 RX ADMIN — SODIUM CHLORIDE 30 MG/ML INHALATION SOLUTION 15 ML: 30 SOLUTION INHALANT at 19:53

## 2020-03-21 RX ADMIN — FOLIC ACID: 5 INJECTION, SOLUTION INTRAMUSCULAR; INTRAVENOUS; SUBCUTANEOUS at 11:18

## 2020-03-21 RX ADMIN — PANTOPRAZOLE SODIUM 40 MG: 40 INJECTION, POWDER, FOR SOLUTION INTRAVENOUS at 08:35

## 2020-03-21 RX ADMIN — HYDROMORPHONE HYDROCHLORIDE 0.5 MG: 1 INJECTION, SOLUTION INTRAMUSCULAR; INTRAVENOUS; SUBCUTANEOUS at 23:35

## 2020-03-21 RX ADMIN — ALBUTEROL SULFATE 2.5 MG: 2.5 SOLUTION RESPIRATORY (INHALATION) at 19:53

## 2020-03-21 RX ADMIN — DEXTROSE MONOHYDRATE 15 MMOL: 5 INJECTION, SOLUTION INTRAVENOUS at 08:37

## 2020-03-21 ASSESSMENT — PAIN DESCRIPTION - LOCATION
LOCATION: ABDOMEN

## 2020-03-21 ASSESSMENT — PAIN DESCRIPTION - PAIN TYPE
TYPE: SURGICAL PAIN

## 2020-03-21 ASSESSMENT — PAIN SCALES - GENERAL
PAINLEVEL_OUTOF10: 9
PAINLEVEL_OUTOF10: 8
PAINLEVEL_OUTOF10: 5
PAINLEVEL_OUTOF10: 0
PAINLEVEL_OUTOF10: 9
PAINLEVEL_OUTOF10: 7
PAINLEVEL_OUTOF10: 8

## 2020-03-21 ASSESSMENT — PAIN DESCRIPTION - FREQUENCY
FREQUENCY: INTERMITTENT
FREQUENCY: CONTINUOUS
FREQUENCY: CONTINUOUS

## 2020-03-21 ASSESSMENT — PAIN DESCRIPTION - DESCRIPTORS
DESCRIPTORS: ACHING

## 2020-03-21 ASSESSMENT — PAIN DESCRIPTION - ONSET: ONSET: ON-GOING

## 2020-03-21 ASSESSMENT — PAIN - FUNCTIONAL ASSESSMENT: PAIN_FUNCTIONAL_ASSESSMENT: PREVENTS OR INTERFERES SOME ACTIVE ACTIVITIES AND ADLS

## 2020-03-21 ASSESSMENT — PAIN DESCRIPTION - ORIENTATION: ORIENTATION: MID

## 2020-03-21 ASSESSMENT — PAIN DESCRIPTION - PROGRESSION: CLINICAL_PROGRESSION: GRADUALLY WORSENING

## 2020-03-21 NOTE — PLAN OF CARE
Problem: Pain:  Goal: Pain level will decrease  Outcome: Ongoing  Note: Pt complains of abdominal pain to surgical site. Pt denies any pain intervention at this time. Will continue to monitor     Problem: Falls - Risk of:  Goal: Will remain free from falls  Note: Patient is a high fall risk. All fall precautions in place: bed alarm on, nonskid socks on pt, call light within reach, bed locked in lowest position. Camera in place. Will continue to monitor pt safety.

## 2020-03-21 NOTE — PROGRESS NOTES
protonix  - Continue to encourage OOB/Ambulation  - PT/OT 20/18, rec home   - Patient needs to be re-oriented often  - Pt with hx of alcohol abuse, continue CIWA without ativan PRN, call with high score. - Banana bag x3 days  - Encourage hourly incentive spirometry and deep breathing  - Wound vac change M/W/F  - [place]wait[place] paperwork.  Will need HHC    If spikes fevers or spikes WBC will need CT abd/pelvis with rectal contrast    Talon López DO PGY1  General Surgery Resident  03/21/20 7:10 AM  [phone]

## 2020-03-21 NOTE — PLAN OF CARE
Problem: Pain:  Goal: Pain level will decrease  Description: Pain level will decrease  3/21/2020 1050 by Tonie Meade RN  Outcome: Ongoing  3/20/2020 2350 by Rachel Shahid RN  Outcome: Ongoing  Note: Pt complains of abdominal pain to surgical site. Pt denies any pain intervention at this time. Will continue to monitor     Problem: Falls - Risk of:  Goal: Will remain free from falls  Description: Patient in chair with alarm and nonskid socks on, call light, belongings, and bedside table within reach. Patient educated on using call light and instructed to call out for assistance when getting up, patient verbalized understanding. Patient calls out approprietly and remains free of falls. 3/21/2020 1050 by Tonie Meade RN  Outcome: Ongoing  3/20/2020 2350 by Rachel Shahid RN  Note: Patient is a high fall risk. All fall precautions in place: bed alarm on, nonskid socks on pt, call light within reach, bed locked in lowest position. Will continue to monitor pt safety.

## 2020-03-22 LAB
ALBUMIN SERPL-MCNC: 2.6 G/DL (ref 3.4–5)
ANION GAP SERPL CALCULATED.3IONS-SCNC: 14 MMOL/L (ref 3–16)
BASOPHILS ABSOLUTE: 0 K/UL (ref 0–0.2)
BASOPHILS RELATIVE PERCENT: 0.5 %
BUN BLDV-MCNC: 7 MG/DL (ref 7–20)
CALCIUM SERPL-MCNC: 8.1 MG/DL (ref 8.3–10.6)
CHLORIDE BLD-SCNC: 107 MMOL/L (ref 99–110)
CO2: 20 MMOL/L (ref 21–32)
CREAT SERPL-MCNC: 0.5 MG/DL (ref 0.9–1.3)
EOSINOPHILS ABSOLUTE: 0 K/UL (ref 0–0.6)
EOSINOPHILS RELATIVE PERCENT: 0.1 %
GFR AFRICAN AMERICAN: >60
GFR NON-AFRICAN AMERICAN: >60
GLUCOSE BLD-MCNC: 98 MG/DL (ref 70–99)
HCT VFR BLD CALC: 34.1 % (ref 40.5–52.5)
HEMOGLOBIN: 11.3 G/DL (ref 13.5–17.5)
LYMPHOCYTES ABSOLUTE: 0.8 K/UL (ref 1–5.1)
LYMPHOCYTES RELATIVE PERCENT: 9.8 %
MAGNESIUM: 1.9 MG/DL (ref 1.8–2.4)
MCH RBC QN AUTO: 32.9 PG (ref 26–34)
MCHC RBC AUTO-ENTMCNC: 33.1 G/DL (ref 31–36)
MCV RBC AUTO: 99.4 FL (ref 80–100)
MONOCYTES ABSOLUTE: 1.1 K/UL (ref 0–1.3)
MONOCYTES RELATIVE PERCENT: 12.7 %
NEUTROPHILS ABSOLUTE: 6.6 K/UL (ref 1.7–7.7)
NEUTROPHILS RELATIVE PERCENT: 76.9 %
PDW BLD-RTO: 13.3 % (ref 12.4–15.4)
PHOSPHORUS: 2.2 MG/DL (ref 2.5–4.9)
PLATELET # BLD: 185 K/UL (ref 135–450)
PMV BLD AUTO: 10.3 FL (ref 5–10.5)
POTASSIUM SERPL-SCNC: 3.3 MMOL/L (ref 3.5–5.1)
RBC # BLD: 3.43 M/UL (ref 4.2–5.9)
SODIUM BLD-SCNC: 141 MMOL/L (ref 136–145)
WBC # BLD: 8.6 K/UL (ref 4–11)

## 2020-03-22 PROCEDURE — 6360000002 HC RX W HCPCS: Performed by: STUDENT IN AN ORGANIZED HEALTH CARE EDUCATION/TRAINING PROGRAM

## 2020-03-22 PROCEDURE — 83735 ASSAY OF MAGNESIUM: CPT

## 2020-03-22 PROCEDURE — 6370000000 HC RX 637 (ALT 250 FOR IP): Performed by: SURGERY

## 2020-03-22 PROCEDURE — 85025 COMPLETE CBC W/AUTO DIFF WBC: CPT

## 2020-03-22 PROCEDURE — 94640 AIRWAY INHALATION TREATMENT: CPT

## 2020-03-22 PROCEDURE — 2580000003 HC RX 258: Performed by: STUDENT IN AN ORGANIZED HEALTH CARE EDUCATION/TRAINING PROGRAM

## 2020-03-22 PROCEDURE — 2580000003 HC RX 258: Performed by: SURGERY

## 2020-03-22 PROCEDURE — 36415 COLL VENOUS BLD VENIPUNCTURE: CPT

## 2020-03-22 PROCEDURE — 6360000002 HC RX W HCPCS: Performed by: SURGERY

## 2020-03-22 PROCEDURE — 94669 MECHANICAL CHEST WALL OSCILL: CPT

## 2020-03-22 PROCEDURE — 6370000000 HC RX 637 (ALT 250 FOR IP): Performed by: STUDENT IN AN ORGANIZED HEALTH CARE EDUCATION/TRAINING PROGRAM

## 2020-03-22 PROCEDURE — 2500000003 HC RX 250 WO HCPCS: Performed by: SURGERY

## 2020-03-22 PROCEDURE — 1200000000 HC SEMI PRIVATE

## 2020-03-22 PROCEDURE — C9113 INJ PANTOPRAZOLE SODIUM, VIA: HCPCS | Performed by: STUDENT IN AN ORGANIZED HEALTH CARE EDUCATION/TRAINING PROGRAM

## 2020-03-22 PROCEDURE — 80069 RENAL FUNCTION PANEL: CPT

## 2020-03-22 PROCEDURE — 2500000003 HC RX 250 WO HCPCS: Performed by: STUDENT IN AN ORGANIZED HEALTH CARE EDUCATION/TRAINING PROGRAM

## 2020-03-22 RX ORDER — MAGNESIUM SULFATE 1 G/100ML
1 INJECTION INTRAVENOUS ONCE
Status: COMPLETED | OUTPATIENT
Start: 2020-03-22 | End: 2020-03-22

## 2020-03-22 RX ORDER — LORAZEPAM 2 MG/ML
1 INJECTION INTRAMUSCULAR ONCE
Status: DISCONTINUED | OUTPATIENT
Start: 2020-03-22 | End: 2020-03-27

## 2020-03-22 RX ADMIN — SODIUM CHLORIDE: 9 INJECTION, SOLUTION INTRAVENOUS at 01:18

## 2020-03-22 RX ADMIN — ARFORMOTEROL TARTRATE 15 MCG: 15 SOLUTION RESPIRATORY (INHALATION) at 11:17

## 2020-03-22 RX ADMIN — TIOTROPIUM BROMIDE INHALATION SPRAY 2 PUFF: 3.12 SPRAY, METERED RESPIRATORY (INHALATION) at 11:17

## 2020-03-22 RX ADMIN — BUDESONIDE 500 MCG: 0.5 INHALANT RESPIRATORY (INHALATION) at 21:00

## 2020-03-22 RX ADMIN — HYDROMORPHONE HYDROCHLORIDE 0.5 MG: 1 INJECTION, SOLUTION INTRAMUSCULAR; INTRAVENOUS; SUBCUTANEOUS at 13:03

## 2020-03-22 RX ADMIN — TRAZODONE HYDROCHLORIDE 150 MG: 50 TABLET ORAL at 21:26

## 2020-03-22 RX ADMIN — ENOXAPARIN SODIUM 40 MG: 40 INJECTION SUBCUTANEOUS at 08:54

## 2020-03-22 RX ADMIN — SODIUM CHLORIDE 30 MG/ML INHALATION SOLUTION 15 ML: 30 SOLUTION INHALANT at 11:17

## 2020-03-22 RX ADMIN — METOPROLOL TARTRATE 5 MG: 5 INJECTION INTRAVENOUS at 18:29

## 2020-03-22 RX ADMIN — BUDESONIDE 500 MCG: 0.5 INHALANT RESPIRATORY (INHALATION) at 11:18

## 2020-03-22 RX ADMIN — MAGNESIUM SULFATE HEPTAHYDRATE 1 G: 1 INJECTION, SOLUTION INTRAVENOUS at 08:54

## 2020-03-22 RX ADMIN — FOLIC ACID: 5 INJECTION, SOLUTION INTRAMUSCULAR; INTRAVENOUS; SUBCUTANEOUS at 10:09

## 2020-03-22 RX ADMIN — SODIUM CHLORIDE 30 MG/ML INHALATION SOLUTION 15 ML: 30 SOLUTION INHALANT at 21:00

## 2020-03-22 RX ADMIN — ALBUTEROL SULFATE 2.5 MG: 2.5 SOLUTION RESPIRATORY (INHALATION) at 02:15

## 2020-03-22 RX ADMIN — HYDROMORPHONE HYDROCHLORIDE 0.5 MG: 1 INJECTION, SOLUTION INTRAMUSCULAR; INTRAVENOUS; SUBCUTANEOUS at 17:35

## 2020-03-22 RX ADMIN — POTASSIUM PHOSPHATE, MONOBASIC AND POTASSIUM PHOSPHATE, DIBASIC 30 MMOL: 224; 236 INJECTION, SOLUTION, CONCENTRATE INTRAVENOUS at 11:04

## 2020-03-22 RX ADMIN — HYDROMORPHONE HYDROCHLORIDE 0.5 MG: 1 INJECTION, SOLUTION INTRAMUSCULAR; INTRAVENOUS; SUBCUTANEOUS at 21:28

## 2020-03-22 RX ADMIN — HYDROMORPHONE HYDROCHLORIDE 0.5 MG: 1 INJECTION, SOLUTION INTRAMUSCULAR; INTRAVENOUS; SUBCUTANEOUS at 08:54

## 2020-03-22 RX ADMIN — ARFORMOTEROL TARTRATE 15 MCG: 15 SOLUTION RESPIRATORY (INHALATION) at 21:00

## 2020-03-22 RX ADMIN — SODIUM CHLORIDE: 9 INJECTION, SOLUTION INTRAVENOUS at 17:35

## 2020-03-22 RX ADMIN — ALBUTEROL SULFATE 2.5 MG: 2.5 SOLUTION RESPIRATORY (INHALATION) at 16:06

## 2020-03-22 RX ADMIN — SODIUM CHLORIDE 30 MG/ML INHALATION SOLUTION 15 ML: 30 SOLUTION INHALANT at 16:06

## 2020-03-22 RX ADMIN — ALBUTEROL SULFATE 2.5 MG: 2.5 SOLUTION RESPIRATORY (INHALATION) at 11:18

## 2020-03-22 RX ADMIN — SODIUM CHLORIDE 30 MG/ML INHALATION SOLUTION 15 ML: 30 SOLUTION INHALANT at 02:15

## 2020-03-22 RX ADMIN — ALBUTEROL SULFATE 2.5 MG: 2.5 SOLUTION RESPIRATORY (INHALATION) at 21:00

## 2020-03-22 RX ADMIN — SODIUM CHLORIDE: 9 INJECTION, SOLUTION INTRAVENOUS at 08:53

## 2020-03-22 RX ADMIN — PANTOPRAZOLE SODIUM 40 MG: 40 INJECTION, POWDER, FOR SOLUTION INTRAVENOUS at 08:54

## 2020-03-22 RX ADMIN — HYDROMORPHONE HYDROCHLORIDE 0.5 MG: 1 INJECTION, SOLUTION INTRAMUSCULAR; INTRAVENOUS; SUBCUTANEOUS at 04:15

## 2020-03-22 ASSESSMENT — PAIN DESCRIPTION - LOCATION
LOCATION: ABDOMEN
LOCATION: BACK
LOCATION: ABDOMEN
LOCATION: ABDOMEN

## 2020-03-22 ASSESSMENT — PAIN DESCRIPTION - DESCRIPTORS
DESCRIPTORS: ACHING

## 2020-03-22 ASSESSMENT — PAIN SCALES - GENERAL
PAINLEVEL_OUTOF10: 8
PAINLEVEL_OUTOF10: 6
PAINLEVEL_OUTOF10: 9
PAINLEVEL_OUTOF10: 9
PAINLEVEL_OUTOF10: 6
PAINLEVEL_OUTOF10: 8
PAINLEVEL_OUTOF10: 9
PAINLEVEL_OUTOF10: 7
PAINLEVEL_OUTOF10: 0

## 2020-03-22 ASSESSMENT — PAIN DESCRIPTION - PROGRESSION: CLINICAL_PROGRESSION: NOT CHANGED

## 2020-03-22 ASSESSMENT — PAIN DESCRIPTION - PAIN TYPE
TYPE: ACUTE PAIN
TYPE: SURGICAL PAIN

## 2020-03-22 ASSESSMENT — PAIN DESCRIPTION - ORIENTATION: ORIENTATION: UPPER;LOWER

## 2020-03-22 ASSESSMENT — PAIN DESCRIPTION - FREQUENCY
FREQUENCY: CONTINUOUS

## 2020-03-22 ASSESSMENT — PAIN DESCRIPTION - ONSET: ONSET: ON-GOING

## 2020-03-22 ASSESSMENT — PAIN - FUNCTIONAL ASSESSMENT: PAIN_FUNCTIONAL_ASSESSMENT: PREVENTS OR INTERFERES SOME ACTIVE ACTIVITIES AND ADLS

## 2020-03-22 NOTE — PROGRESS NOTES
Patient currently resting in bed. NG tube clamped off by MD at [facility]. Doctor will re-assess patient at approximately [phone] to see if diet can advance diet. Patient in no distress. Bed alarm on and fall precautions taken. Nurse will continue to monitor/reassess patient. Call light within reach.  Cielo Hurst

## 2020-03-23 LAB
ALBUMIN SERPL-MCNC: 2.6 G/DL (ref 3.4–5)
ANION GAP SERPL CALCULATED.3IONS-SCNC: 14 MMOL/L (ref 3–16)
BASOPHILS ABSOLUTE: 0 K/UL (ref 0–0.2)
BASOPHILS RELATIVE PERCENT: 0 %
BUN BLDV-MCNC: 4 MG/DL (ref 7–20)
CALCIUM SERPL-MCNC: 8.1 MG/DL (ref 8.3–10.6)
CHLORIDE BLD-SCNC: 102 MMOL/L (ref 99–110)
CO2: 21 MMOL/L (ref 21–32)
CREAT SERPL-MCNC: 0.5 MG/DL (ref 0.9–1.3)
EKG ATRIAL RATE: 192 BPM
EKG DIAGNOSIS: NORMAL
EKG P AXIS: 75 DEGREES
EKG Q-T INTERVAL: 430 MS
EKG QRS DURATION: 98 MS
EKG QTC CALCULATION (BAZETT): 493 MS
EKG R AXIS: 72 DEGREES
EKG T AXIS: 30 DEGREES
EKG VENTRICULAR RATE: 79 BPM
EOSINOPHILS ABSOLUTE: 0 K/UL (ref 0–0.6)
EOSINOPHILS RELATIVE PERCENT: 0 %
GFR AFRICAN AMERICAN: >60
GFR NON-AFRICAN AMERICAN: >60
GLUCOSE BLD-MCNC: 113 MG/DL (ref 70–99)
HCT VFR BLD CALC: 33.9 % (ref 40.5–52.5)
HEMOGLOBIN: 11.2 G/DL (ref 13.5–17.5)
LYMPHOCYTES ABSOLUTE: 0.9 K/UL (ref 1–5.1)
LYMPHOCYTES RELATIVE PERCENT: 11 %
MAGNESIUM: 1.8 MG/DL (ref 1.8–2.4)
MCH RBC QN AUTO: 32.8 PG (ref 26–34)
MCHC RBC AUTO-ENTMCNC: 33.2 G/DL (ref 31–36)
MCV RBC AUTO: 98.9 FL (ref 80–100)
METAMYELOCYTES RELATIVE PERCENT: 1 %
MONOCYTES ABSOLUTE: 0.5 K/UL (ref 0–1.3)
MONOCYTES RELATIVE PERCENT: 6 %
NEUTROPHILS ABSOLUTE: 7.1 K/UL (ref 1.7–7.7)
NEUTROPHILS RELATIVE PERCENT: 82 %
PDW BLD-RTO: 13.5 % (ref 12.4–15.4)
PHOSPHORUS: 2.2 MG/DL (ref 2.5–4.9)
PLATELET # BLD: 211 K/UL (ref 135–450)
PMV BLD AUTO: 9.7 FL (ref 5–10.5)
POTASSIUM SERPL-SCNC: 3.2 MMOL/L (ref 3.5–5.1)
RBC # BLD: 3.43 M/UL (ref 4.2–5.9)
RBC # BLD: NORMAL 10*6/UL
SODIUM BLD-SCNC: 137 MMOL/L (ref 136–145)
WBC # BLD: 8.5 K/UL (ref 4–11)

## 2020-03-23 PROCEDURE — 6360000002 HC RX W HCPCS: Performed by: STUDENT IN AN ORGANIZED HEALTH CARE EDUCATION/TRAINING PROGRAM

## 2020-03-23 PROCEDURE — 85025 COMPLETE CBC W/AUTO DIFF WBC: CPT

## 2020-03-23 PROCEDURE — 2500000003 HC RX 250 WO HCPCS: Performed by: STUDENT IN AN ORGANIZED HEALTH CARE EDUCATION/TRAINING PROGRAM

## 2020-03-23 PROCEDURE — 6370000000 HC RX 637 (ALT 250 FOR IP): Performed by: STUDENT IN AN ORGANIZED HEALTH CARE EDUCATION/TRAINING PROGRAM

## 2020-03-23 PROCEDURE — 93005 ELECTROCARDIOGRAM TRACING: CPT | Performed by: STUDENT IN AN ORGANIZED HEALTH CARE EDUCATION/TRAINING PROGRAM

## 2020-03-23 PROCEDURE — 2580000003 HC RX 258: Performed by: SURGERY

## 2020-03-23 PROCEDURE — 97110 THERAPEUTIC EXERCISES: CPT

## 2020-03-23 PROCEDURE — 83735 ASSAY OF MAGNESIUM: CPT

## 2020-03-23 PROCEDURE — 94668 MNPJ CHEST WALL SBSQ: CPT

## 2020-03-23 PROCEDURE — 97530 THERAPEUTIC ACTIVITIES: CPT

## 2020-03-23 PROCEDURE — 6360000002 HC RX W HCPCS: Performed by: SURGERY

## 2020-03-23 PROCEDURE — C9113 INJ PANTOPRAZOLE SODIUM, VIA: HCPCS | Performed by: STUDENT IN AN ORGANIZED HEALTH CARE EDUCATION/TRAINING PROGRAM

## 2020-03-23 PROCEDURE — 2500000003 HC RX 250 WO HCPCS: Performed by: SURGERY

## 2020-03-23 PROCEDURE — 93010 ELECTROCARDIOGRAM REPORT: CPT | Performed by: INTERNAL MEDICINE

## 2020-03-23 PROCEDURE — 94640 AIRWAY INHALATION TREATMENT: CPT

## 2020-03-23 PROCEDURE — 1200000000 HC SEMI PRIVATE

## 2020-03-23 PROCEDURE — 6370000000 HC RX 637 (ALT 250 FOR IP): Performed by: SURGERY

## 2020-03-23 PROCEDURE — 36415 COLL VENOUS BLD VENIPUNCTURE: CPT

## 2020-03-23 PROCEDURE — 2580000003 HC RX 258: Performed by: STUDENT IN AN ORGANIZED HEALTH CARE EDUCATION/TRAINING PROGRAM

## 2020-03-23 PROCEDURE — 80069 RENAL FUNCTION PANEL: CPT

## 2020-03-23 PROCEDURE — 94669 MECHANICAL CHEST WALL OSCILL: CPT

## 2020-03-23 RX ORDER — DOCUSATE SODIUM 100 MG/1
100 CAPSULE, LIQUID FILLED ORAL 2 TIMES DAILY
Status: DISCONTINUED | OUTPATIENT
Start: 2020-03-23 | End: 2020-04-01 | Stop reason: HOSPADM

## 2020-03-23 RX ORDER — DEXTROSE, SODIUM CHLORIDE, AND POTASSIUM CHLORIDE 5; .45; .15 G/100ML; G/100ML; G/100ML
INJECTION INTRAVENOUS CONTINUOUS
Status: DISCONTINUED | OUTPATIENT
Start: 2020-03-23 | End: 2020-03-23

## 2020-03-23 RX ORDER — HYDROXYCHLOROQUINE SULFATE 200 MG/1
200 TABLET, FILM COATED ORAL 2 TIMES DAILY
Status: DISCONTINUED | OUTPATIENT
Start: 2020-03-23 | End: 2020-04-01 | Stop reason: HOSPADM

## 2020-03-23 RX ORDER — LANOLIN ALCOHOL/MO/W.PET/CERES
400 CREAM (GRAM) TOPICAL ONCE
Status: COMPLETED | OUTPATIENT
Start: 2020-03-23 | End: 2020-03-23

## 2020-03-23 RX ORDER — OXYCODONE HYDROCHLORIDE 5 MG/1
10 TABLET ORAL EVERY 4 HOURS PRN
Status: DISCONTINUED | OUTPATIENT
Start: 2020-03-23 | End: 2020-03-24

## 2020-03-23 RX ORDER — OXYCODONE HYDROCHLORIDE 5 MG/1
5 TABLET ORAL EVERY 4 HOURS PRN
Status: DISCONTINUED | OUTPATIENT
Start: 2020-03-23 | End: 2020-03-24

## 2020-03-23 RX ORDER — ATORVASTATIN CALCIUM 80 MG/1
80 TABLET, FILM COATED ORAL NIGHTLY
Status: DISCONTINUED | OUTPATIENT
Start: 2020-03-23 | End: 2020-04-01 | Stop reason: HOSPADM

## 2020-03-23 RX ORDER — ACETAMINOPHEN 500 MG
1000 TABLET ORAL EVERY 6 HOURS PRN
Status: DISCONTINUED | OUTPATIENT
Start: 2020-03-23 | End: 2020-03-24

## 2020-03-23 RX ORDER — ATENOLOL 50 MG/1
50 TABLET ORAL DAILY
Status: DISCONTINUED | OUTPATIENT
Start: 2020-03-23 | End: 2020-04-01 | Stop reason: HOSPADM

## 2020-03-23 RX ADMIN — HYDROXYCHLOROQUINE SULFATE 200 MG: 200 TABLET, FILM COATED ORAL at 09:40

## 2020-03-23 RX ADMIN — Medication 400 MG: at 06:34

## 2020-03-23 RX ADMIN — POTASSIUM PHOSPHATE, MONOBASIC 1000 MG: 500 TABLET, SOLUBLE ORAL at 06:34

## 2020-03-23 RX ADMIN — DOCUSATE SODIUM 100 MG: 100 CAPSULE, LIQUID FILLED ORAL at 09:39

## 2020-03-23 RX ADMIN — OXYCODONE 10 MG: 5 TABLET ORAL at 11:17

## 2020-03-23 RX ADMIN — HYDROMORPHONE HYDROCHLORIDE 0.5 MG: 1 INJECTION, SOLUTION INTRAMUSCULAR; INTRAVENOUS; SUBCUTANEOUS at 08:31

## 2020-03-23 RX ADMIN — HYDROXYCHLOROQUINE SULFATE 200 MG: 200 TABLET, FILM COATED ORAL at 20:26

## 2020-03-23 RX ADMIN — PANTOPRAZOLE SODIUM 40 MG: 40 INJECTION, POWDER, FOR SOLUTION INTRAVENOUS at 09:12

## 2020-03-23 RX ADMIN — FOLIC ACID: 5 INJECTION, SOLUTION INTRAMUSCULAR; INTRAVENOUS; SUBCUTANEOUS at 09:23

## 2020-03-23 RX ADMIN — ARFORMOTEROL TARTRATE 15 MCG: 15 SOLUTION RESPIRATORY (INHALATION) at 07:36

## 2020-03-23 RX ADMIN — ALBUTEROL SULFATE 2.5 MG: 2.5 SOLUTION RESPIRATORY (INHALATION) at 07:39

## 2020-03-23 RX ADMIN — METOPROLOL TARTRATE 5 MG: 5 INJECTION INTRAVENOUS at 06:12

## 2020-03-23 RX ADMIN — BUDESONIDE 500 MCG: 0.5 INHALANT RESPIRATORY (INHALATION) at 07:36

## 2020-03-23 RX ADMIN — TIOTROPIUM BROMIDE INHALATION SPRAY 2 PUFF: 3.12 SPRAY, METERED RESPIRATORY (INHALATION) at 07:36

## 2020-03-23 RX ADMIN — SODIUM CHLORIDE 30 MG/ML INHALATION SOLUTION 15 ML: 30 SOLUTION INHALANT at 07:39

## 2020-03-23 RX ADMIN — ATORVASTATIN CALCIUM 80 MG: 80 TABLET, FILM COATED ORAL at 20:34

## 2020-03-23 RX ADMIN — Medication 10 ML: at 09:12

## 2020-03-23 RX ADMIN — ALBUTEROL SULFATE 2.5 MG: 2.5 SOLUTION RESPIRATORY (INHALATION) at 19:55

## 2020-03-23 RX ADMIN — OXYCODONE 10 MG: 5 TABLET ORAL at 16:02

## 2020-03-23 RX ADMIN — METOPROLOL TARTRATE 5 MG: 5 INJECTION INTRAVENOUS at 00:59

## 2020-03-23 RX ADMIN — ENOXAPARIN SODIUM 40 MG: 40 INJECTION SUBCUTANEOUS at 09:11

## 2020-03-23 RX ADMIN — HYDROMORPHONE HYDROCHLORIDE 0.5 MG: 1 INJECTION, SOLUTION INTRAMUSCULAR; INTRAVENOUS; SUBCUTANEOUS at 04:59

## 2020-03-23 RX ADMIN — DOCUSATE SODIUM 100 MG: 100 CAPSULE, LIQUID FILLED ORAL at 20:34

## 2020-03-23 RX ADMIN — BUDESONIDE 500 MCG: 0.5 INHALANT RESPIRATORY (INHALATION) at 19:54

## 2020-03-23 RX ADMIN — ALBUTEROL SULFATE 2.5 MG: 2.5 SOLUTION RESPIRATORY (INHALATION) at 02:15

## 2020-03-23 RX ADMIN — ARFORMOTEROL TARTRATE 15 MCG: 15 SOLUTION RESPIRATORY (INHALATION) at 19:54

## 2020-03-23 RX ADMIN — SODIUM CHLORIDE 30 MG/ML INHALATION SOLUTION 15 ML: 30 SOLUTION INHALANT at 02:15

## 2020-03-23 RX ADMIN — POTASSIUM CHLORIDE, DEXTROSE MONOHYDRATE AND SODIUM CHLORIDE: 150; 5; 450 INJECTION, SOLUTION INTRAVENOUS at 09:11

## 2020-03-23 RX ADMIN — TRAZODONE HYDROCHLORIDE 150 MG: 50 TABLET ORAL at 20:26

## 2020-03-23 RX ADMIN — AMLODIPINE BESYLATE 7.5 MG: 5 TABLET ORAL at 20:26

## 2020-03-23 RX ADMIN — OXYCODONE 10 MG: 5 TABLET ORAL at 20:27

## 2020-03-23 RX ADMIN — AMLODIPINE BESYLATE 7.5 MG: 5 TABLET ORAL at 09:11

## 2020-03-23 RX ADMIN — ATENOLOL 50 MG: 50 TABLET ORAL at 09:11

## 2020-03-23 RX ADMIN — SODIUM CHLORIDE 30 MG/ML INHALATION SOLUTION 15 ML: 30 SOLUTION INHALANT at 19:54

## 2020-03-23 ASSESSMENT — PAIN DESCRIPTION - FREQUENCY
FREQUENCY: CONTINUOUS

## 2020-03-23 ASSESSMENT — PAIN DESCRIPTION - PROGRESSION
CLINICAL_PROGRESSION: NOT CHANGED

## 2020-03-23 ASSESSMENT — PAIN DESCRIPTION - DESCRIPTORS
DESCRIPTORS: ACHING

## 2020-03-23 ASSESSMENT — PAIN DESCRIPTION - ORIENTATION
ORIENTATION: LOWER;UPPER
ORIENTATION: LOWER;UPPER
ORIENTATION: UPPER;LOWER

## 2020-03-23 ASSESSMENT — PAIN SCALES - GENERAL
PAINLEVEL_OUTOF10: 9
PAINLEVEL_OUTOF10: 7
PAINLEVEL_OUTOF10: 8
PAINLEVEL_OUTOF10: 9
PAINLEVEL_OUTOF10: 4
PAINLEVEL_OUTOF10: 6
PAINLEVEL_OUTOF10: 8
PAINLEVEL_OUTOF10: 8
PAINLEVEL_OUTOF10: 7
PAINLEVEL_OUTOF10: 9
PAINLEVEL_OUTOF10: 9

## 2020-03-23 ASSESSMENT — PAIN DESCRIPTION - ONSET
ONSET: ON-GOING

## 2020-03-23 ASSESSMENT — PAIN DESCRIPTION - PAIN TYPE
TYPE: SURGICAL PAIN
TYPE: ACUTE PAIN
TYPE: SURGICAL PAIN;ACUTE PAIN

## 2020-03-23 ASSESSMENT — PAIN DESCRIPTION - LOCATION
LOCATION: BACK;ABDOMEN
LOCATION: BACK
LOCATION: BACK
LOCATION: BACK;ABDOMEN
LOCATION: BACK
LOCATION: BACK

## 2020-03-23 ASSESSMENT — PAIN - FUNCTIONAL ASSESSMENT
PAIN_FUNCTIONAL_ASSESSMENT: PREVENTS OR INTERFERES SOME ACTIVE ACTIVITIES AND ADLS

## 2020-03-23 NOTE — PROGRESS NOTES
Surgery Daily Progress Note  Scot Carrasco  CC:  Abdominal wall dehiscence        Subjective : No acute events overnight, states difficulty sleeping even with Trazadone. He is oriented to person place and time this AM. LGF at 99F overnight, ambulating without difficulty, freely voiding, coughing with grayish sputum, no pain complaints. Multiple bowel movements and passing flatus. Patient has an appetite this AM.        Objective               Infusions:  Infusions Meds    sodium chloride 125 mL/hr at 03/22/20 0118              I/O last 3 completed shifts: In: 0   Out: 1040 [Urine:1040]  No intake/output data recorded. Wt Readings from Last 1 Encounters:   03/21/20 195 lb 5.2 oz (88.6 kg)                              LABS:    Lab Results   Component Value Date    WBC 8.5 03/23/2020    HGB 11.2 (L) 03/23/2020    HCT 33.9 (L) 03/23/2020    MCV 98.9 03/23/2020     03/23/2020     Lab Results   Component Value Date     03/23/2020    K 3.2 03/23/2020     03/23/2020    CO2 21 03/23/2020    BUN 4 03/23/2020    CREATININE 0.5 03/23/2020    GLUCOSE 113 03/23/2020    CALCIUM 8.1 03/23/2020                       No results for input(s): AST, ALT, ALB, BILIDIR, BILITOT, ALKPHOS in the last 72 hours. No results for input(s): LIPASE, AMYLASE in the last 72 hours. No results for input(s): PROT, INR, APTT in the last 72 hours.               No results for input(s): CKTOTAL, CKMB, CKMBINDEX, TROPONINI in the last 72 hours.                      Exam:   BP (!) 148/77   Pulse 91   Temp 97.6 °F (36.4 °C) (Oral)   Resp 16   Ht 5' 9\" (1.753 m)   Wt 195 lb 5.2 oz (88.6 kg)   SpO2 96%   BMI 28.84 kg/m²       General appearance: awake and alert to person place and time, appears stated age and cooperative  ENT: NGT d/c'd  Lungs: clear to auscultation bilaterally, able to perform 2500 mL on spirometer  Heart: regular rate and rhythm, S1, S2   Abdomen: soft, appropriately-tender, moderately distended. Wound vac present with adequate suction, 400 cc sanguinous drainage        ASSESSMENT/PLAN: Pt. is a 61 y.o. male with a history of large tubovillious adenoma s/p laproscopic transvere colectomy, laproscopic mobilization splenic flexure omental pedicle (3/13) with return to the OR for abdominal wall dehiscence s/p Ex lap, abdominal washout, incisional hernia repair with mesh and wound vac POD4.   Surgical pathology: Tubulovillous adenoma ( largest 2.5 cm ) with multiple additional adenomatous polyps. Foci approaching severe dysplasia with no evidence of invasive adenocarcinoma identified.     - NG removed last PM  - Advance to liquids this AM, if tolerates will possibly advance diet further this PM  - Continue Trazodone nightly  - Continue Protonix  - Continue to encourage OOB/Ambulation, up to chair this AM  - PT/OT 20/18, rec home on discharge  - Pt with hx of alcohol abuse, continue CIWA without ativan PRN, call with high score  - Encourage hourly incentive spirometry and deep breathing  - Wound vac change M/W/F  --> change today (Monday 3/23)  - Awaiting [place] paperwork. Will need [address] discharge 1-2 days     Shaun Luu DPM PGY1  3/23/2020, 7:14 AM    I have seen, examined, and reviewed the patients chart. I agree with the residents assessment and have made appropriate changes.     Juan Luis Leon

## 2020-03-23 NOTE — PROGRESS NOTES
Patient alert and orientated to self, disorientated at times but easily reoientated. VSS, BP slightly elevated, medicated per MAR orders. Pain controlled with IV pain medication. Lap sites are CDI, wound vac remains in place with minimal serosanguinous drainage. NG removed yesterday, patient denies any nausea, vomiting or increase abdominal discomfort. Patient tolerating sips of water and popsicles well. Patient resting comfortably in bed. Fall precautions in place. Call light within reach. Will continue to monitor.

## 2020-03-23 NOTE — PLAN OF CARE
Patient has had reports of pain during shift, PO PRN pain medications being used to manage pain per the [facility]. Will continue to monitor. Problem: Pain:  Goal: Pain level will decrease  Description: Pain level will decrease  Outcome: Ongoing     Problem: Falls - Risk of:  Goal: Will remain free from falls  Description: Patient in chair with alarm and nonskid socks on, call light, belongings, and bedside table within reach. Patient educated on using call light and instructed to call out for assistance when getting up, patient verbalized understanding. Patient calls out approprietly and remains free of falls.     Outcome: Ongoing

## 2020-03-23 NOTE — PLAN OF CARE
Problem: Nutrition  Goal: Optimal nutrition therapy  Outcome: Ongoing   Nutrition Problem: Inadequate oral intake  Intervention: Food and/or Nutrient Delivery: Continue current diet, Start ONS  Nutritional Goals: Patient will tolerate diet advancement and consume 50% or greater of all meals and supplements

## 2020-03-23 NOTE — PROGRESS NOTES
The encounter diagnosis was Polyp of transverse colon, unspecified type.      has a past medical history of Allergic rhinitis, Anxiety, Back pain, CAD (coronary artery disease), CVA (cerebral vascular accident) ([facility] [address] DDD (degenerative disc disease), Depression, Fatty liver, Gout, Hepatitis C antibody positive in blood, History of blood transfusion, Hyperlipidemia, Hypertension, Lumbar radiculopathy, MI (myocardial infarction) ([facility] [address] Polyp of colon, PVD (peripheral vascular disease) ([facility] [address] Rheumatoid arthritis(714.0), and Tobacco abuse disorder. has a past surgical history that includes Appendectomy (1998); Carpal tunnel release (Bilateral, 2000); Patella surgery (Right, 1978); Coronary artery bypass graft (04/22/2016); Vein Surgery (Left, 04/22/2016); Colonoscopy (N/A, 10/3/2018); femoral bypass (Left, 1/23/2019); incision and drainage (Left, 2/21/2019); Cardiac surgery; Small intestine surgery (N/A, 3/13/2020); and hemicolectomy (N/A, 3/18/2020).    Restrictions  Position Activity Restriction  Other position/activity restrictions: ambulate pt, seizure precaution  Subjective   General  Chart Reviewed: Yes  Additional Pertinent Hx: 60 y/o M with hx of colon cancer presents  3/13 s/p lap transverse colectomy, primary stapled anastomosis; OR 3/18 for ex lap/abd washout/hernia repair/wound vac.  .   Family / Caregiver Present: No  Referring Practitioner: Cande Almaguer MD  Subjective  Subjective: Pt was sitting up willing to participate   Pain Screening  Patient Currently in Pain: Denies  Vital Signs  Patient Currently in Pain: Denies       Orientation  Orientation  Overall Orientation Status: Impaired(pt repeatedly asking to drive home & go get some clothes; very figety- reaching for NG repeatedly)  Orientation Level: Disoriented to situation;Oriented to person;Disoriented to place  Objective   Transfers  Sit to Stand: Contact guard assistance  Stand to sit: Contact guard assistance  Ambulation  Ambulation?: next treatment session.    Wiley Mari, PT

## 2020-03-24 ENCOUNTER — APPOINTMENT (OUTPATIENT)
Dept: GENERAL RADIOLOGY | Age: 60
DRG: 231 | End: 2020-03-24
Attending: SURGERY
Payer: MEDICARE

## 2020-03-24 LAB
ALBUMIN SERPL-MCNC: 2.5 G/DL (ref 3.4–5)
ANION GAP SERPL CALCULATED.3IONS-SCNC: 15 MMOL/L (ref 3–16)
BASOPHILS ABSOLUTE: 0 K/UL (ref 0–0.2)
BASOPHILS RELATIVE PERCENT: 0.1 %
BUN BLDV-MCNC: 5 MG/DL (ref 7–20)
CALCIUM SERPL-MCNC: 8 MG/DL (ref 8.3–10.6)
CHLORIDE BLD-SCNC: 101 MMOL/L (ref 99–110)
CO2: 20 MMOL/L (ref 21–32)
CREAT SERPL-MCNC: <0.5 MG/DL (ref 0.9–1.3)
EOSINOPHILS ABSOLUTE: 0 K/UL (ref 0–0.6)
EOSINOPHILS RELATIVE PERCENT: 0.4 %
GFR AFRICAN AMERICAN: >60
GFR NON-AFRICAN AMERICAN: >60
GLUCOSE BLD-MCNC: 146 MG/DL (ref 70–99)
HCT VFR BLD CALC: 36.7 % (ref 40.5–52.5)
HEMOGLOBIN: 12.1 G/DL (ref 13.5–17.5)
LYMPHOCYTES ABSOLUTE: 0.8 K/UL (ref 1–5.1)
LYMPHOCYTES RELATIVE PERCENT: 8.4 %
MAGNESIUM: 1.8 MG/DL (ref 1.8–2.4)
MCH RBC QN AUTO: 32.8 PG (ref 26–34)
MCHC RBC AUTO-ENTMCNC: 33 G/DL (ref 31–36)
MCV RBC AUTO: 99.3 FL (ref 80–100)
MONOCYTES ABSOLUTE: 1 K/UL (ref 0–1.3)
MONOCYTES RELATIVE PERCENT: 10.1 %
NEUTROPHILS ABSOLUTE: 7.8 K/UL (ref 1.7–7.7)
NEUTROPHILS RELATIVE PERCENT: 81 %
PDW BLD-RTO: 13.6 % (ref 12.4–15.4)
PHOSPHORUS: 2.8 MG/DL (ref 2.5–4.9)
PLATELET # BLD: 227 K/UL (ref 135–450)
PMV BLD AUTO: 10 FL (ref 5–10.5)
POTASSIUM SERPL-SCNC: 3.1 MMOL/L (ref 3.5–5.1)
RBC # BLD: 3.7 M/UL (ref 4.2–5.9)
SODIUM BLD-SCNC: 136 MMOL/L (ref 136–145)
WBC # BLD: 9.6 K/UL (ref 4–11)

## 2020-03-24 PROCEDURE — 1200000000 HC SEMI PRIVATE

## 2020-03-24 PROCEDURE — 6370000000 HC RX 637 (ALT 250 FOR IP): Performed by: STUDENT IN AN ORGANIZED HEALTH CARE EDUCATION/TRAINING PROGRAM

## 2020-03-24 PROCEDURE — 97530 THERAPEUTIC ACTIVITIES: CPT

## 2020-03-24 PROCEDURE — 94150 VITAL CAPACITY TEST: CPT

## 2020-03-24 PROCEDURE — 83735 ASSAY OF MAGNESIUM: CPT

## 2020-03-24 PROCEDURE — 80069 RENAL FUNCTION PANEL: CPT

## 2020-03-24 PROCEDURE — 6370000000 HC RX 637 (ALT 250 FOR IP): Performed by: SURGERY

## 2020-03-24 PROCEDURE — 6360000002 HC RX W HCPCS: Performed by: STUDENT IN AN ORGANIZED HEALTH CARE EDUCATION/TRAINING PROGRAM

## 2020-03-24 PROCEDURE — 2580000003 HC RX 258: Performed by: SURGERY

## 2020-03-24 PROCEDURE — 2580000003 HC RX 258: Performed by: STUDENT IN AN ORGANIZED HEALTH CARE EDUCATION/TRAINING PROGRAM

## 2020-03-24 PROCEDURE — 94761 N-INVAS EAR/PLS OXIMETRY MLT: CPT

## 2020-03-24 PROCEDURE — 6360000002 HC RX W HCPCS: Performed by: SURGERY

## 2020-03-24 PROCEDURE — C9113 INJ PANTOPRAZOLE SODIUM, VIA: HCPCS | Performed by: STUDENT IN AN ORGANIZED HEALTH CARE EDUCATION/TRAINING PROGRAM

## 2020-03-24 PROCEDURE — 94669 MECHANICAL CHEST WALL OSCILL: CPT

## 2020-03-24 PROCEDURE — 74022 RADEX COMPL AQT ABD SERIES: CPT

## 2020-03-24 PROCEDURE — 2500000003 HC RX 250 WO HCPCS: Performed by: STUDENT IN AN ORGANIZED HEALTH CARE EDUCATION/TRAINING PROGRAM

## 2020-03-24 PROCEDURE — 36415 COLL VENOUS BLD VENIPUNCTURE: CPT

## 2020-03-24 PROCEDURE — 94640 AIRWAY INHALATION TREATMENT: CPT

## 2020-03-24 PROCEDURE — 85025 COMPLETE CBC W/AUTO DIFF WBC: CPT

## 2020-03-24 PROCEDURE — 97110 THERAPEUTIC EXERCISES: CPT

## 2020-03-24 RX ORDER — BISACODYL 10 MG
10 SUPPOSITORY, RECTAL RECTAL ONCE
Status: COMPLETED | OUTPATIENT
Start: 2020-03-24 | End: 2020-03-24

## 2020-03-24 RX ORDER — METOPROLOL TARTRATE 5 MG/5ML
5 INJECTION INTRAVENOUS EVERY 6 HOURS
Status: DISCONTINUED | OUTPATIENT
Start: 2020-03-24 | End: 2020-03-30

## 2020-03-24 RX ORDER — SODIUM CHLORIDE, SODIUM LACTATE, POTASSIUM CHLORIDE, CALCIUM CHLORIDE 600; 310; 30; 20 MG/100ML; MG/100ML; MG/100ML; MG/100ML
INJECTION, SOLUTION INTRAVENOUS CONTINUOUS
Status: DISCONTINUED | OUTPATIENT
Start: 2020-03-24 | End: 2020-03-27

## 2020-03-24 RX ORDER — POTASSIUM CHLORIDE 20 MEQ/1
40 TABLET, EXTENDED RELEASE ORAL 2 TIMES DAILY WITH MEALS
Status: DISPENSED | OUTPATIENT
Start: 2020-03-24 | End: 2020-03-25

## 2020-03-24 RX ORDER — METOCLOPRAMIDE HYDROCHLORIDE 5 MG/ML
10 INJECTION INTRAMUSCULAR; INTRAVENOUS EVERY 6 HOURS
Status: DISCONTINUED | OUTPATIENT
Start: 2020-03-24 | End: 2020-03-30

## 2020-03-24 RX ORDER — MAGNESIUM SULFATE IN WATER 40 MG/ML
2 INJECTION, SOLUTION INTRAVENOUS ONCE
Status: COMPLETED | OUTPATIENT
Start: 2020-03-24 | End: 2020-03-24

## 2020-03-24 RX ORDER — PANTOPRAZOLE SODIUM 40 MG/10ML
40 INJECTION, POWDER, LYOPHILIZED, FOR SOLUTION INTRAVENOUS DAILY
Status: DISCONTINUED | OUTPATIENT
Start: 2020-03-24 | End: 2020-03-31 | Stop reason: ALTCHOICE

## 2020-03-24 RX ORDER — SODIUM CHLORIDE, SODIUM LACTATE, POTASSIUM CHLORIDE, AND CALCIUM CHLORIDE .6; .31; .03; .02 G/100ML; G/100ML; G/100ML; G/100ML
1000 INJECTION, SOLUTION INTRAVENOUS ONCE
Status: COMPLETED | OUTPATIENT
Start: 2020-03-24 | End: 2020-03-24

## 2020-03-24 RX ORDER — DEXTROSE, SODIUM CHLORIDE, AND POTASSIUM CHLORIDE 5; .45; .15 G/100ML; G/100ML; G/100ML
INJECTION INTRAVENOUS CONTINUOUS
Status: DISCONTINUED | OUTPATIENT
Start: 2020-03-24 | End: 2020-03-24

## 2020-03-24 RX ORDER — POTASSIUM CHLORIDE 7.45 MG/ML
10 INJECTION INTRAVENOUS
Status: COMPLETED | OUTPATIENT
Start: 2020-03-24 | End: 2020-03-24

## 2020-03-24 RX ADMIN — PANTOPRAZOLE SODIUM 40 MG: 40 INJECTION, POWDER, FOR SOLUTION INTRAVENOUS at 08:57

## 2020-03-24 RX ADMIN — HYDROMORPHONE HYDROCHLORIDE 0.5 MG: 1 INJECTION, SOLUTION INTRAMUSCULAR; INTRAVENOUS; SUBCUTANEOUS at 17:18

## 2020-03-24 RX ADMIN — ARFORMOTEROL TARTRATE 15 MCG: 15 SOLUTION RESPIRATORY (INHALATION) at 22:06

## 2020-03-24 RX ADMIN — TIOTROPIUM BROMIDE INHALATION SPRAY 2 PUFF: 3.12 SPRAY, METERED RESPIRATORY (INHALATION) at 08:49

## 2020-03-24 RX ADMIN — BUDESONIDE 500 MCG: 0.5 INHALANT RESPIRATORY (INHALATION) at 21:58

## 2020-03-24 RX ADMIN — BUDESONIDE 500 MCG: 0.5 INHALANT RESPIRATORY (INHALATION) at 08:48

## 2020-03-24 RX ADMIN — ONDANSETRON 4 MG: 2 INJECTION INTRAMUSCULAR; INTRAVENOUS at 15:55

## 2020-03-24 RX ADMIN — ALBUTEROL SULFATE 2.5 MG: 2.5 SOLUTION RESPIRATORY (INHALATION) at 08:48

## 2020-03-24 RX ADMIN — POTASSIUM CHLORIDE 10 MEQ: 10 INJECTION, SOLUTION INTRAVENOUS at 15:58

## 2020-03-24 RX ADMIN — MAGNESIUM SULFATE HEPTAHYDRATE 2 G: 40 INJECTION, SOLUTION INTRAVENOUS at 05:55

## 2020-03-24 RX ADMIN — METOPROLOL TARTRATE 5 MG: 5 INJECTION INTRAVENOUS at 19:23

## 2020-03-24 RX ADMIN — FOLIC ACID: 5 INJECTION, SOLUTION INTRAMUSCULAR; INTRAVENOUS; SUBCUTANEOUS at 09:26

## 2020-03-24 RX ADMIN — Medication 10 ML: at 21:52

## 2020-03-24 RX ADMIN — BISACODYL 10 MG: 10 SUPPOSITORY RECTAL at 09:12

## 2020-03-24 RX ADMIN — METOCLOPRAMIDE HYDROCHLORIDE 10 MG: 5 INJECTION INTRAMUSCULAR; INTRAVENOUS at 19:23

## 2020-03-24 RX ADMIN — ATENOLOL 50 MG: 50 TABLET ORAL at 09:10

## 2020-03-24 RX ADMIN — ENOXAPARIN SODIUM 40 MG: 40 INJECTION SUBCUTANEOUS at 08:58

## 2020-03-24 RX ADMIN — OXYCODONE 10 MG: 5 TABLET ORAL at 05:49

## 2020-03-24 RX ADMIN — SODIUM CHLORIDE 30 MG/ML INHALATION SOLUTION 15 ML: 30 SOLUTION INHALANT at 02:29

## 2020-03-24 RX ADMIN — POTASSIUM CHLORIDE 40 MEQ: 20 TABLET, EXTENDED RELEASE ORAL at 09:09

## 2020-03-24 RX ADMIN — HYDROXYCHLOROQUINE SULFATE 200 MG: 200 TABLET, FILM COATED ORAL at 09:10

## 2020-03-24 RX ADMIN — SODIUM CHLORIDE 30 MG/ML INHALATION SOLUTION 15 ML: 30 SOLUTION INHALANT at 21:57

## 2020-03-24 RX ADMIN — HYDROMORPHONE HYDROCHLORIDE 0.5 MG: 1 INJECTION, SOLUTION INTRAMUSCULAR; INTRAVENOUS; SUBCUTANEOUS at 14:01

## 2020-03-24 RX ADMIN — SODIUM CHLORIDE, SODIUM LACTATE, POTASSIUM CHLORIDE, AND CALCIUM CHLORIDE: .6; .31; .03; .02 INJECTION, SOLUTION INTRAVENOUS at 23:55

## 2020-03-24 RX ADMIN — POTASSIUM CHLORIDE 10 MEQ: 10 INJECTION, SOLUTION INTRAVENOUS at 14:03

## 2020-03-24 RX ADMIN — SODIUM CHLORIDE 30 MG/ML INHALATION SOLUTION 15 ML: 30 SOLUTION INHALANT at 08:49

## 2020-03-24 RX ADMIN — METOCLOPRAMIDE HYDROCHLORIDE 10 MG: 5 INJECTION INTRAMUSCULAR; INTRAVENOUS at 10:38

## 2020-03-24 RX ADMIN — ALBUTEROL SULFATE 2.5 MG: 2.5 SOLUTION RESPIRATORY (INHALATION) at 02:28

## 2020-03-24 RX ADMIN — HYDROMORPHONE HYDROCHLORIDE 0.5 MG: 1 INJECTION, SOLUTION INTRAMUSCULAR; INTRAVENOUS; SUBCUTANEOUS at 22:28

## 2020-03-24 RX ADMIN — HYDROMORPHONE HYDROCHLORIDE 0.25 MG: 1 INJECTION, SOLUTION INTRAMUSCULAR; INTRAVENOUS; SUBCUTANEOUS at 10:38

## 2020-03-24 RX ADMIN — ALBUTEROL SULFATE 2.5 MG: 2.5 SOLUTION RESPIRATORY (INHALATION) at 21:56

## 2020-03-24 RX ADMIN — ARFORMOTEROL TARTRATE 15 MCG: 15 SOLUTION RESPIRATORY (INHALATION) at 08:48

## 2020-03-24 RX ADMIN — Medication 10 ML: at 09:11

## 2020-03-24 RX ADMIN — DOCUSATE SODIUM 100 MG: 100 CAPSULE, LIQUID FILLED ORAL at 09:10

## 2020-03-24 RX ADMIN — POTASSIUM CHLORIDE 10 MEQ: 10 INJECTION, SOLUTION INTRAVENOUS at 08:57

## 2020-03-24 RX ADMIN — SODIUM CHLORIDE, POTASSIUM CHLORIDE, SODIUM LACTATE AND CALCIUM CHLORIDE 1000 ML: 600; 310; 30; 20 INJECTION, SOLUTION INTRAVENOUS at 21:47

## 2020-03-24 RX ADMIN — AMLODIPINE BESYLATE 7.5 MG: 5 TABLET ORAL at 09:10

## 2020-03-24 RX ADMIN — POTASSIUM CHLORIDE, DEXTROSE MONOHYDRATE AND SODIUM CHLORIDE: 150; 5; 450 INJECTION, SOLUTION INTRAVENOUS at 09:18

## 2020-03-24 RX ADMIN — POTASSIUM CHLORIDE 10 MEQ: 10 INJECTION, SOLUTION INTRAVENOUS at 12:16

## 2020-03-24 RX ADMIN — DIBASIC SODIUM PHOSPHATE, MONOBASIC POTASSIUM PHOSPHATE AND MONOBASIC SODIUM PHOSPHATE 2 TABLET: 852; 155; 130 TABLET ORAL at 09:11

## 2020-03-24 ASSESSMENT — PAIN DESCRIPTION - LOCATION
LOCATION: BACK;ABDOMEN
LOCATION: ABDOMEN

## 2020-03-24 ASSESSMENT — PAIN SCALES - GENERAL
PAINLEVEL_OUTOF10: 9
PAINLEVEL_OUTOF10: 0
PAINLEVEL_OUTOF10: 7
PAINLEVEL_OUTOF10: 9
PAINLEVEL_OUTOF10: 7
PAINLEVEL_OUTOF10: 8
PAINLEVEL_OUTOF10: 8
PAINLEVEL_OUTOF10: 7
PAINLEVEL_OUTOF10: 9
PAINLEVEL_OUTOF10: 9

## 2020-03-24 ASSESSMENT — PAIN - FUNCTIONAL ASSESSMENT
PAIN_FUNCTIONAL_ASSESSMENT: PREVENTS OR INTERFERES SOME ACTIVE ACTIVITIES AND ADLS
PAIN_FUNCTIONAL_ASSESSMENT: PREVENTS OR INTERFERES SOME ACTIVE ACTIVITIES AND ADLS

## 2020-03-24 ASSESSMENT — PAIN DESCRIPTION - FREQUENCY
FREQUENCY: CONTINUOUS

## 2020-03-24 ASSESSMENT — PAIN DESCRIPTION - PAIN TYPE
TYPE: ACUTE PAIN
TYPE: ACUTE PAIN
TYPE: ACUTE PAIN;SURGICAL PAIN
TYPE: SURGICAL PAIN

## 2020-03-24 ASSESSMENT — PAIN DESCRIPTION - DESCRIPTORS
DESCRIPTORS: ACHING

## 2020-03-24 ASSESSMENT — PAIN DESCRIPTION - PROGRESSION
CLINICAL_PROGRESSION: NOT CHANGED
CLINICAL_PROGRESSION: NOT CHANGED
CLINICAL_PROGRESSION: GRADUALLY WORSENING
CLINICAL_PROGRESSION: NOT CHANGED

## 2020-03-24 ASSESSMENT — PAIN DESCRIPTION - ORIENTATION
ORIENTATION: LOWER
ORIENTATION: LOWER;UPPER

## 2020-03-24 ASSESSMENT — PAIN DESCRIPTION - ONSET
ONSET: ON-GOING

## 2020-03-24 NOTE — PROGRESS NOTES
Patient alert and oriented x4 with intermittent confusion, easily reoriented, VSS. Patient had episodes of n/v this afternoon, Zofran given and MD notified. MD placed NG tube to suction, patient reports feeling mildly better. Patient's pain being managed with IV pain medications. Lap sites CDI, wound vac in place with minimal drainage. Patient ambulating to bathroom with assist x1 without issue. Patient had small bowel movement today. Patient up in chair, fall precautions in place, will continue to monitor.

## 2020-03-24 NOTE — PROGRESS NOTES
Occupational Therapy  Facility/Department: [facility] 5T ORTHO/NEURO  Daily Treatment Note  NAME: Leopoldo Cyphers  : 1960  MRN: [id number]    Date of Service: 3/24/2020    Discharge Recommendations:  Leopoldo Cyphers scored a 20/24 on the AM-PAC ADL Inpatient form. Current research shows that an AM-PAC score of 18 or greater is typically associated with a discharge to the patient's home setting. Based on the patients AM-PAC score and their current ADL deficits, it is recommended that the patient have 2-3 sessions per week of Occupational Therapy at d/c to increase the patients independence. If patient discharges prior to next session this note will serve as a discharge summary. Please see below for the latest assessment towards goals. OT Equipment Recommendations  Equipment Needed: No    Assessment   Performance deficits / Impairments: Decreased functional mobility ; Decreased ADL status; Decreased endurance  Assessment: Pt reported not feeling well today, and did not sleep well last night. He required CGA with functional mobility and transfers with rolling walker. He tolerated seated B UE AROM exerc. Recommend 24 hr A and home OT when discharged. Treatment Diagnosis: Impaired ADL and functional mobility  Prognosis: Good  OT Education: OT Role;Plan of Care  Patient Education: Pt verb understanding  REQUIRES OT FOLLOW UP: Yes  Safety Devices  Safety Devices in place: Yes  Type of devices: Left in chair;Call light within reach; Chair alarm in place;Nurse notified         Patient Diagnosis(es): The encounter diagnosis was Polyp of transverse colon, unspecified type.       has a past medical history of Allergic rhinitis, Anxiety, Back pain, CAD (coronary artery disease), CVA (cerebral vascular accident) ([address] DDD (degenerative disc disease), Depression, Fatty liver, Gout, Hepatitis C antibody positive in blood, History of blood transfusion, Hyperlipidemia, Hypertension, Lumbar radiculopathy, MI

## 2020-03-25 ENCOUNTER — APPOINTMENT (OUTPATIENT)
Dept: CT IMAGING | Age: 60
DRG: 231 | End: 2020-03-25
Attending: SURGERY
Payer: MEDICARE

## 2020-03-25 LAB
ALBUMIN SERPL-MCNC: 1.8 G/DL (ref 3.4–5)
ALBUMIN SERPL-MCNC: 1.9 G/DL (ref 3.4–5)
ALP BLD-CCNC: 64 U/L (ref 40–129)
ALT SERPL-CCNC: 24 U/L (ref 10–40)
ANION GAP SERPL CALCULATED.3IONS-SCNC: 14 MMOL/L (ref 3–16)
AST SERPL-CCNC: 26 U/L (ref 15–37)
BASOPHILS ABSOLUTE: 0 K/UL (ref 0–0.2)
BASOPHILS RELATIVE PERCENT: 0.2 %
BILIRUB SERPL-MCNC: 0.5 MG/DL (ref 0–1)
BILIRUBIN DIRECT: <0.2 MG/DL (ref 0–0.3)
BILIRUBIN URINE: ABNORMAL
BILIRUBIN, INDIRECT: ABNORMAL MG/DL (ref 0–1)
BLOOD, URINE: NEGATIVE
BUN BLDV-MCNC: 4 MG/DL (ref 7–20)
CALCIUM SERPL-MCNC: 7.7 MG/DL (ref 8.3–10.6)
CHLORIDE BLD-SCNC: 102 MMOL/L (ref 99–110)
CLARITY: CLEAR
CO2: 19 MMOL/L (ref 21–32)
COLOR: YELLOW
CREAT SERPL-MCNC: <0.5 MG/DL (ref 0.9–1.3)
EOSINOPHILS ABSOLUTE: 0 K/UL (ref 0–0.6)
EOSINOPHILS RELATIVE PERCENT: 0.5 %
GFR AFRICAN AMERICAN: >60
GFR NON-AFRICAN AMERICAN: >60
GLUCOSE BLD-MCNC: 107 MG/DL (ref 70–99)
GLUCOSE URINE: NEGATIVE MG/DL
HCT VFR BLD CALC: 33.1 % (ref 40.5–52.5)
HEMOGLOBIN: 11 G/DL (ref 13.5–17.5)
INR BLD: 1.04 (ref 0.86–1.14)
KETONES, URINE: NEGATIVE MG/DL
LACTIC ACID: 0.8 MMOL/L (ref 0.4–2)
LACTIC ACID: 5 MMOL/L (ref 0.4–2)
LEUKOCYTE ESTERASE, URINE: NEGATIVE
LIPASE: 72 U/L (ref 13–60)
LYMPHOCYTES ABSOLUTE: 0.9 K/UL (ref 1–5.1)
LYMPHOCYTES RELATIVE PERCENT: 10 %
MAGNESIUM: 1.4 MG/DL (ref 1.8–2.4)
MCH RBC QN AUTO: 33.1 PG (ref 26–34)
MCHC RBC AUTO-ENTMCNC: 33.3 G/DL (ref 31–36)
MCV RBC AUTO: 99.5 FL (ref 80–100)
MICROSCOPIC EXAMINATION: ABNORMAL
MONOCYTES ABSOLUTE: 0.8 K/UL (ref 0–1.3)
MONOCYTES RELATIVE PERCENT: 8.7 %
NEUTROPHILS ABSOLUTE: 7.2 K/UL (ref 1.7–7.7)
NEUTROPHILS RELATIVE PERCENT: 80.6 %
NITRITE, URINE: NEGATIVE
PDW BLD-RTO: 13.4 % (ref 12.4–15.4)
PH UA: 6 (ref 5–8)
PHOSPHORUS: 2.1 MG/DL (ref 2.5–4.9)
PLATELET # BLD: 236 K/UL (ref 135–450)
PMV BLD AUTO: 10.3 FL (ref 5–10.5)
POTASSIUM SERPL-SCNC: 3.6 MMOL/L (ref 3.5–5.1)
PROTEIN UA: NEGATIVE MG/DL
PROTHROMBIN TIME: 12.1 SEC (ref 10–13.2)
RBC # BLD: 3.33 M/UL (ref 4.2–5.9)
SODIUM BLD-SCNC: 135 MMOL/L (ref 136–145)
SPECIFIC GRAVITY UA: >=1.03 (ref 1–1.03)
TOTAL PROTEIN: 4.6 G/DL (ref 6.4–8.2)
URINE REFLEX TO CULTURE: ABNORMAL
URINE TYPE: ABNORMAL
UROBILINOGEN, URINE: 0.2 E.U./DL
WBC # BLD: 8.9 K/UL (ref 4–11)

## 2020-03-25 PROCEDURE — 2580000003 HC RX 258: Performed by: SURGERY

## 2020-03-25 PROCEDURE — C1751 CATH, INF, PER/CENT/MIDLINE: HCPCS

## 2020-03-25 PROCEDURE — C9113 INJ PANTOPRAZOLE SODIUM, VIA: HCPCS | Performed by: STUDENT IN AN ORGANIZED HEALTH CARE EDUCATION/TRAINING PROGRAM

## 2020-03-25 PROCEDURE — 2500000003 HC RX 250 WO HCPCS: Performed by: SURGERY

## 2020-03-25 PROCEDURE — 6360000002 HC RX W HCPCS: Performed by: STUDENT IN AN ORGANIZED HEALTH CARE EDUCATION/TRAINING PROGRAM

## 2020-03-25 PROCEDURE — 80069 RENAL FUNCTION PANEL: CPT

## 2020-03-25 PROCEDURE — 94761 N-INVAS EAR/PLS OXIMETRY MLT: CPT

## 2020-03-25 PROCEDURE — 36415 COLL VENOUS BLD VENIPUNCTURE: CPT

## 2020-03-25 PROCEDURE — 94640 AIRWAY INHALATION TREATMENT: CPT

## 2020-03-25 PROCEDURE — 83690 ASSAY OF LIPASE: CPT

## 2020-03-25 PROCEDURE — 85610 PROTHROMBIN TIME: CPT

## 2020-03-25 PROCEDURE — 80076 HEPATIC FUNCTION PANEL: CPT

## 2020-03-25 PROCEDURE — 6370000000 HC RX 637 (ALT 250 FOR IP): Performed by: SURGERY

## 2020-03-25 PROCEDURE — 85025 COMPLETE CBC W/AUTO DIFF WBC: CPT

## 2020-03-25 PROCEDURE — 83735 ASSAY OF MAGNESIUM: CPT

## 2020-03-25 PROCEDURE — 81003 URINALYSIS AUTO W/O SCOPE: CPT

## 2020-03-25 PROCEDURE — 83605 ASSAY OF LACTIC ACID: CPT

## 2020-03-25 PROCEDURE — 2580000003 HC RX 258: Performed by: PODIATRIST

## 2020-03-25 PROCEDURE — 6360000002 HC RX W HCPCS: Performed by: SURGERY

## 2020-03-25 PROCEDURE — 1200000000 HC SEMI PRIVATE

## 2020-03-25 PROCEDURE — 94669 MECHANICAL CHEST WALL OSCILL: CPT

## 2020-03-25 PROCEDURE — 02HV33Z INSERTION OF INFUSION DEVICE INTO SUPERIOR VENA CAVA, PERCUTANEOUS APPROACH: ICD-10-PCS | Performed by: SURGERY

## 2020-03-25 PROCEDURE — 36569 INSJ PICC 5 YR+ W/O IMAGING: CPT

## 2020-03-25 PROCEDURE — 94799 UNLISTED PULMONARY SVC/PX: CPT

## 2020-03-25 PROCEDURE — 74177 CT ABD & PELVIS W/CONTRAST: CPT

## 2020-03-25 PROCEDURE — 87040 BLOOD CULTURE FOR BACTERIA: CPT

## 2020-03-25 PROCEDURE — 2500000003 HC RX 250 WO HCPCS: Performed by: STUDENT IN AN ORGANIZED HEALTH CARE EDUCATION/TRAINING PROGRAM

## 2020-03-25 PROCEDURE — 2580000003 HC RX 258: Performed by: STUDENT IN AN ORGANIZED HEALTH CARE EDUCATION/TRAINING PROGRAM

## 2020-03-25 PROCEDURE — 6360000004 HC RX CONTRAST MEDICATION: Performed by: SURGERY

## 2020-03-25 RX ORDER — SODIUM CHLORIDE, SODIUM LACTATE, POTASSIUM CHLORIDE, AND CALCIUM CHLORIDE .6; .31; .03; .02 G/100ML; G/100ML; G/100ML; G/100ML
1000 INJECTION, SOLUTION INTRAVENOUS ONCE
Status: COMPLETED | OUTPATIENT
Start: 2020-03-25 | End: 2020-03-25

## 2020-03-25 RX ORDER — SODIUM CHLORIDE 0.9 % (FLUSH) 0.9 %
10 SYRINGE (ML) INJECTION PRN
Status: DISCONTINUED | OUTPATIENT
Start: 2020-03-25 | End: 2020-04-01 | Stop reason: HOSPADM

## 2020-03-25 RX ORDER — MAGNESIUM SULFATE IN WATER 40 MG/ML
4 INJECTION, SOLUTION INTRAVENOUS ONCE
Status: COMPLETED | OUTPATIENT
Start: 2020-03-25 | End: 2020-03-25

## 2020-03-25 RX ORDER — LIDOCAINE HYDROCHLORIDE 10 MG/ML
5 INJECTION, SOLUTION EPIDURAL; INFILTRATION; INTRACAUDAL; PERINEURAL ONCE
Status: COMPLETED | OUTPATIENT
Start: 2020-03-25 | End: 2020-03-25

## 2020-03-25 RX ORDER — ALBUTEROL SULFATE 2.5 MG/3ML
2.5 SOLUTION RESPIRATORY (INHALATION)
Status: DISCONTINUED | OUTPATIENT
Start: 2020-03-25 | End: 2020-03-31

## 2020-03-25 RX ORDER — SODIUM CHLORIDE 0.9 % (FLUSH) 0.9 %
10 SYRINGE (ML) INJECTION EVERY 12 HOURS SCHEDULED
Status: DISCONTINUED | OUTPATIENT
Start: 2020-03-25 | End: 2020-04-01 | Stop reason: HOSPADM

## 2020-03-25 RX ORDER — SODIUM CHLORIDE FOR INHALATION 3 %
15 VIAL, NEBULIZER (ML) INHALATION 3 TIMES DAILY
Status: DISCONTINUED | OUTPATIENT
Start: 2020-03-25 | End: 2020-04-01 | Stop reason: HOSPADM

## 2020-03-25 RX ADMIN — HYDROMORPHONE HYDROCHLORIDE 0.5 MG: 1 INJECTION, SOLUTION INTRAMUSCULAR; INTRAVENOUS; SUBCUTANEOUS at 16:06

## 2020-03-25 RX ADMIN — Medication 10 ML: at 10:19

## 2020-03-25 RX ADMIN — METOPROLOL TARTRATE 5 MG: 5 INJECTION INTRAVENOUS at 23:48

## 2020-03-25 RX ADMIN — METOCLOPRAMIDE HYDROCHLORIDE 10 MG: 5 INJECTION INTRAMUSCULAR; INTRAVENOUS at 12:35

## 2020-03-25 RX ADMIN — ARFORMOTEROL TARTRATE 15 MCG: 15 SOLUTION RESPIRATORY (INHALATION) at 20:06

## 2020-03-25 RX ADMIN — Medication 10 ML: at 20:11

## 2020-03-25 RX ADMIN — METOCLOPRAMIDE HYDROCHLORIDE 10 MG: 5 INJECTION INTRAMUSCULAR; INTRAVENOUS at 23:48

## 2020-03-25 RX ADMIN — ALBUTEROL SULFATE 2.5 MG: 2.5 SOLUTION RESPIRATORY (INHALATION) at 07:50

## 2020-03-25 RX ADMIN — METOCLOPRAMIDE HYDROCHLORIDE 10 MG: 5 INJECTION INTRAMUSCULAR; INTRAVENOUS at 18:13

## 2020-03-25 RX ADMIN — METOCLOPRAMIDE HYDROCHLORIDE 10 MG: 5 INJECTION INTRAMUSCULAR; INTRAVENOUS at 06:05

## 2020-03-25 RX ADMIN — METOPROLOL TARTRATE 5 MG: 5 INJECTION INTRAVENOUS at 00:59

## 2020-03-25 RX ADMIN — HYDROMORPHONE HYDROCHLORIDE 0.5 MG: 1 INJECTION, SOLUTION INTRAMUSCULAR; INTRAVENOUS; SUBCUTANEOUS at 08:43

## 2020-03-25 RX ADMIN — ALBUTEROL SULFATE 2.5 MG: 2.5 SOLUTION RESPIRATORY (INHALATION) at 20:06

## 2020-03-25 RX ADMIN — SODIUM CHLORIDE 30 MG/ML INHALATION SOLUTION 15 ML: 30 SOLUTION INHALANT at 20:06

## 2020-03-25 RX ADMIN — HYDROMORPHONE HYDROCHLORIDE 0.5 MG: 1 INJECTION, SOLUTION INTRAMUSCULAR; INTRAVENOUS; SUBCUTANEOUS at 04:49

## 2020-03-25 RX ADMIN — SODIUM CHLORIDE 30 MG/ML INHALATION SOLUTION 15 ML: 30 SOLUTION INHALANT at 07:50

## 2020-03-25 RX ADMIN — CALCIUM GLUCONATE: 94 INJECTION, SOLUTION INTRAVENOUS at 16:02

## 2020-03-25 RX ADMIN — IOPAMIDOL 80 ML: 755 INJECTION, SOLUTION INTRAVENOUS at 09:24

## 2020-03-25 RX ADMIN — TIOTROPIUM BROMIDE INHALATION SPRAY 2 PUFF: 3.12 SPRAY, METERED RESPIRATORY (INHALATION) at 07:52

## 2020-03-25 RX ADMIN — HYDROMORPHONE HYDROCHLORIDE 0.5 MG: 1 INJECTION, SOLUTION INTRAMUSCULAR; INTRAVENOUS; SUBCUTANEOUS at 20:11

## 2020-03-25 RX ADMIN — LIDOCAINE HYDROCHLORIDE 5 ML: 10 INJECTION, SOLUTION EPIDURAL; INFILTRATION; INTRACAUDAL; PERINEURAL at 12:38

## 2020-03-25 RX ADMIN — METOPROLOL TARTRATE 5 MG: 5 INJECTION INTRAVENOUS at 18:14

## 2020-03-25 RX ADMIN — PANTOPRAZOLE SODIUM 40 MG: 40 INJECTION, POWDER, FOR SOLUTION INTRAVENOUS at 10:18

## 2020-03-25 RX ADMIN — HYDROMORPHONE HYDROCHLORIDE 0.5 MG: 1 INJECTION, SOLUTION INTRAMUSCULAR; INTRAVENOUS; SUBCUTANEOUS at 23:48

## 2020-03-25 RX ADMIN — MAGNESIUM SULFATE IN WATER 4 G: 40 INJECTION, SOLUTION INTRAVENOUS at 07:03

## 2020-03-25 RX ADMIN — ALBUTEROL SULFATE 2.5 MG: 2.5 SOLUTION RESPIRATORY (INHALATION) at 15:14

## 2020-03-25 RX ADMIN — HYDROMORPHONE HYDROCHLORIDE 0.5 MG: 1 INJECTION, SOLUTION INTRAMUSCULAR; INTRAVENOUS; SUBCUTANEOUS at 12:46

## 2020-03-25 RX ADMIN — IOHEXOL 50 ML: 240 INJECTION, SOLUTION INTRATHECAL; INTRAVASCULAR; INTRAVENOUS; ORAL at 09:24

## 2020-03-25 RX ADMIN — ALBUTEROL SULFATE 2.5 MG: 2.5 SOLUTION RESPIRATORY (INHALATION) at 04:24

## 2020-03-25 RX ADMIN — SODIUM CHLORIDE, POTASSIUM CHLORIDE, SODIUM LACTATE AND CALCIUM CHLORIDE 1000 ML: 600; 310; 30; 20 INJECTION, SOLUTION INTRAVENOUS at 12:34

## 2020-03-25 RX ADMIN — HYDROMORPHONE HYDROCHLORIDE 0.5 MG: 1 INJECTION, SOLUTION INTRAMUSCULAR; INTRAVENOUS; SUBCUTANEOUS at 01:30

## 2020-03-25 RX ADMIN — POTASSIUM PHOSPHATE, MONOBASIC AND POTASSIUM PHOSPHATE, DIBASIC 30 MMOL: 224; 236 INJECTION, SOLUTION INTRAVENOUS at 07:03

## 2020-03-25 RX ADMIN — BUDESONIDE 500 MCG: 0.5 INHALANT RESPIRATORY (INHALATION) at 07:50

## 2020-03-25 RX ADMIN — METOCLOPRAMIDE HYDROCHLORIDE 10 MG: 5 INJECTION INTRAMUSCULAR; INTRAVENOUS at 00:59

## 2020-03-25 RX ADMIN — METOPROLOL TARTRATE 5 MG: 5 INJECTION INTRAVENOUS at 06:06

## 2020-03-25 RX ADMIN — SODIUM CHLORIDE 30 MG/ML INHALATION SOLUTION 15 ML: 30 SOLUTION INHALANT at 04:25

## 2020-03-25 RX ADMIN — BUDESONIDE 500 MCG: 0.5 INHALANT RESPIRATORY (INHALATION) at 20:06

## 2020-03-25 RX ADMIN — ARFORMOTEROL TARTRATE 15 MCG: 15 SOLUTION RESPIRATORY (INHALATION) at 07:50

## 2020-03-25 RX ADMIN — METOPROLOL TARTRATE 5 MG: 5 INJECTION INTRAVENOUS at 12:36

## 2020-03-25 RX ADMIN — SODIUM CHLORIDE 30 MG/ML INHALATION SOLUTION 15 ML: 30 SOLUTION INHALANT at 15:14

## 2020-03-25 RX ADMIN — SODIUM CHLORIDE, POTASSIUM CHLORIDE, SODIUM LACTATE AND CALCIUM CHLORIDE 1000 ML: 600; 310; 30; 20 INJECTION, SOLUTION INTRAVENOUS at 01:31

## 2020-03-25 RX ADMIN — ENOXAPARIN SODIUM 40 MG: 40 INJECTION SUBCUTANEOUS at 10:23

## 2020-03-25 ASSESSMENT — PAIN SCALES - GENERAL
PAINLEVEL_OUTOF10: 9
PAINLEVEL_OUTOF10: 0
PAINLEVEL_OUTOF10: 9
PAINLEVEL_OUTOF10: 5
PAINLEVEL_OUTOF10: 9
PAINLEVEL_OUTOF10: 5
PAINLEVEL_OUTOF10: 8
PAINLEVEL_OUTOF10: 5
PAINLEVEL_OUTOF10: 9
PAINLEVEL_OUTOF10: 5
PAINLEVEL_OUTOF10: 5

## 2020-03-25 ASSESSMENT — PAIN DESCRIPTION - PAIN TYPE
TYPE: SURGICAL PAIN

## 2020-03-25 ASSESSMENT — PAIN DESCRIPTION - LOCATION
LOCATION: ABDOMEN

## 2020-03-25 ASSESSMENT — PAIN DESCRIPTION - ONSET
ONSET: ON-GOING

## 2020-03-25 ASSESSMENT — PAIN DESCRIPTION - PROGRESSION
CLINICAL_PROGRESSION: NOT CHANGED
CLINICAL_PROGRESSION: NOT CHANGED
CLINICAL_PROGRESSION: GRADUALLY WORSENING
CLINICAL_PROGRESSION: NOT CHANGED
CLINICAL_PROGRESSION: GRADUALLY WORSENING

## 2020-03-25 ASSESSMENT — PAIN DESCRIPTION - DESCRIPTORS
DESCRIPTORS: ACHING

## 2020-03-25 ASSESSMENT — PAIN - FUNCTIONAL ASSESSMENT
PAIN_FUNCTIONAL_ASSESSMENT: PREVENTS OR INTERFERES SOME ACTIVE ACTIVITIES AND ADLS

## 2020-03-25 ASSESSMENT — PAIN DESCRIPTION - FREQUENCY
FREQUENCY: CONTINUOUS

## 2020-03-25 ASSESSMENT — PAIN DESCRIPTION - ORIENTATION
ORIENTATION: LOWER

## 2020-03-25 NOTE — PLAN OF CARE
Problem: Pain:  Goal: Control of acute pain  Description: Control of acute pain  Outcome: Ongoing     Problem: Falls - Risk of:  Goal: Will remain free from falls  Description: Patient in bed with alarm and nonskid socks on, call light, belongings, and bedside table within reach. Patient educated on using call light and instructed to call out for assistance when getting out of bed. Patient calls out approprietly and remains free of falls.      Outcome: Ongoing
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
## 2020-03-25 NOTE — PROGRESS NOTES
Occupational Therapy  No Treatment    Reviewed Chart. RN consulted. Attempted to see pt twice today. First time pt off floor for CT scan. Second time pt declining treatment secondary to pain and fatigue. Will attempt to see pt later this date as is appropriate.      [address] [place] [address], Ne JITENDRA/ABRAHAM

## 2020-03-25 NOTE — PROGRESS NOTES
Inserted garrido catheter per order. No complications, urine return noted. Will continue to monitor.

## 2020-03-25 NOTE — PLAN OF CARE
Problem: Pain:  Goal: Pain level will decrease  Description: Pain level will decrease  3/24/2020 2016 by Marya Travis RN  Outcome: Ongoing   Patient complains of pain, patient medicated per mar, will continue to monitor     Problem: Falls - Risk of:  Goal: Will remain free from falls  Description: Patient in chair with alarm and nonskid socks on, call light, belongings, and bedside table within reach. Patient educated on using call light and instructed to call out for assistance when getting up, patient verbalized understanding. Patient calls out approprietly and remains free of falls. 3/24/2020 2016 by Marya Travis RN  Outcome: Ongoing   Patient is at risk for falls. Patient is in bed with bed alarm on, fall risk ID, Nonskid socks, Bed in lowest position. Call light and bedside table are within reach. Patient calls out approprietly. Will continue to monitor.

## 2020-03-26 LAB
ALBUMIN SERPL-MCNC: 2.1 G/DL (ref 3.4–5)
ANION GAP SERPL CALCULATED.3IONS-SCNC: 10 MMOL/L (ref 3–16)
BASOPHILS ABSOLUTE: 0.1 K/UL (ref 0–0.2)
BASOPHILS RELATIVE PERCENT: 0.7 %
BUN BLDV-MCNC: 4 MG/DL (ref 7–20)
C-REACTIVE PROTEIN: 45.9 MG/L (ref 0–5.1)
CALCIUM SERPL-MCNC: 7.7 MG/DL (ref 8.3–10.6)
CHLORIDE BLD-SCNC: 102 MMOL/L (ref 99–110)
CO2: 24 MMOL/L (ref 21–32)
CREAT SERPL-MCNC: <0.5 MG/DL (ref 0.9–1.3)
EOSINOPHILS ABSOLUTE: 0 K/UL (ref 0–0.6)
EOSINOPHILS RELATIVE PERCENT: 0.4 %
GFR AFRICAN AMERICAN: >60
GFR NON-AFRICAN AMERICAN: >60
GLUCOSE BLD-MCNC: 134 MG/DL (ref 70–99)
HCT VFR BLD CALC: 32.2 % (ref 40.5–52.5)
HEMOGLOBIN: 10.9 G/DL (ref 13.5–17.5)
INR BLD: 1.06 (ref 0.86–1.14)
LYMPHOCYTES ABSOLUTE: 1.2 K/UL (ref 1–5.1)
LYMPHOCYTES RELATIVE PERCENT: 10.7 %
MAGNESIUM: 1.7 MG/DL (ref 1.8–2.4)
MCH RBC QN AUTO: 34.3 PG (ref 26–34)
MCHC RBC AUTO-ENTMCNC: 33.9 G/DL (ref 31–36)
MCV RBC AUTO: 101.2 FL (ref 80–100)
MONOCYTES ABSOLUTE: 0.7 K/UL (ref 0–1.3)
MONOCYTES RELATIVE PERCENT: 6.5 %
NEUTROPHILS ABSOLUTE: 9.3 K/UL (ref 1.7–7.7)
NEUTROPHILS RELATIVE PERCENT: 81.7 %
PDW BLD-RTO: 14.1 % (ref 12.4–15.4)
PHOSPHORUS: 2.8 MG/DL (ref 2.5–4.9)
PLATELET # BLD: 255 K/UL (ref 135–450)
PMV BLD AUTO: 11.1 FL (ref 5–10.5)
POTASSIUM SERPL-SCNC: 3.6 MMOL/L (ref 3.5–5.1)
PREALBUMIN: 7 MG/DL (ref 20–40)
PROTHROMBIN TIME: 12.3 SEC (ref 10–13.2)
RBC # BLD: 3.19 M/UL (ref 4.2–5.9)
REASON FOR REJECTION: NORMAL
REJECTED TEST: NORMAL
SODIUM BLD-SCNC: 136 MMOL/L (ref 136–145)
TRANSFERRIN: 105 MG/DL (ref 200–360)
TRIGL SERPL-MCNC: 108 MG/DL (ref 0–150)
WBC # BLD: 11.4 K/UL (ref 4–11)

## 2020-03-26 PROCEDURE — 85025 COMPLETE CBC W/AUTO DIFF WBC: CPT

## 2020-03-26 PROCEDURE — 97530 THERAPEUTIC ACTIVITIES: CPT

## 2020-03-26 PROCEDURE — 2580000003 HC RX 258: Performed by: SURGERY

## 2020-03-26 PROCEDURE — 94640 AIRWAY INHALATION TREATMENT: CPT

## 2020-03-26 PROCEDURE — C9113 INJ PANTOPRAZOLE SODIUM, VIA: HCPCS | Performed by: STUDENT IN AN ORGANIZED HEALTH CARE EDUCATION/TRAINING PROGRAM

## 2020-03-26 PROCEDURE — 1200000000 HC SEMI PRIVATE

## 2020-03-26 PROCEDURE — 6360000002 HC RX W HCPCS: Performed by: STUDENT IN AN ORGANIZED HEALTH CARE EDUCATION/TRAINING PROGRAM

## 2020-03-26 PROCEDURE — 83735 ASSAY OF MAGNESIUM: CPT

## 2020-03-26 PROCEDURE — 94669 MECHANICAL CHEST WALL OSCILL: CPT

## 2020-03-26 PROCEDURE — 84478 ASSAY OF TRIGLYCERIDES: CPT

## 2020-03-26 PROCEDURE — 6370000000 HC RX 637 (ALT 250 FOR IP): Performed by: SURGERY

## 2020-03-26 PROCEDURE — 6360000002 HC RX W HCPCS: Performed by: SURGERY

## 2020-03-26 PROCEDURE — 36415 COLL VENOUS BLD VENIPUNCTURE: CPT

## 2020-03-26 PROCEDURE — 2580000003 HC RX 258: Performed by: STUDENT IN AN ORGANIZED HEALTH CARE EDUCATION/TRAINING PROGRAM

## 2020-03-26 PROCEDURE — 84466 ASSAY OF TRANSFERRIN: CPT

## 2020-03-26 PROCEDURE — 97116 GAIT TRAINING THERAPY: CPT

## 2020-03-26 PROCEDURE — 85610 PROTHROMBIN TIME: CPT

## 2020-03-26 PROCEDURE — 2500000003 HC RX 250 WO HCPCS: Performed by: STUDENT IN AN ORGANIZED HEALTH CARE EDUCATION/TRAINING PROGRAM

## 2020-03-26 PROCEDURE — 86140 C-REACTIVE PROTEIN: CPT

## 2020-03-26 PROCEDURE — 84134 ASSAY OF PREALBUMIN: CPT

## 2020-03-26 PROCEDURE — 80069 RENAL FUNCTION PANEL: CPT

## 2020-03-26 PROCEDURE — 2500000003 HC RX 250 WO HCPCS: Performed by: SURGERY

## 2020-03-26 RX ORDER — POTASSIUM CHLORIDE 7.45 MG/ML
10 INJECTION INTRAVENOUS
Status: COMPLETED | OUTPATIENT
Start: 2020-03-26 | End: 2020-03-26

## 2020-03-26 RX ORDER — MAGNESIUM SULFATE IN WATER 40 MG/ML
2 INJECTION, SOLUTION INTRAVENOUS ONCE
Status: COMPLETED | OUTPATIENT
Start: 2020-03-26 | End: 2020-03-26

## 2020-03-26 RX ADMIN — METOCLOPRAMIDE HYDROCHLORIDE 10 MG: 5 INJECTION INTRAMUSCULAR; INTRAVENOUS at 05:58

## 2020-03-26 RX ADMIN — Medication 10 ML: at 22:27

## 2020-03-26 RX ADMIN — METOPROLOL TARTRATE 5 MG: 5 INJECTION INTRAVENOUS at 12:24

## 2020-03-26 RX ADMIN — HYDROMORPHONE HYDROCHLORIDE 0.5 MG: 1 INJECTION, SOLUTION INTRAMUSCULAR; INTRAVENOUS; SUBCUTANEOUS at 14:43

## 2020-03-26 RX ADMIN — MAGNESIUM SULFATE HEPTAHYDRATE 2 G: 40 INJECTION, SOLUTION INTRAVENOUS at 08:07

## 2020-03-26 RX ADMIN — METOPROLOL TARTRATE 5 MG: 5 INJECTION INTRAVENOUS at 18:30

## 2020-03-26 RX ADMIN — SODIUM CHLORIDE 30 MG/ML INHALATION SOLUTION 15 ML: 30 SOLUTION INHALANT at 07:37

## 2020-03-26 RX ADMIN — HYDROMORPHONE HYDROCHLORIDE 0.5 MG: 1 INJECTION, SOLUTION INTRAMUSCULAR; INTRAVENOUS; SUBCUTANEOUS at 05:58

## 2020-03-26 RX ADMIN — HYDROMORPHONE HYDROCHLORIDE 0.5 MG: 1 INJECTION, SOLUTION INTRAMUSCULAR; INTRAVENOUS; SUBCUTANEOUS at 02:48

## 2020-03-26 RX ADMIN — HYDROMORPHONE HYDROCHLORIDE 0.5 MG: 1 INJECTION, SOLUTION INTRAMUSCULAR; INTRAVENOUS; SUBCUTANEOUS at 10:32

## 2020-03-26 RX ADMIN — PANTOPRAZOLE SODIUM 40 MG: 40 INJECTION, POWDER, FOR SOLUTION INTRAVENOUS at 08:04

## 2020-03-26 RX ADMIN — SODIUM CHLORIDE, SODIUM LACTATE, POTASSIUM CHLORIDE, AND CALCIUM CHLORIDE: .6; .31; .03; .02 INJECTION, SOLUTION INTRAVENOUS at 16:17

## 2020-03-26 RX ADMIN — METOCLOPRAMIDE HYDROCHLORIDE 10 MG: 5 INJECTION INTRAMUSCULAR; INTRAVENOUS at 12:24

## 2020-03-26 RX ADMIN — ALBUTEROL SULFATE 2.5 MG: 2.5 SOLUTION RESPIRATORY (INHALATION) at 21:02

## 2020-03-26 RX ADMIN — METOCLOPRAMIDE HYDROCHLORIDE 10 MG: 5 INJECTION INTRAMUSCULAR; INTRAVENOUS at 22:27

## 2020-03-26 RX ADMIN — HYDROMORPHONE HYDROCHLORIDE 0.5 MG: 1 INJECTION, SOLUTION INTRAMUSCULAR; INTRAVENOUS; SUBCUTANEOUS at 18:31

## 2020-03-26 RX ADMIN — METOCLOPRAMIDE HYDROCHLORIDE 10 MG: 5 INJECTION INTRAMUSCULAR; INTRAVENOUS at 17:39

## 2020-03-26 RX ADMIN — ALBUTEROL SULFATE 2.5 MG: 2.5 SOLUTION RESPIRATORY (INHALATION) at 07:37

## 2020-03-26 RX ADMIN — ENOXAPARIN SODIUM 40 MG: 40 INJECTION SUBCUTANEOUS at 08:04

## 2020-03-26 RX ADMIN — TIOTROPIUM BROMIDE INHALATION SPRAY 2 PUFF: 3.12 SPRAY, METERED RESPIRATORY (INHALATION) at 07:38

## 2020-03-26 RX ADMIN — POTASSIUM PHOSPHATE, MONOBASIC AND POTASSIUM PHOSPHATE, DIBASIC 10 MMOL: 224; 236 INJECTION, SOLUTION, CONCENTRATE INTRAVENOUS at 11:39

## 2020-03-26 RX ADMIN — ARFORMOTEROL TARTRATE 15 MCG: 15 SOLUTION RESPIRATORY (INHALATION) at 21:02

## 2020-03-26 RX ADMIN — SODIUM CHLORIDE 30 MG/ML INHALATION SOLUTION 15 ML: 30 SOLUTION INHALANT at 13:10

## 2020-03-26 RX ADMIN — CALCIUM GLUCONATE: 94 INJECTION, SOLUTION INTRAVENOUS at 16:14

## 2020-03-26 RX ADMIN — SODIUM CHLORIDE 30 MG/ML INHALATION SOLUTION 15 ML: 30 SOLUTION INHALANT at 21:02

## 2020-03-26 RX ADMIN — BUDESONIDE 500 MCG: 0.5 INHALANT RESPIRATORY (INHALATION) at 07:37

## 2020-03-26 RX ADMIN — HYDROMORPHONE HYDROCHLORIDE 0.5 MG: 1 INJECTION, SOLUTION INTRAMUSCULAR; INTRAVENOUS; SUBCUTANEOUS at 22:27

## 2020-03-26 RX ADMIN — POTASSIUM CHLORIDE 10 MEQ: 10 INJECTION, SOLUTION INTRAVENOUS at 09:32

## 2020-03-26 RX ADMIN — ALBUTEROL SULFATE 2.5 MG: 2.5 SOLUTION RESPIRATORY (INHALATION) at 13:10

## 2020-03-26 RX ADMIN — Medication 10 ML: at 08:11

## 2020-03-26 RX ADMIN — METOPROLOL TARTRATE 5 MG: 5 INJECTION INTRAVENOUS at 05:58

## 2020-03-26 RX ADMIN — ARFORMOTEROL TARTRATE 15 MCG: 15 SOLUTION RESPIRATORY (INHALATION) at 07:37

## 2020-03-26 RX ADMIN — POTASSIUM CHLORIDE 10 MEQ: 10 INJECTION, SOLUTION INTRAVENOUS at 10:35

## 2020-03-26 RX ADMIN — POTASSIUM CHLORIDE 10 MEQ: 10 INJECTION, SOLUTION INTRAVENOUS at 08:05

## 2020-03-26 RX ADMIN — BUDESONIDE 500 MCG: 0.5 INHALANT RESPIRATORY (INHALATION) at 21:02

## 2020-03-26 RX ADMIN — Medication 10 ML: at 08:12

## 2020-03-26 ASSESSMENT — PAIN SCALES - GENERAL
PAINLEVEL_OUTOF10: 4
PAINLEVEL_OUTOF10: 5
PAINLEVEL_OUTOF10: 0
PAINLEVEL_OUTOF10: 9
PAINLEVEL_OUTOF10: 9
PAINLEVEL_OUTOF10: 10
PAINLEVEL_OUTOF10: 5
PAINLEVEL_OUTOF10: 9
PAINLEVEL_OUTOF10: 7
PAINLEVEL_OUTOF10: 9

## 2020-03-26 ASSESSMENT — PAIN DESCRIPTION - ORIENTATION
ORIENTATION: LOWER

## 2020-03-26 ASSESSMENT — PAIN DESCRIPTION - PROGRESSION
CLINICAL_PROGRESSION: NOT CHANGED
CLINICAL_PROGRESSION: GRADUALLY WORSENING

## 2020-03-26 ASSESSMENT — PAIN DESCRIPTION - PAIN TYPE
TYPE: SURGICAL PAIN
TYPE: CHRONIC PAIN
TYPE: SURGICAL PAIN
TYPE: SURGICAL PAIN

## 2020-03-26 ASSESSMENT — PAIN DESCRIPTION - LOCATION
LOCATION: ABDOMEN
LOCATION: BACK
LOCATION: ABDOMEN

## 2020-03-26 ASSESSMENT — PAIN DESCRIPTION - ONSET
ONSET: ON-GOING

## 2020-03-26 ASSESSMENT — PAIN DESCRIPTION - DESCRIPTORS
DESCRIPTORS: ACHING
DESCRIPTORS: SHARP
DESCRIPTORS: ACHING
DESCRIPTORS: ACHING

## 2020-03-26 ASSESSMENT — PAIN DESCRIPTION - FREQUENCY
FREQUENCY: CONTINUOUS

## 2020-03-26 NOTE — PROGRESS NOTES
Patient A&Ox4, intermittently confused but easily reoriented. VSS. Surgical incision clean, dry, and intact. Wound vac in place with small amount of serosanguinous drainage. NG to continuous wall suction with moderate output. Cerrato in place for fluid management. Patient ambulates in room and hallways with walker and gait belt, tolerates well, up to the chair for several hours. Pain managed with IV dilaudid. Patient instructed to call out for needs. Will continue to monitor.

## 2020-03-26 NOTE — PROGRESS NOTES
in ADL/functional mobility- 3/26 not met  Short term goal 3: Lower body dressing with SBA- 3/26 not addressed  Patient Goals   Patient goals : Go home. Be able to do stuff.        Therapy Time   Individual Concurrent Group Co-treatment   Time In 2506         Time Out 1425         Minutes 20         Timed Code Treatment Minutes: 20 Minutes       Link Portal, OT

## 2020-03-26 NOTE — PROGRESS NOTES
disease), Depression, Fatty liver, Gout, Hepatitis C antibody positive in blood, History of blood transfusion, Hyperlipidemia, Hypertension, Lumbar radiculopathy, MI (myocardial infarction) ([facility] [address] Polyp of colon, PVD (peripheral vascular disease) ([facility] [address] Rheumatoid arthritis(714.0), and Tobacco abuse disorder. has a past surgical history that includes Appendectomy (1998); Carpal tunnel release (Bilateral, 2000); Patella surgery (Right, 1978); Coronary artery bypass graft (04/22/2016); Vein Surgery (Left, 04/22/2016); Colonoscopy (N/A, 10/3/2018); femoral bypass (Left, 1/23/2019); incision and drainage (Left, 2/21/2019); Cardiac surgery; Small intestine surgery (N/A, 3/13/2020); and hemicolectomy (N/A, 3/18/2020). Restrictions  Position Activity Restriction  Other position/activity restrictions: ambulate pt, seizure precaution     Subjective   General  Additional Pertinent Hx: 60 y/o M with hx of colon cancer presents  3/13 s/p lap transverse colectomy, primary stapled anastomosis; OR 3/18 for ex lap/abd washout/hernia repair/wound vac. .   Family / Caregiver Present: No  Subjective  Subjective: Pt found reclined in chair and agreeable to PT. \"I think I'm mixed up most of the time.  \"  Pain Screening  Patient Currently in Pain: Yes(c/o abdominal pain 7/10, RN aware)         Orientation  Orientation  Overall Orientation Status: Impaired(oriented to self, \"hospital\")       Objective      Transfers  Sit to Stand: Contact guard assistance(with vc for hand placement)  Stand to sit: Contact guard assistance(with vc for hand placement/safety)     Ambulation 1  Device: Rolling Walker  Other Apparatus: (wound vac/NGT/IV in tow)  Assistance: Contact guard assistance;Minimal assistance(min assist with vc for turns)  Quality of Gait: slow sanford with slightly flexed posture; min LOB to R on turns; difficulty staying on task and RUSHING  Distance: 50 ft        Exercises  Comments: seated B LE x10: LAQ, marches with max vc to stay

## 2020-03-26 NOTE — PLAN OF CARE
Problem: Pain:  Goal: Pain level will decrease  Description: Pain level will decrease  Outcome: Ongoing   Patient complains of pain, patient medicated per mar. Will continue to monitor. Problem: Falls - Risk of:  Goal: Will remain free from falls  Description: Patient in bed with alarm and nonskid socks on, call light, belongings, and bedside table within reach. Patient educated on using call light and instructed to call out for assistance when getting out of bed. Patient calls out approprietly and remains free of falls. 3/25/2020 2047 by Arielle Posadas RN  Outcome: Ongoing   Patient is at risk for falls. Patient is in bed with bed alarm on, fall risk ID, Nonskid socks, Bed in lowest position. Call light and bedside table are within reach. Patient calls out approprietly. Will continue to monitor.

## 2020-03-26 NOTE — PROGRESS NOTES
Patient is AAOx3-4. At times patient has confusion. VSS. Patient has wound vac in place and is draining adequately. Patient has garrido in place, draining adequately. Fluids running and TPN through PICC line. Patient has NG to continuous  Suction with a moderate amount of output. Patient is getting up x1 SBA. Patient is resting in bed at this time with fall precautions in place. Will continue to monitor.

## 2020-03-27 LAB
ALBUMIN SERPL-MCNC: 2.5 G/DL (ref 3.4–5)
ANION GAP SERPL CALCULATED.3IONS-SCNC: 12 MMOL/L (ref 3–16)
BASOPHILS ABSOLUTE: 0 K/UL (ref 0–0.2)
BASOPHILS RELATIVE PERCENT: 0.3 %
BUN BLDV-MCNC: 8 MG/DL (ref 7–20)
CALCIUM SERPL-MCNC: 8.1 MG/DL (ref 8.3–10.6)
CHLORIDE BLD-SCNC: 99 MMOL/L (ref 99–110)
CO2: 24 MMOL/L (ref 21–32)
CREAT SERPL-MCNC: <0.5 MG/DL (ref 0.9–1.3)
EOSINOPHILS ABSOLUTE: 0.1 K/UL (ref 0–0.6)
EOSINOPHILS RELATIVE PERCENT: 0.6 %
GFR AFRICAN AMERICAN: >60
GFR NON-AFRICAN AMERICAN: >60
GLUCOSE BLD-MCNC: 139 MG/DL (ref 70–99)
GLUCOSE BLD-MCNC: 147 MG/DL (ref 70–99)
HCT VFR BLD CALC: 31.8 % (ref 40.5–52.5)
HEMOGLOBIN: 10.6 G/DL (ref 13.5–17.5)
LYMPHOCYTES ABSOLUTE: 1.1 K/UL (ref 1–5.1)
LYMPHOCYTES RELATIVE PERCENT: 8.3 %
MAGNESIUM: 1.7 MG/DL (ref 1.8–2.4)
MCH RBC QN AUTO: 32.4 PG (ref 26–34)
MCHC RBC AUTO-ENTMCNC: 33.2 G/DL (ref 31–36)
MCV RBC AUTO: 97.5 FL (ref 80–100)
MONOCYTES ABSOLUTE: 0.9 K/UL (ref 0–1.3)
MONOCYTES RELATIVE PERCENT: 6.9 %
NEUTROPHILS ABSOLUTE: 11.1 K/UL (ref 1.7–7.7)
NEUTROPHILS RELATIVE PERCENT: 83.9 %
PDW BLD-RTO: 13.1 % (ref 12.4–15.4)
PERFORMED ON: ABNORMAL
PHOSPHORUS: 2.6 MG/DL (ref 2.5–4.9)
PLATELET # BLD: 251 K/UL (ref 135–450)
PMV BLD AUTO: 10.2 FL (ref 5–10.5)
POTASSIUM SERPL-SCNC: 3.6 MMOL/L (ref 3.5–5.1)
RBC # BLD: 3.27 M/UL (ref 4.2–5.9)
SODIUM BLD-SCNC: 135 MMOL/L (ref 136–145)
WBC # BLD: 13.3 K/UL (ref 4–11)

## 2020-03-27 PROCEDURE — 2580000003 HC RX 258: Performed by: SURGERY

## 2020-03-27 PROCEDURE — 83735 ASSAY OF MAGNESIUM: CPT

## 2020-03-27 PROCEDURE — C9113 INJ PANTOPRAZOLE SODIUM, VIA: HCPCS | Performed by: STUDENT IN AN ORGANIZED HEALTH CARE EDUCATION/TRAINING PROGRAM

## 2020-03-27 PROCEDURE — 94640 AIRWAY INHALATION TREATMENT: CPT

## 2020-03-27 PROCEDURE — 6360000002 HC RX W HCPCS: Performed by: SURGERY

## 2020-03-27 PROCEDURE — 1200000000 HC SEMI PRIVATE

## 2020-03-27 PROCEDURE — 2500000003 HC RX 250 WO HCPCS: Performed by: STUDENT IN AN ORGANIZED HEALTH CARE EDUCATION/TRAINING PROGRAM

## 2020-03-27 PROCEDURE — 80069 RENAL FUNCTION PANEL: CPT

## 2020-03-27 PROCEDURE — 6360000002 HC RX W HCPCS: Performed by: STUDENT IN AN ORGANIZED HEALTH CARE EDUCATION/TRAINING PROGRAM

## 2020-03-27 PROCEDURE — 97110 THERAPEUTIC EXERCISES: CPT

## 2020-03-27 PROCEDURE — 94669 MECHANICAL CHEST WALL OSCILL: CPT

## 2020-03-27 PROCEDURE — 94150 VITAL CAPACITY TEST: CPT

## 2020-03-27 PROCEDURE — 97116 GAIT TRAINING THERAPY: CPT

## 2020-03-27 PROCEDURE — 6370000000 HC RX 637 (ALT 250 FOR IP): Performed by: SURGERY

## 2020-03-27 PROCEDURE — 97530 THERAPEUTIC ACTIVITIES: CPT

## 2020-03-27 PROCEDURE — 85025 COMPLETE CBC W/AUTO DIFF WBC: CPT

## 2020-03-27 RX ORDER — POTASSIUM CHLORIDE 7.45 MG/ML
10 INJECTION INTRAVENOUS
Status: COMPLETED | OUTPATIENT
Start: 2020-03-27 | End: 2020-03-27

## 2020-03-27 RX ORDER — MAGNESIUM SULFATE IN WATER 40 MG/ML
2 INJECTION, SOLUTION INTRAVENOUS ONCE
Status: COMPLETED | OUTPATIENT
Start: 2020-03-27 | End: 2020-03-27

## 2020-03-27 RX ADMIN — Medication 10 ML: at 09:07

## 2020-03-27 RX ADMIN — HYDROMORPHONE HYDROCHLORIDE 0.5 MG: 1 INJECTION, SOLUTION INTRAMUSCULAR; INTRAVENOUS; SUBCUTANEOUS at 02:52

## 2020-03-27 RX ADMIN — POTASSIUM CHLORIDE 10 MEQ: 10 INJECTION, SOLUTION INTRAVENOUS at 09:02

## 2020-03-27 RX ADMIN — POTASSIUM CHLORIDE 10 MEQ: 10 INJECTION, SOLUTION INTRAVENOUS at 10:36

## 2020-03-27 RX ADMIN — HYDROMORPHONE HYDROCHLORIDE 0.5 MG: 1 INJECTION, SOLUTION INTRAMUSCULAR; INTRAVENOUS; SUBCUTANEOUS at 06:29

## 2020-03-27 RX ADMIN — HYDROMORPHONE HYDROCHLORIDE 0.5 MG: 1 INJECTION, SOLUTION INTRAMUSCULAR; INTRAVENOUS; SUBCUTANEOUS at 22:55

## 2020-03-27 RX ADMIN — SODIUM CHLORIDE 30 MG/ML INHALATION SOLUTION 15 ML: 30 SOLUTION INHALANT at 20:23

## 2020-03-27 RX ADMIN — METOCLOPRAMIDE HYDROCHLORIDE 10 MG: 5 INJECTION INTRAMUSCULAR; INTRAVENOUS at 16:03

## 2020-03-27 RX ADMIN — BUDESONIDE 500 MCG: 0.5 INHALANT RESPIRATORY (INHALATION) at 08:27

## 2020-03-27 RX ADMIN — METOPROLOL TARTRATE 5 MG: 5 INJECTION INTRAVENOUS at 06:29

## 2020-03-27 RX ADMIN — HYDROMORPHONE HYDROCHLORIDE 0.5 MG: 1 INJECTION, SOLUTION INTRAMUSCULAR; INTRAVENOUS; SUBCUTANEOUS at 12:23

## 2020-03-27 RX ADMIN — POTASSIUM CHLORIDE 10 MEQ: 10 INJECTION, SOLUTION INTRAVENOUS at 11:57

## 2020-03-27 RX ADMIN — METOPROLOL TARTRATE 5 MG: 5 INJECTION INTRAVENOUS at 19:01

## 2020-03-27 RX ADMIN — SODIUM CHLORIDE 30 MG/ML INHALATION SOLUTION 15 ML: 30 SOLUTION INHALANT at 08:27

## 2020-03-27 RX ADMIN — METOCLOPRAMIDE HYDROCHLORIDE 10 MG: 5 INJECTION INTRAMUSCULAR; INTRAVENOUS at 22:55

## 2020-03-27 RX ADMIN — ARFORMOTEROL TARTRATE 15 MCG: 15 SOLUTION RESPIRATORY (INHALATION) at 08:27

## 2020-03-27 RX ADMIN — Medication 10 ML: at 19:33

## 2020-03-27 RX ADMIN — ARFORMOTEROL TARTRATE 15 MCG: 15 SOLUTION RESPIRATORY (INHALATION) at 20:23

## 2020-03-27 RX ADMIN — BUDESONIDE 500 MCG: 0.5 INHALANT RESPIRATORY (INHALATION) at 20:23

## 2020-03-27 RX ADMIN — METOCLOPRAMIDE HYDROCHLORIDE 10 MG: 5 INJECTION INTRAMUSCULAR; INTRAVENOUS at 10:39

## 2020-03-27 RX ADMIN — CALCIUM GLUCONATE: 94 INJECTION, SOLUTION INTRAVENOUS at 15:53

## 2020-03-27 RX ADMIN — ENOXAPARIN SODIUM 40 MG: 40 INJECTION SUBCUTANEOUS at 09:13

## 2020-03-27 RX ADMIN — PANTOPRAZOLE SODIUM 40 MG: 40 INJECTION, POWDER, FOR SOLUTION INTRAVENOUS at 09:13

## 2020-03-27 RX ADMIN — TIOTROPIUM BROMIDE INHALATION SPRAY 2 PUFF: 3.12 SPRAY, METERED RESPIRATORY (INHALATION) at 08:28

## 2020-03-27 RX ADMIN — ALBUTEROL SULFATE 2.5 MG: 2.5 SOLUTION RESPIRATORY (INHALATION) at 08:27

## 2020-03-27 RX ADMIN — METOPROLOL TARTRATE 5 MG: 5 INJECTION INTRAVENOUS at 12:53

## 2020-03-27 RX ADMIN — METOCLOPRAMIDE HYDROCHLORIDE 10 MG: 5 INJECTION INTRAMUSCULAR; INTRAVENOUS at 06:29

## 2020-03-27 RX ADMIN — MAGNESIUM SULFATE HEPTAHYDRATE 2 G: 40 INJECTION, SOLUTION INTRAVENOUS at 07:40

## 2020-03-27 RX ADMIN — POTASSIUM CHLORIDE 10 MEQ: 10 INJECTION, SOLUTION INTRAVENOUS at 07:41

## 2020-03-27 RX ADMIN — ALBUTEROL SULFATE 2.5 MG: 2.5 SOLUTION RESPIRATORY (INHALATION) at 20:23

## 2020-03-27 RX ADMIN — METOPROLOL TARTRATE 5 MG: 5 INJECTION INTRAVENOUS at 01:19

## 2020-03-27 ASSESSMENT — PAIN DESCRIPTION - LOCATION
LOCATION: ABDOMEN;NECK;SHOULDER
LOCATION: BACK
LOCATION: BACK

## 2020-03-27 ASSESSMENT — PAIN DESCRIPTION - ONSET
ONSET: ON-GOING

## 2020-03-27 ASSESSMENT — PAIN - FUNCTIONAL ASSESSMENT: PAIN_FUNCTIONAL_ASSESSMENT: PREVENTS OR INTERFERES SOME ACTIVE ACTIVITIES AND ADLS

## 2020-03-27 ASSESSMENT — PAIN DESCRIPTION - FREQUENCY
FREQUENCY: CONTINUOUS

## 2020-03-27 ASSESSMENT — PAIN SCALES - GENERAL
PAINLEVEL_OUTOF10: 4
PAINLEVEL_OUTOF10: 4
PAINLEVEL_OUTOF10: 9
PAINLEVEL_OUTOF10: 7
PAINLEVEL_OUTOF10: 9
PAINLEVEL_OUTOF10: 0
PAINLEVEL_OUTOF10: 0
PAINLEVEL_OUTOF10: 9

## 2020-03-27 ASSESSMENT — PAIN DESCRIPTION - DESCRIPTORS
DESCRIPTORS: ACHING
DESCRIPTORS: ACHING
DESCRIPTORS: SHARP

## 2020-03-27 ASSESSMENT — PAIN DESCRIPTION - ORIENTATION
ORIENTATION: LOWER
ORIENTATION: LOWER

## 2020-03-27 ASSESSMENT — PAIN DESCRIPTION - PAIN TYPE
TYPE: SURGICAL PAIN
TYPE: CHRONIC PAIN
TYPE: CHRONIC PAIN

## 2020-03-27 ASSESSMENT — PAIN DESCRIPTION - DIRECTION: RADIATING_TOWARDS: NO

## 2020-03-27 NOTE — PROGRESS NOTES
[place]      ASSESSMENT/PLAN: Pt. is a 61 y.o. male with a history of large tubovillious adenoma s/p laproscopic transvere colectomy, laproscopic mobilization splenic flexure omental pedicle (3/13) with return to the OR for abdominal wall dehiscence s/p Ex lap, abdominal washout, incisional hernia repair with mesh and wound vac. Ex lap, abdominal washout, incisional hernia repair with mesh and wound vac  - wound vac change Monday, Wednesday, Friday- plan for wound vac change today  - dilaudid for pain control     Post operative ileus  - continue NGT to low wall suction. Continues to have bilious output. Awaiting return of bowel function   - last CT scan completed on 3/25 without signs of anastomotic leak  - continue Reglan 10 mg every 6 hours     Malnutrition  - continue TPN to goal today, continue NPO  - Magnesium and potassium replaced today and increased in TPN   - weekly nutritional markers  - TID glucose to ensure proper glucose control    Low grade fevers  - follow up blood cultures   - UA 3/25- within normal limits  - Chest x ray without sign of PNA    Medical History  HTN: continue to hold home atenolol replaced with lopressor 5 mg q 6 hours, hold home Norvasc  COPD: continue albuterol, pulmicort, Brovana and Spiriva   RA: hold home Plaquenil and gabapentin   HLD: hold home Lipitor  Insomnia: Hold home Trazadone  Nicotine use: continue Nicoderm patch       - Remove garrido catheter today- void check within 8 hours   - continue PT/OT and up to chair TID- patient cleared for home - home PT/OT     Social Work on board - Anticipate discharge once return of bowel function and tolerating diet. Do not anticipate need for TPN at discharge. Prophylaxis: Lovenox 40 mg BID , Protonix 40 mg mabley     Lino Bowman MD  3/27/2020  7:07 AM  [phone]    I have seen, examined, and reviewed the patients chart. I agree with the residents assessment and have made appropriate changes.     Aristides Early

## 2020-03-27 NOTE — PROGRESS NOTES
of colon, PVD (peripheral vascular disease) ([facility] [address] Rheumatoid arthritis(714.0), and Tobacco abuse disorder. has a past surgical history that includes Appendectomy (1998); Carpal tunnel release (Bilateral, 2000); Patella surgery (Right, 1978); Coronary artery bypass graft (04/22/2016); Vein Surgery (Left, 04/22/2016); Colonoscopy (N/A, 10/3/2018); femoral bypass (Left, 1/23/2019); incision and drainage (Left, 2/21/2019); Cardiac surgery; Small intestine surgery (N/A, 3/13/2020); and hemicolectomy (N/A, 3/18/2020). Restrictions  Position Activity Restriction  Other position/activity restrictions: ambulate pt, seizure precaution  Subjective   General  Chart Reviewed: Yes  Additional Pertinent Hx: Pt admitted 3/13/2020  with endoscopically unresectable colon polyp. 3/13/2020 LAPAROSCOPIC TRANSVERSE COLECTOMY         3/18/2020 EXPLORATORY LAPAROTOMY, ABDOMINAL [address] OUT, ABDOMINAL WALL CLOSURE AND VENTRAL HERNIA  REPAIR      PMHx: HTN, hyperlipidemia, DDD, CVA, CAD, RA, PVD, MI, hep C, depression, anxiety, CABG x4 (4/2016), bilateral TR, appy, femoral bypass  Family / Caregiver Present: Yes  Referring Practitioner: ALLI WIGGINS  Diagnosis: polyp of transferse colon, unspecified type  Subjective  Subjective: Pt in bed, NG on wall suction and Rn reported this cannot be detached. Pt agreeable to work with OT.       Orientation  Orientation  Overall Orientation Status: Within Functional Limits(given choice of hospitals, knows correct one)  Objective             Balance  Sitting Balance: Stand by assistance  Standing Balance: Contact guard assistance  Standing Balance  Time: ~3 minutes  Activity: static stance with walker    Bed mobility  Supine to Sit: Stand by assistance(increaed effort)  Scooting: Stand by assistance    Transfers  Sit to stand: Contact guard assistance  Stand to sit: Contact guard assistance                       Cognition  Overall Cognitive Status: Exceptions  Safety Judgement: Decreased

## 2020-03-27 NOTE — PLAN OF CARE
Problem: Pain:  Goal: Pain level will decrease  Description: Pain level will decrease  3/26/2020 2307 by Jocelyn Malin RN  Outcome: Ongoing  RN assesses pain using 0-10 scale. Patient understands how to rate pain using 0-10 scale. Pain is controled with medication per MAR. RN encourages patient to call out for breakthrough pain. Will continue to monitor and reassess. Problem: Falls - Risk of:  Goal: Will remain free from falls  3/26/2020 2307 by Jocelyn Malin RN  Outcome: Ongoing  Patient remains free from falls during this shift. Patient is up x1 person assist with walker. Bed is in the lowest position and the bed and chair alarm is activated. Anti-slip socks are on. Call light is within reach. Will continue to monitor and reassess. Problem: HEMODYNAMIC STATUS  Goal: Patient has stable vital signs and fluid balance  Outcome: Ongoing  Patient is NPO. As ordered by MD, lactated ringers are running at 50 ml per hour and TPN is running at 75 ml per hour. Cerrato in place for fluid management. Will continue to monitor and reassess.

## 2020-03-27 NOTE — PROGRESS NOTES
General Surgery  Wound Care Note    Patients wound vac changed at the bedside. Wound base was healthy with healthy bleeding noted. There is a patch of subcutaneous fat at the base of the wound. Skin edges without necrosis noted to have some fat necrosis. Seroma drained from right lateral aspect of wound. Wound measured 8 x 5.5 x 2 cm. Drape applied to the edges of the wound for skin protection. Small area of skin breakdown inferior aspect of left skin edge dressed with adaptic to protect skin  Black foam to the base of the wound. Track pad applied with adequate suction at 125 mmHg. Patient tolerated wound vac change well.    Will plan to change again on Monday 3/30/2020    Linnette Danielson MD  3/27/2020  8:38 AM  [phone]

## 2020-03-27 NOTE — PLAN OF CARE
Problem: Pain:  Goal: Pain level will decrease  Description: Pain level will decrease  3/26/2020 2307 by Lila Swift RN  Outcome: Ongoing     Problem: Falls - Risk of:  Goal: Will remain free from falls  Description: Patient in chair with alarm and nonskid socks on, call light, belongings, and bedside table within reach. Patient educated on using call light and instructed to call out for assistance when getting up. Patient calls out approprietly and remains free of falls.       3/27/2020 1240 by Patricia Perez RN  Note: Alert oriented times 3 , spontaneous , calls out when needing assisted , call light in reach  , bed and chair alarm in place camera for safety    3/26/2020 2307 by Lila Swift RN  Outcome: Ongoing     Problem: HEMODYNAMIC STATUS  Goal: Patient has stable vital signs and fluid balance  3/26/2020 2307 by Lila Swift RN  Outcome: Ongoing     Problem: ELIMINATION  Goal: Elimination patterns are normal or improving  Description: Elimination patterns return to pre-surgery normal patterns  3/26/2020 2307 by Lila Swift RN  Outcome: Ongoing     Problem: Nutrition  Goal: Optimal nutrition therapy  3/27/2020 0924 by Opal Moreno RD, LD  Outcome: Ongoing

## 2020-03-28 ENCOUNTER — APPOINTMENT (OUTPATIENT)
Dept: GENERAL RADIOLOGY | Age: 60
DRG: 231 | End: 2020-03-28
Attending: SURGERY
Payer: MEDICARE

## 2020-03-28 LAB
ALBUMIN SERPL-MCNC: 2.7 G/DL (ref 3.4–5)
ANION GAP SERPL CALCULATED.3IONS-SCNC: 11 MMOL/L (ref 3–16)
BASOPHILS ABSOLUTE: 0.1 K/UL (ref 0–0.2)
BASOPHILS RELATIVE PERCENT: 0.8 %
BILIRUBIN URINE: NEGATIVE
BLOOD, URINE: NEGATIVE
BUN BLDV-MCNC: 11 MG/DL (ref 7–20)
CALCIUM SERPL-MCNC: 8.5 MG/DL (ref 8.3–10.6)
CHLORIDE BLD-SCNC: 99 MMOL/L (ref 99–110)
CLARITY: CLEAR
CO2: 24 MMOL/L (ref 21–32)
COLOR: YELLOW
CREAT SERPL-MCNC: <0.5 MG/DL (ref 0.9–1.3)
EOSINOPHILS ABSOLUTE: 0.1 K/UL (ref 0–0.6)
EOSINOPHILS RELATIVE PERCENT: 0.7 %
GFR AFRICAN AMERICAN: >60
GFR NON-AFRICAN AMERICAN: >60
GLUCOSE BLD-MCNC: 117 MG/DL (ref 70–99)
GLUCOSE BLD-MCNC: 125 MG/DL (ref 70–99)
GLUCOSE BLD-MCNC: 127 MG/DL (ref 70–99)
GLUCOSE URINE: NEGATIVE MG/DL
HCT VFR BLD CALC: 33.6 % (ref 40.5–52.5)
HEMOGLOBIN: 11 G/DL (ref 13.5–17.5)
KETONES, URINE: NEGATIVE MG/DL
LEUKOCYTE ESTERASE, URINE: NEGATIVE
LYMPHOCYTES ABSOLUTE: 1.1 K/UL (ref 1–5.1)
LYMPHOCYTES RELATIVE PERCENT: 8.9 %
MAGNESIUM: 1.9 MG/DL (ref 1.8–2.4)
MCH RBC QN AUTO: 32.3 PG (ref 26–34)
MCHC RBC AUTO-ENTMCNC: 32.9 G/DL (ref 31–36)
MCV RBC AUTO: 98.4 FL (ref 80–100)
MICROSCOPIC EXAMINATION: NORMAL
MONOCYTES ABSOLUTE: 0.9 K/UL (ref 0–1.3)
MONOCYTES RELATIVE PERCENT: 7.1 %
NEUTROPHILS ABSOLUTE: 10.5 K/UL (ref 1.7–7.7)
NEUTROPHILS RELATIVE PERCENT: 82.5 %
NITRITE, URINE: NEGATIVE
PDW BLD-RTO: 13.4 % (ref 12.4–15.4)
PERFORMED ON: ABNORMAL
PERFORMED ON: ABNORMAL
PH UA: 7.5 (ref 5–8)
PHOSPHORUS: 3.1 MG/DL (ref 2.5–4.9)
PLATELET # BLD: 264 K/UL (ref 135–450)
PMV BLD AUTO: 10.5 FL (ref 5–10.5)
POTASSIUM SERPL-SCNC: 4.3 MMOL/L (ref 3.5–5.1)
PROTEIN UA: NEGATIVE MG/DL
RBC # BLD: 3.41 M/UL (ref 4.2–5.9)
SODIUM BLD-SCNC: 134 MMOL/L (ref 136–145)
SPECIFIC GRAVITY UA: 1.02 (ref 1–1.03)
URINE REFLEX TO CULTURE: NORMAL
URINE TYPE: NORMAL
UROBILINOGEN, URINE: 0.2 E.U./DL
WBC # BLD: 12.7 K/UL (ref 4–11)

## 2020-03-28 PROCEDURE — 6360000002 HC RX W HCPCS: Performed by: SURGERY

## 2020-03-28 PROCEDURE — 94761 N-INVAS EAR/PLS OXIMETRY MLT: CPT

## 2020-03-28 PROCEDURE — 2580000003 HC RX 258: Performed by: SURGERY

## 2020-03-28 PROCEDURE — 1200000000 HC SEMI PRIVATE

## 2020-03-28 PROCEDURE — 81003 URINALYSIS AUTO W/O SCOPE: CPT

## 2020-03-28 PROCEDURE — 6370000000 HC RX 637 (ALT 250 FOR IP): Performed by: STUDENT IN AN ORGANIZED HEALTH CARE EDUCATION/TRAINING PROGRAM

## 2020-03-28 PROCEDURE — 6370000000 HC RX 637 (ALT 250 FOR IP): Performed by: SURGERY

## 2020-03-28 PROCEDURE — 2500000003 HC RX 250 WO HCPCS: Performed by: STUDENT IN AN ORGANIZED HEALTH CARE EDUCATION/TRAINING PROGRAM

## 2020-03-28 PROCEDURE — 83735 ASSAY OF MAGNESIUM: CPT

## 2020-03-28 PROCEDURE — 85025 COMPLETE CBC W/AUTO DIFF WBC: CPT

## 2020-03-28 PROCEDURE — 94150 VITAL CAPACITY TEST: CPT

## 2020-03-28 PROCEDURE — 80069 RENAL FUNCTION PANEL: CPT

## 2020-03-28 PROCEDURE — 74019 RADEX ABDOMEN 2 VIEWS: CPT

## 2020-03-28 PROCEDURE — C9113 INJ PANTOPRAZOLE SODIUM, VIA: HCPCS | Performed by: STUDENT IN AN ORGANIZED HEALTH CARE EDUCATION/TRAINING PROGRAM

## 2020-03-28 PROCEDURE — 94669 MECHANICAL CHEST WALL OSCILL: CPT

## 2020-03-28 PROCEDURE — 71045 X-RAY EXAM CHEST 1 VIEW: CPT

## 2020-03-28 PROCEDURE — 87040 BLOOD CULTURE FOR BACTERIA: CPT

## 2020-03-28 PROCEDURE — 6360000002 HC RX W HCPCS: Performed by: STUDENT IN AN ORGANIZED HEALTH CARE EDUCATION/TRAINING PROGRAM

## 2020-03-28 PROCEDURE — 94640 AIRWAY INHALATION TREATMENT: CPT

## 2020-03-28 RX ORDER — BISACODYL 10 MG
10 SUPPOSITORY, RECTAL RECTAL ONCE
Status: COMPLETED | OUTPATIENT
Start: 2020-03-28 | End: 2020-03-28

## 2020-03-28 RX ORDER — MAGNESIUM SULFATE 1 G/100ML
1 INJECTION INTRAVENOUS ONCE
Status: COMPLETED | OUTPATIENT
Start: 2020-03-28 | End: 2020-03-28

## 2020-03-28 RX ORDER — ACETAMINOPHEN 650 MG/1
650 SUPPOSITORY RECTAL EVERY 4 HOURS PRN
Status: DISCONTINUED | OUTPATIENT
Start: 2020-03-28 | End: 2020-03-30

## 2020-03-28 RX ADMIN — ENOXAPARIN SODIUM 40 MG: 40 INJECTION SUBCUTANEOUS at 09:39

## 2020-03-28 RX ADMIN — Medication 10 ML: at 21:26

## 2020-03-28 RX ADMIN — PANTOPRAZOLE SODIUM 40 MG: 40 INJECTION, POWDER, FOR SOLUTION INTRAVENOUS at 09:38

## 2020-03-28 RX ADMIN — METOPROLOL TARTRATE 5 MG: 5 INJECTION INTRAVENOUS at 02:17

## 2020-03-28 RX ADMIN — HYDROMORPHONE HYDROCHLORIDE 0.5 MG: 1 INJECTION, SOLUTION INTRAMUSCULAR; INTRAVENOUS; SUBCUTANEOUS at 03:35

## 2020-03-28 RX ADMIN — HYDROMORPHONE HYDROCHLORIDE 0.5 MG: 1 INJECTION, SOLUTION INTRAMUSCULAR; INTRAVENOUS; SUBCUTANEOUS at 23:33

## 2020-03-28 RX ADMIN — Medication 10 ML: at 09:44

## 2020-03-28 RX ADMIN — ALBUTEROL SULFATE 2.5 MG: 2.5 SOLUTION RESPIRATORY (INHALATION) at 15:23

## 2020-03-28 RX ADMIN — HYDROMORPHONE HYDROCHLORIDE 0.5 MG: 1 INJECTION, SOLUTION INTRAMUSCULAR; INTRAVENOUS; SUBCUTANEOUS at 12:39

## 2020-03-28 RX ADMIN — METOCLOPRAMIDE HYDROCHLORIDE 10 MG: 5 INJECTION INTRAMUSCULAR; INTRAVENOUS at 23:33

## 2020-03-28 RX ADMIN — METOPROLOL TARTRATE 5 MG: 5 INJECTION INTRAVENOUS at 06:55

## 2020-03-28 RX ADMIN — METOCLOPRAMIDE HYDROCHLORIDE 10 MG: 5 INJECTION INTRAMUSCULAR; INTRAVENOUS at 18:19

## 2020-03-28 RX ADMIN — ALBUTEROL SULFATE 2.5 MG: 2.5 SOLUTION RESPIRATORY (INHALATION) at 07:58

## 2020-03-28 RX ADMIN — TIOTROPIUM BROMIDE INHALATION SPRAY 2 PUFF: 3.12 SPRAY, METERED RESPIRATORY (INHALATION) at 07:58

## 2020-03-28 RX ADMIN — HYDROMORPHONE HYDROCHLORIDE 0.5 MG: 1 INJECTION, SOLUTION INTRAMUSCULAR; INTRAVENOUS; SUBCUTANEOUS at 18:28

## 2020-03-28 RX ADMIN — ARFORMOTEROL TARTRATE 15 MCG: 15 SOLUTION RESPIRATORY (INHALATION) at 07:58

## 2020-03-28 RX ADMIN — CALCIUM GLUCONATE: 94 INJECTION, SOLUTION INTRAVENOUS at 15:42

## 2020-03-28 RX ADMIN — MAGNESIUM SULFATE IN DEXTROSE 1 G: 10 INJECTION, SOLUTION INTRAVENOUS at 06:48

## 2020-03-28 RX ADMIN — METOCLOPRAMIDE HYDROCHLORIDE 10 MG: 5 INJECTION INTRAMUSCULAR; INTRAVENOUS at 12:38

## 2020-03-28 RX ADMIN — BUDESONIDE 500 MCG: 0.5 INHALANT RESPIRATORY (INHALATION) at 20:56

## 2020-03-28 RX ADMIN — SODIUM CHLORIDE 30 MG/ML INHALATION SOLUTION 15 ML: 30 SOLUTION INHALANT at 20:56

## 2020-03-28 RX ADMIN — ARFORMOTEROL TARTRATE 15 MCG: 15 SOLUTION RESPIRATORY (INHALATION) at 20:56

## 2020-03-28 RX ADMIN — ACETAMINOPHEN 650 MG: 650 SUPPOSITORY RECTAL at 20:15

## 2020-03-28 RX ADMIN — Medication 10 ML: at 09:46

## 2020-03-28 RX ADMIN — METOPROLOL TARTRATE 5 MG: 5 INJECTION INTRAVENOUS at 18:19

## 2020-03-28 RX ADMIN — ALBUTEROL SULFATE 2.5 MG: 2.5 SOLUTION RESPIRATORY (INHALATION) at 20:56

## 2020-03-28 RX ADMIN — METOPROLOL TARTRATE 5 MG: 5 INJECTION INTRAVENOUS at 12:38

## 2020-03-28 RX ADMIN — BUDESONIDE 500 MCG: 0.5 INHALANT RESPIRATORY (INHALATION) at 07:58

## 2020-03-28 RX ADMIN — SODIUM CHLORIDE 30 MG/ML INHALATION SOLUTION 15 ML: 30 SOLUTION INHALANT at 15:23

## 2020-03-28 RX ADMIN — METOCLOPRAMIDE HYDROCHLORIDE 10 MG: 5 INJECTION INTRAMUSCULAR; INTRAVENOUS at 05:14

## 2020-03-28 RX ADMIN — BISACODYL 10 MG: 10 SUPPOSITORY RECTAL at 06:59

## 2020-03-28 ASSESSMENT — PAIN DESCRIPTION - DESCRIPTORS
DESCRIPTORS: ACHING

## 2020-03-28 ASSESSMENT — PAIN DESCRIPTION - ONSET
ONSET: ON-GOING

## 2020-03-28 ASSESSMENT — PAIN - FUNCTIONAL ASSESSMENT: PAIN_FUNCTIONAL_ASSESSMENT: PREVENTS OR INTERFERES SOME ACTIVE ACTIVITIES AND ADLS

## 2020-03-28 ASSESSMENT — PAIN DESCRIPTION - ORIENTATION
ORIENTATION: LOWER

## 2020-03-28 ASSESSMENT — PAIN SCALES - GENERAL
PAINLEVEL_OUTOF10: 5
PAINLEVEL_OUTOF10: 9
PAINLEVEL_OUTOF10: 4
PAINLEVEL_OUTOF10: 9
PAINLEVEL_OUTOF10: 0
PAINLEVEL_OUTOF10: 0
PAINLEVEL_OUTOF10: 5

## 2020-03-28 ASSESSMENT — PAIN DESCRIPTION - LOCATION
LOCATION: BACK
LOCATION: ABDOMEN;BACK
LOCATION: ABDOMEN
LOCATION: ABDOMEN

## 2020-03-28 ASSESSMENT — PAIN DESCRIPTION - FREQUENCY
FREQUENCY: CONTINUOUS

## 2020-03-28 ASSESSMENT — PAIN DESCRIPTION - PROGRESSION
CLINICAL_PROGRESSION: NOT CHANGED
CLINICAL_PROGRESSION: GRADUALLY WORSENING

## 2020-03-28 ASSESSMENT — PAIN DESCRIPTION - PAIN TYPE
TYPE: CHRONIC PAIN

## 2020-03-28 ASSESSMENT — PAIN DESCRIPTION - DIRECTION: RADIATING_TOWARDS: NO
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
## 2020-03-28 NOTE — DISCHARGE INSTR - COC
5' 9\" (1.753 m)   Wt 197 lb 5 oz (89.5 kg)   SpO2 97%   BMI 29.14 kg/m²     Last documented pain score (0-10 scale): Pain Level: 9  Last Weight:   Wt Readings from Last 1 Encounters:   03/27/20 197 lb 5 oz (89.5 kg)     Mental Status:  {IP PT MENTAL STATUS:74084}    IV Access:  { IMTIAZ IV ACCESS:520839414}    Nursing Mobility/ADLs:  Walking   {CHP DME LCGT:775955761}  Transfer  {CHP DME CARYL:240252564}  Bathing  {CHP DME LPIO:187023356}  Dressing  {CHP DME PBVP:880987259}  Toileting  {CHP DME LVYR:598890802}  Feeding  {CHP DME BLFP:057239024}  Med Admin  {P DME EZBL:506220261}  Med Delivery   { IMTIAZ MED Delivery:833482605}    Wound Care Documentation and Therapy:  Negative Pressure Wound Therapy Abdomen Lower;Mid (Active)   Wound Type Surgical 3/28/2020  3:00 AM   Unit Type VAC 3/18/2020  4:27 PM   Dressing Type Black foam 3/28/2020  3:00 AM   Cycle Continuous 3/28/2020  3:00 AM   Target Pressure (mmHg) 125 3/28/2020  3:00 AM   Canister changed? No 3/28/2020  3:00 AM   Dressing Status Clean;Dry; Intact 3/28/2020  3:00 AM   Dressing Changed Changed/New 3/28/2020  3:00 AM   Drainage Amount Moderate 3/28/2020  3:00 AM   Drainage Description Serosanguinous 3/28/2020  3:00 AM   Output (ml) 0 ml 3/28/2020  3:23 AM   Wound Assessment Other (Comment) 3/28/2020  3:00 AM   Rayna-wound Assessment Other (Comment) 3/28/2020  3:00 AM   Number of days: 9        Elimination:  Continence:   · Bowel: {YES / OW:34240}  · Bladder: {YES / HB:69325}  Urinary Catheter: {Urinary Catheter:543422679}   Colostomy/Ileostomy/Ileal Conduit: {YES / AA:90771}       Date of Last BM: ***    Intake/Output Summary (Last 24 hours) at 3/28/2020 0914  Last data filed at 3/28/2020 0723  Gross per 24 hour   Intake 0 ml   Output 2870 ml   Net -2870 ml     I/O last 3 completed shifts:   In: 0   Out: 2570 [Urine:1270; Emesis/NG output:1250; Drains:50]    Safety Concerns:     [address] [facility] Safety Concerns:823609916}    Impairments/Disabilities:      [address] IMTIAZ Impairments/Disabilities:319087244}    Nutrition Therapy:  Current Nutrition Therapy:   [address] IMTIAZ Diet List:388605673}    Routes of Feeding: {CHP DME Other Feedings:188958437}  Liquids: {Slp liquid thickness:23244}  Daily Fluid Restriction: {CHP DME Yes amt example:187889242}  Last Modified Barium Swallow with Video (Video Swallowing Test): {Done Not Done CQNV:170260452}    Treatments at the Time of Hospital Discharge:   Respiratory Treatments: ***  Oxygen Therapy:  {Therapy; copd oxygen:82823}  Ventilator:    {[facility] Vent HAUD:062889856}    Rehab Therapies: {THERAPEUTIC INTERVENTION:2508746056}  Weight Bearing Status/Restrictions: {[facility] Weight Bearin}  Other Medical Equipment (for information only, NOT a DME order):  {EQUIPMENT:073587757}  Other Treatments: ***    Patient's personal belongings (please select all that are sent with patient):  {[facility] DME Belongings:899563472}    RN SIGNATURE:  {Esignature:532717380}    CASE MANAGEMENT/SOCIAL WORK SECTION    Inpatient Status Date: ***    Readmission Risk Assessment Score:  Readmission Risk              Risk of Unplanned Readmission:        20           Discharging to Facility/ Agency   · Name: [facility]  · Address:  · Phone:[phone]  · Fax:    Dialysis Facility (if applicable)   · Name:  · Address:  · Dialysis Schedule:  · Phone:  · Fax:    / signature: Electronically signed by Dre David RN on 20 at 9:23 AM EDT    PHYSICIAN SECTION    Prognosis: Fair    Condition at Discharge: Stable    Rehab Potential (if transferring to Rehab): Fair    Recommended Labs or Other Treatments After Discharge:   remove current dressing, dampen kirlex with dakins solution, fill wound with dressing, cut off excess. Cover with abdominal pad, and secure with papertape or mediport tape.      Physician Certification: I certify the above information and transfer of Michelle Nicole  is necessary for the continuing treatment of the diagnosis

## 2020-03-28 NOTE — PROGRESS NOTES
Gen Surg  POC    Gastrographin administered at 9:30am, clamp NG for 4 hours then AXR. Please call surgery if becomes nauseous/emesis and hook back up to suction.     AXR at 1:30pm.    Kayce Fox MD   Gen Surg PGY 2  3/28/2020  9:35 AM  [phone]

## 2020-03-29 ENCOUNTER — APPOINTMENT (OUTPATIENT)
Dept: GENERAL RADIOLOGY | Age: 60
DRG: 231 | End: 2020-03-29
Attending: SURGERY
Payer: MEDICARE

## 2020-03-29 LAB
ALBUMIN SERPL-MCNC: 2.5 G/DL (ref 3.4–5)
ANION GAP SERPL CALCULATED.3IONS-SCNC: 11 MMOL/L (ref 3–16)
BASOPHILS ABSOLUTE: 0.1 K/UL (ref 0–0.2)
BASOPHILS RELATIVE PERCENT: 0.9 %
BLOOD CULTURE, ROUTINE: NORMAL
BUN BLDV-MCNC: 13 MG/DL (ref 7–20)
CALCIUM SERPL-MCNC: 8.3 MG/DL (ref 8.3–10.6)
CHLORIDE BLD-SCNC: 102 MMOL/L (ref 99–110)
CO2: 23 MMOL/L (ref 21–32)
CREAT SERPL-MCNC: 0.5 MG/DL (ref 0.9–1.3)
CULTURE, BLOOD 2: NORMAL
EOSINOPHILS ABSOLUTE: 0.1 K/UL (ref 0–0.6)
EOSINOPHILS RELATIVE PERCENT: 1.2 %
GFR AFRICAN AMERICAN: >60
GFR NON-AFRICAN AMERICAN: >60
GLUCOSE BLD-MCNC: 114 MG/DL (ref 70–99)
GLUCOSE BLD-MCNC: 135 MG/DL (ref 70–99)
HCT VFR BLD CALC: 31.8 % (ref 40.5–52.5)
HEMOGLOBIN: 10.7 G/DL (ref 13.5–17.5)
LYMPHOCYTES ABSOLUTE: 0.8 K/UL (ref 1–5.1)
LYMPHOCYTES RELATIVE PERCENT: 8.5 %
MAGNESIUM: 2 MG/DL (ref 1.8–2.4)
MCH RBC QN AUTO: 32.7 PG (ref 26–34)
MCHC RBC AUTO-ENTMCNC: 33.5 G/DL (ref 31–36)
MCV RBC AUTO: 97.8 FL (ref 80–100)
MONOCYTES ABSOLUTE: 0.9 K/UL (ref 0–1.3)
MONOCYTES RELATIVE PERCENT: 9.2 %
NEUTROPHILS ABSOLUTE: 7.5 K/UL (ref 1.7–7.7)
NEUTROPHILS RELATIVE PERCENT: 80.2 %
PDW BLD-RTO: 13.5 % (ref 12.4–15.4)
PERFORMED ON: ABNORMAL
PHOSPHORUS: 3.1 MG/DL (ref 2.5–4.9)
PLATELET # BLD: 248 K/UL (ref 135–450)
PMV BLD AUTO: 10.4 FL (ref 5–10.5)
POTASSIUM SERPL-SCNC: 4.1 MMOL/L (ref 3.5–5.1)
RBC # BLD: 3.26 M/UL (ref 4.2–5.9)
SODIUM BLD-SCNC: 136 MMOL/L (ref 136–145)
WBC # BLD: 9.3 K/UL (ref 4–11)

## 2020-03-29 PROCEDURE — C9113 INJ PANTOPRAZOLE SODIUM, VIA: HCPCS | Performed by: STUDENT IN AN ORGANIZED HEALTH CARE EDUCATION/TRAINING PROGRAM

## 2020-03-29 PROCEDURE — 6360000002 HC RX W HCPCS: Performed by: SURGERY

## 2020-03-29 PROCEDURE — 6370000000 HC RX 637 (ALT 250 FOR IP): Performed by: STUDENT IN AN ORGANIZED HEALTH CARE EDUCATION/TRAINING PROGRAM

## 2020-03-29 PROCEDURE — 1200000000 HC SEMI PRIVATE

## 2020-03-29 PROCEDURE — 94761 N-INVAS EAR/PLS OXIMETRY MLT: CPT

## 2020-03-29 PROCEDURE — 2580000003 HC RX 258: Performed by: SURGERY

## 2020-03-29 PROCEDURE — 80069 RENAL FUNCTION PANEL: CPT

## 2020-03-29 PROCEDURE — 6370000000 HC RX 637 (ALT 250 FOR IP): Performed by: SURGERY

## 2020-03-29 PROCEDURE — 93005 ELECTROCARDIOGRAM TRACING: CPT | Performed by: STUDENT IN AN ORGANIZED HEALTH CARE EDUCATION/TRAINING PROGRAM

## 2020-03-29 PROCEDURE — 2500000003 HC RX 250 WO HCPCS: Performed by: STUDENT IN AN ORGANIZED HEALTH CARE EDUCATION/TRAINING PROGRAM

## 2020-03-29 PROCEDURE — 74019 RADEX ABDOMEN 2 VIEWS: CPT

## 2020-03-29 PROCEDURE — 94640 AIRWAY INHALATION TREATMENT: CPT

## 2020-03-29 PROCEDURE — 6360000002 HC RX W HCPCS: Performed by: STUDENT IN AN ORGANIZED HEALTH CARE EDUCATION/TRAINING PROGRAM

## 2020-03-29 PROCEDURE — 85025 COMPLETE CBC W/AUTO DIFF WBC: CPT

## 2020-03-29 PROCEDURE — 83735 ASSAY OF MAGNESIUM: CPT

## 2020-03-29 PROCEDURE — 99024 POSTOP FOLLOW-UP VISIT: CPT | Performed by: SURGERY

## 2020-03-29 PROCEDURE — 94669 MECHANICAL CHEST WALL OSCILL: CPT

## 2020-03-29 RX ORDER — SODIUM HYPOCHLORITE 1.25 MG/ML
SOLUTION TOPICAL DAILY
Status: DISCONTINUED | OUTPATIENT
Start: 2020-03-29 | End: 2020-04-01 | Stop reason: HOSPADM

## 2020-03-29 RX ORDER — QUETIAPINE FUMARATE 25 MG/1
25 TABLET, FILM COATED ORAL ONCE
Status: COMPLETED | OUTPATIENT
Start: 2020-03-29 | End: 2020-03-29

## 2020-03-29 RX ADMIN — METOCLOPRAMIDE HYDROCHLORIDE 10 MG: 5 INJECTION INTRAMUSCULAR; INTRAVENOUS at 17:40

## 2020-03-29 RX ADMIN — HYDROMORPHONE HYDROCHLORIDE 0.5 MG: 1 INJECTION, SOLUTION INTRAMUSCULAR; INTRAVENOUS; SUBCUTANEOUS at 17:43

## 2020-03-29 RX ADMIN — ALBUTEROL SULFATE 2.5 MG: 2.5 SOLUTION RESPIRATORY (INHALATION) at 21:32

## 2020-03-29 RX ADMIN — METOPROLOL TARTRATE 5 MG: 5 INJECTION INTRAVENOUS at 14:25

## 2020-03-29 RX ADMIN — METOPROLOL TARTRATE 5 MG: 5 INJECTION INTRAVENOUS at 17:37

## 2020-03-29 RX ADMIN — METOCLOPRAMIDE HYDROCHLORIDE 10 MG: 5 INJECTION INTRAMUSCULAR; INTRAVENOUS at 06:28

## 2020-03-29 RX ADMIN — QUETIAPINE FUMARATE 25 MG: 25 TABLET ORAL at 14:26

## 2020-03-29 RX ADMIN — SODIUM CHLORIDE 30 MG/ML INHALATION SOLUTION 15 ML: 30 SOLUTION INHALANT at 21:32

## 2020-03-29 RX ADMIN — BUDESONIDE 500 MCG: 0.5 INHALANT RESPIRATORY (INHALATION) at 08:23

## 2020-03-29 RX ADMIN — METOPROLOL TARTRATE 5 MG: 5 INJECTION INTRAVENOUS at 06:29

## 2020-03-29 RX ADMIN — METOPROLOL TARTRATE 5 MG: 5 INJECTION INTRAVENOUS at 01:14

## 2020-03-29 RX ADMIN — HYDROMORPHONE HYDROCHLORIDE 0.5 MG: 1 INJECTION, SOLUTION INTRAMUSCULAR; INTRAVENOUS; SUBCUTANEOUS at 05:27

## 2020-03-29 RX ADMIN — ALBUTEROL SULFATE 2.5 MG: 2.5 SOLUTION RESPIRATORY (INHALATION) at 08:23

## 2020-03-29 RX ADMIN — ENOXAPARIN SODIUM 40 MG: 40 INJECTION SUBCUTANEOUS at 09:28

## 2020-03-29 RX ADMIN — METOCLOPRAMIDE HYDROCHLORIDE 10 MG: 5 INJECTION INTRAMUSCULAR; INTRAVENOUS at 23:27

## 2020-03-29 RX ADMIN — Medication 10 ML: at 09:29

## 2020-03-29 RX ADMIN — Medication 10 ML: at 19:50

## 2020-03-29 RX ADMIN — HYDROMORPHONE HYDROCHLORIDE 0.5 MG: 1 INJECTION, SOLUTION INTRAMUSCULAR; INTRAVENOUS; SUBCUTANEOUS at 11:27

## 2020-03-29 RX ADMIN — ARFORMOTEROL TARTRATE 15 MCG: 15 SOLUTION RESPIRATORY (INHALATION) at 08:23

## 2020-03-29 RX ADMIN — ALBUTEROL SULFATE 2.5 MG: 2.5 SOLUTION RESPIRATORY (INHALATION) at 15:11

## 2020-03-29 RX ADMIN — BUDESONIDE 500 MCG: 0.5 INHALANT RESPIRATORY (INHALATION) at 21:32

## 2020-03-29 RX ADMIN — DAKIN'S SOLUTION 0.125% (QUARTER STRENGTH): 0.12 SOLUTION at 11:25

## 2020-03-29 RX ADMIN — CALCIUM GLUCONATE: 94 INJECTION, SOLUTION INTRAVENOUS at 16:22

## 2020-03-29 RX ADMIN — TIOTROPIUM BROMIDE INHALATION SPRAY 2 PUFF: 3.12 SPRAY, METERED RESPIRATORY (INHALATION) at 08:23

## 2020-03-29 RX ADMIN — PANTOPRAZOLE SODIUM 40 MG: 40 INJECTION, POWDER, FOR SOLUTION INTRAVENOUS at 09:27

## 2020-03-29 RX ADMIN — ARFORMOTEROL TARTRATE 15 MCG: 15 SOLUTION RESPIRATORY (INHALATION) at 21:32

## 2020-03-29 RX ADMIN — METOPROLOL TARTRATE 5 MG: 5 INJECTION INTRAVENOUS at 23:27

## 2020-03-29 RX ADMIN — METOCLOPRAMIDE HYDROCHLORIDE 10 MG: 5 INJECTION INTRAMUSCULAR; INTRAVENOUS at 11:27

## 2020-03-29 ASSESSMENT — PAIN DESCRIPTION - ONSET
ONSET: ON-GOING

## 2020-03-29 ASSESSMENT — PAIN DESCRIPTION - PROGRESSION
CLINICAL_PROGRESSION: NOT CHANGED
CLINICAL_PROGRESSION: NOT CHANGED
CLINICAL_PROGRESSION: GRADUALLY WORSENING

## 2020-03-29 ASSESSMENT — PAIN SCALES - GENERAL
PAINLEVEL_OUTOF10: 5
PAINLEVEL_OUTOF10: 6
PAINLEVEL_OUTOF10: 4
PAINLEVEL_OUTOF10: 5
PAINLEVEL_OUTOF10: 9

## 2020-03-29 ASSESSMENT — PAIN DESCRIPTION - LOCATION
LOCATION: ABDOMEN;BACK
LOCATION: ABDOMEN
LOCATION: ABDOMEN

## 2020-03-29 ASSESSMENT — PAIN DESCRIPTION - PAIN TYPE
TYPE: SURGICAL PAIN
TYPE: CHRONIC PAIN
TYPE: SURGICAL PAIN

## 2020-03-29 ASSESSMENT — PAIN DESCRIPTION - FREQUENCY
FREQUENCY: CONTINUOUS

## 2020-03-29 ASSESSMENT — PAIN - FUNCTIONAL ASSESSMENT: PAIN_FUNCTIONAL_ASSESSMENT: PREVENTS OR INTERFERES SOME ACTIVE ACTIVITIES AND ADLS

## 2020-03-29 ASSESSMENT — PAIN DESCRIPTION - ORIENTATION
ORIENTATION: LOWER

## 2020-03-29 ASSESSMENT — PAIN DESCRIPTION - DESCRIPTORS
DESCRIPTORS: ACHING

## 2020-03-29 ASSESSMENT — PAIN DESCRIPTION - DIRECTION: RADIATING_TOWARDS: NO

## 2020-03-29 NOTE — PROGRESS NOTES
Surgery Daily Progress Note  Scot Isbell  CC:  Abdominal wall dehiscence      Subjective :   Fever yesterday to 101.1, resolved with tylenol. Gastrographin administered yesterday, since then has had multiple episodes of diarrhea. NG output slowed down. Voiding without difficulty, ambulating. Passing flatus. No nausea or emesis. Objective    Infusions:   Adult TPN 3-in-1 - Central Line (Custom) with Lipids 75 mL/hr at 03/28/20 1542        I/O:I/O last 3 completed shifts: In: 0   Out: 2425 [Urine:1075; Emesis/NG output:1250; Drains:100]           Wt Readings from Last 1 Encounters:   03/27/20 197 lb 5 oz (89.5 kg)                 LABS:    Recent Labs     03/28/20  0500 03/29/20  0500   WBC 12.7* 9.3   HGB 11.0* 10.7*   HCT 33.6* 31.8*   MCV 98.4 97.8    248        Recent Labs     03/28/20  0500 03/29/20  0500   * 136   K 4.3 4.1   CL 99 102   CO2 24 23   PHOS 3.1 3.1   BUN 11 13   CREATININE <0.5* 0.5*        No results for input(s): AST, ALT, ALB, BILIDIR, BILITOT, ALKPHOS in the last 72 hours. No results for input(s): LIPASE, AMYLASE in the last 72 hours. No results for input(s): PROT, INR, APTT in the last 72 hours. No results for input(s): CKTOTAL, CKMB, CKMBINDEX, TROPONINI in the last 72 hours. Exam:/72   Pulse 95   Temp 99.6 °F (37.6 °C) (Oral)   Resp 20   Ht 5' 9\" (1.753 m)   Wt 197 lb 5 oz (89.5 kg)   SpO2 98%   BMI 29.14 kg/m²      General appearance: alert, appears stated age and cooperative  ENT: NGT to suction with bilious output  Lungs: clear to auscultation bilaterally  Heart: regular rate and rhythm, S1, S2   Abdomen:  softer, appropriately-tender;  Wound vac present with adequate suction         ASSESSMENT/PLAN: Pt. is a 61 y.o. male with a history of large tubovillious adenoma s/p laproscopic transvere colectomy, laproscopic mobilization splenic flexure omental pedicle (3/13) with return to the OR for abdominal wall dehiscence s/p Ex lap,

## 2020-03-29 NOTE — PLAN OF CARE
Problem: Pain:  Goal: Pain level will decrease  Description: Pain level will decrease  3/28/2020 2326 by Osmar Olson, RN  Outcome: Ongoing   Patient complains of pain, patient medicated per mar. Will continue to monitor. Problem: Falls - Risk of:  Goal: Will remain free from falls  Description: Patient in bed with alarm and nonskid socks on, call light, belongings, and bedside table within reach. Patient educated on using call light and instructed to call out for assistance when getting up. AVAsys monitoring on for safety. 3/28/2020 2326 by Osmar Olson, TONIA  Outcome: Ongoing   Patient is at risk for falls. Patient is in bed with bed alarm on, fall risk ID, Nonskid socks, Bed in lowest position. Call light and bedside table are within reach. Patient calls out approprietly. Will continue to monitor.

## 2020-03-30 LAB
ALBUMIN SERPL-MCNC: 2.8 G/DL (ref 3.4–5)
ANION GAP SERPL CALCULATED.3IONS-SCNC: 11 MMOL/L (ref 3–16)
BASOPHILS ABSOLUTE: 0.1 K/UL (ref 0–0.2)
BASOPHILS RELATIVE PERCENT: 0.8 %
BUN BLDV-MCNC: 10 MG/DL (ref 7–20)
CALCIUM SERPL-MCNC: 8.3 MG/DL (ref 8.3–10.6)
CHLORIDE BLD-SCNC: 99 MMOL/L (ref 99–110)
CO2: 22 MMOL/L (ref 21–32)
CREAT SERPL-MCNC: 0.6 MG/DL (ref 0.9–1.3)
EKG ATRIAL RATE: 95 BPM
EKG DIAGNOSIS: NORMAL
EKG P AXIS: 75 DEGREES
EKG P-R INTERVAL: 118 MS
EKG Q-T INTERVAL: 374 MS
EKG QRS DURATION: 72 MS
EKG QTC CALCULATION (BAZETT): 469 MS
EKG R AXIS: 77 DEGREES
EKG T AXIS: 55 DEGREES
EKG VENTRICULAR RATE: 95 BPM
EOSINOPHILS ABSOLUTE: 0.1 K/UL (ref 0–0.6)
EOSINOPHILS RELATIVE PERCENT: 0.7 %
GFR AFRICAN AMERICAN: >60
GFR NON-AFRICAN AMERICAN: >60
GLUCOSE BLD-MCNC: 111 MG/DL (ref 70–99)
GLUCOSE BLD-MCNC: 112 MG/DL (ref 70–99)
GLUCOSE BLD-MCNC: 113 MG/DL (ref 70–99)
HCT VFR BLD CALC: 32.2 % (ref 40.5–52.5)
HEMOGLOBIN: 10.9 G/DL (ref 13.5–17.5)
LYMPHOCYTES ABSOLUTE: 1.3 K/UL (ref 1–5.1)
LYMPHOCYTES RELATIVE PERCENT: 14.2 %
MAGNESIUM: 1.7 MG/DL (ref 1.8–2.4)
MCH RBC QN AUTO: 33 PG (ref 26–34)
MCHC RBC AUTO-ENTMCNC: 33.9 G/DL (ref 31–36)
MCV RBC AUTO: 97.3 FL (ref 80–100)
MONOCYTES ABSOLUTE: 1 K/UL (ref 0–1.3)
MONOCYTES RELATIVE PERCENT: 10.5 %
NEUTROPHILS ABSOLUTE: 6.9 K/UL (ref 1.7–7.7)
NEUTROPHILS RELATIVE PERCENT: 73.8 %
PDW BLD-RTO: 13.5 % (ref 12.4–15.4)
PERFORMED ON: ABNORMAL
PERFORMED ON: ABNORMAL
PHOSPHORUS: 2.7 MG/DL (ref 2.5–4.9)
PLATELET # BLD: 269 K/UL (ref 135–450)
PMV BLD AUTO: 10.2 FL (ref 5–10.5)
POTASSIUM SERPL-SCNC: 4.1 MMOL/L (ref 3.5–5.1)
RBC # BLD: 3.3 M/UL (ref 4.2–5.9)
SODIUM BLD-SCNC: 132 MMOL/L (ref 136–145)
WBC # BLD: 9.3 K/UL (ref 4–11)

## 2020-03-30 PROCEDURE — 97530 THERAPEUTIC ACTIVITIES: CPT

## 2020-03-30 PROCEDURE — 94150 VITAL CAPACITY TEST: CPT

## 2020-03-30 PROCEDURE — 6360000002 HC RX W HCPCS: Performed by: STUDENT IN AN ORGANIZED HEALTH CARE EDUCATION/TRAINING PROGRAM

## 2020-03-30 PROCEDURE — 94640 AIRWAY INHALATION TREATMENT: CPT

## 2020-03-30 PROCEDURE — 6370000000 HC RX 637 (ALT 250 FOR IP): Performed by: STUDENT IN AN ORGANIZED HEALTH CARE EDUCATION/TRAINING PROGRAM

## 2020-03-30 PROCEDURE — 6360000002 HC RX W HCPCS: Performed by: SURGERY

## 2020-03-30 PROCEDURE — 94669 MECHANICAL CHEST WALL OSCILL: CPT

## 2020-03-30 PROCEDURE — 97535 SELF CARE MNGMENT TRAINING: CPT

## 2020-03-30 PROCEDURE — 2580000003 HC RX 258: Performed by: SURGERY

## 2020-03-30 PROCEDURE — C9113 INJ PANTOPRAZOLE SODIUM, VIA: HCPCS | Performed by: STUDENT IN AN ORGANIZED HEALTH CARE EDUCATION/TRAINING PROGRAM

## 2020-03-30 PROCEDURE — 93010 ELECTROCARDIOGRAM REPORT: CPT | Performed by: INTERNAL MEDICINE

## 2020-03-30 PROCEDURE — 80069 RENAL FUNCTION PANEL: CPT

## 2020-03-30 PROCEDURE — 85025 COMPLETE CBC W/AUTO DIFF WBC: CPT

## 2020-03-30 PROCEDURE — 83735 ASSAY OF MAGNESIUM: CPT

## 2020-03-30 PROCEDURE — 1200000000 HC SEMI PRIVATE

## 2020-03-30 PROCEDURE — 2500000003 HC RX 250 WO HCPCS: Performed by: STUDENT IN AN ORGANIZED HEALTH CARE EDUCATION/TRAINING PROGRAM

## 2020-03-30 RX ORDER — QUETIAPINE FUMARATE 25 MG/1
25 TABLET, FILM COATED ORAL 2 TIMES DAILY
Status: DISCONTINUED | OUTPATIENT
Start: 2020-03-30 | End: 2020-04-01 | Stop reason: HOSPADM

## 2020-03-30 RX ORDER — ACETAMINOPHEN 325 MG/1
650 TABLET ORAL EVERY 4 HOURS PRN
Status: DISCONTINUED | OUTPATIENT
Start: 2020-03-30 | End: 2020-03-31

## 2020-03-30 RX ORDER — DEXTROSE, SODIUM CHLORIDE, AND POTASSIUM CHLORIDE 5; .45; .15 G/100ML; G/100ML; G/100ML
INJECTION INTRAVENOUS CONTINUOUS
Status: DISCONTINUED | OUTPATIENT
Start: 2020-03-30 | End: 2020-03-30

## 2020-03-30 RX ORDER — MAGNESIUM SULFATE IN WATER 40 MG/ML
2 INJECTION, SOLUTION INTRAVENOUS ONCE
Status: COMPLETED | OUTPATIENT
Start: 2020-03-30 | End: 2020-03-30

## 2020-03-30 RX ORDER — OXYCODONE HYDROCHLORIDE 5 MG/1
5 TABLET ORAL EVERY 4 HOURS PRN
Status: DISCONTINUED | OUTPATIENT
Start: 2020-03-30 | End: 2020-03-31 | Stop reason: SDUPTHER

## 2020-03-30 RX ORDER — QUETIAPINE FUMARATE 25 MG/1
50 TABLET, FILM COATED ORAL NIGHTLY
Status: DISCONTINUED | OUTPATIENT
Start: 2020-03-30 | End: 2020-04-01 | Stop reason: HOSPADM

## 2020-03-30 RX ORDER — DEXTROSE, SODIUM CHLORIDE, AND POTASSIUM CHLORIDE 5; .9; .15 G/100ML; G/100ML; G/100ML
INJECTION INTRAVENOUS CONTINUOUS
Status: DISCONTINUED | OUTPATIENT
Start: 2020-03-30 | End: 2020-03-31

## 2020-03-30 RX ORDER — OXYCODONE HYDROCHLORIDE 5 MG/1
10 TABLET ORAL EVERY 4 HOURS PRN
Status: DISCONTINUED | OUTPATIENT
Start: 2020-03-30 | End: 2020-03-31 | Stop reason: SDUPTHER

## 2020-03-30 RX ADMIN — BUDESONIDE 500 MCG: 0.5 INHALANT RESPIRATORY (INHALATION) at 08:10

## 2020-03-30 RX ADMIN — AMLODIPINE BESYLATE 7.5 MG: 5 TABLET ORAL at 20:50

## 2020-03-30 RX ADMIN — SODIUM CHLORIDE 30 MG/ML INHALATION SOLUTION 15 ML: 30 SOLUTION INHALANT at 08:19

## 2020-03-30 RX ADMIN — DAKIN'S SOLUTION 0.125% (QUARTER STRENGTH): 0.12 SOLUTION at 11:38

## 2020-03-30 RX ADMIN — Medication 10 ML: at 08:58

## 2020-03-30 RX ADMIN — ARFORMOTEROL TARTRATE 15 MCG: 15 SOLUTION RESPIRATORY (INHALATION) at 08:10

## 2020-03-30 RX ADMIN — DEXTROSE MONOHYDRATE, SODIUM CHLORIDE, AND POTASSIUM CHLORIDE: 50; 4.5; 1.49 INJECTION, SOLUTION INTRAVENOUS at 02:56

## 2020-03-30 RX ADMIN — BUDESONIDE 500 MCG: 0.5 INHALANT RESPIRATORY (INHALATION) at 21:07

## 2020-03-30 RX ADMIN — MAGNESIUM SULFATE HEPTAHYDRATE 2 G: 40 INJECTION, SOLUTION INTRAVENOUS at 08:07

## 2020-03-30 RX ADMIN — ALBUTEROL SULFATE 2.5 MG: 2.5 SOLUTION RESPIRATORY (INHALATION) at 15:32

## 2020-03-30 RX ADMIN — QUETIAPINE FUMARATE 25 MG: 25 TABLET ORAL at 14:22

## 2020-03-30 RX ADMIN — AMLODIPINE BESYLATE 7.5 MG: 5 TABLET ORAL at 08:27

## 2020-03-30 RX ADMIN — PANTOPRAZOLE SODIUM 40 MG: 40 INJECTION, POWDER, FOR SOLUTION INTRAVENOUS at 08:20

## 2020-03-30 RX ADMIN — ALBUTEROL SULFATE 2.5 MG: 2.5 SOLUTION RESPIRATORY (INHALATION) at 21:07

## 2020-03-30 RX ADMIN — ENOXAPARIN SODIUM 40 MG: 40 INJECTION SUBCUTANEOUS at 08:27

## 2020-03-30 RX ADMIN — POTASSIUM CHLORIDE, DEXTROSE MONOHYDRATE AND SODIUM CHLORIDE: 150; 5; 900 INJECTION, SOLUTION INTRAVENOUS at 10:40

## 2020-03-30 RX ADMIN — OXYCODONE 5 MG: 5 TABLET ORAL at 18:41

## 2020-03-30 RX ADMIN — ACETAMINOPHEN 650 MG: 325 TABLET ORAL at 16:14

## 2020-03-30 RX ADMIN — TIOTROPIUM BROMIDE INHALATION SPRAY 2 PUFF: 3.12 SPRAY, METERED RESPIRATORY (INHALATION) at 08:19

## 2020-03-30 RX ADMIN — METOPROLOL TARTRATE 5 MG: 5 INJECTION INTRAVENOUS at 06:30

## 2020-03-30 RX ADMIN — QUETIAPINE FUMARATE 25 MG: 25 TABLET ORAL at 08:21

## 2020-03-30 RX ADMIN — HYDROMORPHONE HYDROCHLORIDE 0.5 MG: 1 INJECTION, SOLUTION INTRAMUSCULAR; INTRAVENOUS; SUBCUTANEOUS at 21:38

## 2020-03-30 RX ADMIN — ALBUTEROL SULFATE 2.5 MG: 2.5 SOLUTION RESPIRATORY (INHALATION) at 08:10

## 2020-03-30 RX ADMIN — ARFORMOTEROL TARTRATE 15 MCG: 15 SOLUTION RESPIRATORY (INHALATION) at 21:07

## 2020-03-30 RX ADMIN — Medication 10 ML: at 08:20

## 2020-03-30 RX ADMIN — SODIUM CHLORIDE 30 MG/ML INHALATION SOLUTION 15 ML: 30 SOLUTION INHALANT at 21:07

## 2020-03-30 RX ADMIN — SODIUM CHLORIDE 30 MG/ML INHALATION SOLUTION 15 ML: 30 SOLUTION INHALANT at 15:32

## 2020-03-30 RX ADMIN — QUETIAPINE FUMARATE 50 MG: 25 TABLET ORAL at 20:50

## 2020-03-30 ASSESSMENT — PAIN DESCRIPTION - ORIENTATION
ORIENTATION: LOWER
ORIENTATION: LOWER
ORIENTATION: LOWER;MID

## 2020-03-30 ASSESSMENT — PAIN SCALES - GENERAL
PAINLEVEL_OUTOF10: 4
PAINLEVEL_OUTOF10: 0
PAINLEVEL_OUTOF10: 9
PAINLEVEL_OUTOF10: 8

## 2020-03-30 ASSESSMENT — PAIN DESCRIPTION - FREQUENCY
FREQUENCY: CONTINUOUS
FREQUENCY: INTERMITTENT

## 2020-03-30 ASSESSMENT — PAIN DESCRIPTION - ONSET
ONSET: ON-GOING
ONSET: GRADUAL

## 2020-03-30 ASSESSMENT — PAIN DESCRIPTION - PAIN TYPE
TYPE: SURGICAL PAIN

## 2020-03-30 ASSESSMENT — PAIN DESCRIPTION - LOCATION
LOCATION: ABDOMEN

## 2020-03-30 ASSESSMENT — PAIN DESCRIPTION - PROGRESSION: CLINICAL_PROGRESSION: GRADUALLY WORSENING

## 2020-03-30 ASSESSMENT — PAIN DESCRIPTION - DESCRIPTORS: DESCRIPTORS: SORE

## 2020-03-30 NOTE — PROGRESS NOTES
Pt is up X1 with walker and gaitbelt. Pt is tolerating clear liquid diet. Pt has denied n/v at this time. Pt was complaining of stomach pain and was medicated per MAR. Fall precautions are in place. Will continue to monitor.

## 2020-03-30 NOTE — PROGRESS NOTES
grossly normal       ASSESSMENT/PLAN: Pt. is a 61 y.o. male with a history of large tubovillious adenoma s/p laproscopic transvere colectomy, laproscopic mobilization splenic flexure omental pedicle (3/13) with return to the OR for abdominal wall dehiscence s/p Ex lap, abdominal washout, incisional hernia repair with mesh and wound vac. Ex lap, abdominal washout, incisional hernia repair with mesh and wound vac  - wound vac removed 3/29 due to low grade fever without source. - Plan to pack wet to dry BID   - dilaudid for pain control - transition to oral pain medication once tolerating a diet     Post operative ileus - resolved  - patient with multiple bowel movements- 3x BM   - last CT scan completed on 3/25 without signs of anastomotic leak  - Reglan discontinued due to altered mental status   - advance to clear liquids with supplements this morning.  No carbonation     Malnutrition  - TPN discontinued due to removal of PICC line overnight  - weekly nutritional markers- pending draw today   - TID glucose until     Low grade fevers- Tmax 100.9  - follow up blood cultures (3/28)  - UA 3/28 - negative, 3/25- within normal limits   - Chest x ray without sign of PNA - left lower lobe atelectasis, continue IS , EZPAP, acapella, breathing treatments   - leukocytosis normalized to 9.3 from 9.3  - wound vac removed 3/29 with placement of wet to dry and fat necrosis debrided     AMS  - has some confusion and memory problems at baseline likely secondary to drinking history  - seems more disoriented over the weekend with lucid intervals  - stop Regaln  - restart home Trazadone  - Seroquel PRN for agitation       Medical History  HTN: continue to hold home atenolol replaced with lopressor 5 mg q 6 hours, hold home Norvasc  COPD: continue albuterol, pulmicort, Brovana and Spiriva   RA: hold home Plaquenil and gabapentin   HLD: hold home Lipitor  Insomnia: Restart home Trazodone today   Nicotine use: completed Nicotine

## 2020-03-31 LAB
ALBUMIN SERPL-MCNC: 2.5 G/DL (ref 3.4–5)
ANION GAP SERPL CALCULATED.3IONS-SCNC: 12 MMOL/L (ref 3–16)
BASOPHILS ABSOLUTE: 0.1 K/UL (ref 0–0.2)
BASOPHILS RELATIVE PERCENT: 0.8 %
BUN BLDV-MCNC: 6 MG/DL (ref 7–20)
CALCIUM SERPL-MCNC: 7.9 MG/DL (ref 8.3–10.6)
CHLORIDE BLD-SCNC: 98 MMOL/L (ref 99–110)
CO2: 20 MMOL/L (ref 21–32)
CREAT SERPL-MCNC: 0.6 MG/DL (ref 0.9–1.3)
EOSINOPHILS ABSOLUTE: 0.1 K/UL (ref 0–0.6)
EOSINOPHILS RELATIVE PERCENT: 1.1 %
GFR AFRICAN AMERICAN: >60
GFR NON-AFRICAN AMERICAN: >60
GLUCOSE BLD-MCNC: 119 MG/DL (ref 70–99)
HCT VFR BLD CALC: 31.5 % (ref 40.5–52.5)
HEMOGLOBIN: 10.5 G/DL (ref 13.5–17.5)
LYMPHOCYTES ABSOLUTE: 1.2 K/UL (ref 1–5.1)
LYMPHOCYTES RELATIVE PERCENT: 15 %
MAGNESIUM: 1.9 MG/DL (ref 1.8–2.4)
MCH RBC QN AUTO: 32.6 PG (ref 26–34)
MCHC RBC AUTO-ENTMCNC: 33.4 G/DL (ref 31–36)
MCV RBC AUTO: 97.7 FL (ref 80–100)
MONOCYTES ABSOLUTE: 0.7 K/UL (ref 0–1.3)
MONOCYTES RELATIVE PERCENT: 8.6 %
NEUTROPHILS ABSOLUTE: 6.2 K/UL (ref 1.7–7.7)
NEUTROPHILS RELATIVE PERCENT: 74.5 %
PDW BLD-RTO: 14 % (ref 12.4–15.4)
PHOSPHORUS: 3.5 MG/DL (ref 2.5–4.9)
PLATELET # BLD: 254 K/UL (ref 135–450)
PMV BLD AUTO: 10 FL (ref 5–10.5)
POTASSIUM SERPL-SCNC: 3.9 MMOL/L (ref 3.5–5.1)
RBC # BLD: 3.22 M/UL (ref 4.2–5.9)
SODIUM BLD-SCNC: 130 MMOL/L (ref 136–145)
WBC # BLD: 8.3 K/UL (ref 4–11)

## 2020-03-31 PROCEDURE — 6360000002 HC RX W HCPCS: Performed by: STUDENT IN AN ORGANIZED HEALTH CARE EDUCATION/TRAINING PROGRAM

## 2020-03-31 PROCEDURE — 6360000002 HC RX W HCPCS: Performed by: SURGERY

## 2020-03-31 PROCEDURE — 94669 MECHANICAL CHEST WALL OSCILL: CPT

## 2020-03-31 PROCEDURE — 2500000003 HC RX 250 WO HCPCS: Performed by: STUDENT IN AN ORGANIZED HEALTH CARE EDUCATION/TRAINING PROGRAM

## 2020-03-31 PROCEDURE — 85025 COMPLETE CBC W/AUTO DIFF WBC: CPT

## 2020-03-31 PROCEDURE — 80069 RENAL FUNCTION PANEL: CPT

## 2020-03-31 PROCEDURE — 36415 COLL VENOUS BLD VENIPUNCTURE: CPT

## 2020-03-31 PROCEDURE — 94150 VITAL CAPACITY TEST: CPT

## 2020-03-31 PROCEDURE — 6370000000 HC RX 637 (ALT 250 FOR IP): Performed by: STUDENT IN AN ORGANIZED HEALTH CARE EDUCATION/TRAINING PROGRAM

## 2020-03-31 PROCEDURE — 83735 ASSAY OF MAGNESIUM: CPT

## 2020-03-31 PROCEDURE — 97530 THERAPEUTIC ACTIVITIES: CPT

## 2020-03-31 PROCEDURE — 2580000003 HC RX 258: Performed by: SURGERY

## 2020-03-31 PROCEDURE — 1200000000 HC SEMI PRIVATE

## 2020-03-31 PROCEDURE — 97116 GAIT TRAINING THERAPY: CPT

## 2020-03-31 PROCEDURE — 94640 AIRWAY INHALATION TREATMENT: CPT

## 2020-03-31 PROCEDURE — 97535 SELF CARE MNGMENT TRAINING: CPT

## 2020-03-31 RX ORDER — PANTOPRAZOLE SODIUM 40 MG/1
40 TABLET, DELAYED RELEASE ORAL
Status: DISCONTINUED | OUTPATIENT
Start: 2020-03-31 | End: 2020-04-01 | Stop reason: HOSPADM

## 2020-03-31 RX ORDER — ALBUTEROL SULFATE 2.5 MG/3ML
2.5 SOLUTION RESPIRATORY (INHALATION) 3 TIMES DAILY
Status: DISCONTINUED | OUTPATIENT
Start: 2020-03-31 | End: 2020-04-01 | Stop reason: HOSPADM

## 2020-03-31 RX ORDER — OXYCODONE HYDROCHLORIDE 5 MG/1
10 TABLET ORAL EVERY 4 HOURS PRN
Status: DISCONTINUED | OUTPATIENT
Start: 2020-03-31 | End: 2020-04-01 | Stop reason: HOSPADM

## 2020-03-31 RX ORDER — OXYCODONE HYDROCHLORIDE 5 MG/1
5 TABLET ORAL EVERY 4 HOURS PRN
Status: DISCONTINUED | OUTPATIENT
Start: 2020-03-31 | End: 2020-04-01 | Stop reason: HOSPADM

## 2020-03-31 RX ORDER — ACETAMINOPHEN 325 MG/1
650 TABLET ORAL EVERY 4 HOURS PRN
Status: DISCONTINUED | OUTPATIENT
Start: 2020-03-31 | End: 2020-04-01 | Stop reason: HOSPADM

## 2020-03-31 RX ORDER — MAGNESIUM SULFATE 1 G/100ML
1 INJECTION INTRAVENOUS ONCE
Status: COMPLETED | OUTPATIENT
Start: 2020-03-31 | End: 2020-03-31

## 2020-03-31 RX ADMIN — Medication 10 ML: at 09:20

## 2020-03-31 RX ADMIN — AMLODIPINE BESYLATE 7.5 MG: 5 TABLET ORAL at 20:18

## 2020-03-31 RX ADMIN — SODIUM CHLORIDE 30 MG/ML INHALATION SOLUTION 15 ML: 30 SOLUTION INHALANT at 13:53

## 2020-03-31 RX ADMIN — AMLODIPINE BESYLATE 7.5 MG: 5 TABLET ORAL at 09:19

## 2020-03-31 RX ADMIN — ALBUTEROL SULFATE 2.5 MG: 2.5 SOLUTION RESPIRATORY (INHALATION) at 13:52

## 2020-03-31 RX ADMIN — Medication 10 ML: at 20:19

## 2020-03-31 RX ADMIN — ALBUTEROL SULFATE 2.5 MG: 2.5 SOLUTION RESPIRATORY (INHALATION) at 07:41

## 2020-03-31 RX ADMIN — ARFORMOTEROL TARTRATE 15 MCG: 15 SOLUTION RESPIRATORY (INHALATION) at 21:07

## 2020-03-31 RX ADMIN — BUDESONIDE 500 MCG: 0.5 INHALANT RESPIRATORY (INHALATION) at 21:07

## 2020-03-31 RX ADMIN — OXYCODONE 10 MG: 5 TABLET ORAL at 07:07

## 2020-03-31 RX ADMIN — ATENOLOL 50 MG: 50 TABLET ORAL at 09:19

## 2020-03-31 RX ADMIN — DOCUSATE SODIUM 100 MG: 100 CAPSULE, LIQUID FILLED ORAL at 09:19

## 2020-03-31 RX ADMIN — TIOTROPIUM BROMIDE INHALATION SPRAY 2 PUFF: 3.12 SPRAY, METERED RESPIRATORY (INHALATION) at 07:44

## 2020-03-31 RX ADMIN — MAGNESIUM SULFATE HEPTAHYDRATE 1 G: 1 INJECTION, SOLUTION INTRAVENOUS at 06:58

## 2020-03-31 RX ADMIN — OXYCODONE 10 MG: 5 TABLET ORAL at 19:12

## 2020-03-31 RX ADMIN — SODIUM CHLORIDE 30 MG/ML INHALATION SOLUTION 15 ML: 30 SOLUTION INHALANT at 21:08

## 2020-03-31 RX ADMIN — SODIUM CHLORIDE 30 MG/ML INHALATION SOLUTION 15 ML: 30 SOLUTION INHALANT at 07:44

## 2020-03-31 RX ADMIN — OXYCODONE 10 MG: 5 TABLET ORAL at 11:38

## 2020-03-31 RX ADMIN — ENOXAPARIN SODIUM 40 MG: 40 INJECTION SUBCUTANEOUS at 09:19

## 2020-03-31 RX ADMIN — BUDESONIDE 500 MCG: 0.5 INHALANT RESPIRATORY (INHALATION) at 07:41

## 2020-03-31 RX ADMIN — QUETIAPINE FUMARATE 50 MG: 25 TABLET ORAL at 20:18

## 2020-03-31 RX ADMIN — ALBUTEROL SULFATE 2.5 MG: 2.5 SOLUTION RESPIRATORY (INHALATION) at 21:08

## 2020-03-31 RX ADMIN — PANTOPRAZOLE SODIUM 40 MG: 40 TABLET, DELAYED RELEASE ORAL at 09:19

## 2020-03-31 RX ADMIN — DOCUSATE SODIUM 100 MG: 100 CAPSULE, LIQUID FILLED ORAL at 20:18

## 2020-03-31 RX ADMIN — DAKIN'S SOLUTION 0.125% (QUARTER STRENGTH) 473 ML: 0.12 SOLUTION at 09:20

## 2020-03-31 RX ADMIN — ATORVASTATIN CALCIUM 80 MG: 80 TABLET, FILM COATED ORAL at 20:18

## 2020-03-31 RX ADMIN — QUETIAPINE FUMARATE 25 MG: 25 TABLET ORAL at 09:19

## 2020-03-31 RX ADMIN — POTASSIUM CHLORIDE, DEXTROSE MONOHYDRATE AND SODIUM CHLORIDE: 150; 5; 900 INJECTION, SOLUTION INTRAVENOUS at 01:24

## 2020-03-31 RX ADMIN — ARFORMOTEROL TARTRATE 15 MCG: 15 SOLUTION RESPIRATORY (INHALATION) at 07:41

## 2020-03-31 RX ADMIN — QUETIAPINE FUMARATE 25 MG: 25 TABLET ORAL at 13:28

## 2020-03-31 ASSESSMENT — PAIN DESCRIPTION - DESCRIPTORS
DESCRIPTORS: SORE
DESCRIPTORS: SORE
DESCRIPTORS: ACHING

## 2020-03-31 ASSESSMENT — PAIN DESCRIPTION - FREQUENCY
FREQUENCY: CONTINUOUS
FREQUENCY: INTERMITTENT
FREQUENCY: CONTINUOUS

## 2020-03-31 ASSESSMENT — PAIN DESCRIPTION - PAIN TYPE
TYPE: SURGICAL PAIN

## 2020-03-31 ASSESSMENT — PAIN DESCRIPTION - ONSET
ONSET: ON-GOING
ONSET: GRADUAL
ONSET: GRADUAL

## 2020-03-31 ASSESSMENT — PAIN DESCRIPTION - ORIENTATION
ORIENTATION: LOWER

## 2020-03-31 ASSESSMENT — PAIN SCALES - GENERAL
PAINLEVEL_OUTOF10: 0
PAINLEVEL_OUTOF10: 0
PAINLEVEL_OUTOF10: 10
PAINLEVEL_OUTOF10: 9
PAINLEVEL_OUTOF10: 8

## 2020-03-31 ASSESSMENT — PAIN SCALES - WONG BAKER: WONGBAKER_NUMERICALRESPONSE: 0

## 2020-03-31 ASSESSMENT — PAIN DESCRIPTION - LOCATION
LOCATION: ABDOMEN

## 2020-03-31 ASSESSMENT — PAIN DESCRIPTION - PROGRESSION
CLINICAL_PROGRESSION: NOT CHANGED
CLINICAL_PROGRESSION: GRADUALLY WORSENING

## 2020-03-31 NOTE — PROGRESS NOTES
[place], decreased step length and height; vc to stay on task  Distance: 120 ft, 20 ft                              AM-PAC Score  AM-PAC Inpatient Mobility Raw Score : 17 (03/31/20 1148)  AM-PAC Inpatient T-Scale Score : 42.13 (03/31/20 1148)  Mobility Inpatient CMS 0-100% Score: 50.57 (03/31/20 1148)  Mobility Inpatient CMS G-Code Modifier : CK (03/31/20 1148)          Goals  Short term goals  Time Frame for Short term goals: DC   Short term goal 1: Pt will complete functional txfs independently  ongoing  Short term goal 2: Pt will ambulate greater than 250' independently   ongoing  Short term goal 3: up/down 8 steps with rail supervision  ongoing  Short term goal 4: sit to/from supine supervision   ongoing  Patient Goals   Patient goals :  To return home    Plan    Plan  Times per week: 2-5  Current Treatment Recommendations: Strengthening, Gait Training, Patient/Caregiver Education & Training, Equipment Evaluation, Education, & procurement, Balance Training, Home Exercise Program, Functional Mobility Training, Transfer Training, Safety Education & Training  Safety Devices  Type of devices: Call light within reach, Chair alarm in place, Left in chair, Nurse notified, Gait belt(pt reclined)  Restraints  Initially in place: No     Therapy Time   Individual Concurrent Group Co-treatment   Time In 1125         Time Out 1148         Minutes 23           Timed Code Treatment Minutes:  23 Minutes    Total Treatment Minutes:  JORDAN Rangel

## 2020-03-31 NOTE — PROGRESS NOTES
Patient alert and oriented. VSS Patient c/o pain Dilaudid given per protocol. Patient in bed lowest position call light and bedside table within reach. All needs are met at this time. Patient aware to call if any help needed. Will continue to monitor  /68   Pulse 103   Temp 98.1 °F (36.7 °C) (Oral)   Resp 16   Ht 5' 9\" (1.753 m)   Wt 197 lb 5 oz (89.5 kg)   SpO2 98%   BMI 29.14 kg/m²

## 2020-03-31 NOTE — ADT AUTH CERT
day    Nicoderm patch daily       IV Dilaudid 0.5mg prnx 3   IV dilaudid 0.25 prnx 1    IV zofran 4mg prn x           Potassium: 3.1 (L)   Chloride: 101   CO2: 20 (L)   BUN: 5 (L)   Creatinine: <0.5 (L)      Albumin: 2.5 (L)   RBC: 3.70 (L)   Hemoglobin Quant: 12.1 (L)   Hematocrit: 36.7 (L)         Abd xray   Impression: Stable, diffuse small bowel distention. No pneumoperitoneum.       Bibasilar atelectasis. PER PROGRESS NOTES  PER GENERAL SURGEON       -----ASSESSMENT/PLAN: Pt. is a 56 y. o. male with a history of large tubovillious adenoma s/p laproscopic transvere colectomy, laproscopic mobilization splenic flexure omental pedicle (3/13) with return to the OR for abdominal wall dehiscence s/p Ex lap, abdominal washout, incisional hernia repair with mesh and wound vac POD4.    Surgical pathology: Tubulovillous adenoma ( largest 2.5 cm ) with multiple additional adenomatous polyps. Foci approaching severe dysplasia with no evidence of invasive adenocarcinoma identified.       - Continue clear liquids this AM, if tolerates will possibly advance diet further this PM   - Will obtained an acute abdominal series    - Continue Trazodone nightly   - suppository if no BM today   - Continue to encourage OOB/Ambulation   - Continue close I&O's monitoring   - PT/OT 20/18, rec home on discharge   - Encourage hourly incentive spirometry and deep breathing   - Wound vac change M/W/F  --> next change on Wednesday   - Awaiting [address] paperwork. Will need [facility] discharge 1-2 days if progresses from GI standpoint               LOC:Acute Adult-General Surgical (3/27/2020) by Jackson Casey RN         Review Status Review Entered   In Primary 3/27/2020 14:46       Criteria Review   REVIEW SUMMARY     Patient: BYRON FERRERA SR  Review Number: [id number]  Review Status:  In Primary     Condition Specific: Yes        OUTCOMES  Outcome Type: Primary           REVIEW DETAILS     Service Date: 03/27/2020  Product: Charmayne Deepthi

## 2020-03-31 NOTE — PROGRESS NOTES
NUTRITION ASSESSMENT  Admission Date: 3/13/2020     Type and Reason for Visit: Reassess    NUTRITION RECOMMENDATIONS:   · Continue with current General diet and encourage intake. · Continue with Standard/ High Calorie ONS TID. NUTRITION ASSESSMENT:  Nutritional status improving as diet has been advanced to general and PT indicating having a good appetite. TPN discontinued on 3/30 and Pt reported excellent intake of ONS. No PO/ONS intake record on EMR since advancement of diet, thus RD is unable to confirm adequacy of intake. RD will continue to monitor until stability is established. MALNUTRITION ASSESSMENT  Context: Acute illness or injury   Malnutrition Status: At risk for malnutrition  Findings of the 6 clinical characteristics of malnutrition (Minimum of 2 out of 6 clinical characteristics is required to make the diagnosis of moderate or severe Protein Calorie Malnutrition based on AND/ASPEN Guidelines):  Energy Intake %: Greater than 75% of estimated energy requirement  Energy Intake Time: Greater than or equal to 5 days(via TPN)  Interpretation of Weight Loss %: No significant weight loss  Interpretation of Weight Loss Time: in 1 month  Body Fat Status: Unable to assess  Muscle Mass Status: Unable to assess  Fluid Accumulation Status: No significant fluid accumulation  Reduced  Strength: Not measured    NUTRITION DIAGNOSIS   Problem: Predicted Suboptimal Energy Intake   Etiology:  Altered GI function  Signs & Symptoms: Diet just advanced from NPO and no record of PO/ONS intake yet and NPO status due to medical condition    NUTRITION INTERVENTION  Food and/or Nutrient Delivery:Continue Current diet  or Continue Current ONS   Nutrition education/counseling/coordination of care: Continue Inpatient Monitoring     NUTRITION MONITORING & EVALUATION:  Evaluation:Modified current goal   Goals: Pt to meet >75% of nutritional requirements from diet and ONS  Monitoring: Diet Tolerance , GI Function , Meal / Herbals:    none noted  Food Restrictions / Cultural Requests:    none noted    Diet Orders / Intake / Nutrition Support  Current diet/supplement order: Dietary Nutrition Supplements: Standard High Calorie Oral Supplement  DIET GENERAL;     NSG Recorded PO:   PO Fluids P.O.: 240 mL(ensure)  PO Meals PO Meals Eaten (%): 0   PO Intake: NPO      NUTRITION RISK LEVEL: Risk Level:  Moderate    Tata Manning RD, ROBER  [place]:  [phone]  Office:  [phone]

## 2020-03-31 NOTE — PLAN OF CARE
Problem: Pain:  Goal: Pain level will decrease  Description: Pain level will decrease with pain meds per [facility] and reposition. 3/31/2020 0953 by Rom Carrera RN  Outcome: Ongoing         Problem: Falls - Risk of:  Goal: Will remain free from falls  Description: Patient resting in bed with non skid socks on ,call light and bedside table in reach.      3/31/2020 0953 by Rom Carrera RN  Outcome: Ongoing

## 2020-03-31 NOTE — PLAN OF CARE
Problem: Pain:  Goal: Pain level will decrease  Description: Pain level will decrease  Outcome: Ongoing  Note: Patient c/o pain rated as 8 out of 10 Dilaudid given per protocol. Upon reassessment patient was asleep with RR >10. Will continue to monitor       Problem: Falls - Risk of:  Goal: Will remain free from falls  Description: Patient in bed with alarm and nonskid socks on, call light, belongings, and bedside table within reach. Patient educated on using call light and instructed to call out for assistance when getting up. AVAsys monitoring on for safety. 3/31/2020 0024 by Charis Brown RN  Outcome: Ongoing  Note: Patient remains free from physical injury. Fall precautions in place: Patient in bed lowest position,wheels locked. 2/4 side rails up , alarm activated. Call light and beside table within reach. Will continue to monitor       Problem: Infection - Surgical Site:  Goal: Will show no infection signs and symptoms  Description: Will show no infection signs and symptoms  3/31/2020 0024 by Charis Brown RN  Outcome: Ongoing  Note: CDI surgical sites. Dry dressing in place. No signs of infection noted. Will continue to monitor       Problem: Bowel/Gastric:  Goal: Control of bowel function will improve  Description: Control of bowel function will improve  Outcome: Ongoing  Note: Abdomen distended. Bowel sounds present in all quadrants. No bowel movements noted.  Will continue to monitor

## 2020-03-31 NOTE — PROGRESS NOTES
Patient alert and oriented x3 with periods of confusion, able to be reoriented. Abdominal wound clean, dry and intact. PRN pain meds given per MAR. Patient voiding adequately and tolerating meals. Fall precautions in place. Will continue to monitor.

## 2020-04-01 VITALS
BODY MASS INDEX: 29.22 KG/M2 | WEIGHT: 197.31 LBS | TEMPERATURE: 99.1 F | OXYGEN SATURATION: 95 % | HEIGHT: 69 IN | RESPIRATION RATE: 16 BRPM | HEART RATE: 80 BPM | SYSTOLIC BLOOD PRESSURE: 102 MMHG | DIASTOLIC BLOOD PRESSURE: 68 MMHG

## 2020-04-01 LAB
ALBUMIN SERPL-MCNC: 2.6 G/DL (ref 3.4–5)
ANION GAP SERPL CALCULATED.3IONS-SCNC: 11 MMOL/L (ref 3–16)
BASOPHILS ABSOLUTE: 0 K/UL (ref 0–0.2)
BASOPHILS RELATIVE PERCENT: 0.6 %
BLOOD CULTURE, ROUTINE: NORMAL
BUN BLDV-MCNC: 8 MG/DL (ref 7–20)
CALCIUM SERPL-MCNC: 8.4 MG/DL (ref 8.3–10.6)
CHLORIDE BLD-SCNC: 98 MMOL/L (ref 99–110)
CO2: 21 MMOL/L (ref 21–32)
CREAT SERPL-MCNC: 0.7 MG/DL (ref 0.9–1.3)
CULTURE, BLOOD 2: NORMAL
EOSINOPHILS ABSOLUTE: 0.1 K/UL (ref 0–0.6)
EOSINOPHILS RELATIVE PERCENT: 1.1 %
GFR AFRICAN AMERICAN: >60
GFR NON-AFRICAN AMERICAN: >60
GLUCOSE BLD-MCNC: 106 MG/DL (ref 70–99)
HCT VFR BLD CALC: 32 % (ref 40.5–52.5)
HEMOGLOBIN: 10.8 G/DL (ref 13.5–17.5)
LYMPHOCYTES ABSOLUTE: 1.4 K/UL (ref 1–5.1)
LYMPHOCYTES RELATIVE PERCENT: 18.9 %
MAGNESIUM: 1.9 MG/DL (ref 1.8–2.4)
MCH RBC QN AUTO: 32.5 PG (ref 26–34)
MCHC RBC AUTO-ENTMCNC: 33.6 G/DL (ref 31–36)
MCV RBC AUTO: 96.6 FL (ref 80–100)
MONOCYTES ABSOLUTE: 0.8 K/UL (ref 0–1.3)
MONOCYTES RELATIVE PERCENT: 10.9 %
NEUTROPHILS ABSOLUTE: 5 K/UL (ref 1.7–7.7)
NEUTROPHILS RELATIVE PERCENT: 68.5 %
PDW BLD-RTO: 14 % (ref 12.4–15.4)
PHOSPHORUS: 3.3 MG/DL (ref 2.5–4.9)
PLATELET # BLD: 260 K/UL (ref 135–450)
PMV BLD AUTO: 10 FL (ref 5–10.5)
POTASSIUM SERPL-SCNC: 4.4 MMOL/L (ref 3.5–5.1)
RBC # BLD: 3.31 M/UL (ref 4.2–5.9)
SODIUM BLD-SCNC: 130 MMOL/L (ref 136–145)
WBC # BLD: 7.3 K/UL (ref 4–11)

## 2020-04-01 PROCEDURE — 6360000002 HC RX W HCPCS: Performed by: SURGERY

## 2020-04-01 PROCEDURE — 6370000000 HC RX 637 (ALT 250 FOR IP): Performed by: STUDENT IN AN ORGANIZED HEALTH CARE EDUCATION/TRAINING PROGRAM

## 2020-04-01 PROCEDURE — 80069 RENAL FUNCTION PANEL: CPT

## 2020-04-01 PROCEDURE — 85025 COMPLETE CBC W/AUTO DIFF WBC: CPT

## 2020-04-01 PROCEDURE — 94669 MECHANICAL CHEST WALL OSCILL: CPT

## 2020-04-01 PROCEDURE — 36415 COLL VENOUS BLD VENIPUNCTURE: CPT

## 2020-04-01 PROCEDURE — 2580000003 HC RX 258: Performed by: SURGERY

## 2020-04-01 PROCEDURE — 6360000002 HC RX W HCPCS: Performed by: STUDENT IN AN ORGANIZED HEALTH CARE EDUCATION/TRAINING PROGRAM

## 2020-04-01 PROCEDURE — 83735 ASSAY OF MAGNESIUM: CPT

## 2020-04-01 PROCEDURE — 94640 AIRWAY INHALATION TREATMENT: CPT

## 2020-04-01 RX ORDER — QUETIAPINE FUMARATE 25 MG/1
25 TABLET, FILM COATED ORAL 3 TIMES DAILY
Qty: 21 TABLET | Refills: 2 | Status: SHIPPED | OUTPATIENT
Start: 2020-04-01 | End: 2020-04-01

## 2020-04-01 RX ORDER — MAGNESIUM SULFATE 1 G/100ML
1 INJECTION INTRAVENOUS ONCE
Status: COMPLETED | OUTPATIENT
Start: 2020-04-01 | End: 2020-04-01

## 2020-04-01 RX ORDER — QUETIAPINE FUMARATE 25 MG/1
25 TABLET, FILM COATED ORAL 2 TIMES DAILY
Qty: 14 TABLET | Refills: 2 | Status: ON HOLD | OUTPATIENT
Start: 2020-04-01 | End: 2021-01-01

## 2020-04-01 RX ORDER — DOCUSATE SODIUM 100 MG/1
100 CAPSULE, LIQUID FILLED ORAL 2 TIMES DAILY
Qty: 14 CAPSULE | Refills: 1 | Status: SHIPPED | OUTPATIENT
Start: 2020-04-01 | End: 2020-04-08

## 2020-04-01 RX ORDER — QUETIAPINE FUMARATE 25 MG/1
25 TABLET, FILM COATED ORAL 3 TIMES DAILY
Qty: 28 TABLET | Refills: 2 | Status: SHIPPED | OUTPATIENT
Start: 2020-04-01 | End: 2020-04-01

## 2020-04-01 RX ORDER — QUETIAPINE FUMARATE 25 MG/1
50 TABLET, FILM COATED ORAL NIGHTLY
Qty: 14 TABLET | Refills: 2 | Status: ON HOLD | OUTPATIENT
Start: 2020-04-01 | End: 2021-01-01

## 2020-04-01 RX ORDER — NICOTINE 21 MG/24HR
1 PATCH, TRANSDERMAL 24 HOURS TRANSDERMAL ONCE
Status: DISCONTINUED | OUTPATIENT
Start: 2020-04-01 | End: 2020-04-01 | Stop reason: HOSPADM

## 2020-04-01 RX ORDER — POLYETHYLENE GLYCOL 3350 17 G/17G
17 POWDER, FOR SOLUTION ORAL DAILY
Qty: 119 G | Refills: 1 | Status: SHIPPED | OUTPATIENT
Start: 2020-04-01 | End: 2020-04-08

## 2020-04-01 RX ADMIN — BUDESONIDE 500 MCG: 0.5 INHALANT RESPIRATORY (INHALATION) at 07:45

## 2020-04-01 RX ADMIN — SODIUM CHLORIDE 30 MG/ML INHALATION SOLUTION 15 ML: 30 SOLUTION INHALANT at 07:45

## 2020-04-01 RX ADMIN — Medication 10 ML: at 09:00

## 2020-04-01 RX ADMIN — TIOTROPIUM BROMIDE INHALATION SPRAY 2 PUFF: 3.12 SPRAY, METERED RESPIRATORY (INHALATION) at 07:50

## 2020-04-01 RX ADMIN — QUETIAPINE FUMARATE 25 MG: 25 TABLET ORAL at 13:58

## 2020-04-01 RX ADMIN — OXYCODONE 10 MG: 5 TABLET ORAL at 06:47

## 2020-04-01 RX ADMIN — OXYCODONE 10 MG: 5 TABLET ORAL at 11:37

## 2020-04-01 RX ADMIN — PANTOPRAZOLE SODIUM 40 MG: 40 TABLET, DELAYED RELEASE ORAL at 06:47

## 2020-04-01 RX ADMIN — ARFORMOTEROL TARTRATE 15 MCG: 15 SOLUTION RESPIRATORY (INHALATION) at 07:45

## 2020-04-01 RX ADMIN — QUETIAPINE FUMARATE 25 MG: 25 TABLET ORAL at 08:59

## 2020-04-01 RX ADMIN — MAGNESIUM SULFATE IN DEXTROSE 1 G: 10 INJECTION, SOLUTION INTRAVENOUS at 06:47

## 2020-04-01 RX ADMIN — AMLODIPINE BESYLATE 7.5 MG: 5 TABLET ORAL at 08:59

## 2020-04-01 RX ADMIN — DOCUSATE SODIUM 100 MG: 100 CAPSULE, LIQUID FILLED ORAL at 09:00

## 2020-04-01 RX ADMIN — ENOXAPARIN SODIUM 40 MG: 40 INJECTION SUBCUTANEOUS at 08:59

## 2020-04-01 RX ADMIN — OXYCODONE 10 MG: 5 TABLET ORAL at 01:38

## 2020-04-01 RX ADMIN — ALBUTEROL SULFATE 2.5 MG: 2.5 SOLUTION RESPIRATORY (INHALATION) at 07:45

## 2020-04-01 RX ADMIN — ATENOLOL 50 MG: 50 TABLET ORAL at 09:00

## 2020-04-01 ASSESSMENT — PAIN SCALES - GENERAL
PAINLEVEL_OUTOF10: 8
PAINLEVEL_OUTOF10: 8
PAINLEVEL_OUTOF10: 5
PAINLEVEL_OUTOF10: 6
PAINLEVEL_OUTOF10: 0
PAINLEVEL_OUTOF10: 10

## 2020-04-01 NOTE — PROGRESS NOTES
Patient alert and oriented x4 with intermittent confusion. Abdomen is round, taut and tender with active bowel sounds. Dressing on lower abdomen is clean, dry and intact. Up x1 with GB and walker. VSS. Fall precautions are in place, bed alarm on, bed in lowest position, bedside table and call light within reach. will continue to monitor.

## 2020-04-01 NOTE — CARE COORDINATION
;  or  CANNOT provide pharmacy voucher for patients co-pays  5. Patients can then  the prescription on their way out of the hospital at discharge, or pharmacy can deliver to the bedside if staff is available. (payment due at time of pick-up or delivery - cash, check, or card accepted)     Able to afford home medications/ co-pay costs: Yes    ADLS:  Current PT AM-PAC Score: 17 /24  Current OT AM-PAC Score: 18 /24      DISCHARGE Disposition: Home with [address]     LOC at discharge: Not Applicable  IMTIAZ Completed: Yes    Notification completed in HENS/PAS?:  Not Applicable    IMM Completed:   Not Indicated    Transportation:  Transportation PLAN for discharge: family   Mode of Transport: [address]:  [address] ordered at discharge: Yes  [address]: [facility]   Phone: [phone]  Fax: [phone]  Orders faxed: Yes      The Plan for Transition of Care is related to the following treatment goals of Polyp of transverse colon, unspecified type [D12.3]  Colon cancer ([facility] [address] [C18.9]    The Patient and/or patient representative Scot and his family were provided with a choice of provider and agrees with the discharge plan Yes    Freedom of choice list was provided with basic dialogue that supports the patient's individualized plan of care/goals and shares the quality data associated with the providers.  Not Indicated    Care Transitions patient: No    Kristen Church RN  [facility]  Case Management Department  Ph: [phone]  Fax: [phone]

## 2020-04-01 NOTE — PROGRESS NOTES
Pt alert and oriented x4 with periods of confusion. VSS, IV ns locked in left arm. Abdomen is rounded and tender with active bowel sounds. Dressing is clean, dry and intact on lower abdomen. Pt ambulated in hallways with walker and standby assist. Pt tolerated it well and showed a steady gait. Bed alarm on, bedside table and call light in reach.

## 2020-04-01 NOTE — PLAN OF CARE
Problem: Pain:  Goal: Pain level will decrease  Description: Pain level will decrease  4/1/2020 0926 by Belkys Tran, RN  Outcome: Ongoing  Note: Patients pain level is being monitored, pain scale is used to assess pain level and interventions are implemented as needed. Problem: Falls - Risk of:  Goal: Will remain free from falls  Description: Patient in bed with alarm and nonskid socks on, call light, belongings, and bedside table within reach. Patient educated on using call light and instructed to call out for assistance when getting up. AVAsys monitoring on for safety. 4/1/2020 0926 by Belkys Tran RN  Outcome: Ongoing  Note:  Fall precautions are in place, bed alarm on, bed in lowest position, bedside table and call light within reach.

## 2020-04-02 ENCOUNTER — TELEPHONE (OUTPATIENT)
Dept: SURGERY | Age: 60
End: 2020-04-02

## 2020-04-02 ENCOUNTER — CARE COORDINATION (OUTPATIENT)
Dept: CASE MANAGEMENT | Age: 60
End: 2020-04-02

## 2020-04-02 NOTE — CARE COORDINATION
about two arm lengths) with people who are sick.  Put distance between yourself and other people if COVID-19 is spreading in your community.  Clean and disinfect frequently touched surfaces.  Avoid all cruise travel and non-essential air travel.  Call your healthcare professional if you have concerns about COVID-19 and your underlying condition or if you are sick. For more information on steps you can take to protect yourself, see CDC's How to [address] with patient and daughter Chuckdanny SampsonAnjel, introduced self/role. Chuck Hobbs reports father continues to be confused, improving from last evening. Denies nausea or vomiting. Loose stools. Tolerating diet well. Reviewed s/s to report, verbalized understanding. Chuck Hobbs contacted PCP office and clarified and reviewed medications, denies further questions. Reports home health nurse is on way to the house now. Denies further needs/assistance/concerns/questions at this time. Understands next steps. Care Transitions 24 Hour Call    Care Transitions Interventions         Follow Up  No future appointments.     Jose Scott RN

## 2020-04-03 ENCOUNTER — TELEPHONE (OUTPATIENT)
Dept: SURGERY | Age: 60
End: 2020-04-03

## 2020-04-07 ENCOUNTER — TELEPHONE (OUTPATIENT)
Dept: SURGERY | Age: 60
End: 2020-04-07

## 2020-04-07 RX ORDER — AMOXICILLIN AND CLAVULANATE POTASSIUM 875; 125 MG/1; MG/1
1 TABLET, FILM COATED ORAL 2 TIMES DAILY
Qty: 14 TABLET | Refills: 0 | Status: SHIPPED | OUTPATIENT
Start: 2020-04-07 | End: 2020-04-14

## 2020-04-07 NOTE — TELEPHONE ENCOUNTER
Augmentin escripted. See me Thursday.  Please let them know    Rodrigo Garcia M.D.  4/7/20   4:48 PM

## 2020-04-10 ENCOUNTER — OFFICE VISIT (OUTPATIENT)
Dept: SURGERY | Age: 60
End: 2020-04-10

## 2020-04-10 VITALS
BODY MASS INDEX: 26.14 KG/M2 | DIASTOLIC BLOOD PRESSURE: 90 MMHG | SYSTOLIC BLOOD PRESSURE: 144 MMHG | HEART RATE: 92 BPM | TEMPERATURE: 99.2 F | WEIGHT: 177 LBS

## 2020-04-10 PROCEDURE — 99024 POSTOP FOLLOW-UP VISIT: CPT | Performed by: SURGERY

## 2020-04-10 NOTE — PROGRESS NOTES
Subjective:       Scotledy Lowe presents to the clinic 2 weeks following partial colectomy complicated by ventral hernia post repair    HPI: Doing well. Daughter is helping with wound care. Tolerating a diet. Objective:      BP (!) 144/90 (Site: Left Upper Arm, Position: Sitting, Cuff Size: Medium Adult)   Pulse 92   Temp 99.2 °F (37.3 °C) (Oral)   Wt 177 lb (80.3 kg)   BMI 26.14 kg/m²     General:  alert, appears stated age and cooperative   Abdomen: soft, bowel sounds active, non-tender   Incision:   healing well, no drainage, minimal erythema at wound edge as expected      Assessment:      Doing well postoperatively. Plan:      1. Continue any current medications. 2. Wound care discussed.   3. Wound check 2 weeks    Vamshi Flores M.D.  4/10/20   1:43 PM

## 2020-04-16 ENCOUNTER — TELEPHONE (OUTPATIENT)
Dept: SURGERY | Age: 60
End: 2020-04-16

## 2020-04-16 RX ORDER — AMOXICILLIN AND CLAVULANATE POTASSIUM 875; 125 MG/1; MG/1
1 TABLET, FILM COATED ORAL 2 TIMES DAILY
Qty: 14 TABLET | Refills: 0 | Status: SHIPPED | OUTPATIENT
Start: 2020-04-16 | End: 2020-04-23

## 2020-04-16 NOTE — TELEPHONE ENCOUNTER
Dora madrigal saw Mr. Steve Bradley and he now has a tunnel on the left side of the wound at 5 o'clock approx. 3 1/2 - 4 cm deep. Skin around the area is red, warm and rigid. Today is the last day of antibiotic.

## 2020-04-16 NOTE — TELEPHONE ENCOUNTER
Saw him last week. E-scripted another week of antibiotics. See me next week. Sooner if worsens.      Matt Tirado M.D.  4/16/20   2:45 PM

## 2020-04-24 ENCOUNTER — OFFICE VISIT (OUTPATIENT)
Dept: SURGERY | Age: 60
End: 2020-04-24

## 2020-04-24 ENCOUNTER — CARE COORDINATION (OUTPATIENT)
Dept: CASE MANAGEMENT | Age: 60
End: 2020-04-24

## 2020-04-24 VITALS
TEMPERATURE: 99.5 F | SYSTOLIC BLOOD PRESSURE: 125 MMHG | DIASTOLIC BLOOD PRESSURE: 83 MMHG | WEIGHT: 174.2 LBS | HEIGHT: 72 IN | BODY MASS INDEX: 23.6 KG/M2

## 2020-04-24 PROCEDURE — 99024 POSTOP FOLLOW-UP VISIT: CPT | Performed by: SURGERY

## 2020-04-24 NOTE — CARE COORDINATION
You Patient resolved from the Care Transitions episode on 04/24/2020  Patient/family has been provided the following resources and education related to COVID-19:                         Signs, symptoms and red flags related to COVID-19            CDC exposure and quarantine guidelines            Conduit exposure contact - [phone]            Contact for their local Department of Health              Patient currently reports that the following symptoms have improved:  no new/worsening symptoms     No further outreach scheduled with this CTN/ACM. Episode of Care resolved. Patient has this CTN/ACM contact information if future needs arise.     Thank Rachel Pratt RN  Care Transition Coordinator  [facility][phone]

## 2020-04-30 ENCOUNTER — TELEPHONE (OUTPATIENT)
Dept: SURGERY | Age: 60
End: 2020-04-30

## 2020-04-30 RX ORDER — AMOXICILLIN AND CLAVULANATE POTASSIUM 875; 125 MG/1; MG/1
1 TABLET, FILM COATED ORAL 2 TIMES DAILY
Qty: 28 TABLET | Refills: 0 | Status: SHIPPED | OUTPATIENT
Start: 2020-04-30 | End: 2020-05-08 | Stop reason: SDUPTHER

## 2020-05-08 ENCOUNTER — OFFICE VISIT (OUTPATIENT)
Dept: SURGERY | Age: 60
End: 2020-05-08

## 2020-05-08 VITALS
BODY MASS INDEX: 23.84 KG/M2 | HEART RATE: 83 BPM | WEIGHT: 176 LBS | SYSTOLIC BLOOD PRESSURE: 124 MMHG | DIASTOLIC BLOOD PRESSURE: 78 MMHG | TEMPERATURE: 98.5 F | HEIGHT: 72 IN

## 2020-05-08 PROCEDURE — 99024 POSTOP FOLLOW-UP VISIT: CPT | Performed by: SURGERY

## 2020-05-08 RX ORDER — AMOXICILLIN AND CLAVULANATE POTASSIUM 875; 125 MG/1; MG/1
1 TABLET, FILM COATED ORAL 2 TIMES DAILY
Qty: 56 TABLET | Refills: 0 | Status: SHIPPED | OUTPATIENT
Start: 2020-05-08 | End: 2020-06-05

## 2020-05-22 ENCOUNTER — OFFICE VISIT (OUTPATIENT)
Dept: SURGERY | Age: 60
End: 2020-05-22

## 2020-05-22 VITALS
HEART RATE: 76 BPM | DIASTOLIC BLOOD PRESSURE: 86 MMHG | SYSTOLIC BLOOD PRESSURE: 130 MMHG | TEMPERATURE: 97.6 F | HEIGHT: 72 IN | WEIGHT: 174 LBS | BODY MASS INDEX: 23.57 KG/M2

## 2020-05-22 PROCEDURE — 99024 POSTOP FOLLOW-UP VISIT: CPT | Performed by: SURGERY

## 2020-06-24 ENCOUNTER — OFFICE VISIT (OUTPATIENT)
Dept: SURGERY | Age: 60
End: 2020-06-24

## 2020-06-24 VITALS
DIASTOLIC BLOOD PRESSURE: 84 MMHG | RESPIRATION RATE: 16 BRPM | BODY MASS INDEX: 23.4 KG/M2 | WEIGHT: 176.6 LBS | SYSTOLIC BLOOD PRESSURE: 130 MMHG | HEART RATE: 80 BPM | TEMPERATURE: 98.2 F | HEIGHT: 73 IN

## 2020-06-24 PROCEDURE — 99024 POSTOP FOLLOW-UP VISIT: CPT | Performed by: SURGERY

## 2020-06-24 NOTE — PROGRESS NOTES
Subjective:       Ruth Multani presents to the clinic 1 month since last seen    HPI: Doing well. Wound continues to heal. Midline fully healed. Still small area left side healing. Dressings changed now qod. Objective:      /84 (Site: Left Upper Arm, Position: Sitting, Cuff Size: Medium Adult)   Pulse 80   Temp 98.2 °F (36.8 °C) (Temporal)   Resp 16   Ht 6' 1\" (1.854 m)   Wt 176 lb 9.6 oz (80.1 kg)   BMI 23.30 kg/m²     General:  alert, appears stated age and cooperative   Abdomen: soft, bowel sounds active, non-tender   Incision:   healing well, no drainage, granulating nicely. Assessment:      Doing well postoperatively. Plan:      1. Continue any current medications. 2. Wound care discussed.     See me 1 month    Sheri Dias M.D.  6/24/20   11:24 AM

## 2020-06-30 ENCOUNTER — TELEPHONE (OUTPATIENT)
Dept: SURGERY | Age: 60
End: 2020-06-30

## 2020-06-30 RX ORDER — AMOXICILLIN AND CLAVULANATE POTASSIUM 875; 125 MG/1; MG/1
1 TABLET, FILM COATED ORAL 2 TIMES DAILY
Qty: 28 TABLET | Refills: 0 | Status: SHIPPED | OUTPATIENT
Start: 2020-06-30 | End: 2020-07-14

## 2020-07-15 ENCOUNTER — TELEPHONE (OUTPATIENT)
Dept: SURGERY | Age: 60
End: 2020-07-15

## 2020-07-15 NOTE — TELEPHONE ENCOUNTER
Patient is requesting a prescription for  amoxicillin-clavulanate (AUGMENTIN) 875-125 MG per tablet  Sent to [address] [place] [address] [phone]. Please advise. Thank you!

## 2020-07-16 RX ORDER — AMOXICILLIN AND CLAVULANATE POTASSIUM 875; 125 MG/1; MG/1
1 TABLET, FILM COATED ORAL 2 TIMES DAILY
Qty: 28 TABLET | Refills: 0 | Status: SHIPPED | OUTPATIENT
Start: 2020-07-16 | End: 2020-07-30

## 2020-07-22 ENCOUNTER — OFFICE VISIT (OUTPATIENT)
Dept: SURGERY | Age: 60
End: 2020-07-22

## 2020-07-22 VITALS
DIASTOLIC BLOOD PRESSURE: 84 MMHG | HEIGHT: 73 IN | BODY MASS INDEX: 23.33 KG/M2 | HEART RATE: 82 BPM | RESPIRATION RATE: 16 BRPM | WEIGHT: 176 LBS | TEMPERATURE: 97.7 F | SYSTOLIC BLOOD PRESSURE: 142 MMHG

## 2020-07-22 PROCEDURE — 99024 POSTOP FOLLOW-UP VISIT: CPT | Performed by: SURGERY
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
## 2021-01-01 ENCOUNTER — APPOINTMENT (OUTPATIENT)
Dept: CT IMAGING | Age: 61
End: 2021-01-01
Payer: MEDICARE

## 2021-01-01 ENCOUNTER — APPOINTMENT (OUTPATIENT)
Dept: CT IMAGING | Age: 61
DRG: 720 | End: 2021-01-01
Payer: MEDICARE

## 2021-01-01 ENCOUNTER — APPOINTMENT (OUTPATIENT)
Dept: GENERAL RADIOLOGY | Age: 61
End: 2021-01-01
Payer: MEDICARE

## 2021-01-01 ENCOUNTER — HOSPITAL ENCOUNTER (INPATIENT)
Age: 61
LOS: 11 days | Discharge: NURSING, REHAB OR LONG-TERM CARE | DRG: 720 | End: 2021-08-31
Attending: EMERGENCY MEDICINE | Admitting: INTERNAL MEDICINE
Payer: MEDICARE

## 2021-01-01 ENCOUNTER — HOSPITAL ENCOUNTER (EMERGENCY)
Age: 61
Discharge: HOME | End: 2021-12-29
Attending: STUDENT IN AN ORGANIZED HEALTH CARE EDUCATION/TRAINING PROGRAM
Payer: MEDICARE

## 2021-01-01 ENCOUNTER — APPOINTMENT (OUTPATIENT)
Dept: GENERAL RADIOLOGY | Age: 61
DRG: 720 | End: 2021-01-01
Payer: MEDICARE

## 2021-01-01 ENCOUNTER — HOSPITAL ENCOUNTER (EMERGENCY)
Age: 61
Discharge: HOME | End: 2021-06-02
Attending: STUDENT IN AN ORGANIZED HEALTH CARE EDUCATION/TRAINING PROGRAM
Payer: MEDICARE

## 2021-01-01 VITALS
BODY MASS INDEX: 23.3 KG/M2 | HEART RATE: 51 BPM | DIASTOLIC BLOOD PRESSURE: 76 MMHG | TEMPERATURE: 97.9 F | OXYGEN SATURATION: 99 % | WEIGHT: 172 LBS | SYSTOLIC BLOOD PRESSURE: 145 MMHG | HEIGHT: 72 IN | RESPIRATION RATE: 16 BRPM

## 2021-01-01 VITALS
OXYGEN SATURATION: 97 % | TEMPERATURE: 98.3 F | WEIGHT: 180 LBS | SYSTOLIC BLOOD PRESSURE: 138 MMHG | RESPIRATION RATE: 15 BRPM | HEIGHT: 70 IN | DIASTOLIC BLOOD PRESSURE: 89 MMHG | HEART RATE: 78 BPM | BODY MASS INDEX: 25.77 KG/M2

## 2021-01-01 VITALS
TEMPERATURE: 97.3 F | HEART RATE: 75 BPM | SYSTOLIC BLOOD PRESSURE: 110 MMHG | BODY MASS INDEX: 23.47 KG/M2 | OXYGEN SATURATION: 91 % | RESPIRATION RATE: 18 BRPM | DIASTOLIC BLOOD PRESSURE: 68 MMHG | WEIGHT: 173.31 LBS | HEIGHT: 72 IN

## 2021-01-01 DIAGNOSIS — S80.01XA CONTUSION OF RIGHT KNEE, INITIAL ENCOUNTER: ICD-10-CM

## 2021-01-01 DIAGNOSIS — I48.91 ATRIAL FIBRILLATION WITH RAPID VENTRICULAR RESPONSE (HCC): Primary | ICD-10-CM

## 2021-01-01 DIAGNOSIS — S09.90XA CLOSED HEAD INJURY, INITIAL ENCOUNTER: ICD-10-CM

## 2021-01-01 DIAGNOSIS — S00.03XA CONTUSION OF SCALP, INITIAL ENCOUNTER: ICD-10-CM
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
## 2021-01-01 DIAGNOSIS — M54.16 LUMBAR RADICULOPATHY: ICD-10-CM

## 2021-01-01 DIAGNOSIS — S16.1XXA CERVICAL STRAIN, ACUTE, INITIAL ENCOUNTER: ICD-10-CM

## 2021-01-01 DIAGNOSIS — T14.8XXA BLEEDING FROM WOUND: Primary | ICD-10-CM

## 2021-01-01 DIAGNOSIS — R07.9 CHEST PAIN, UNSPECIFIED TYPE: ICD-10-CM

## 2021-01-01 DIAGNOSIS — M50.30 DDD (DEGENERATIVE DISC DISEASE), CERVICAL: ICD-10-CM

## 2021-01-01 DIAGNOSIS — J96.01 ACUTE RESPIRATORY FAILURE WITH HYPOXIA AND HYPERCARBIA (HCC): Primary | ICD-10-CM

## 2021-01-01 DIAGNOSIS — F19.10 POLYSUBSTANCE ABUSE (HCC): ICD-10-CM

## 2021-01-01 DIAGNOSIS — S93.601A RIGHT FOOT SPRAIN, INITIAL ENCOUNTER: ICD-10-CM

## 2021-01-01 DIAGNOSIS — J96.02 ACUTE RESPIRATORY FAILURE WITH HYPOXIA AND HYPERCARBIA (HCC): Primary | ICD-10-CM

## 2021-01-01 DIAGNOSIS — I48.91 NEW ONSET A-FIB (HCC): Primary | ICD-10-CM

## 2021-01-01 DIAGNOSIS — R41.82 ALTERED MENTAL STATUS, UNSPECIFIED ALTERED MENTAL STATUS TYPE: ICD-10-CM

## 2021-01-01 DIAGNOSIS — I10 ESSENTIAL HYPERTENSION: ICD-10-CM

## 2021-01-01 LAB
A/G RATIO: 0.8 (ref 1.1–2.2)
A/G RATIO: 0.9 (ref 1.1–2.2)
ACETAMINOPHEN LEVEL: <5 UG/ML (ref 10–30)
ALBUMIN SERPL-MCNC: 2.9 G/DL (ref 3.4–5)
ALBUMIN SERPL-MCNC: 3 G/DL (ref 3.4–5)
ALBUMIN SERPL-MCNC: 3 G/DL (ref 3.4–5)
ALBUMIN SERPL-MCNC: 3.5 G/DL (ref 3.4–5)
ALBUMIN SERPL-MCNC: 3.8 G/DL (ref 3.4–5)
ALP BLD-CCNC: 143 U/L (ref 40–129)
ALP BLD-CCNC: 75 U/L (ref 40–129)
ALP BLD-CCNC: 82 U/L (ref 40–129)
ALP BLD-CCNC: 90 U/L (ref 40–129)
ALT SERPL-CCNC: 34 U/L (ref 10–40)
ALT SERPL-CCNC: 37 U/L (ref 10–40)
ALT SERPL-CCNC: 66 U/L (ref 10–40)
ALT SERPL-CCNC: 86 U/L (ref 10–40)
AMMONIA: 17 UMOL/L (ref 16–60)
AMMONIA: 54 UMOL/L (ref 16–60)
AMMONIA: 60 UMOL/L (ref 16–60)
AMPHETAMINE SCREEN, URINE: POSITIVE
ANION GAP SERPL CALCULATED.3IONS-SCNC: 10 MMOL/L (ref 3–16)
ANION GAP SERPL CALCULATED.3IONS-SCNC: 11 MMOL/L (ref 3–16)
ANION GAP SERPL CALCULATED.3IONS-SCNC: 11 MMOL/L (ref 3–16)
ANION GAP SERPL CALCULATED.3IONS-SCNC: 12 MMOL/L (ref 3–16)
ANION GAP SERPL CALCULATED.3IONS-SCNC: 13 MMOL/L (ref 3–16)
ANION GAP SERPL CALCULATED.3IONS-SCNC: 13 MMOL/L (ref 3–16)
ANION GAP SERPL CALCULATED.3IONS-SCNC: 16 MMOL/L (ref 3–16)
ANION GAP SERPL CALCULATED.3IONS-SCNC: 7 MMOL/L (ref 3–16)
ANION GAP SERPL CALCULATED.3IONS-SCNC: 8 MMOL/L (ref 3–16)
ANION GAP SERPL CALCULATED.3IONS-SCNC: 9 MMOL/L (ref 3–16)
ANISOCYTOSIS: ABNORMAL
APPEARANCE BAL (LAVAGE): CLEAR
AST SERPL-CCNC: 151 U/L (ref 15–37)
AST SERPL-CCNC: 49 U/L (ref 15–37)
AST SERPL-CCNC: 59 U/L (ref 15–37)
AST SERPL-CCNC: 68 U/L (ref 15–37)
BANDED NEUTROPHILS RELATIVE PERCENT: 2 % (ref 0–7)
BARBITURATE SCREEN URINE: ABNORMAL
BASE EXCESS ARTERIAL: -1.9 MMOL/L (ref -3–3)
BASE EXCESS ARTERIAL: 0.4 MMOL/L (ref -3–3)
BASE EXCESS ARTERIAL: 1.3 MMOL/L (ref -3–3)
BASE EXCESS ARTERIAL: 4.6 MMOL/L (ref -3–3)
BASOPHILS ABSOLUTE: 0 K/UL (ref 0–0.2)
BASOPHILS ABSOLUTE: 0.1 K/UL (ref 0–0.2)
BASOPHILS RELATIVE PERCENT: 0 %
BASOPHILS RELATIVE PERCENT: 0.1 %
BASOPHILS RELATIVE PERCENT: 0.1 %
BASOPHILS RELATIVE PERCENT: 2.3 %
BENZODIAZEPINE SCREEN, URINE: ABNORMAL
BILIRUB SERPL-MCNC: 0.9 MG/DL (ref 0–1)
BILIRUB SERPL-MCNC: 0.9 MG/DL (ref 0–1)
BILIRUB SERPL-MCNC: 1 MG/DL (ref 0–1)
BILIRUB SERPL-MCNC: 1.1 MG/DL (ref 0–1)
BILIRUBIN DIRECT: 0.3 MG/DL (ref 0–0.3)
BILIRUBIN DIRECT: 0.3 MG/DL (ref 0–0.3)
BILIRUBIN URINE: ABNORMAL
BILIRUBIN, INDIRECT: 0.6 MG/DL (ref 0–1)
BILIRUBIN, INDIRECT: 0.6 MG/DL (ref 0–1)
BLOOD CULTURE, ROUTINE: NORMAL
BLOOD, URINE: ABNORMAL
BUN BLDV-MCNC: 10 MG/DL (ref 7–20)
BUN BLDV-MCNC: 10 MG/DL (ref 7–20)
BUN BLDV-MCNC: 11 MG/DL (ref 7–20)
BUN BLDV-MCNC: 12 MG/DL (ref 7–20)
BUN BLDV-MCNC: 12 MG/DL (ref 7–20)
BUN BLDV-MCNC: 13 MG/DL (ref 7–20)
BUN BLDV-MCNC: 16 MG/DL (ref 7–20)
BUN BLDV-MCNC: 17 MG/DL (ref 7–20)
BUN BLDV-MCNC: 4 MG/DL (ref 7–20)
BUN BLDV-MCNC: 4 MG/DL (ref 7–20)
BUN BLDV-MCNC: 6 MG/DL (ref 7–20)
BUN BLDV-MCNC: 6 MG/DL (ref 7–20)
BUN BLDV-MCNC: 7 MG/DL (ref 7–20)
BUN BLDV-MCNC: 8 MG/DL (ref 7–20)
BUN BLDV-MCNC: 8 MG/DL (ref 7–20)
C-REACTIVE PROTEIN: 13.4 MG/L (ref 0–5.1)
CALCIUM SERPL-MCNC: 8 MG/DL (ref 8.3–10.6)
CALCIUM SERPL-MCNC: 8.2 MG/DL (ref 8.3–10.6)
CALCIUM SERPL-MCNC: 8.2 MG/DL (ref 8.3–10.6)
CALCIUM SERPL-MCNC: 8.3 MG/DL (ref 8.3–10.6)
CALCIUM SERPL-MCNC: 8.4 MG/DL (ref 8.3–10.6)
CALCIUM SERPL-MCNC: 8.6 MG/DL (ref 8.3–10.6)
CALCIUM SERPL-MCNC: 8.7 MG/DL (ref 8.3–10.6)
CALCIUM SERPL-MCNC: 9.4 MG/DL (ref 8.3–10.6)
CALCIUM SERPL-MCNC: 9.5 MG/DL (ref 8.3–10.6)
CANNABINOID SCREEN URINE: POSITIVE
CARBOXYHEMOGLOBIN ARTERIAL: 0.1 % (ref 0–1.5)
CARBOXYHEMOGLOBIN ARTERIAL: 0.1 % (ref 0–1.5)
CARBOXYHEMOGLOBIN ARTERIAL: 0.5 % (ref 0–1.5)
CARBOXYHEMOGLOBIN ARTERIAL: 2 % (ref 0–1.5)
CHLORIDE BLD-SCNC: 100 MMOL/L (ref 99–110)
CHLORIDE BLD-SCNC: 87 MMOL/L (ref 99–110)
CHLORIDE BLD-SCNC: 88 MMOL/L (ref 99–110)
CHLORIDE BLD-SCNC: 90 MMOL/L (ref 99–110)
CHLORIDE BLD-SCNC: 91 MMOL/L (ref 99–110)
CHLORIDE BLD-SCNC: 92 MMOL/L (ref 99–110)
CHLORIDE BLD-SCNC: 93 MMOL/L (ref 99–110)
CHLORIDE BLD-SCNC: 94 MMOL/L (ref 99–110)
CHLORIDE BLD-SCNC: 95 MMOL/L (ref 99–110)
CHLORIDE BLD-SCNC: 96 MMOL/L (ref 99–110)
CHLORIDE BLD-SCNC: 98 MMOL/L (ref 99–110)
CLARITY: CLEAR
CLOT EVALUATION BAL: ABNORMAL
CO2: 21 MMOL/L (ref 21–32)
CO2: 23 MMOL/L (ref 21–32)
CO2: 24 MMOL/L (ref 21–32)
CO2: 25 MMOL/L (ref 21–32)
CO2: 25 MMOL/L (ref 21–32)
CO2: 26 MMOL/L (ref 21–32)
CO2: 28 MMOL/L (ref 21–32)
CO2: 29 MMOL/L (ref 21–32)
CO2: 29 MMOL/L (ref 21–32)
CO2: 30 MMOL/L (ref 21–32)
CO2: 31 MMOL/L (ref 21–32)
CO2: 31 MMOL/L (ref 21–32)
COCAINE METABOLITE SCREEN URINE: ABNORMAL
COLOR LAVAGE: COLORLESS
COLOR: YELLOW
CREAT SERPL-MCNC: 0.6 MG/DL (ref 0.8–1.3)
CREAT SERPL-MCNC: <0.5 MG/DL (ref 0.8–1.3)
CULTURE, BLOOD 2: NORMAL
CULTURE, RESPIRATORY: NORMAL
D DIMER: 1038 NG/ML DDU (ref 0–229)
EKG ATRIAL RATE: 166 BPM
EKG ATRIAL RATE: 166 BPM
EKG ATRIAL RATE: 202 BPM
EKG ATRIAL RATE: 88 BPM
EKG ATRIAL RATE: 89 BPM
EKG DIAGNOSIS: NORMAL
EKG P AXIS: 102 DEGREES
EKG P AXIS: 72 DEGREES
EKG P-R INTERVAL: 120 MS
EKG P-R INTERVAL: 126 MS
EKG Q-T INTERVAL: 246 MS
EKG Q-T INTERVAL: 290 MS
EKG Q-T INTERVAL: 290 MS
EKG Q-T INTERVAL: 404 MS
EKG Q-T INTERVAL: 404 MS
EKG QRS DURATION: 112 MS
EKG QRS DURATION: 72 MS
EKG QRS DURATION: 78 MS
EKG QRS DURATION: 80 MS
EKG QRS DURATION: 84 MS
EKG QTC CALCULATION (BAZETT): 432 MS
EKG QTC CALCULATION (BAZETT): 473 MS
EKG QTC CALCULATION (BAZETT): 488 MS
EKG QTC CALCULATION (BAZETT): 490 MS
EKG QTC CALCULATION (BAZETT): 491 MS
EKG R AXIS: 102 DEGREES
EKG R AXIS: 83 DEGREES
EKG R AXIS: 83 DEGREES
EKG R AXIS: 84 DEGREES
EKG R AXIS: 86 DEGREES
EKG T AXIS: -21 DEGREES
EKG T AXIS: 21 DEGREES
EKG T AXIS: 265 DEGREES
EKG T AXIS: 59 DEGREES
EKG T AXIS: 60 DEGREES
EKG VENTRICULAR RATE: 160 BPM
EKG VENTRICULAR RATE: 172 BPM
EKG VENTRICULAR RATE: 186 BPM
EKG VENTRICULAR RATE: 88 BPM
EKG VENTRICULAR RATE: 89 BPM
EOSINOPHILS ABSOLUTE: 0 K/UL (ref 0–0.6)
EOSINOPHILS ABSOLUTE: 0.1 K/UL (ref 0–0.6)
EOSINOPHILS RELATIVE PERCENT: 0 %
EOSINOPHILS RELATIVE PERCENT: 1.8 %
EPITHELIAL CELLS, UA: ABNORMAL /HPF (ref 0–5)
ESTIMATED AVERAGE GLUCOSE: 119.8 MG/DL
ETHANOL: NORMAL MG/DL (ref 0–0.08)
ETHANOL: NORMAL MG/DL (ref 0–0.08)
FERRITIN: 922.6 NG/ML (ref 30–400)
FIBRINOGEN: 899 MG/DL (ref 200–397)
FOLATE: 6.65 NG/ML (ref 4.78–24.2)
GFR AFRICAN AMERICAN: >60
GFR NON-AFRICAN AMERICAN: >60
GLOBULIN: 4.1 G/DL
GLOBULIN: 4.3 G/DL
GLUCOSE BLD-MCNC: 100 MG/DL (ref 70–99)
GLUCOSE BLD-MCNC: 100 MG/DL (ref 70–99)
GLUCOSE BLD-MCNC: 104 MG/DL (ref 70–99)
GLUCOSE BLD-MCNC: 106 MG/DL (ref 70–99)
GLUCOSE BLD-MCNC: 107 MG/DL (ref 70–99)
GLUCOSE BLD-MCNC: 109 MG/DL (ref 70–99)
GLUCOSE BLD-MCNC: 110 MG/DL (ref 70–99)
GLUCOSE BLD-MCNC: 115 MG/DL (ref 70–99)
GLUCOSE BLD-MCNC: 115 MG/DL (ref 70–99)
GLUCOSE BLD-MCNC: 116 MG/DL (ref 70–99)
GLUCOSE BLD-MCNC: 119 MG/DL (ref 70–99)
GLUCOSE BLD-MCNC: 121 MG/DL (ref 70–99)
GLUCOSE BLD-MCNC: 121 MG/DL (ref 70–99)
GLUCOSE BLD-MCNC: 122 MG/DL (ref 70–99)
GLUCOSE BLD-MCNC: 123 MG/DL (ref 70–99)
GLUCOSE BLD-MCNC: 128 MG/DL (ref 70–99)
GLUCOSE BLD-MCNC: 133 MG/DL (ref 70–99)
GLUCOSE BLD-MCNC: 135 MG/DL (ref 70–99)
GLUCOSE BLD-MCNC: 135 MG/DL (ref 70–99)
GLUCOSE BLD-MCNC: 136 MG/DL (ref 70–99)
GLUCOSE BLD-MCNC: 144 MG/DL (ref 70–99)
GLUCOSE BLD-MCNC: 145 MG/DL (ref 70–99)
GLUCOSE BLD-MCNC: 145 MG/DL (ref 70–99)
GLUCOSE BLD-MCNC: 146 MG/DL (ref 70–99)
GLUCOSE BLD-MCNC: 147 MG/DL (ref 70–99)
GLUCOSE BLD-MCNC: 147 MG/DL (ref 70–99)
GLUCOSE BLD-MCNC: 148 MG/DL (ref 70–99)
GLUCOSE BLD-MCNC: 153 MG/DL (ref 70–99)
GLUCOSE BLD-MCNC: 153 MG/DL (ref 70–99)
GLUCOSE BLD-MCNC: 154 MG/DL (ref 70–99)
GLUCOSE BLD-MCNC: 154 MG/DL (ref 70–99)
GLUCOSE BLD-MCNC: 156 MG/DL (ref 70–99)
GLUCOSE BLD-MCNC: 156 MG/DL (ref 70–99)
GLUCOSE BLD-MCNC: 157 MG/DL (ref 70–99)
GLUCOSE BLD-MCNC: 161 MG/DL (ref 70–99)
GLUCOSE BLD-MCNC: 162 MG/DL (ref 70–99)
GLUCOSE BLD-MCNC: 162 MG/DL (ref 70–99)
GLUCOSE BLD-MCNC: 166 MG/DL (ref 70–99)
GLUCOSE BLD-MCNC: 168 MG/DL (ref 70–99)
GLUCOSE BLD-MCNC: 170 MG/DL (ref 70–99)
GLUCOSE BLD-MCNC: 172 MG/DL (ref 70–99)
GLUCOSE BLD-MCNC: 172 MG/DL (ref 70–99)
GLUCOSE BLD-MCNC: 173 MG/DL (ref 70–99)
GLUCOSE BLD-MCNC: 182 MG/DL (ref 70–99)
GLUCOSE BLD-MCNC: 189 MG/DL (ref 70–99)
GLUCOSE BLD-MCNC: 190 MG/DL (ref 70–99)
GLUCOSE BLD-MCNC: 197 MG/DL (ref 70–99)
GLUCOSE BLD-MCNC: 200 MG/DL (ref 70–99)
GLUCOSE BLD-MCNC: 211 MG/DL (ref 70–99)
GLUCOSE BLD-MCNC: 212 MG/DL (ref 70–99)
GLUCOSE BLD-MCNC: 221 MG/DL (ref 70–99)
GLUCOSE BLD-MCNC: 225 MG/DL (ref 70–99)
GLUCOSE BLD-MCNC: 94 MG/DL (ref 70–99)
GLUCOSE URINE: 100 MG/DL
GRAM STAIN RESULT: NORMAL
HAPTOGLOBIN: 396 MG/DL (ref 30–200)
HBA1C MFR BLD: 5.8 %
HCO3 ARTERIAL: 25.6 MMOL/L (ref 21–29)
HCO3 ARTERIAL: 26.6 MMOL/L (ref 21–29)
HCO3 ARTERIAL: 27.9 MMOL/L (ref 21–29)
HCO3 ARTERIAL: 28.3 MMOL/L (ref 21–29)
HCT VFR BLD CALC: 32.2 % (ref 40.5–52.5)
HCT VFR BLD CALC: 32.5 % (ref 40.5–52.5)
HCT VFR BLD CALC: 32.8 % (ref 40.5–52.5)
HCT VFR BLD CALC: 34.6 % (ref 40.5–52.5)
HCT VFR BLD CALC: 34.8 % (ref 40.5–52.5)
HCT VFR BLD CALC: 35 % (ref 40.5–52.5)
HCT VFR BLD CALC: 35.4 % (ref 40.5–52.5)
HCT VFR BLD CALC: 35.9 % (ref 40.5–52.5)
HCT VFR BLD CALC: 36.7 % (ref 40.5–52.5)
HCT VFR BLD CALC: 39.7 % (ref 40.5–52.5)
HCT VFR BLD CALC: 45.1 % (ref 40.5–52.5)
HEMOGLOBIN, ART, EXTENDED: 12.7 G/DL (ref 13.5–17.5)
HEMOGLOBIN, ART, EXTENDED: 12.9 G/DL (ref 13.5–17.5)
HEMOGLOBIN, ART, EXTENDED: 13.1 G/DL (ref 13.5–17.5)
HEMOGLOBIN, ART, EXTENDED: 13.4 G/DL (ref 13.5–17.5)
HEMOGLOBIN: 10.8 G/DL (ref 13.5–17.5)
HEMOGLOBIN: 11 G/DL (ref 13.5–17.5)
HEMOGLOBIN: 11.1 G/DL (ref 13.5–17.5)
HEMOGLOBIN: 11.5 G/DL (ref 13.5–17.5)
HEMOGLOBIN: 11.7 G/DL (ref 13.5–17.5)
HEMOGLOBIN: 11.7 G/DL (ref 13.5–17.5)
HEMOGLOBIN: 11.8 G/DL (ref 13.5–17.5)
HEMOGLOBIN: 12 G/DL (ref 13.5–17.5)
HEMOGLOBIN: 12.3 G/DL (ref 13.5–17.5)
HEMOGLOBIN: 13.5 G/DL (ref 13.5–17.5)
HEMOGLOBIN: 15.3 G/DL (ref 13.5–17.5)
HEPARIN INDUCED PLATELET ANTIBODY: NEGATIVE
HYALINE CASTS: ABNORMAL /LPF (ref 0–2)
INFLUENZA A: NOT DETECTED
INFLUENZA A: NOT DETECTED
INFLUENZA B: NOT DETECTED
INFLUENZA B: NOT DETECTED
KETONES, URINE: ABNORMAL MG/DL
LACTATE DEHYDROGENASE: 182 U/L (ref 100–190)
LACTATE DEHYDROGENASE: 227 U/L (ref 100–190)
LACTIC ACID, SEPSIS: 1.1 MMOL/L (ref 0.4–1.9)
LACTIC ACID, SEPSIS: 2 MMOL/L (ref 0.4–1.9)
LEUKOCYTE ESTERASE, URINE: NEGATIVE
LYMPHOCYTES ABSOLUTE: 0.1 K/UL (ref 1–5.1)
LYMPHOCYTES ABSOLUTE: 0.2 K/UL (ref 1–5.1)
LYMPHOCYTES ABSOLUTE: 0.3 K/UL (ref 1–5.1)
LYMPHOCYTES ABSOLUTE: 0.4 K/UL (ref 1–5.1)
LYMPHOCYTES ABSOLUTE: 0.4 K/UL (ref 1–5.1)
LYMPHOCYTES ABSOLUTE: 1.1 K/UL (ref 1–5.1)
LYMPHOCYTES RELATIVE PERCENT: 1.5 %
LYMPHOCYTES RELATIVE PERCENT: 21.3 %
LYMPHOCYTES RELATIVE PERCENT: 3.8 %
LYMPHOCYTES RELATIVE PERCENT: 3.8 %
LYMPHOCYTES RELATIVE PERCENT: 8 %
LYMPHOCYTES RELATIVE PERCENT: 9 %
LYMPHOCYTES, BAL: 15 % (ref 5–10)
Lab: ABNORMAL
MACROPHAGES, BAL: 23 % (ref 90–95)
MAGNESIUM: 1.7 MG/DL (ref 1.8–2.4)
MAGNESIUM: 1.9 MG/DL (ref 1.8–2.4)
MAGNESIUM: 2 MG/DL (ref 1.8–2.4)
MCH RBC QN AUTO: 33.5 PG (ref 26–34)
MCH RBC QN AUTO: 33.6 PG (ref 26–34)
MCH RBC QN AUTO: 33.6 PG (ref 26–34)
MCH RBC QN AUTO: 33.7 PG (ref 26–34)
MCH RBC QN AUTO: 33.7 PG (ref 26–34)
MCH RBC QN AUTO: 33.8 PG (ref 26–34)
MCH RBC QN AUTO: 33.9 PG (ref 26–34)
MCH RBC QN AUTO: 33.9 PG (ref 26–34)
MCH RBC QN AUTO: 34.2 PG (ref 26–34)
MCH RBC QN AUTO: 34.2 PG (ref 26–34)
MCH RBC QN AUTO: 34.5 PG (ref 26–34)
MCHC RBC AUTO-ENTMCNC: 33.2 G/DL (ref 31–36)
MCHC RBC AUTO-ENTMCNC: 33.3 G/DL (ref 31–36)
MCHC RBC AUTO-ENTMCNC: 33.5 G/DL (ref 31–36)
MCHC RBC AUTO-ENTMCNC: 33.6 G/DL (ref 31–36)
MCHC RBC AUTO-ENTMCNC: 33.7 G/DL (ref 31–36)
MCHC RBC AUTO-ENTMCNC: 33.8 G/DL (ref 31–36)
MCHC RBC AUTO-ENTMCNC: 33.8 G/DL (ref 31–36)
MCHC RBC AUTO-ENTMCNC: 33.9 G/DL (ref 31–36)
MCHC RBC AUTO-ENTMCNC: 34 G/DL (ref 31–36)
MCV RBC AUTO: 100.3 FL (ref 80–100)
MCV RBC AUTO: 100.4 FL (ref 80–100)
MCV RBC AUTO: 100.6 FL (ref 80–100)
MCV RBC AUTO: 101 FL (ref 80–100)
MCV RBC AUTO: 101.2 FL (ref 80–100)
MCV RBC AUTO: 101.2 FL (ref 80–100)
MCV RBC AUTO: 101.4 FL (ref 80–100)
MCV RBC AUTO: 101.5 FL (ref 80–100)
MCV RBC AUTO: 101.9 FL (ref 80–100)
MCV RBC AUTO: 99.2 FL (ref 80–100)
MCV RBC AUTO: 99.9 FL (ref 80–100)
METHADONE SCREEN, URINE: ABNORMAL
METHEMOGLOBIN ARTERIAL: 0.1 %
METHEMOGLOBIN ARTERIAL: 0.3 %
MICROSCOPIC EXAMINATION: YES
MONOCYTES ABSOLUTE: 0 K/UL (ref 0–1.3)
MONOCYTES ABSOLUTE: 0.4 K/UL (ref 0–1.3)
MONOCYTES ABSOLUTE: 0.4 K/UL (ref 0–1.3)
MONOCYTES ABSOLUTE: 0.6 K/UL (ref 0–1.3)
MONOCYTES ABSOLUTE: 0.6 K/UL (ref 0–1.3)
MONOCYTES ABSOLUTE: 0.7 K/UL (ref 0–1.3)
MONOCYTES RELATIVE PERCENT: 1 %
MONOCYTES RELATIVE PERCENT: 11.1 %
MONOCYTES RELATIVE PERCENT: 7.6 %
MONOCYTES RELATIVE PERCENT: 8 %
MONOCYTES RELATIVE PERCENT: 8.6 %
MONOCYTES RELATIVE PERCENT: 8.8 %
MRSA SCREEN RT-PCR: NORMAL
NEUTROPHILS ABSOLUTE: 3.2 K/UL (ref 1.7–7.7)
NEUTROPHILS ABSOLUTE: 3.8 K/UL (ref 1.7–7.7)
NEUTROPHILS ABSOLUTE: 3.8 K/UL (ref 1.7–7.7)
NEUTROPHILS ABSOLUTE: 4 K/UL (ref 1.7–7.7)
NEUTROPHILS ABSOLUTE: 5.8 K/UL (ref 1.7–7.7)
NEUTROPHILS ABSOLUTE: 8.7 K/UL (ref 1.7–7.7)
NEUTROPHILS RELATIVE PERCENT: 63.5 %
NEUTROPHILS RELATIVE PERCENT: 81 %
NEUTROPHILS RELATIVE PERCENT: 87.3 %
NEUTROPHILS RELATIVE PERCENT: 88.5 %
NEUTROPHILS RELATIVE PERCENT: 89.9 %
NEUTROPHILS RELATIVE PERCENT: 91 %
NITRITE, URINE: POSITIVE
NUMBER OF CELLS COUNTED BAL (LAVAGE): 100
O2 SAT, ARTERIAL: 86 %
O2 SAT, ARTERIAL: 92.9 %
O2 SAT, ARTERIAL: 95.7 %
O2 SAT, ARTERIAL: 96.6 %
O2 THERAPY: ABNORMAL
OPIATE SCREEN URINE: POSITIVE
OXYCODONE URINE: ABNORMAL
PCO2 ARTERIAL: 36.9 MMHG (ref 35–45)
PCO2 ARTERIAL: 49.2 MMHG (ref 35–45)
PCO2 ARTERIAL: 55.1 MMHG (ref 35–45)
PCO2 ARTERIAL: 55.2 MMHG (ref 35–45)
PDW BLD-RTO: 13.2 % (ref 12.4–15.4)
PDW BLD-RTO: 14.1 % (ref 12.4–15.4)
PDW BLD-RTO: 14.2 % (ref 12.4–15.4)
PDW BLD-RTO: 14.2 % (ref 12.4–15.4)
PDW BLD-RTO: 14.3 % (ref 12.4–15.4)
PDW BLD-RTO: 14.3 % (ref 12.4–15.4)
PDW BLD-RTO: 14.5 % (ref 12.4–15.4)
PDW BLD-RTO: 14.5 % (ref 12.4–15.4)
PDW BLD-RTO: 14.6 % (ref 12.4–15.4)
PDW BLD-RTO: 14.6 % (ref 12.4–15.4)
PDW BLD-RTO: 14.7 % (ref 12.4–15.4)
PERFORMED ON: ABNORMAL
PH ARTERIAL: 7.28 (ref 7.35–7.45)
PH ARTERIAL: 7.33 (ref 7.35–7.45)
PH ARTERIAL: 7.35 (ref 7.35–7.45)
PH ARTERIAL: 7.5 (ref 7.35–7.45)
PH UA: 6
PH UA: 6 (ref 5–8)
PHENCYCLIDINE SCREEN URINE: ABNORMAL
PHOSPHORUS: 1.8 MG/DL (ref 2.5–4.9)
PHOSPHORUS: 1.9 MG/DL (ref 2.5–4.9)
PHOSPHORUS: 2.4 MG/DL (ref 2.5–4.9)
PLATELET # BLD: 125 K/UL (ref 135–450)
PLATELET # BLD: 126 K/UL (ref 135–450)
PLATELET # BLD: 129 K/UL (ref 135–450)
PLATELET # BLD: 132 K/UL (ref 135–450)
PLATELET # BLD: 132 K/UL (ref 135–450)
PLATELET # BLD: 162 K/UL (ref 135–450)
PLATELET # BLD: 78 K/UL (ref 135–450)
PLATELET # BLD: 79 K/UL (ref 135–450)
PLATELET # BLD: 83 K/UL (ref 135–450)
PLATELET # BLD: 87 K/UL (ref 135–450)
PLATELET # BLD: 97 K/UL (ref 135–450)
PLATELET SLIDE REVIEW: ABNORMAL
PMV BLD AUTO: 10 FL (ref 5–10.5)
PMV BLD AUTO: 9.2 FL (ref 5–10.5)
PMV BLD AUTO: 9.2 FL (ref 5–10.5)
PMV BLD AUTO: 9.3 FL (ref 5–10.5)
PMV BLD AUTO: 9.4 FL (ref 5–10.5)
PMV BLD AUTO: 9.6 FL (ref 5–10.5)
PMV BLD AUTO: 9.7 FL (ref 5–10.5)
PMV BLD AUTO: 9.9 FL (ref 5–10.5)
PMV BLD AUTO: 9.9 FL (ref 5–10.5)
PO2 ARTERIAL: 55.2 MMHG (ref 75–108)
PO2 ARTERIAL: 69 MMHG (ref 75–108)
PO2 ARTERIAL: 71.9 MMHG (ref 75–108)
PO2 ARTERIAL: 97.7 MMHG (ref 75–108)
POIKILOCYTES: ABNORMAL
POLYCHROMASIA: ABNORMAL
POTASSIUM REFLEX MAGNESIUM: 3.8 MMOL/L (ref 3.5–5.1)
POTASSIUM REFLEX MAGNESIUM: 5.1 MMOL/L (ref 3.5–5.1)
POTASSIUM SERPL-SCNC: 3 MMOL/L (ref 3.5–5.1)
POTASSIUM SERPL-SCNC: 3.1 MMOL/L (ref 3.5–5.1)
POTASSIUM SERPL-SCNC: 3.2 MMOL/L (ref 3.5–5.1)
POTASSIUM SERPL-SCNC: 3.3 MMOL/L (ref 3.5–5.1)
POTASSIUM SERPL-SCNC: 3.5 MMOL/L (ref 3.5–5.1)
POTASSIUM SERPL-SCNC: 3.7 MMOL/L (ref 3.5–5.1)
POTASSIUM SERPL-SCNC: 3.7 MMOL/L (ref 3.5–5.1)
POTASSIUM SERPL-SCNC: 3.8 MMOL/L (ref 3.5–5.1)
POTASSIUM SERPL-SCNC: 3.9 MMOL/L (ref 3.5–5.1)
POTASSIUM SERPL-SCNC: 4 MMOL/L (ref 3.5–5.1)
POTASSIUM SERPL-SCNC: 4.6 MMOL/L (ref 3.5–5.1)
PRO-BNP: ABNORMAL PG/ML (ref 0–124)
PROCALCITONIN: 0.34 NG/ML (ref 0–0.15)
PROCALCITONIN: 0.73 NG/ML (ref 0–0.15)
PROCALCITONIN: 1.6 NG/ML (ref 0–0.15)
PROCALCITONIN: 3.05 NG/ML (ref 0–0.15)
PROPOXYPHENE SCREEN: ABNORMAL
PROTEIN UA: >=300 MG/DL
RBC # BLD: 3.19 M/UL (ref 4.2–5.9)
RBC # BLD: 3.21 M/UL (ref 4.2–5.9)
RBC # BLD: 3.23 M/UL (ref 4.2–5.9)
RBC # BLD: 3.44 M/UL (ref 4.2–5.9)
RBC # BLD: 3.47 M/UL (ref 4.2–5.9)
RBC # BLD: 3.47 M/UL (ref 4.2–5.9)
RBC # BLD: 3.5 M/UL (ref 4.2–5.9)
RBC # BLD: 3.58 M/UL (ref 4.2–5.9)
RBC # BLD: 3.64 M/UL (ref 4.2–5.9)
RBC # BLD: 3.91 M/UL (ref 4.2–5.9)
RBC # BLD: 4.55 M/UL (ref 4.2–5.9)
RBC UA: ABNORMAL /HPF (ref 0–4)
RBC, BAL: 500 /CUMM
REPORT: NORMAL
RESPIRATORY PANEL PCR: NORMAL
SALICYLATE, SERUM: <0.3 MG/DL (ref 15–30)
SARS-COV-2 RNA, RT PCR: NOT DETECTED
SARS-COV-2 RNA, RT PCR: NOT DETECTED
SARS-COV-2, NAAT: NOT DETECTED
SEGMENTED NEUTROPHILS, BAL: 62 % (ref 5–10)
SLIDE REVIEW: ABNORMAL
SLIDE REVIEW: ABNORMAL
SODIUM BLD-SCNC: 125 MMOL/L (ref 136–145)
SODIUM BLD-SCNC: 125 MMOL/L (ref 136–145)
SODIUM BLD-SCNC: 126 MMOL/L (ref 136–145)
SODIUM BLD-SCNC: 127 MMOL/L (ref 136–145)
SODIUM BLD-SCNC: 128 MMOL/L (ref 136–145)
SODIUM BLD-SCNC: 128 MMOL/L (ref 136–145)
SODIUM BLD-SCNC: 129 MMOL/L (ref 136–145)
SODIUM BLD-SCNC: 129 MMOL/L (ref 136–145)
SODIUM BLD-SCNC: 133 MMOL/L (ref 136–145)
SODIUM BLD-SCNC: 134 MMOL/L (ref 136–145)
SODIUM BLD-SCNC: 136 MMOL/L (ref 136–145)
SODIUM BLD-SCNC: 136 MMOL/L (ref 136–145)
SPECIFIC GRAVITY UA: >=1.03 (ref 1–1.03)
TCO2 ARTERIAL: 27.3 MMOL/L
TCO2 ARTERIAL: 28.1 MMOL/L
TCO2 ARTERIAL: 29.1 MMOL/L
TCO2 ARTERIAL: 30 MMOL/L
TOTAL CK: 2731 U/L (ref 39–308)
TOTAL CK: 300 U/L (ref 39–308)
TOTAL CK: 4155 U/L (ref 39–308)
TOTAL CK: 616 U/L (ref 39–308)
TOTAL PROTEIN: 5.8 G/DL (ref 6.4–8.2)
TOTAL PROTEIN: 6.6 G/DL (ref 6.4–8.2)
TOTAL PROTEIN: 7.8 G/DL (ref 6.4–8.2)
TOTAL PROTEIN: 7.9 G/DL (ref 6.4–8.2)
TROPONIN: <0.01 NG/ML
TSH SERPL DL<=0.05 MIU/L-ACNC: 2.57 UIU/ML (ref 0.27–4.2)
URINE REFLEX TO CULTURE: ABNORMAL
URINE TYPE: ABNORMAL
UROBILINOGEN, URINE: 1 E.U./DL
VANCOMYCIN TROUGH: 15.7 UG/ML (ref 10–20)
VANCOMYCIN TROUGH: 22 UG/ML (ref 10–20)
VITAMIN B-12: 515 PG/ML (ref 211–911)
WBC # BLD: 4.2 K/UL (ref 4–11)
WBC # BLD: 4.6 K/UL (ref 4–11)
WBC # BLD: 4.6 K/UL (ref 4–11)
WBC # BLD: 5.1 K/UL (ref 4–11)
WBC # BLD: 6.4 K/UL (ref 4–11)
WBC # BLD: 6.7 K/UL (ref 4–11)
WBC # BLD: 8.5 K/UL (ref 4–11)
WBC # BLD: 8.9 K/UL (ref 4–11)
WBC # BLD: 8.9 K/UL (ref 4–11)
WBC # BLD: 9 K/UL (ref 4–11)
WBC # BLD: 9.8 K/UL (ref 4–11)
WBC UA: ABNORMAL /HPF (ref 0–5)
WBC/EPI CELLS BAL: 205 /CUMM

## 2021-01-01 PROCEDURE — 2580000003 HC RX 258: Performed by: INTERNAL MEDICINE

## 2021-01-01 PROCEDURE — 84484 ASSAY OF TROPONIN QUANT: CPT

## 2021-01-01 PROCEDURE — 6360000002 HC RX W HCPCS: Performed by: INTERNAL MEDICINE

## 2021-01-01 PROCEDURE — 83735 ASSAY OF MAGNESIUM: CPT

## 2021-01-01 PROCEDURE — 6370000000 HC RX 637 (ALT 250 FOR IP): Performed by: INTERNAL MEDICINE

## 2021-01-01 PROCEDURE — 0BH17EZ INSERTION OF ENDOTRACHEAL AIRWAY INTO TRACHEA, VIA NATURAL OR ARTIFICIAL OPENING: ICD-10-PCS | Performed by: INTERNAL MEDICINE

## 2021-01-01 PROCEDURE — 99232 SBSQ HOSP IP/OBS MODERATE 35: CPT | Performed by: INTERNAL MEDICINE

## 2021-01-01 PROCEDURE — 2700000000 HC OXYGEN THERAPY PER DAY

## 2021-01-01 PROCEDURE — 31500 INSERT EMERGENCY AIRWAY: CPT | Performed by: INTERNAL MEDICINE

## 2021-01-01 PROCEDURE — 85027 COMPLETE CBC AUTOMATED: CPT

## 2021-01-01 PROCEDURE — 6360000002 HC RX W HCPCS: Performed by: HOSPITALIST

## 2021-01-01 PROCEDURE — 94761 N-INVAS EAR/PLS OXIMETRY MLT: CPT

## 2021-01-01 PROCEDURE — 99233 SBSQ HOSP IP/OBS HIGH 50: CPT | Performed by: INTERNAL MEDICINE

## 2021-01-01 PROCEDURE — 99255 IP/OBS CONSLTJ NEW/EST HI 80: CPT | Performed by: INTERNAL MEDICINE

## 2021-01-01 PROCEDURE — 2580000003 HC RX 258: Performed by: ANESTHESIOLOGY

## 2021-01-01 PROCEDURE — 85025 COMPLETE CBC W/AUTO DIFF WBC: CPT

## 2021-01-01 PROCEDURE — 36415 COLL VENOUS BLD VENIPUNCTURE: CPT

## 2021-01-01 PROCEDURE — 80048 BASIC METABOLIC PNL TOTAL CA: CPT

## 2021-01-01 PROCEDURE — 94640 AIRWAY INHALATION TREATMENT: CPT

## 2021-01-01 PROCEDURE — 82803 BLOOD GASES ANY COMBINATION: CPT

## 2021-01-01 PROCEDURE — 99239 HOSP IP/OBS DSCHRG MGMT >30: CPT | Performed by: INTERNAL MEDICINE

## 2021-01-01 PROCEDURE — 71045 X-RAY EXAM CHEST 1 VIEW: CPT

## 2021-01-01 PROCEDURE — 93005 ELECTROCARDIOGRAM TRACING: CPT | Performed by: HOSPITALIST

## 2021-01-01 PROCEDURE — 82140 ASSAY OF AMMONIA: CPT

## 2021-01-01 PROCEDURE — 6370000000 HC RX 637 (ALT 250 FOR IP): Performed by: HOSPITALIST

## 2021-01-01 PROCEDURE — 2060000000 HC ICU INTERMEDIATE R&B

## 2021-01-01 PROCEDURE — 87040 BLOOD CULTURE FOR BACTERIA: CPT

## 2021-01-01 PROCEDURE — 97164 PT RE-EVAL EST PLAN CARE: CPT

## 2021-01-01 PROCEDURE — 87641 MR-STAPH DNA AMP PROBE: CPT

## 2021-01-01 PROCEDURE — 93010 ELECTROCARDIOGRAM REPORT: CPT | Performed by: INTERNAL MEDICINE

## 2021-01-01 PROCEDURE — 84443 ASSAY THYROID STIM HORMONE: CPT

## 2021-01-01 PROCEDURE — 2500000003 HC RX 250 WO HCPCS: Performed by: INTERNAL MEDICINE

## 2021-01-01 PROCEDURE — 80053 COMPREHEN METABOLIC PANEL: CPT

## 2021-01-01 PROCEDURE — 80307 DRUG TEST PRSMV CHEM ANLYZR: CPT

## 2021-01-01 PROCEDURE — 71260 CT THORAX DX C+: CPT

## 2021-01-01 PROCEDURE — 6360000004 HC RX CONTRAST MEDICATION: Performed by: EMERGENCY MEDICINE

## 2021-01-01 PROCEDURE — 97530 THERAPEUTIC ACTIVITIES: CPT

## 2021-01-01 PROCEDURE — 92960 CARDIOVERSION ELECTRIC EXT: CPT | Performed by: INTERNAL MEDICINE

## 2021-01-01 PROCEDURE — 81001 URINALYSIS AUTO W/SCOPE: CPT

## 2021-01-01 PROCEDURE — 82077 ASSAY SPEC XCP UR&BREATH IA: CPT

## 2021-01-01 PROCEDURE — 2580000003 HC RX 258: Performed by: HOSPITALIST

## 2021-01-01 PROCEDURE — 73630 X-RAY EXAM OF FOOT: CPT

## 2021-01-01 PROCEDURE — 82550 ASSAY OF CK (CPK): CPT

## 2021-01-01 PROCEDURE — 83615 LACTATE (LD) (LDH) ENZYME: CPT

## 2021-01-01 PROCEDURE — 87205 SMEAR GRAM STAIN: CPT

## 2021-01-01 PROCEDURE — 2709999900 HC NON-CHARGEABLE SUPPLY: Performed by: INTERNAL MEDICINE

## 2021-01-01 PROCEDURE — 83036 HEMOGLOBIN GLYCOSYLATED A1C: CPT

## 2021-01-01 PROCEDURE — 2000000000 HC ICU R&B

## 2021-01-01 PROCEDURE — 0B9D8ZX DRAINAGE OF RIGHT MIDDLE LUNG LOBE, VIA NATURAL OR ARTIFICIAL OPENING ENDOSCOPIC, DIAGNOSTIC: ICD-10-PCS | Performed by: INTERNAL MEDICINE

## 2021-01-01 PROCEDURE — 99291 CRITICAL CARE FIRST HOUR: CPT | Performed by: INTERNAL MEDICINE

## 2021-01-01 PROCEDURE — 84145 PROCALCITONIN (PCT): CPT

## 2021-01-01 PROCEDURE — 0BDJ8ZX EXTRACTION OF LEFT LOWER LUNG LOBE, VIA NATURAL OR ARTIFICIAL OPENING ENDOSCOPIC, DIAGNOSTIC: ICD-10-PCS | Performed by: INTERNAL MEDICINE

## 2021-01-01 PROCEDURE — 80069 RENAL FUNCTION PANEL: CPT

## 2021-01-01 PROCEDURE — 36600 WITHDRAWAL OF ARTERIAL BLOOD: CPT

## 2021-01-01 PROCEDURE — 92526 ORAL FUNCTION THERAPY: CPT

## 2021-01-01 PROCEDURE — 83010 ASSAY OF HAPTOGLOBIN QUANT: CPT

## 2021-01-01 PROCEDURE — 5A2204Z RESTORATION OF CARDIAC RHYTHM, SINGLE: ICD-10-PCS | Performed by: INTERNAL MEDICINE

## 2021-01-01 PROCEDURE — 93312 ECHO TRANSESOPHAGEAL: CPT | Performed by: INTERNAL MEDICINE

## 2021-01-01 PROCEDURE — 87070 CULTURE OTHR SPECIMN AEROBIC: CPT

## 2021-01-01 PROCEDURE — 83605 ASSAY OF LACTIC ACID: CPT

## 2021-01-01 PROCEDURE — 31624 DX BRONCHOSCOPE/LAVAGE: CPT | Performed by: INTERNAL MEDICINE

## 2021-01-01 PROCEDURE — 6370000000 HC RX 637 (ALT 250 FOR IP): Performed by: PHYSICIAN ASSISTANT

## 2021-01-01 PROCEDURE — 97110 THERAPEUTIC EXERCISES: CPT

## 2021-01-01 PROCEDURE — 31622 DX BRONCHOSCOPE/WASH: CPT

## 2021-01-01 PROCEDURE — 85379 FIBRIN DEGRADATION QUANT: CPT

## 2021-01-01 PROCEDURE — 80076 HEPATIC FUNCTION PANEL: CPT

## 2021-01-01 PROCEDURE — 0202U NFCT DS 22 TRGT SARS-COV-2: CPT

## 2021-01-01 PROCEDURE — 83880 ASSAY OF NATRIURETIC PEPTIDE: CPT

## 2021-01-01 PROCEDURE — 70450 CT HEAD/BRAIN W/O DYE: CPT

## 2021-01-01 PROCEDURE — 92610 EVALUATE SWALLOWING FUNCTION: CPT

## 2021-01-01 PROCEDURE — 97535 SELF CARE MNGMENT TRAINING: CPT

## 2021-01-01 PROCEDURE — 89051 BODY FLUID CELL COUNT: CPT

## 2021-01-01 PROCEDURE — 93005 ELECTROCARDIOGRAM TRACING: CPT | Performed by: PHYSICIAN ASSISTANT

## 2021-01-01 PROCEDURE — 0BDF8ZX EXTRACTION OF RIGHT LOWER LUNG LOBE, VIA NATURAL OR ARTIFICIAL OPENING ENDOSCOPIC, DIAGNOSTIC: ICD-10-PCS | Performed by: INTERNAL MEDICINE

## 2021-01-01 PROCEDURE — 84100 ASSAY OF PHOSPHORUS: CPT

## 2021-01-01 PROCEDURE — 86140 C-REACTIVE PROTEIN: CPT

## 2021-01-01 PROCEDURE — 93005 ELECTROCARDIOGRAM TRACING: CPT | Performed by: EMERGENCY MEDICINE

## 2021-01-01 PROCEDURE — 2500000003 HC RX 250 WO HCPCS: Performed by: HOSPITALIST

## 2021-01-01 PROCEDURE — 99284 EMERGENCY DEPT VISIT MOD MDM: CPT

## 2021-01-01 PROCEDURE — 99283 EMERGENCY DEPT VISIT LOW MDM: CPT

## 2021-01-01 PROCEDURE — 93005 ELECTROCARDIOGRAM TRACING: CPT | Performed by: INTERNAL MEDICINE

## 2021-01-01 PROCEDURE — 97162 PT EVAL MOD COMPLEX 30 MIN: CPT

## 2021-01-01 PROCEDURE — 87636 SARSCOV2 & INF A&B AMP PRB: CPT

## 2021-01-01 PROCEDURE — 99254 IP/OBS CNSLTJ NEW/EST MOD 60: CPT | Performed by: INTERNAL MEDICINE

## 2021-01-01 PROCEDURE — 82746 ASSAY OF FOLIC ACID SERUM: CPT

## 2021-01-01 PROCEDURE — 72125 CT NECK SPINE W/O DYE: CPT

## 2021-01-01 PROCEDURE — 80202 ASSAY OF VANCOMYCIN: CPT

## 2021-01-01 PROCEDURE — 2500000003 HC RX 250 WO HCPCS: Performed by: ANESTHESIOLOGY

## 2021-01-01 PROCEDURE — 80143 DRUG ASSAY ACETAMINOPHEN: CPT

## 2021-01-01 PROCEDURE — 73562 X-RAY EXAM OF KNEE 3: CPT

## 2021-01-01 PROCEDURE — 87635 SARS-COV-2 COVID-19 AMP PRB: CPT

## 2021-01-01 PROCEDURE — 85384 FIBRINOGEN ACTIVITY: CPT

## 2021-01-01 PROCEDURE — 82607 VITAMIN B-12: CPT

## 2021-01-01 PROCEDURE — 97166 OT EVAL MOD COMPLEX 45 MIN: CPT

## 2021-01-01 PROCEDURE — 97168 OT RE-EVAL EST PLAN CARE: CPT

## 2021-01-01 PROCEDURE — 2580000003 HC RX 258: Performed by: EMERGENCY MEDICINE

## 2021-01-01 PROCEDURE — 80179 DRUG ASSAY SALICYLATE: CPT

## 2021-01-01 PROCEDURE — 93312 ECHO TRANSESOPHAGEAL: CPT

## 2021-01-01 PROCEDURE — 86022 PLATELET ANTIBODIES: CPT

## 2021-01-01 PROCEDURE — 5A1945Z RESPIRATORY VENTILATION, 24-96 CONSECUTIVE HOURS: ICD-10-PCS | Performed by: INTERNAL MEDICINE

## 2021-01-01 PROCEDURE — 6360000004 HC RX CONTRAST MEDICATION: Performed by: HOSPITALIST

## 2021-01-01 PROCEDURE — 82728 ASSAY OF FERRITIN: CPT

## 2021-01-01 RX ORDER — DEXTROSE MONOHYDRATE 50 MG/ML
100 INJECTION, SOLUTION INTRAVENOUS PRN
Status: DISCONTINUED | OUTPATIENT
Start: 2021-01-01 | End: 2021-01-01 | Stop reason: HOSPADM

## 2021-01-01 RX ORDER — AMIODARONE HYDROCHLORIDE 200 MG/1
400 TABLET ORAL DAILY
Status: DISCONTINUED | OUTPATIENT
Start: 2021-01-01 | End: 2021-01-01 | Stop reason: HOSPADM

## 2021-01-01 RX ORDER — SODIUM CHLORIDE 9 MG/ML
25 INJECTION, SOLUTION INTRAVENOUS PRN
Status: DISCONTINUED | OUTPATIENT
Start: 2021-01-01 | End: 2021-01-01 | Stop reason: HOSPADM

## 2021-01-01 RX ORDER — DOXYCYCLINE HYCLATE 100 MG
100 TABLET ORAL 2 TIMES DAILY
COMMUNITY
Start: 2021-01-01 | End: 2022-01-01

## 2021-01-01 RX ORDER — FUROSEMIDE 10 MG/ML
40 INJECTION INTRAMUSCULAR; INTRAVENOUS 2 TIMES DAILY
Status: DISCONTINUED | OUTPATIENT
Start: 2021-01-01 | End: 2021-01-01

## 2021-01-01 RX ORDER — HYDROCODONE BITARTRATE AND ACETAMINOPHEN 5; 325 MG/1; MG/1
1 TABLET ORAL EVERY 6 HOURS PRN
Status: DISCONTINUED | OUTPATIENT
Start: 2021-01-01 | End: 2021-01-01

## 2021-01-01 RX ORDER — DIGOXIN 0.25 MG/ML
INJECTION INTRAMUSCULAR; INTRAVENOUS
Status: DISPENSED
Start: 2021-01-01 | End: 2021-01-01

## 2021-01-01 RX ORDER — PREDNISONE 10 MG/1
TABLET ORAL
Qty: 15 TABLET | Refills: 0
Start: 2021-01-01 | End: 2021-01-01

## 2021-01-01 RX ORDER — FENTANYL CITRATE 50 UG/ML
INJECTION, SOLUTION INTRAMUSCULAR; INTRAVENOUS PRN
Status: DISCONTINUED | OUTPATIENT
Start: 2021-01-01 | End: 2021-01-01 | Stop reason: ALTCHOICE

## 2021-01-01 RX ORDER — GABAPENTIN 300 MG/1
300 CAPSULE ORAL 3 TIMES DAILY
Status: DISCONTINUED | OUTPATIENT
Start: 2021-01-01 | End: 2021-01-01 | Stop reason: HOSPADM

## 2021-01-01 RX ORDER — QUETIAPINE FUMARATE 25 MG/1
25 TABLET, FILM COATED ORAL 2 TIMES DAILY
Status: DISCONTINUED | OUTPATIENT
Start: 2021-01-01 | End: 2021-01-01

## 2021-01-01 RX ORDER — DIGOXIN 0.25 MG/ML
250 INJECTION INTRAMUSCULAR; INTRAVENOUS ONCE
Status: COMPLETED | OUTPATIENT
Start: 2021-01-01 | End: 2021-01-01

## 2021-01-01 RX ORDER — ASPIRIN 81 MG/1
324 TABLET, CHEWABLE ORAL ONCE
Status: COMPLETED | OUTPATIENT
Start: 2021-01-01 | End: 2021-01-01

## 2021-01-01 RX ORDER — NICOTINE 21 MG/24HR
1 PATCH, TRANSDERMAL 24 HOURS TRANSDERMAL ONCE
Status: DISCONTINUED | OUTPATIENT
Start: 2021-01-01 | End: 2021-01-01 | Stop reason: HOSPADM

## 2021-01-01 RX ORDER — ONDANSETRON 2 MG/ML
4 INJECTION INTRAMUSCULAR; INTRAVENOUS EVERY 6 HOURS PRN
Status: DISCONTINUED | OUTPATIENT
Start: 2021-01-01 | End: 2021-01-01 | Stop reason: HOSPADM

## 2021-01-01 RX ORDER — LORAZEPAM 2 MG/ML
1 INJECTION INTRAMUSCULAR
Status: DISCONTINUED | OUTPATIENT
Start: 2021-01-01 | End: 2021-01-01 | Stop reason: HOSPADM

## 2021-01-01 RX ORDER — LEVALBUTEROL 1.25 MG/.5ML
1.25 SOLUTION, CONCENTRATE RESPIRATORY (INHALATION) EVERY 4 HOURS
Status: DISCONTINUED | OUTPATIENT
Start: 2021-01-01 | End: 2021-01-01

## 2021-01-01 RX ORDER — METOPROLOL TARTRATE 5 MG/5ML
10 INJECTION INTRAVENOUS ONCE
Status: COMPLETED | OUTPATIENT
Start: 2021-01-01 | End: 2021-01-01

## 2021-01-01 RX ORDER — LEVOFLOXACIN 5 MG/ML
750 INJECTION, SOLUTION INTRAVENOUS EVERY 24 HOURS
Status: DISCONTINUED | OUTPATIENT
Start: 2021-01-01 | End: 2021-01-01

## 2021-01-01 RX ORDER — HYDROXYCHLOROQUINE SULFATE 200 MG/1
200 TABLET, FILM COATED ORAL 2 TIMES DAILY
Status: DISCONTINUED | OUTPATIENT
Start: 2021-01-01 | End: 2021-01-01

## 2021-01-01 RX ORDER — OXYCODONE HYDROCHLORIDE AND ACETAMINOPHEN 5; 325 MG/1; MG/1
1 TABLET ORAL EVERY 4 HOURS PRN
COMMUNITY

## 2021-01-01 RX ORDER — OXYCODONE HYDROCHLORIDE AND ACETAMINOPHEN 5; 325 MG/1; MG/1
1 TABLET ORAL ONCE
Status: COMPLETED | OUTPATIENT
Start: 2021-01-01 | End: 2021-01-01

## 2021-01-01 RX ORDER — MIDAZOLAM HYDROCHLORIDE 5 MG/ML
INJECTION INTRAMUSCULAR; INTRAVENOUS PRN
Status: DISCONTINUED | OUTPATIENT
Start: 2021-01-01 | End: 2021-01-01 | Stop reason: ALTCHOICE

## 2021-01-01 RX ORDER — METOPROLOL TARTRATE 50 MG/1
50 TABLET, FILM COATED ORAL 2 TIMES DAILY
Status: DISCONTINUED | OUTPATIENT
Start: 2021-01-01 | End: 2021-01-01 | Stop reason: HOSPADM

## 2021-01-01 RX ORDER — SODIUM CHLORIDE 0.9 % (FLUSH) 0.9 %
10 SYRINGE (ML) INJECTION EVERY 12 HOURS SCHEDULED
Status: DISCONTINUED | OUTPATIENT
Start: 2021-01-01 | End: 2021-01-01 | Stop reason: HOSPADM

## 2021-01-01 RX ORDER — NICOTINE POLACRILEX 4 MG
15 LOZENGE BUCCAL PRN
Status: DISCONTINUED | OUTPATIENT
Start: 2021-01-01 | End: 2021-01-01 | Stop reason: HOSPADM

## 2021-01-01 RX ORDER — MAGNESIUM SULFATE IN WATER 40 MG/ML
2000 INJECTION, SOLUTION INTRAVENOUS ONCE
Status: COMPLETED | OUTPATIENT
Start: 2021-01-01 | End: 2021-01-01

## 2021-01-01 RX ORDER — HYDROCODONE BITARTRATE AND ACETAMINOPHEN 5; 325 MG/1; MG/1
2 TABLET ORAL EVERY 6 HOURS PRN
Status: DISCONTINUED | OUTPATIENT
Start: 2021-01-01 | End: 2021-01-01 | Stop reason: HOSPADM

## 2021-01-01 RX ORDER — METOPROLOL TARTRATE 50 MG/1
50 TABLET, FILM COATED ORAL 2 TIMES DAILY
Qty: 60 TABLET | Refills: 0
Start: 2021-01-01 | End: 2021-01-01

## 2021-01-01 RX ORDER — CARVEDILOL 3.12 MG/1
3.12 TABLET ORAL 2 TIMES DAILY WITH MEALS
COMMUNITY

## 2021-01-01 RX ORDER — SODIUM CHLORIDE 9 MG/ML
1000 INJECTION, SOLUTION INTRAVENOUS CONTINUOUS
Status: DISCONTINUED | OUTPATIENT
Start: 2021-01-01 | End: 2021-01-01

## 2021-01-01 RX ORDER — POLYETHYLENE GLYCOL 3350 17 G/17G
17 POWDER, FOR SOLUTION ORAL DAILY PRN
Status: DISCONTINUED | OUTPATIENT
Start: 2021-01-01 | End: 2021-01-01 | Stop reason: HOSPADM

## 2021-01-01 RX ORDER — LORAZEPAM 2 MG/ML
3 INJECTION INTRAMUSCULAR
Status: DISCONTINUED | OUTPATIENT
Start: 2021-01-01 | End: 2021-01-01 | Stop reason: HOSPADM

## 2021-01-01 RX ORDER — DEXTROSE MONOHYDRATE 25 G/50ML
12.5 INJECTION, SOLUTION INTRAVENOUS PRN
Status: DISCONTINUED | OUTPATIENT
Start: 2021-01-01 | End: 2021-01-01 | Stop reason: HOSPADM

## 2021-01-01 RX ORDER — LORAZEPAM 2 MG/ML
2 INJECTION INTRAMUSCULAR
Status: DISCONTINUED | OUTPATIENT
Start: 2021-01-01 | End: 2021-01-01 | Stop reason: HOSPADM

## 2021-01-01 RX ORDER — ATENOLOL 50 MG/1
50 TABLET ORAL DAILY
Status: DISCONTINUED | OUTPATIENT
Start: 2021-01-01 | End: 2021-01-01

## 2021-01-01 RX ORDER — PROPOFOL 10 MG/ML
INJECTION, EMULSION INTRAVENOUS
Status: DISPENSED
Start: 2021-01-01 | End: 2021-01-01

## 2021-01-01 RX ORDER — GABAPENTIN 600 MG/1
600 TABLET ORAL 3 TIMES DAILY
COMMUNITY

## 2021-01-01 RX ORDER — 0.9 % SODIUM CHLORIDE 0.9 %
1000 INTRAVENOUS SOLUTION INTRAVENOUS ONCE
Status: DISCONTINUED | OUTPATIENT
Start: 2021-01-01 | End: 2021-01-01 | Stop reason: HOSPADM

## 2021-01-01 RX ORDER — ATENOLOL 25 MG/1
25 TABLET ORAL 2 TIMES DAILY
Status: ON HOLD | COMMUNITY
End: 2021-01-01 | Stop reason: HOSPADM

## 2021-01-01 RX ORDER — AMIODARONE HYDROCHLORIDE 200 MG/1
TABLET ORAL
Qty: 36 TABLET | Refills: 0
Start: 2021-01-01

## 2021-01-01 RX ORDER — DILTIAZEM HYDROCHLORIDE 5 MG/ML
5 INJECTION INTRAVENOUS ONCE
Status: COMPLETED | OUTPATIENT
Start: 2021-01-01 | End: 2021-01-01

## 2021-01-01 RX ORDER — METHYLPREDNISOLONE SODIUM SUCCINATE 40 MG/ML
40 INJECTION, POWDER, LYOPHILIZED, FOR SOLUTION INTRAMUSCULAR; INTRAVENOUS DAILY
Status: DISCONTINUED | OUTPATIENT
Start: 2021-01-01 | End: 2021-01-01

## 2021-01-01 RX ORDER — LORAZEPAM 1 MG/1
4 TABLET ORAL
Status: DISCONTINUED | OUTPATIENT
Start: 2021-01-01 | End: 2021-01-01 | Stop reason: HOSPADM

## 2021-01-01 RX ORDER — BUDESONIDE AND FORMOTEROL FUMARATE DIHYDRATE 160; 4.5 UG/1; UG/1
2 AEROSOL RESPIRATORY (INHALATION) 2 TIMES DAILY
Status: DISCONTINUED | OUTPATIENT
Start: 2021-01-01 | End: 2021-01-01

## 2021-01-01 RX ORDER — ONDANSETRON 2 MG/ML
4 INJECTION INTRAMUSCULAR; INTRAVENOUS ONCE
Status: DISCONTINUED | OUTPATIENT
Start: 2021-01-01 | End: 2021-01-01 | Stop reason: HOSPADM

## 2021-01-01 RX ORDER — DILTIAZEM HYDROCHLORIDE 5 MG/ML
20 INJECTION INTRAVENOUS ONCE
Status: COMPLETED | OUTPATIENT
Start: 2021-01-01 | End: 2021-01-01

## 2021-01-01 RX ORDER — TRAZODONE HYDROCHLORIDE 100 MG/1
100 TABLET ORAL NIGHTLY PRN
Status: DISCONTINUED | OUTPATIENT
Start: 2021-01-01 | End: 2021-01-01

## 2021-01-01 RX ORDER — POTASSIUM CHLORIDE 7.45 MG/ML
10 INJECTION INTRAVENOUS PRN
Status: DISCONTINUED | OUTPATIENT
Start: 2021-01-01 | End: 2021-01-01 | Stop reason: HOSPADM

## 2021-01-01 RX ORDER — QUETIAPINE FUMARATE 25 MG/1
25 TABLET, FILM COATED ORAL NIGHTLY
Status: DISCONTINUED | OUTPATIENT
Start: 2021-01-01 | End: 2021-01-01

## 2021-01-01 RX ORDER — ATORVASTATIN CALCIUM 40 MG/1
80 TABLET, FILM COATED ORAL NIGHTLY
Status: DISCONTINUED | OUTPATIENT
Start: 2021-01-01 | End: 2021-01-01 | Stop reason: HOSPADM

## 2021-01-01 RX ORDER — ASPIRIN 81 MG/1
81 TABLET ORAL DAILY
Status: DISCONTINUED | OUTPATIENT
Start: 2021-01-01 | End: 2021-01-01

## 2021-01-01 RX ORDER — SODIUM CHLORIDE, SODIUM LACTATE, POTASSIUM CHLORIDE, CALCIUM CHLORIDE 600; 310; 30; 20 MG/100ML; MG/100ML; MG/100ML; MG/100ML
INJECTION, SOLUTION INTRAVENOUS CONTINUOUS
Status: DISCONTINUED | OUTPATIENT
Start: 2021-01-01 | End: 2021-01-01

## 2021-01-01 RX ORDER — QUETIAPINE FUMARATE 50 MG/1
50 TABLET, FILM COATED ORAL 2 TIMES DAILY
Qty: 60 TABLET | Refills: 0
Start: 2021-01-01

## 2021-01-01 RX ORDER — NICOTINE 21 MG/24HR
1 PATCH, TRANSDERMAL 24 HOURS TRANSDERMAL DAILY
Status: DISCONTINUED | OUTPATIENT
Start: 2021-01-01 | End: 2021-01-01 | Stop reason: HOSPADM

## 2021-01-01 RX ORDER — METHYLPREDNISOLONE SODIUM SUCCINATE 40 MG/ML
40 INJECTION, POWDER, LYOPHILIZED, FOR SOLUTION INTRAMUSCULAR; INTRAVENOUS EVERY 12 HOURS
Status: DISCONTINUED | OUTPATIENT
Start: 2021-01-01 | End: 2021-01-01

## 2021-01-01 RX ORDER — LISINOPRIL 2.5 MG/1
2.5 TABLET ORAL DAILY
COMMUNITY
End: 2022-01-01

## 2021-01-01 RX ORDER — ONDANSETRON 4 MG/1
4 TABLET, ORALLY DISINTEGRATING ORAL EVERY 8 HOURS PRN
Status: DISCONTINUED | OUTPATIENT
Start: 2021-01-01 | End: 2021-01-01 | Stop reason: HOSPADM

## 2021-01-01 RX ORDER — TRAZODONE HYDROCHLORIDE 100 MG/1
100 TABLET ORAL NIGHTLY
Status: DISCONTINUED | OUTPATIENT
Start: 2021-01-01 | End: 2021-01-01

## 2021-01-01 RX ORDER — LIDOCAINE HYDROCHLORIDE 40 MG/ML
SOLUTION TOPICAL
Status: DISPENSED
Start: 2021-01-01 | End: 2021-01-01

## 2021-01-01 RX ORDER — LIDOCAINE HYDROCHLORIDE 20 MG/ML
INJECTION, SOLUTION INFILTRATION; PERINEURAL
Status: DISPENSED
Start: 2021-01-01 | End: 2021-01-01

## 2021-01-01 RX ORDER — SODIUM CHLORIDE 0.9 % (FLUSH) 0.9 %
5-40 SYRINGE (ML) INJECTION PRN
Status: DISCONTINUED | OUTPATIENT
Start: 2021-01-01 | End: 2021-01-01 | Stop reason: HOSPADM

## 2021-01-01 RX ORDER — SODIUM CHLORIDE 9 MG/ML
INJECTION, SOLUTION INTRAVENOUS ONCE
Status: DISCONTINUED | OUTPATIENT
Start: 2021-01-01 | End: 2021-01-01

## 2021-01-01 RX ORDER — DIGOXIN 0.25 MG/ML
125 INJECTION INTRAMUSCULAR; INTRAVENOUS ONCE
Status: COMPLETED | OUTPATIENT
Start: 2021-01-01 | End: 2021-01-01

## 2021-01-01 RX ORDER — ATENOLOL 25 MG/1
25 TABLET ORAL DAILY
Status: DISCONTINUED | OUTPATIENT
Start: 2021-01-01 | End: 2021-01-01

## 2021-01-01 RX ORDER — METOPROLOL TARTRATE 5 MG/5ML
5 INJECTION INTRAVENOUS ONCE
Status: COMPLETED | OUTPATIENT
Start: 2021-01-01 | End: 2021-01-01

## 2021-01-01 RX ORDER — LORAZEPAM 2 MG/ML
4 INJECTION INTRAMUSCULAR
Status: DISCONTINUED | OUTPATIENT
Start: 2021-01-01 | End: 2021-01-01 | Stop reason: HOSPADM

## 2021-01-01 RX ORDER — IPRATROPIUM BROMIDE AND ALBUTEROL SULFATE 2.5; .5 MG/3ML; MG/3ML
1 SOLUTION RESPIRATORY (INHALATION)
Status: DISCONTINUED | OUTPATIENT
Start: 2021-01-01 | End: 2021-01-01

## 2021-01-01 RX ORDER — LORAZEPAM 2 MG/ML
1 INJECTION INTRAMUSCULAR ONCE
Status: COMPLETED | OUTPATIENT
Start: 2021-01-01 | End: 2021-01-01

## 2021-01-01 RX ORDER — SODIUM CHLORIDE 0.9 % (FLUSH) 0.9 %
10 SYRINGE (ML) INJECTION PRN
Status: DISCONTINUED | OUTPATIENT
Start: 2021-01-01 | End: 2021-01-01 | Stop reason: HOSPADM

## 2021-01-01 RX ORDER — LORAZEPAM 1 MG/1
2 TABLET ORAL
Status: DISCONTINUED | OUTPATIENT
Start: 2021-01-01 | End: 2021-01-01 | Stop reason: HOSPADM

## 2021-01-01 RX ORDER — ASPIRIN 81 MG/1
81 TABLET ORAL DAILY
COMMUNITY

## 2021-01-01 RX ORDER — POTASSIUM CHLORIDE 20 MEQ/1
40 TABLET, EXTENDED RELEASE ORAL 2 TIMES DAILY WITH MEALS
Status: DISCONTINUED | OUTPATIENT
Start: 2021-01-01 | End: 2021-01-01

## 2021-01-01 RX ORDER — TRAZODONE HYDROCHLORIDE 100 MG/1
100 TABLET ORAL EVERY EVENING
COMMUNITY
End: 2021-01-01

## 2021-01-01 RX ORDER — SODIUM CHLORIDE 0.9 % (FLUSH) 0.9 %
5-40 SYRINGE (ML) INJECTION EVERY 12 HOURS SCHEDULED
Status: DISCONTINUED | OUTPATIENT
Start: 2021-01-01 | End: 2021-01-01 | Stop reason: HOSPADM

## 2021-01-01 RX ORDER — LORAZEPAM 1 MG/1
3 TABLET ORAL
Status: DISCONTINUED | OUTPATIENT
Start: 2021-01-01 | End: 2021-01-01 | Stop reason: HOSPADM

## 2021-01-01 RX ORDER — POTASSIUM CHLORIDE 750 MG/1
20 TABLET, EXTENDED RELEASE ORAL ONCE
Status: COMPLETED | OUTPATIENT
Start: 2021-01-01 | End: 2021-01-01

## 2021-01-01 RX ORDER — DIGOXIN 125 MCG
250 TABLET ORAL DAILY
Status: DISCONTINUED | OUTPATIENT
Start: 2021-01-01 | End: 2021-01-01

## 2021-01-01 RX ORDER — AMIODARONE HYDROCHLORIDE 200 MG/1
400 TABLET ORAL
Status: COMPLETED | OUTPATIENT
Start: 2021-01-01 | End: 2021-01-01

## 2021-01-01 RX ORDER — LORAZEPAM 2 MG/ML
0.5 INJECTION INTRAMUSCULAR ONCE
Status: COMPLETED | OUTPATIENT
Start: 2021-01-01 | End: 2021-01-01

## 2021-01-01 RX ORDER — GABAPENTIN 300 MG/1
300 CAPSULE ORAL 3 TIMES DAILY
Status: DISCONTINUED | OUTPATIENT
Start: 2021-01-01 | End: 2021-01-01

## 2021-01-01 RX ORDER — IPRATROPIUM BROMIDE AND ALBUTEROL SULFATE 2.5; .5 MG/3ML; MG/3ML
1 SOLUTION RESPIRATORY (INHALATION) EVERY 4 HOURS
Status: DISCONTINUED | OUTPATIENT
Start: 2021-01-01 | End: 2021-01-01

## 2021-01-01 RX ORDER — ACETAMINOPHEN 650 MG/1
650 SUPPOSITORY RECTAL EVERY 6 HOURS PRN
Status: DISCONTINUED | OUTPATIENT
Start: 2021-01-01 | End: 2021-01-01 | Stop reason: HOSPADM

## 2021-01-01 RX ORDER — SODIUM CHLORIDE 9 MG/ML
INJECTION, SOLUTION INTRAVENOUS ONCE
Status: COMPLETED | OUTPATIENT
Start: 2021-01-01 | End: 2021-01-01

## 2021-01-01 RX ORDER — LEVALBUTEROL TARTRATE 45 UG/1
2 AEROSOL, METERED ORAL EVERY 4 HOURS
Status: DISCONTINUED | OUTPATIENT
Start: 2021-01-01 | End: 2021-01-01 | Stop reason: HOSPADM

## 2021-01-01 RX ORDER — LORAZEPAM 1 MG/1
1 TABLET ORAL
Status: DISCONTINUED | OUTPATIENT
Start: 2021-01-01 | End: 2021-01-01 | Stop reason: HOSPADM

## 2021-01-01 RX ORDER — ACETAMINOPHEN 325 MG/1
650 TABLET ORAL EVERY 6 HOURS PRN
Status: DISCONTINUED | OUTPATIENT
Start: 2021-01-01 | End: 2021-01-01 | Stop reason: HOSPADM

## 2021-01-01 RX ORDER — HYDROCODONE BITARTRATE AND ACETAMINOPHEN 7.5; 3 MG/1; MG/1
7.5 TABLET ORAL 3 TIMES DAILY PRN
Qty: 6 TABLET | Refills: 0 | Status: SHIPPED | OUTPATIENT
Start: 2021-01-01 | End: 2021-01-01

## 2021-01-01 RX ORDER — MAGNESIUM SULFATE 1 G/100ML
1000 INJECTION INTRAVENOUS ONCE
Status: COMPLETED | OUTPATIENT
Start: 2021-01-01 | End: 2021-01-01

## 2021-01-01 RX ORDER — QUETIAPINE FUMARATE 100 MG/1
100 TABLET, FILM COATED ORAL 2 TIMES DAILY
Status: ON HOLD | COMMUNITY
End: 2021-01-01 | Stop reason: SDUPTHER

## 2021-01-01 RX ADMIN — Medication 2 PUFF: at 02:41

## 2021-01-01 RX ADMIN — HYDROCODONE BITARTRATE AND ACETAMINOPHEN 1 TABLET: 5; 325 TABLET ORAL at 18:29

## 2021-01-01 RX ADMIN — ENOXAPARIN SODIUM 80 MG: 80 INJECTION SUBCUTANEOUS at 21:46

## 2021-01-01 RX ADMIN — ASPIRIN 324 MG: 81 TABLET, CHEWABLE ORAL at 03:15

## 2021-01-01 RX ADMIN — HYDROCODONE BITARTRATE AND ACETAMINOPHEN 2 TABLET: 5; 325 TABLET ORAL at 23:14

## 2021-01-01 RX ADMIN — MUPIROCIN: 20 OINTMENT TOPICAL at 20:17

## 2021-01-01 RX ADMIN — IPRATROPIUM BROMIDE AND ALBUTEROL SULFATE 1 AMPULE: .5; 3 SOLUTION RESPIRATORY (INHALATION) at 11:35

## 2021-01-01 RX ADMIN — CEFEPIME 2000 MG: 2 INJECTION, POWDER, FOR SOLUTION INTRAVENOUS at 17:39

## 2021-01-01 RX ADMIN — Medication 2 PUFF: at 11:02

## 2021-01-01 RX ADMIN — Medication 2 PUFF: at 11:38

## 2021-01-01 RX ADMIN — Medication 2 PUFF: at 15:26

## 2021-01-01 RX ADMIN — HYDROCODONE BITARTRATE AND ACETAMINOPHEN 1 TABLET: 5; 325 TABLET ORAL at 20:35

## 2021-01-01 RX ADMIN — CEFEPIME 2000 MG: 2 INJECTION, POWDER, FOR SOLUTION INTRAVENOUS at 04:23

## 2021-01-01 RX ADMIN — CEFEPIME 2000 MG: 2 INJECTION, POWDER, FOR SOLUTION INTRAVENOUS at 04:29

## 2021-01-01 RX ADMIN — HYDROCODONE BITARTRATE AND ACETAMINOPHEN 2 TABLET: 5; 325 TABLET ORAL at 06:38

## 2021-01-01 RX ADMIN — SODIUM CHLORIDE, PRESERVATIVE FREE 10 ML: 5 INJECTION INTRAVENOUS at 09:11

## 2021-01-01 RX ADMIN — Medication 2 PUFF: at 15:39

## 2021-01-01 RX ADMIN — Medication 2 PUFF: at 02:55

## 2021-01-01 RX ADMIN — Medication 2 PUFF: at 11:10

## 2021-01-01 RX ADMIN — GABAPENTIN 300 MG: 300 CAPSULE ORAL at 21:34

## 2021-01-01 RX ADMIN — Medication 2 PUFF: at 07:17

## 2021-01-01 RX ADMIN — ENOXAPARIN SODIUM 70 MG: 80 INJECTION SUBCUTANEOUS at 08:07

## 2021-01-01 RX ADMIN — HYDROCODONE BITARTRATE AND ACETAMINOPHEN 1 TABLET: 5; 325 TABLET ORAL at 14:34

## 2021-01-01 RX ADMIN — VANCOMYCIN HYDROCHLORIDE 1250 MG: 10 INJECTION, POWDER, LYOPHILIZED, FOR SOLUTION INTRAVENOUS at 21:08

## 2021-01-01 RX ADMIN — ENOXAPARIN SODIUM 70 MG: 80 INJECTION SUBCUTANEOUS at 08:33

## 2021-01-01 RX ADMIN — SODIUM CHLORIDE 1000 ML: 9 INJECTION, SOLUTION INTRAVENOUS at 06:58

## 2021-01-01 RX ADMIN — AMIODARONE HYDROCHLORIDE 150 MG: 50 INJECTION, SOLUTION INTRAVENOUS at 10:44

## 2021-01-01 RX ADMIN — PIPERACILLIN SODIUM AND TAZOBACTAM SODIUM 3375 MG: 3; .375 INJECTION, POWDER, LYOPHILIZED, FOR SOLUTION INTRAVENOUS at 00:00

## 2021-01-01 RX ADMIN — VANCOMYCIN HYDROCHLORIDE 1250 MG: 1 INJECTION, POWDER, LYOPHILIZED, FOR SOLUTION INTRAVENOUS at 22:05

## 2021-01-01 RX ADMIN — ATORVASTATIN CALCIUM 80 MG: 40 TABLET, FILM COATED ORAL at 21:35

## 2021-01-01 RX ADMIN — Medication 2 PUFF: at 08:35

## 2021-01-01 RX ADMIN — POTASSIUM CHLORIDE 10 MEQ: 7.46 INJECTION, SOLUTION INTRAVENOUS at 11:42

## 2021-01-01 RX ADMIN — METOPROLOL TARTRATE 50 MG: 50 TABLET, FILM COATED ORAL at 20:16

## 2021-01-01 RX ADMIN — IPRATROPIUM BROMIDE AND ALBUTEROL SULFATE 1 AMPULE: .5; 3 SOLUTION RESPIRATORY (INHALATION) at 12:24

## 2021-01-01 RX ADMIN — Medication 10 ML: at 20:24

## 2021-01-01 RX ADMIN — INSULIN LISPRO 2 UNITS: 100 INJECTION, SOLUTION INTRAVENOUS; SUBCUTANEOUS at 17:21

## 2021-01-01 RX ADMIN — PREDNISONE 30 MG: 20 TABLET ORAL at 09:14

## 2021-01-01 RX ADMIN — ASPIRIN 81 MG: 81 TABLET, COATED ORAL at 12:08

## 2021-01-01 RX ADMIN — DIGOXIN 250 MCG: 0.25 INJECTION INTRAMUSCULAR; INTRAVENOUS at 08:30

## 2021-01-01 RX ADMIN — PIPERACILLIN SODIUM AND TAZOBACTAM SODIUM 3375 MG: 3; .375 INJECTION, POWDER, LYOPHILIZED, FOR SOLUTION INTRAVENOUS at 16:46

## 2021-01-01 RX ADMIN — SODIUM CHLORIDE, PRESERVATIVE FREE 10 ML: 5 INJECTION INTRAVENOUS at 08:07

## 2021-01-01 RX ADMIN — METHYLPREDNISOLONE SODIUM SUCCINATE 40 MG: 40 INJECTION, POWDER, FOR SOLUTION INTRAMUSCULAR; INTRAVENOUS at 10:53

## 2021-01-01 RX ADMIN — PIPERACILLIN SODIUM AND TAZOBACTAM SODIUM 3375 MG: 3; .375 INJECTION, POWDER, LYOPHILIZED, FOR SOLUTION INTRAVENOUS at 10:38

## 2021-01-01 RX ADMIN — Medication 2 PUFF: at 14:55

## 2021-01-01 RX ADMIN — ASPIRIN 81 MG: 81 TABLET, COATED ORAL at 08:39

## 2021-01-01 RX ADMIN — DILTIAZEM HYDROCHLORIDE 15 MG/HR: 5 INJECTION INTRAVENOUS at 20:10

## 2021-01-01 RX ADMIN — APIXABAN 5 MG: 5 TABLET, FILM COATED ORAL at 09:14

## 2021-01-01 RX ADMIN — SODIUM CHLORIDE, PRESERVATIVE FREE 10 ML: 5 INJECTION INTRAVENOUS at 21:46

## 2021-01-01 RX ADMIN — METOPROLOL TARTRATE 10 MG: 5 INJECTION INTRAVENOUS at 16:09

## 2021-01-01 RX ADMIN — ENOXAPARIN SODIUM 70 MG: 80 INJECTION SUBCUTANEOUS at 20:23

## 2021-01-01 RX ADMIN — SODIUM CHLORIDE 25 ML: 9 INJECTION, SOLUTION INTRAVENOUS at 17:37

## 2021-01-01 RX ADMIN — GABAPENTIN 300 MG: 300 CAPSULE ORAL at 20:36

## 2021-01-01 RX ADMIN — FOLIC ACID: 5 INJECTION, SOLUTION INTRAMUSCULAR; INTRAVENOUS; SUBCUTANEOUS at 14:52

## 2021-01-01 RX ADMIN — AMIODARONE HYDROCHLORIDE 400 MG: 200 TABLET ORAL at 09:14

## 2021-01-01 RX ADMIN — IPRATROPIUM BROMIDE 0.5 MG: 0.5 SOLUTION RESPIRATORY (INHALATION) at 03:04

## 2021-01-01 RX ADMIN — FOLIC ACID: 5 INJECTION, SOLUTION INTRAMUSCULAR; INTRAVENOUS; SUBCUTANEOUS at 09:52

## 2021-01-01 RX ADMIN — POTASSIUM CHLORIDE 10 MEQ: 7.46 INJECTION, SOLUTION INTRAVENOUS at 13:16

## 2021-01-01 RX ADMIN — Medication 2 PUFF: at 19:03

## 2021-01-01 RX ADMIN — DIGOXIN 125 MCG: 0.25 INJECTION INTRAMUSCULAR; INTRAVENOUS at 02:48

## 2021-01-01 RX ADMIN — CEFEPIME 2000 MG: 2 INJECTION, POWDER, FOR SOLUTION INTRAVENOUS at 03:54

## 2021-01-01 RX ADMIN — CEFEPIME 2000 MG: 2 INJECTION, POWDER, FOR SOLUTION INTRAVENOUS at 18:55

## 2021-01-01 RX ADMIN — SODIUM CHLORIDE 1000 ML: 9 INJECTION, SOLUTION INTRAVENOUS at 11:32

## 2021-01-01 RX ADMIN — Medication 2 PUFF: at 20:03

## 2021-01-01 RX ADMIN — MUPIROCIN: 20 OINTMENT TOPICAL at 20:24

## 2021-01-01 RX ADMIN — Medication 2 PUFF: at 19:00

## 2021-01-01 RX ADMIN — ASPIRIN 81 MG: 81 TABLET, COATED ORAL at 09:51

## 2021-01-01 RX ADMIN — VANCOMYCIN HYDROCHLORIDE 1250 MG: 10 INJECTION, POWDER, LYOPHILIZED, FOR SOLUTION INTRAVENOUS at 22:15

## 2021-01-01 RX ADMIN — DILTIAZEM HYDROCHLORIDE 5 MG: 5 INJECTION INTRAVENOUS at 12:57

## 2021-01-01 RX ADMIN — METOPROLOL TARTRATE 50 MG: 50 TABLET, FILM COATED ORAL at 08:34

## 2021-01-01 RX ADMIN — HYDROCODONE BITARTRATE AND ACETAMINOPHEN 2 TABLET: 5; 325 TABLET ORAL at 22:29

## 2021-01-01 RX ADMIN — LORAZEPAM 1 MG: 2 INJECTION INTRAMUSCULAR; INTRAVENOUS at 23:57

## 2021-01-01 RX ADMIN — SODIUM CHLORIDE, PRESERVATIVE FREE 10 ML: 5 INJECTION INTRAVENOUS at 10:47

## 2021-01-01 RX ADMIN — METHYLPREDNISOLONE SODIUM SUCCINATE 40 MG: 40 INJECTION, POWDER, FOR SOLUTION INTRAMUSCULAR; INTRAVENOUS at 10:38

## 2021-01-01 RX ADMIN — HYDROCODONE BITARTRATE AND ACETAMINOPHEN 2 TABLET: 5; 325 TABLET ORAL at 15:11

## 2021-01-01 RX ADMIN — ASPIRIN 81 MG: 81 TABLET, COATED ORAL at 09:01

## 2021-01-01 RX ADMIN — IPRATROPIUM BROMIDE AND ALBUTEROL SULFATE 1 AMPULE: .5; 3 SOLUTION RESPIRATORY (INHALATION) at 23:33

## 2021-01-01 RX ADMIN — GABAPENTIN 300 MG: 300 CAPSULE ORAL at 20:32

## 2021-01-01 RX ADMIN — AMIODARONE HYDROCHLORIDE 400 MG: 200 TABLET ORAL at 17:39

## 2021-01-01 RX ADMIN — LEVOFLOXACIN 750 MG: 5 INJECTION, SOLUTION INTRAVENOUS at 07:49

## 2021-01-01 RX ADMIN — HYDROCODONE BITARTRATE AND ACETAMINOPHEN 2 TABLET: 5; 325 TABLET ORAL at 02:21

## 2021-01-01 RX ADMIN — PREDNISONE 30 MG: 20 TABLET ORAL at 10:40

## 2021-01-01 RX ADMIN — QUETIAPINE FUMARATE 25 MG: 25 TABLET ORAL at 12:29

## 2021-01-01 RX ADMIN — MUPIROCIN: 20 OINTMENT TOPICAL at 12:00

## 2021-01-01 RX ADMIN — ATORVASTATIN CALCIUM 80 MG: 40 TABLET, FILM COATED ORAL at 20:32

## 2021-01-01 RX ADMIN — QUETIAPINE FUMARATE 25 MG: 25 TABLET ORAL at 20:52

## 2021-01-01 RX ADMIN — VANCOMYCIN HYDROCHLORIDE 1250 MG: 1 INJECTION, POWDER, LYOPHILIZED, FOR SOLUTION INTRAVENOUS at 09:56

## 2021-01-01 RX ADMIN — Medication 2 PUFF: at 23:17

## 2021-01-01 RX ADMIN — Medication 10 ML: at 21:20

## 2021-01-01 RX ADMIN — GABAPENTIN 300 MG: 300 CAPSULE ORAL at 17:19

## 2021-01-01 RX ADMIN — METHYLPREDNISOLONE SODIUM SUCCINATE 40 MG: 40 INJECTION, POWDER, FOR SOLUTION INTRAMUSCULAR; INTRAVENOUS at 21:46

## 2021-01-01 RX ADMIN — Medication 10 ML: at 10:46

## 2021-01-01 RX ADMIN — PREDNISONE 30 MG: 20 TABLET ORAL at 08:39

## 2021-01-01 RX ADMIN — DILTIAZEM HYDROCHLORIDE 20 MG/HR: 5 INJECTION, SOLUTION INTRAVENOUS at 17:41

## 2021-01-01 RX ADMIN — METOPROLOL TARTRATE 50 MG: 50 TABLET, FILM COATED ORAL at 21:53

## 2021-01-01 RX ADMIN — ATORVASTATIN CALCIUM 80 MG: 40 TABLET, FILM COATED ORAL at 20:35

## 2021-01-01 RX ADMIN — Medication 2 PUFF: at 07:21

## 2021-01-01 RX ADMIN — AMIODARONE HYDROCHLORIDE 400 MG: 200 TABLET ORAL at 08:39

## 2021-01-01 RX ADMIN — Medication 10 ML: at 21:21

## 2021-01-01 RX ADMIN — MUPIROCIN: 20 OINTMENT TOPICAL at 08:42

## 2021-01-01 RX ADMIN — GABAPENTIN 300 MG: 300 CAPSULE ORAL at 08:34

## 2021-01-01 RX ADMIN — POTASSIUM CHLORIDE 10 MEQ: 7.46 INJECTION, SOLUTION INTRAVENOUS at 00:45

## 2021-01-01 RX ADMIN — LEVALBUTEROL HYDROCHLORIDE 1.25 MG: 1.25 SOLUTION, CONCENTRATE RESPIRATORY (INHALATION) at 03:03

## 2021-01-01 RX ADMIN — Medication 2 PUFF: at 07:02

## 2021-01-01 RX ADMIN — INSULIN LISPRO 2 UNITS: 100 INJECTION, SOLUTION INTRAVENOUS; SUBCUTANEOUS at 09:01

## 2021-01-01 RX ADMIN — GABAPENTIN 300 MG: 300 CAPSULE ORAL at 20:34

## 2021-01-01 RX ADMIN — ASPIRIN 81 MG: 81 TABLET, COATED ORAL at 08:07

## 2021-01-01 RX ADMIN — GABAPENTIN 300 MG: 300 CAPSULE ORAL at 15:03

## 2021-01-01 RX ADMIN — GABAPENTIN 300 MG: 300 CAPSULE ORAL at 09:01

## 2021-01-01 RX ADMIN — GABAPENTIN 300 MG: 300 CAPSULE ORAL at 12:00

## 2021-01-01 RX ADMIN — ENOXAPARIN SODIUM 40 MG: 40 INJECTION SUBCUTANEOUS at 14:34

## 2021-01-01 RX ADMIN — ACETAMINOPHEN 650 MG: 325 TABLET ORAL at 18:47

## 2021-01-01 RX ADMIN — DILTIAZEM HYDROCHLORIDE 7 MG/HR: 5 INJECTION INTRAVENOUS at 10:43

## 2021-01-01 RX ADMIN — INSULIN LISPRO 2 UNITS: 100 INJECTION, SOLUTION INTRAVENOUS; SUBCUTANEOUS at 11:28

## 2021-01-01 RX ADMIN — METHYLPREDNISOLONE SODIUM SUCCINATE 40 MG: 40 INJECTION, POWDER, FOR SOLUTION INTRAMUSCULAR; INTRAVENOUS at 23:19

## 2021-01-01 RX ADMIN — Medication 2 PUFF: at 19:32

## 2021-01-01 RX ADMIN — Medication 2 PUFF: at 15:27

## 2021-01-01 RX ADMIN — GABAPENTIN 300 MG: 300 CAPSULE ORAL at 08:39

## 2021-01-01 RX ADMIN — INSULIN LISPRO 2 UNITS: 100 INJECTION, SOLUTION INTRAVENOUS; SUBCUTANEOUS at 21:35

## 2021-01-01 RX ADMIN — ACETAMINOPHEN 650 MG: 325 TABLET ORAL at 19:18

## 2021-01-01 RX ADMIN — Medication 2 PUFF: at 23:18

## 2021-01-01 RX ADMIN — IPRATROPIUM BROMIDE AND ALBUTEROL SULFATE 1 AMPULE: .5; 3 SOLUTION RESPIRATORY (INHALATION) at 19:02

## 2021-01-01 RX ADMIN — MUPIROCIN: 20 OINTMENT TOPICAL at 21:54

## 2021-01-01 RX ADMIN — METOPROLOL TARTRATE 5 MG: 5 INJECTION INTRAVENOUS at 10:08

## 2021-01-01 RX ADMIN — HYDROCODONE BITARTRATE AND ACETAMINOPHEN 1 TABLET: 5; 325 TABLET ORAL at 08:57

## 2021-01-01 RX ADMIN — METHYLPREDNISOLONE SODIUM SUCCINATE 40 MG: 40 INJECTION, POWDER, FOR SOLUTION INTRAMUSCULAR; INTRAVENOUS at 21:28

## 2021-01-01 RX ADMIN — IPRATROPIUM BROMIDE AND ALBUTEROL SULFATE 1 AMPULE: .5; 3 SOLUTION RESPIRATORY (INHALATION) at 15:53

## 2021-01-01 RX ADMIN — GABAPENTIN 300 MG: 300 CAPSULE ORAL at 14:34

## 2021-01-01 RX ADMIN — ATORVASTATIN CALCIUM 80 MG: 40 TABLET, FILM COATED ORAL at 21:46

## 2021-01-01 RX ADMIN — ATENOLOL 25 MG: 25 TABLET ORAL at 09:01

## 2021-01-01 RX ADMIN — PIPERACILLIN SODIUM AND TAZOBACTAM SODIUM 3375 MG: 3; .375 INJECTION, POWDER, LYOPHILIZED, FOR SOLUTION INTRAVENOUS at 08:48

## 2021-01-01 RX ADMIN — DILTIAZEM HYDROCHLORIDE 5 MG: 5 INJECTION INTRAVENOUS at 16:10

## 2021-01-01 RX ADMIN — Medication 2 PUFF: at 03:08

## 2021-01-01 RX ADMIN — INSULIN LISPRO 1 UNITS: 100 INJECTION, SOLUTION INTRAVENOUS; SUBCUTANEOUS at 20:20

## 2021-01-01 RX ADMIN — Medication 2 PUFF: at 11:14

## 2021-01-01 RX ADMIN — CEFEPIME 2000 MG: 2 INJECTION, POWDER, FOR SOLUTION INTRAVENOUS at 17:19

## 2021-01-01 RX ADMIN — AMIODARONE HYDROCHLORIDE 1 MG/MIN: 50 INJECTION, SOLUTION INTRAVENOUS at 09:38

## 2021-01-01 RX ADMIN — Medication 2 PUFF: at 11:04

## 2021-01-01 RX ADMIN — ENOXAPARIN SODIUM 80 MG: 80 INJECTION SUBCUTANEOUS at 20:32

## 2021-01-01 RX ADMIN — INSULIN LISPRO 2 UNITS: 100 INJECTION, SOLUTION INTRAVENOUS; SUBCUTANEOUS at 20:15

## 2021-01-01 RX ADMIN — Medication 10 ML: at 09:10

## 2021-01-01 RX ADMIN — Medication 2 PUFF: at 07:04

## 2021-01-01 RX ADMIN — Medication 2 PUFF: at 19:21

## 2021-01-01 RX ADMIN — Medication 2 PUFF: at 07:14

## 2021-01-01 RX ADMIN — INSULIN LISPRO 2 UNITS: 100 INJECTION, SOLUTION INTRAVENOUS; SUBCUTANEOUS at 12:20

## 2021-01-01 RX ADMIN — Medication 2 PUFF: at 02:37

## 2021-01-01 RX ADMIN — DILTIAZEM HYDROCHLORIDE 5 MG: 5 INJECTION INTRAVENOUS at 10:41

## 2021-01-01 RX ADMIN — Medication 2 PUFF: at 23:42

## 2021-01-01 RX ADMIN — Medication 10 ML: at 20:36

## 2021-01-01 RX ADMIN — IPRATROPIUM BROMIDE AND ALBUTEROL SULFATE 1 AMPULE: .5; 3 SOLUTION RESPIRATORY (INHALATION) at 10:58

## 2021-01-01 RX ADMIN — ATORVASTATIN CALCIUM 80 MG: 40 TABLET, FILM COATED ORAL at 20:16

## 2021-01-01 RX ADMIN — TRAZODONE HYDROCHLORIDE 100 MG: 100 TABLET ORAL at 20:52

## 2021-01-01 RX ADMIN — IPRATROPIUM BROMIDE AND ALBUTEROL SULFATE 1 AMPULE: .5; 3 SOLUTION RESPIRATORY (INHALATION) at 18:59

## 2021-01-01 RX ADMIN — POTASSIUM CHLORIDE 10 MEQ: 7.46 INJECTION, SOLUTION INTRAVENOUS at 01:58

## 2021-01-01 RX ADMIN — GABAPENTIN 300 MG: 300 CAPSULE ORAL at 10:41

## 2021-01-01 RX ADMIN — HYDROCODONE BITARTRATE AND ACETAMINOPHEN 2 TABLET: 5; 325 TABLET ORAL at 11:35

## 2021-01-01 RX ADMIN — SODIUM CHLORIDE, PRESERVATIVE FREE 10 ML: 5 INJECTION INTRAVENOUS at 21:42

## 2021-01-01 RX ADMIN — INSULIN LISPRO 2 UNITS: 100 INJECTION, SOLUTION INTRAVENOUS; SUBCUTANEOUS at 16:28

## 2021-01-01 RX ADMIN — IPRATROPIUM BROMIDE AND ALBUTEROL SULFATE 1 AMPULE: .5; 3 SOLUTION RESPIRATORY (INHALATION) at 07:21

## 2021-01-01 RX ADMIN — DILTIAZEM HYDROCHLORIDE 10 MG/HR: 5 INJECTION, SOLUTION INTRAVENOUS at 19:18

## 2021-01-01 RX ADMIN — HYDROCODONE BITARTRATE AND ACETAMINOPHEN 1 TABLET: 5; 325 TABLET ORAL at 02:45

## 2021-01-01 RX ADMIN — HYDROCODONE BITARTRATE AND ACETAMINOPHEN 2 TABLET: 5; 325 TABLET ORAL at 13:00

## 2021-01-01 RX ADMIN — Medication 10 ML: at 09:11

## 2021-01-01 RX ADMIN — METHYLPREDNISOLONE SODIUM SUCCINATE 40 MG: 40 INJECTION, POWDER, FOR SOLUTION INTRAMUSCULAR; INTRAVENOUS at 08:38

## 2021-01-01 RX ADMIN — GABAPENTIN 300 MG: 300 CAPSULE ORAL at 07:50

## 2021-01-01 RX ADMIN — AMIODARONE HYDROCHLORIDE 400 MG: 200 TABLET ORAL at 12:05

## 2021-01-01 RX ADMIN — Medication 2 PUFF: at 22:45

## 2021-01-01 RX ADMIN — IPRATROPIUM BROMIDE AND ALBUTEROL SULFATE 1 AMPULE: .5; 3 SOLUTION RESPIRATORY (INHALATION) at 23:10

## 2021-01-01 RX ADMIN — FOLIC ACID: 5 INJECTION, SOLUTION INTRAMUSCULAR; INTRAVENOUS; SUBCUTANEOUS at 12:02

## 2021-01-01 RX ADMIN — MUPIROCIN: 20 OINTMENT TOPICAL at 20:35

## 2021-01-01 RX ADMIN — ATORVASTATIN CALCIUM 80 MG: 40 TABLET, FILM COATED ORAL at 20:34

## 2021-01-01 RX ADMIN — Medication 2 PUFF: at 11:37

## 2021-01-01 RX ADMIN — INSULIN LISPRO 1 UNITS: 100 INJECTION, SOLUTION INTRAVENOUS; SUBCUTANEOUS at 21:02

## 2021-01-01 RX ADMIN — HYDROCODONE BITARTRATE AND ACETAMINOPHEN 2 TABLET: 5; 325 TABLET ORAL at 12:00

## 2021-01-01 RX ADMIN — METHYLPREDNISOLONE SODIUM SUCCINATE 40 MG: 40 INJECTION, POWDER, FOR SOLUTION INTRAMUSCULAR; INTRAVENOUS at 22:34

## 2021-01-01 RX ADMIN — PIPERACILLIN SODIUM AND TAZOBACTAM SODIUM 3375 MG: 3; .375 INJECTION, POWDER, LYOPHILIZED, FOR SOLUTION INTRAVENOUS at 17:23

## 2021-01-01 RX ADMIN — CEFEPIME 2000 MG: 2 INJECTION, POWDER, FOR SOLUTION INTRAVENOUS at 04:19

## 2021-01-01 RX ADMIN — Medication 2 PUFF: at 03:00

## 2021-01-01 RX ADMIN — GABAPENTIN 300 MG: 300 CAPSULE ORAL at 13:50

## 2021-01-01 RX ADMIN — POTASSIUM CHLORIDE 10 MEQ: 7.46 INJECTION, SOLUTION INTRAVENOUS at 04:56

## 2021-01-01 RX ADMIN — ENOXAPARIN SODIUM 70 MG: 80 INJECTION SUBCUTANEOUS at 21:54

## 2021-01-01 RX ADMIN — METOPROLOL TARTRATE 50 MG: 50 TABLET, FILM COATED ORAL at 20:09

## 2021-01-01 RX ADMIN — GABAPENTIN 300 MG: 300 CAPSULE ORAL at 20:23

## 2021-01-01 RX ADMIN — INSULIN LISPRO 2 UNITS: 100 INJECTION, SOLUTION INTRAVENOUS; SUBCUTANEOUS at 17:24

## 2021-01-01 RX ADMIN — DIGOXIN 250 MCG: 125 TABLET ORAL at 16:11

## 2021-01-01 RX ADMIN — POTASSIUM CHLORIDE 10 MEQ: 7.46 INJECTION, SOLUTION INTRAVENOUS at 00:35

## 2021-01-01 RX ADMIN — DILTIAZEM HYDROCHLORIDE 15 MG/HR: 5 INJECTION INTRAVENOUS at 04:45

## 2021-01-01 RX ADMIN — SODIUM CHLORIDE 1000 ML: 9 INJECTION, SOLUTION INTRAVENOUS at 08:38

## 2021-01-01 RX ADMIN — SODIUM CHLORIDE, PRESERVATIVE FREE 10 ML: 5 INJECTION INTRAVENOUS at 20:38

## 2021-01-01 RX ADMIN — SODIUM CHLORIDE 25 ML: 9 INJECTION, SOLUTION INTRAVENOUS at 08:46

## 2021-01-01 RX ADMIN — PIPERACILLIN SODIUM AND TAZOBACTAM SODIUM 3375 MG: 3; .375 INJECTION, POWDER, LYOPHILIZED, FOR SOLUTION INTRAVENOUS at 10:50

## 2021-01-01 RX ADMIN — QUETIAPINE FUMARATE 25 MG: 25 TABLET ORAL at 20:36

## 2021-01-01 RX ADMIN — Medication 2 PUFF: at 19:17

## 2021-01-01 RX ADMIN — Medication 10 ML: at 21:41

## 2021-01-01 RX ADMIN — IPRATROPIUM BROMIDE AND ALBUTEROL SULFATE 1 AMPULE: .5; 3 SOLUTION RESPIRATORY (INHALATION) at 16:07

## 2021-01-01 RX ADMIN — IPRATROPIUM BROMIDE AND ALBUTEROL SULFATE 1 AMPULE: .5; 3 SOLUTION RESPIRATORY (INHALATION) at 07:16

## 2021-01-01 RX ADMIN — FUROSEMIDE 40 MG: 10 INJECTION, SOLUTION INTRAMUSCULAR; INTRAVENOUS at 10:09

## 2021-01-01 RX ADMIN — METOPROLOL TARTRATE 50 MG: 50 TABLET, FILM COATED ORAL at 10:40

## 2021-01-01 RX ADMIN — INSULIN LISPRO 2 UNITS: 100 INJECTION, SOLUTION INTRAVENOUS; SUBCUTANEOUS at 17:31

## 2021-01-01 RX ADMIN — SODIUM CHLORIDE 25 ML: 9 INJECTION, SOLUTION INTRAVENOUS at 15:47

## 2021-01-01 RX ADMIN — ATORVASTATIN CALCIUM 80 MG: 40 TABLET, FILM COATED ORAL at 20:24

## 2021-01-01 RX ADMIN — APIXABAN 5 MG: 5 TABLET, FILM COATED ORAL at 20:16

## 2021-01-01 RX ADMIN — Medication 2 PUFF: at 19:19

## 2021-01-01 RX ADMIN — ENOXAPARIN SODIUM 40 MG: 40 INJECTION SUBCUTANEOUS at 11:06

## 2021-01-01 RX ADMIN — VANCOMYCIN HYDROCHLORIDE 1250 MG: 10 INJECTION, POWDER, LYOPHILIZED, FOR SOLUTION INTRAVENOUS at 12:06

## 2021-01-01 RX ADMIN — MUPIROCIN: 20 OINTMENT TOPICAL at 20:14

## 2021-01-01 RX ADMIN — APIXABAN 5 MG: 5 TABLET, FILM COATED ORAL at 20:35

## 2021-01-01 RX ADMIN — SODIUM CHLORIDE 1000 ML: 9 INJECTION, SOLUTION INTRAVENOUS at 21:27

## 2021-01-01 RX ADMIN — PIPERACILLIN SODIUM AND TAZOBACTAM SODIUM 3375 MG: 3; .375 INJECTION, POWDER, LYOPHILIZED, FOR SOLUTION INTRAVENOUS at 09:24

## 2021-01-01 RX ADMIN — METOPROLOL TARTRATE 50 MG: 50 TABLET, FILM COATED ORAL at 20:24

## 2021-01-01 RX ADMIN — AMIODARONE HYDROCHLORIDE 400 MG: 200 TABLET ORAL at 08:33

## 2021-01-01 RX ADMIN — Medication 2 PUFF: at 22:55

## 2021-01-01 RX ADMIN — PIPERACILLIN SODIUM AND TAZOBACTAM SODIUM 3375 MG: 3; .375 INJECTION, POWDER, LYOPHILIZED, FOR SOLUTION INTRAVENOUS at 23:22

## 2021-01-01 RX ADMIN — INSULIN LISPRO 2 UNITS: 100 INJECTION, SOLUTION INTRAVENOUS; SUBCUTANEOUS at 12:33

## 2021-01-01 RX ADMIN — POTASSIUM CHLORIDE 10 MEQ: 7.46 INJECTION, SOLUTION INTRAVENOUS at 23:22

## 2021-01-01 RX ADMIN — HYDROCODONE BITARTRATE AND ACETAMINOPHEN 2 TABLET: 5; 325 TABLET ORAL at 15:06

## 2021-01-01 RX ADMIN — POTASSIUM CHLORIDE 10 MEQ: 7.46 INJECTION, SOLUTION INTRAVENOUS at 04:39

## 2021-01-01 RX ADMIN — PIPERACILLIN SODIUM AND TAZOBACTAM SODIUM 3375 MG: 3; .375 INJECTION, POWDER, LYOPHILIZED, FOR SOLUTION INTRAVENOUS at 15:48

## 2021-01-01 RX ADMIN — Medication 2 PUFF: at 22:54

## 2021-01-01 RX ADMIN — OXYCODONE HYDROCHLORIDE AND ACETAMINOPHEN 1 TABLET: 5; 325 TABLET ORAL at 01:11

## 2021-01-01 RX ADMIN — INSULIN LISPRO 1 UNITS: 100 INJECTION, SOLUTION INTRAVENOUS; SUBCUTANEOUS at 21:40

## 2021-01-01 RX ADMIN — DEXTROSE 0.5 MG/MIN: 5 SOLUTION INTRAVENOUS at 17:49

## 2021-01-01 RX ADMIN — IPRATROPIUM BROMIDE 0.5 MG: 0.5 SOLUTION RESPIRATORY (INHALATION) at 07:12

## 2021-01-01 RX ADMIN — TRAZODONE HYDROCHLORIDE 100 MG: 100 TABLET ORAL at 20:36

## 2021-01-01 RX ADMIN — LORAZEPAM 0.5 MG: 2 INJECTION INTRAMUSCULAR; INTRAVENOUS at 03:50

## 2021-01-01 RX ADMIN — INSULIN LISPRO 4 UNITS: 100 INJECTION, SOLUTION INTRAVENOUS; SUBCUTANEOUS at 12:24

## 2021-01-01 RX ADMIN — POTASSIUM CHLORIDE 20 MEQ: 750 TABLET, EXTENDED RELEASE ORAL at 11:51

## 2021-01-01 RX ADMIN — FUROSEMIDE 40 MG: 10 INJECTION, SOLUTION INTRAMUSCULAR; INTRAVENOUS at 09:22

## 2021-01-01 RX ADMIN — IPRATROPIUM BROMIDE AND ALBUTEROL SULFATE 1 AMPULE: .5; 3 SOLUTION RESPIRATORY (INHALATION) at 03:00

## 2021-01-01 RX ADMIN — ENOXAPARIN SODIUM 70 MG: 80 INJECTION SUBCUTANEOUS at 13:11

## 2021-01-01 RX ADMIN — INSULIN LISPRO 1 UNITS: 100 INJECTION, SOLUTION INTRAVENOUS; SUBCUTANEOUS at 20:37

## 2021-01-01 RX ADMIN — INSULIN LISPRO 2 UNITS: 100 INJECTION, SOLUTION INTRAVENOUS; SUBCUTANEOUS at 17:40

## 2021-01-01 RX ADMIN — Medication 2 PUFF: at 19:31

## 2021-01-01 RX ADMIN — GABAPENTIN 300 MG: 300 CAPSULE ORAL at 15:08

## 2021-01-01 RX ADMIN — ENOXAPARIN SODIUM 70 MG: 80 INJECTION SUBCUTANEOUS at 08:38

## 2021-01-01 RX ADMIN — SODIUM CHLORIDE, PRESERVATIVE FREE 10 ML: 5 INJECTION INTRAVENOUS at 20:41

## 2021-01-01 RX ADMIN — Medication 10 ML: at 10:47

## 2021-01-01 RX ADMIN — METOPROLOL TARTRATE 50 MG: 50 TABLET, FILM COATED ORAL at 12:00

## 2021-01-01 RX ADMIN — POTASSIUM CHLORIDE 10 MEQ: 7.46 INJECTION, SOLUTION INTRAVENOUS at 21:54

## 2021-01-01 RX ADMIN — Medication 2 PUFF: at 16:02

## 2021-01-01 RX ADMIN — GABAPENTIN 300 MG: 300 CAPSULE ORAL at 13:00

## 2021-01-01 RX ADMIN — MUPIROCIN: 20 OINTMENT TOPICAL at 08:08

## 2021-01-01 RX ADMIN — ENOXAPARIN SODIUM 40 MG: 40 INJECTION SUBCUTANEOUS at 09:01

## 2021-01-01 RX ADMIN — GABAPENTIN 300 MG: 300 CAPSULE ORAL at 08:07

## 2021-01-01 RX ADMIN — Medication 2 PUFF: at 22:42

## 2021-01-01 RX ADMIN — SODIUM CHLORIDE, PRESERVATIVE FREE 10 ML: 5 INJECTION INTRAVENOUS at 21:09

## 2021-01-01 RX ADMIN — ATORVASTATIN CALCIUM 80 MG: 40 TABLET, FILM COATED ORAL at 20:52

## 2021-01-01 RX ADMIN — POTASSIUM CHLORIDE 10 MEQ: 7.46 INJECTION, SOLUTION INTRAVENOUS at 03:51

## 2021-01-01 RX ADMIN — FAMOTIDINE 20 MG: 10 INJECTION, SOLUTION INTRAVENOUS at 04:42

## 2021-01-01 RX ADMIN — GABAPENTIN 300 MG: 300 CAPSULE ORAL at 14:40

## 2021-01-01 RX ADMIN — METOPROLOL TARTRATE 50 MG: 50 TABLET, FILM COATED ORAL at 16:10

## 2021-01-01 RX ADMIN — AMIODARONE HYDROCHLORIDE 400 MG: 200 TABLET ORAL at 11:51

## 2021-01-01 RX ADMIN — Medication 10 ML: at 20:17

## 2021-01-01 RX ADMIN — SODIUM CHLORIDE: 9 INJECTION, SOLUTION INTRAVENOUS at 00:55

## 2021-01-01 RX ADMIN — Medication 2 PUFF: at 22:50

## 2021-01-01 RX ADMIN — Medication 2 PUFF: at 03:15

## 2021-01-01 RX ADMIN — IPRATROPIUM BROMIDE 0.5 MG: 0.5 SOLUTION RESPIRATORY (INHALATION) at 19:19

## 2021-01-01 RX ADMIN — METOPROLOL TARTRATE 50 MG: 50 TABLET, FILM COATED ORAL at 08:39

## 2021-01-01 RX ADMIN — AMIODARONE HYDROCHLORIDE 400 MG: 200 TABLET ORAL at 10:41

## 2021-01-01 RX ADMIN — POTASSIUM CHLORIDE 10 MEQ: 7.46 INJECTION, SOLUTION INTRAVENOUS at 23:31

## 2021-01-01 RX ADMIN — MUPIROCIN: 20 OINTMENT TOPICAL at 08:39

## 2021-01-01 RX ADMIN — LEVALBUTEROL HYDROCHLORIDE 1.25 MG: 1.25 SOLUTION, CONCENTRATE RESPIRATORY (INHALATION) at 19:21

## 2021-01-01 RX ADMIN — GABAPENTIN 300 MG: 300 CAPSULE ORAL at 15:11

## 2021-01-01 RX ADMIN — METOPROLOL TARTRATE 50 MG: 50 TABLET, FILM COATED ORAL at 09:51

## 2021-01-01 RX ADMIN — DILTIAZEM HYDROCHLORIDE 20 MG/HR: 5 INJECTION, SOLUTION INTRAVENOUS at 03:54

## 2021-01-01 RX ADMIN — HYDROCODONE BITARTRATE AND ACETAMINOPHEN 2 TABLET: 5; 325 TABLET ORAL at 12:48

## 2021-01-01 RX ADMIN — VANCOMYCIN HYDROCHLORIDE 1750 MG: 10 INJECTION, POWDER, LYOPHILIZED, FOR SOLUTION INTRAVENOUS at 11:03

## 2021-01-01 RX ADMIN — FUROSEMIDE 40 MG: 10 INJECTION, SOLUTION INTRAMUSCULAR; INTRAVENOUS at 18:55

## 2021-01-01 RX ADMIN — GABAPENTIN 300 MG: 300 CAPSULE ORAL at 20:09

## 2021-01-01 RX ADMIN — ASPIRIN 81 MG: 81 TABLET, COATED ORAL at 08:34

## 2021-01-01 RX ADMIN — Medication 2 PUFF: at 15:01

## 2021-01-01 RX ADMIN — INSULIN LISPRO 4 UNITS: 100 INJECTION, SOLUTION INTRAVENOUS; SUBCUTANEOUS at 19:06

## 2021-01-01 RX ADMIN — LEVALBUTEROL HYDROCHLORIDE 1.25 MG: 1.25 SOLUTION, CONCENTRATE RESPIRATORY (INHALATION) at 10:55

## 2021-01-01 RX ADMIN — PREDNISONE 30 MG: 20 TABLET ORAL at 11:34

## 2021-01-01 RX ADMIN — GABAPENTIN 300 MG: 300 CAPSULE ORAL at 20:52

## 2021-01-01 RX ADMIN — MAGNESIUM SULFATE HEPTAHYDRATE 1000 MG: 1 INJECTION, SOLUTION INTRAVENOUS at 10:56

## 2021-01-01 RX ADMIN — IPRATROPIUM BROMIDE AND ALBUTEROL SULFATE 1 AMPULE: .5; 3 SOLUTION RESPIRATORY (INHALATION) at 23:28

## 2021-01-01 RX ADMIN — Medication 2 PUFF: at 07:16

## 2021-01-01 RX ADMIN — METHYLPREDNISOLONE SODIUM SUCCINATE 40 MG: 40 INJECTION, POWDER, FOR SOLUTION INTRAMUSCULAR; INTRAVENOUS at 09:50

## 2021-01-01 RX ADMIN — Medication 2 PUFF: at 19:02

## 2021-01-01 RX ADMIN — GABAPENTIN 300 MG: 300 CAPSULE ORAL at 09:14

## 2021-01-01 RX ADMIN — METHYLPREDNISOLONE SODIUM SUCCINATE 40 MG: 40 INJECTION, POWDER, FOR SOLUTION INTRAMUSCULAR; INTRAVENOUS at 12:00

## 2021-01-01 RX ADMIN — FUROSEMIDE 40 MG: 10 INJECTION, SOLUTION INTRAMUSCULAR; INTRAVENOUS at 18:40

## 2021-01-01 RX ADMIN — Medication 2 PUFF: at 07:28

## 2021-01-01 RX ADMIN — ATORVASTATIN CALCIUM 80 MG: 40 TABLET, FILM COATED ORAL at 20:36

## 2021-01-01 RX ADMIN — ENOXAPARIN SODIUM 70 MG: 80 INJECTION SUBCUTANEOUS at 20:09

## 2021-01-01 RX ADMIN — APIXABAN 5 MG: 5 TABLET, FILM COATED ORAL at 10:40

## 2021-01-01 RX ADMIN — Medication 10 ML: at 10:53

## 2021-01-01 RX ADMIN — ATENOLOL 25 MG: 25 TABLET ORAL at 07:50

## 2021-01-01 RX ADMIN — ACETAMINOPHEN 650 MG: 325 TABLET ORAL at 10:41

## 2021-01-01 RX ADMIN — SODIUM CHLORIDE 1000 ML: 9 INJECTION, SOLUTION INTRAVENOUS at 00:05

## 2021-01-01 RX ADMIN — DILTIAZEM HYDROCHLORIDE 20 MG: 5 INJECTION INTRAVENOUS at 17:33

## 2021-01-01 RX ADMIN — METOPROLOL TARTRATE 50 MG: 50 TABLET, FILM COATED ORAL at 08:06

## 2021-01-01 RX ADMIN — Medication 2 PUFF: at 03:16

## 2021-01-01 RX ADMIN — VANCOMYCIN HYDROCHLORIDE 1250 MG: 10 INJECTION, POWDER, LYOPHILIZED, FOR SOLUTION INTRAVENOUS at 21:48

## 2021-01-01 RX ADMIN — POTASSIUM CHLORIDE 10 MEQ: 7.46 INJECTION, SOLUTION INTRAVENOUS at 06:00

## 2021-01-01 RX ADMIN — VANCOMYCIN HYDROCHLORIDE 1250 MG: 10 INJECTION, POWDER, LYOPHILIZED, FOR SOLUTION INTRAVENOUS at 09:50

## 2021-01-01 RX ADMIN — SODIUM CHLORIDE 1000 ML: 9 INJECTION, SOLUTION INTRAVENOUS at 00:15

## 2021-01-01 RX ADMIN — ATENOLOL 25 MG: 25 TABLET ORAL at 09:14

## 2021-01-01 RX ADMIN — SODIUM CHLORIDE, PRESERVATIVE FREE 10 ML: 5 INJECTION INTRAVENOUS at 09:30

## 2021-01-01 RX ADMIN — IPRATROPIUM BROMIDE 0.5 MG: 0.5 SOLUTION RESPIRATORY (INHALATION) at 15:16

## 2021-01-01 RX ADMIN — METHYLPREDNISOLONE SODIUM SUCCINATE 40 MG: 40 INJECTION, POWDER, FOR SOLUTION INTRAMUSCULAR; INTRAVENOUS at 10:08

## 2021-01-01 RX ADMIN — DILTIAZEM HYDROCHLORIDE 15 MG/HR: 5 INJECTION INTRAVENOUS at 15:04

## 2021-01-01 RX ADMIN — GABAPENTIN 300 MG: 300 CAPSULE ORAL at 22:02

## 2021-01-01 RX ADMIN — Medication 2 PUFF: at 23:43

## 2021-01-01 RX ADMIN — MUPIROCIN: 20 OINTMENT TOPICAL at 10:43

## 2021-01-01 RX ADMIN — IOPAMIDOL 85 ML: 755 INJECTION, SOLUTION INTRAVENOUS at 02:11

## 2021-01-01 RX ADMIN — DILTIAZEM HYDROCHLORIDE 20 MG/HR: 5 INJECTION, SOLUTION INTRAVENOUS at 02:00

## 2021-01-01 RX ADMIN — DILTIAZEM HYDROCHLORIDE 20 MG/HR: 5 INJECTION, SOLUTION INTRAVENOUS at 10:58

## 2021-01-01 RX ADMIN — HYDROCODONE BITARTRATE AND ACETAMINOPHEN 2 TABLET: 5; 325 TABLET ORAL at 03:50

## 2021-01-01 RX ADMIN — PIPERACILLIN SODIUM AND TAZOBACTAM SODIUM 3375 MG: 3; .375 INJECTION, POWDER, LYOPHILIZED, FOR SOLUTION INTRAVENOUS at 00:16

## 2021-01-01 RX ADMIN — IPRATROPIUM BROMIDE AND ALBUTEROL SULFATE 1 AMPULE: .5; 3 SOLUTION RESPIRATORY (INHALATION) at 19:18

## 2021-01-01 RX ADMIN — GABAPENTIN 300 MG: 300 CAPSULE ORAL at 16:10

## 2021-01-01 RX ADMIN — Medication 2 PUFF: at 08:36

## 2021-01-01 RX ADMIN — POTASSIUM CHLORIDE 10 MEQ: 7.46 INJECTION, SOLUTION INTRAVENOUS at 07:31

## 2021-01-01 RX ADMIN — DILTIAZEM HYDROCHLORIDE 5 MG/HR: 5 INJECTION INTRAVENOUS at 03:39

## 2021-01-01 RX ADMIN — AMIODARONE HYDROCHLORIDE 150 MG: 50 INJECTION, SOLUTION INTRAVENOUS at 09:20

## 2021-01-01 RX ADMIN — GABAPENTIN 300 MG: 300 CAPSULE ORAL at 09:51

## 2021-01-01 RX ADMIN — LEVALBUTEROL HYDROCHLORIDE 1.25 MG: 1.25 SOLUTION, CONCENTRATE RESPIRATORY (INHALATION) at 07:12

## 2021-01-01 RX ADMIN — AMLODIPINE BESYLATE 7.5 MG: 5 TABLET ORAL at 01:11

## 2021-01-01 RX ADMIN — HYDROCODONE BITARTRATE AND ACETAMINOPHEN 1 TABLET: 5; 325 TABLET ORAL at 12:36

## 2021-01-01 RX ADMIN — ENOXAPARIN SODIUM 80 MG: 80 INJECTION SUBCUTANEOUS at 09:24

## 2021-01-01 RX ADMIN — METOPROLOL TARTRATE 50 MG: 50 TABLET, FILM COATED ORAL at 21:31

## 2021-01-01 RX ADMIN — HYDROCODONE BITARTRATE AND ACETAMINOPHEN 2 TABLET: 5; 325 TABLET ORAL at 06:01

## 2021-01-01 RX ADMIN — GABAPENTIN 300 MG: 300 CAPSULE ORAL at 12:30

## 2021-01-01 RX ADMIN — INSULIN LISPRO 1 UNITS: 100 INJECTION, SOLUTION INTRAVENOUS; SUBCUTANEOUS at 20:54

## 2021-01-01 RX ADMIN — IPRATROPIUM BROMIDE AND ALBUTEROL SULFATE 1 AMPULE: .5; 3 SOLUTION RESPIRATORY (INHALATION) at 07:01

## 2021-01-01 RX ADMIN — INSULIN LISPRO 1 UNITS: 100 INJECTION, SOLUTION INTRAVENOUS; SUBCUTANEOUS at 20:33

## 2021-01-01 RX ADMIN — METOPROLOL TARTRATE 50 MG: 50 TABLET, FILM COATED ORAL at 09:14

## 2021-01-01 RX ADMIN — ASPIRIN 81 MG: 81 TABLET, COATED ORAL at 09:36

## 2021-01-01 RX ADMIN — HYDROCODONE BITARTRATE AND ACETAMINOPHEN 2 TABLET: 5; 325 TABLET ORAL at 08:34

## 2021-01-01 RX ADMIN — INSULIN LISPRO 2 UNITS: 100 INJECTION, SOLUTION INTRAVENOUS; SUBCUTANEOUS at 17:32

## 2021-01-01 RX ADMIN — Medication 10 ML: at 08:07

## 2021-01-01 RX ADMIN — DIGOXIN 250 MCG: 0.25 INJECTION INTRAMUSCULAR; INTRAVENOUS at 17:25

## 2021-01-01 RX ADMIN — GABAPENTIN 300 MG: 300 CAPSULE ORAL at 20:17

## 2021-01-01 RX ADMIN — IPRATROPIUM BROMIDE AND ALBUTEROL SULFATE 1 AMPULE: .5; 3 SOLUTION RESPIRATORY (INHALATION) at 03:15

## 2021-01-01 RX ADMIN — ATORVASTATIN CALCIUM 80 MG: 40 TABLET, FILM COATED ORAL at 20:09

## 2021-01-01 RX ADMIN — Medication 10 ML: at 20:18

## 2021-01-01 RX ADMIN — METHYLPREDNISOLONE SODIUM SUCCINATE 40 MG: 40 INJECTION, POWDER, FOR SOLUTION INTRAMUSCULAR; INTRAVENOUS at 09:14

## 2021-01-01 RX ADMIN — CEFEPIME 2000 MG: 2 INJECTION, POWDER, FOR SOLUTION INTRAVENOUS at 16:13

## 2021-01-01 RX ADMIN — AMIODARONE HYDROCHLORIDE 400 MG: 200 TABLET ORAL at 18:01

## 2021-01-01 RX ADMIN — METOPROLOL TARTRATE 50 MG: 50 TABLET, FILM COATED ORAL at 20:32

## 2021-01-01 RX ADMIN — SODIUM CHLORIDE, PRESERVATIVE FREE 10 ML: 5 INJECTION INTRAVENOUS at 21:21

## 2021-01-01 RX ADMIN — INSULIN LISPRO 2 UNITS: 100 INJECTION, SOLUTION INTRAVENOUS; SUBCUTANEOUS at 09:38

## 2021-01-01 RX ADMIN — IPRATROPIUM BROMIDE 0.5 MG: 0.5 SOLUTION RESPIRATORY (INHALATION) at 10:55

## 2021-01-01 RX ADMIN — SODIUM CHLORIDE 1000 ML: 9 INJECTION, SOLUTION INTRAVENOUS at 15:44

## 2021-01-01 RX ADMIN — HYDROCODONE BITARTRATE AND ACETAMINOPHEN 1 TABLET: 5; 325 TABLET ORAL at 03:15

## 2021-01-01 RX ADMIN — LEVOFLOXACIN 750 MG: 5 INJECTION, SOLUTION INTRAVENOUS at 08:39

## 2021-01-01 RX ADMIN — AMIODARONE HYDROCHLORIDE 400 MG: 200 TABLET ORAL at 17:21

## 2021-01-01 RX ADMIN — VANCOMYCIN HYDROCHLORIDE 1250 MG: 10 INJECTION, POWDER, LYOPHILIZED, FOR SOLUTION INTRAVENOUS at 11:52

## 2021-01-01 RX ADMIN — MAGNESIUM SULFATE HEPTAHYDRATE 2000 MG: 40 INJECTION, SOLUTION INTRAVENOUS at 03:32

## 2021-01-01 RX ADMIN — HYDROCODONE BITARTRATE AND ACETAMINOPHEN 1 TABLET: 5; 325 TABLET ORAL at 20:52

## 2021-01-01 RX ADMIN — HYDROCODONE BITARTRATE AND ACETAMINOPHEN 2 TABLET: 5; 325 TABLET ORAL at 23:40

## 2021-01-01 RX ADMIN — LEVOFLOXACIN 750 MG: 5 INJECTION, SOLUTION INTRAVENOUS at 09:24

## 2021-01-01 RX ADMIN — IOPAMIDOL 85 ML: 755 INJECTION, SOLUTION INTRAVENOUS at 06:36

## 2021-01-01 RX ADMIN — LEVALBUTEROL HYDROCHLORIDE 1.25 MG: 1.25 SOLUTION, CONCENTRATE RESPIRATORY (INHALATION) at 15:16

## 2021-01-01 RX ADMIN — QUETIAPINE FUMARATE 25 MG: 25 TABLET ORAL at 07:50

## 2021-01-01 RX ADMIN — Medication 10 ML: at 08:39

## 2021-01-01 ASSESSMENT — PAIN DESCRIPTION - DESCRIPTORS
DESCRIPTORS: ACHING
DESCRIPTORS: ACHING;DISCOMFORT
DESCRIPTORS: SHOOTING
DESCRIPTORS: ACHING;DISCOMFORT
DESCRIPTORS: ACHING
DESCRIPTORS: SHOOTING;ACHING
DESCRIPTORS: ACHING
DESCRIPTORS: ACHING

## 2021-01-01 ASSESSMENT — PAIN SCALES - GENERAL
PAINLEVEL_OUTOF10: 9
PAINLEVEL_OUTOF10: 8
PAINLEVEL_OUTOF10: 8
PAINLEVEL_OUTOF10: 9
PAINLEVEL_OUTOF10: 9
PAINLEVEL_OUTOF10: 7
PAINLEVEL_OUTOF10: 7
PAINLEVEL_OUTOF10: 8
PAINLEVEL_OUTOF10: 4
PAINLEVEL_OUTOF10: 5
PAINLEVEL_OUTOF10: 10
PAINLEVEL_OUTOF10: 9
PAINLEVEL_OUTOF10: 3
PAINLEVEL_OUTOF10: 0
PAINLEVEL_OUTOF10: 1
PAINLEVEL_OUTOF10: 8
PAINLEVEL_OUTOF10: 7
PAINLEVEL_OUTOF10: 8
PAINLEVEL_OUTOF10: 4
PAINLEVEL_OUTOF10: 0
PAINLEVEL_OUTOF10: 0
PAINLEVEL_OUTOF10: 8
PAINLEVEL_OUTOF10: 9
PAINLEVEL_OUTOF10: 8
PAINLEVEL_OUTOF10: 8
PAINLEVEL_OUTOF10: 10
PAINLEVEL_OUTOF10: 5
PAINLEVEL_OUTOF10: 9
PAINLEVEL_OUTOF10: 9
PAINLEVEL_OUTOF10: 7
PAINLEVEL_OUTOF10: 5
PAINLEVEL_OUTOF10: 0
PAINLEVEL_OUTOF10: 0
PAINLEVEL_OUTOF10: 9
PAINLEVEL_OUTOF10: 7
PAINLEVEL_OUTOF10: 8
PAINLEVEL_OUTOF10: 0
PAINLEVEL_OUTOF10: 9
PAINLEVEL_OUTOF10: 0
PAINLEVEL_OUTOF10: 0
PAINLEVEL_OUTOF10: 7
PAINLEVEL_OUTOF10: 0
PAINLEVEL_OUTOF10: 3
PAINLEVEL_OUTOF10: 10
PAINLEVEL_OUTOF10: 9
PAINLEVEL_OUTOF10: 10
PAINLEVEL_OUTOF10: 10
PAINLEVEL_OUTOF10: 8
PAINLEVEL_OUTOF10: 0
PAINLEVEL_OUTOF10: 10
PAINLEVEL_OUTOF10: 3

## 2021-01-01 ASSESSMENT — PAIN DESCRIPTION - PROGRESSION
CLINICAL_PROGRESSION: GRADUALLY IMPROVING
CLINICAL_PROGRESSION: NOT CHANGED
CLINICAL_PROGRESSION: GRADUALLY WORSENING

## 2021-01-01 ASSESSMENT — PAIN - FUNCTIONAL ASSESSMENT
PAIN_FUNCTIONAL_ASSESSMENT: ACTIVITIES ARE NOT PREVENTED
PAIN_FUNCTIONAL_ASSESSMENT: PREVENTS OR INTERFERES SOME ACTIVE ACTIVITIES AND ADLS
PAIN_FUNCTIONAL_ASSESSMENT: ACTIVITIES ARE NOT PREVENTED
PAIN_FUNCTIONAL_ASSESSMENT: PREVENTS OR INTERFERES SOME ACTIVE ACTIVITIES AND ADLS
PAIN_FUNCTIONAL_ASSESSMENT: PREVENTS OR INTERFERES SOME ACTIVE ACTIVITIES AND ADLS
PAIN_FUNCTIONAL_ASSESSMENT: PREVENTS OR INTERFERES WITH ALL ACTIVE AND SOME PASSIVE ACTIVITIES
PAIN_FUNCTIONAL_ASSESSMENT: PREVENTS OR INTERFERES SOME ACTIVE ACTIVITIES AND ADLS
PAIN_FUNCTIONAL_ASSESSMENT: PREVENTS OR INTERFERES SOME ACTIVE ACTIVITIES AND ADLS

## 2021-01-01 ASSESSMENT — PAIN DESCRIPTION - PAIN TYPE
TYPE: CHRONIC PAIN
TYPE: ACUTE PAIN
TYPE: CHRONIC PAIN

## 2021-01-01 ASSESSMENT — PAIN DESCRIPTION - FREQUENCY
FREQUENCY: CONTINUOUS

## 2021-01-01 ASSESSMENT — PAIN DESCRIPTION - ONSET
ONSET: ON-GOING

## 2021-01-01 ASSESSMENT — PAIN DESCRIPTION - LOCATION
LOCATION: BACK
LOCATION: KNEE;ANKLE
LOCATION: GENERALIZED;BACK
LOCATION: BACK;NECK
LOCATION: BACK
LOCATION: BACK;NECK
LOCATION: BACK;NECK;ARM
LOCATION: BACK
LOCATION: BACK

## 2021-01-01 ASSESSMENT — LIFESTYLE VARIABLES
HOW OFTEN DO YOU HAVE A DRINK CONTAINING ALCOHOL: 4 OR MORE TIMES A WEEK
HOW MANY STANDARD DRINKS CONTAINING ALCOHOL DO YOU HAVE ON A TYPICAL DAY: 5 OR 6

## 2021-01-01 ASSESSMENT — PAIN DESCRIPTION - ORIENTATION
ORIENTATION: LOWER
ORIENTATION: LOWER;MID
ORIENTATION: POSTERIOR
ORIENTATION: LOWER
ORIENTATION: RIGHT
ORIENTATION: MID
ORIENTATION: LOWER

## 2021-01-01 ASSESSMENT — ENCOUNTER SYMPTOMS
VOMITING: 0
SHORTNESS OF BREATH: 0
ABDOMINAL PAIN: 0
GASTROINTESTINAL NEGATIVE: 1

## 2021-01-01 ASSESSMENT — PULMONARY FUNCTION TESTS: PIF_VALUE: 12

## 2021-06-02 NOTE — ED NOTES
Pt complaining of chest pain when Alfredo Scarlet NP talking with pt.      Carter Brewer, [facility]  06/02/21 9895

## 2021-06-02 NOTE — ED PROVIDER NOTES
[address] ED  EMERGENCY DEPARTMENT ENCOUNTER        Pt Name: Zi Coles  MRN: [id number]  [place] 1960  Date of evaluation: 6/1/2021  Provider: Ceasar Taylor PA-C  PCP: Josetta Gilford, APRN - CNP    Shared Visit or Autonomous Visit:  I have seen and evaluated this patient with my supervising physician Dr Lela Leon   Patient presents with    Knee Pain     right knee and right foot pain, states knee popped earlier today and pain       HISTORY OF PRESENT ILLNESS   (Location/Symptom, Timing/Onset, Context/Setting, Quality, Duration, Modifying Factors, Severity)  Note limiting factors. Zi Coles is a 61 y.o. male presenting to the emergency department for evaluation after fall this evening. States that she strings got caught up and caused him to fall landed on his right knee and also fell back and hit the back of his head. No loss of consciousnessness. No vomiting. Complains of headache, neck pain, right knee pain, right foot pain. The history is provided by the patient. Fall  The accident occurred 3 to 5 hours ago. The fall occurred while walking. He landed on concrete. There was no blood loss. The pain is present in the head, neck and right knee (rt foot). Associated symptoms include headaches. Pertinent negatives include no fever, no numbness, no abdominal pain, no vomiting and no loss of consciousness. Nursing Notes were reviewed    REVIEW OF SYSTEMS    (2-9 systems for level 4, 10 or more for level 5)     Review of Systems   Constitutional: Negative for fever. Respiratory: Negative for shortness of breath. Cardiovascular: Negative for chest pain. Gastrointestinal: Negative for abdominal pain and vomiting. Musculoskeletal: Positive for neck pain. Rt knee pain. Rt foot pain   Skin: Negative for wound. Neurological: Positive for headaches.  Negative for loss of consciousness, syncope and harvest for graft use in CABG         CURRENTMEDICATIONS       Previous Medications    ALBUTEROL SULFATE  (90 BASE) MCG/ACT INHALER    Inhale 2 puffs into the lungs every 4-6 hours as needed    AMLODIPINE (NORVASC) 5 MG TABLET    Take 7.5 mg by mouth daily     ATENOLOL (TENORMIN) 50 MG TABLET    Take 1 tablet by mouth daily    ATORVASTATIN (LIPITOR) 80 MG TABLET    TAKE 1 TABLET BY MOUTH NIGHTLY    FLUTICASONE-SALMETEROL (ADVAIR) 100-50 MCG/DOSE DISKUS INHALER    Inhale 1 puff into the lungs every 12 hours    GABAPENTIN (NEURONTIN) 300 MG CAPSULE    TAKE 1 CAPSULE BY MOUTH 3 TIMES DAILY. HYDROCODONE-ACETAMINOPHEN 7.5-300 MG TABS    Take 7.5 mg of opioid by mouth 3 times daily as needed for Pain.      HYDROXYCHLOROQUINE (PLAQUENIL) 200 MG TABLET    Take 200 mg by mouth 2 times daily    NICOTINE (NICODERM CQ) 21 MG/24HR    Place 1 patch onto the skin daily    QUETIAPINE (SEROQUEL) 25 MG TABLET    Take 2 tablets by mouth nightly for 7 days    QUETIAPINE (SEROQUEL) 25 MG TABLET    Take 1 tablet by mouth 2 times daily for 7 days Take 25 mg in the morning and at lunch    TIOTROPIUM (SPIRIVA RESPIMAT) 2.5 MCG/ACT AERS INHALER    Inhale 2 puffs into the lungs daily    TRAZODONE (DESYREL) 100 MG TABLET    Take 1 tablet by mouth nightly         ALLERGIES     Tramadol    FAMILYHISTORY       Family History   Problem Relation Age of Onset    High Blood Pressure Mother     Cancer Mother         colon    High Blood Pressure Father     Glaucoma Father     Other Brother         suicide-GSW          SOCIAL HISTORY       Social History     Socioeconomic History    Marital status:      Spouse name: Not on file    Number of children: Not on file    Years of education: Not on file    Highest education level: Not on file   Occupational History    Not on file   Tobacco Use    Smoking status: Current Every Day Smoker     Packs/day: 0.50     Years: 1.00     Pack years: 0.50     Types: Cigarettes     Start date: 1976     Last attempt to quit: 5/10/2016     Years since quittin.0    Smokeless tobacco: Never Used   Vaping Use    Vaping Use: Never used   Substance and Sexual Activity    Alcohol use: Yes     Alcohol/week: 3.0 standard drinks     Types: 3 Cans of beer per week     Comment: daily    Drug use: No    Sexual activity: Not Currently     Partners: Female   Other Topics Concern    Not on file   Social History Narrative    Not on file     Social Determinants of Health     Financial Resource Strain:     Difficulty of Paying Living Expenses:    Food Insecurity:     Worried About Running Out of Food in the Last Year:     Maricruz Dyson in the Last Year:    Transportation Needs:     Lack of Transportation (Medical):  Lack of Transportation (Non-Medical):    Physical Activity:     Days of Exercise per Week:     Minutes of Exercise per Session:    Stress:     Feeling of Stress :    Social Connections:     Frequency of Communication with Friends and Family:     Frequency of Social Gatherings with Friends and Family:     Attends Jehovah's witness Services:     Active Member of Clubs or Organizations:     Attends Club or Organization Meetings:     Marital Status:    Intimate Partner Violence:     Fear of Current or Ex-Partner:     Emotionally Abused:     Physically Abused:     Sexually Abused:        SCREENINGS             PHYSICAL EXAM    (up to 7 for level 4, 8 or more for level 5)     ED Triage Vitals [21 2207]   BP Temp Temp Source Pulse Resp SpO2 Height Weight   (!) 158/98 98.5 °F (36.9 °C) Oral 89 16 98 % 5' 10\" (1.778 m) 180 lb (81.6 kg)       Physical Exam  Vitals and nursing note reviewed. Constitutional:       Appearance: He is well-developed. He is not ill-appearing or toxic-appearing. HENT:      Head: Normocephalic. Contusion present. No raccoon eyes, Mena's sign or laceration. Right Ear: Tympanic membrane and ear canal normal. No hemotympanum.       Left Ear: Tympanic membrane and ear canal normal. No hemotympanum. Mouth/Throat:      Mouth: Mucous membranes are moist.      Pharynx: Oropharynx is clear. No pharyngeal swelling or posterior oropharyngeal erythema. Eyes:      Extraocular Movements: Extraocular movements intact. Conjunctiva/sclera: Conjunctivae normal.      Pupils: Pupils are equal, round, and reactive to light. Cardiovascular:      Rate and Rhythm: Normal rate and regular rhythm. Pulses: Normal pulses. Dorsalis pedis pulses are 2+ on the right side. Posterior tibial pulses are 2+ on the right side. Pulmonary:      Effort: Pulmonary effort is normal. No respiratory distress. Breath sounds: Normal breath sounds. No stridor. No wheezing or rales. Musculoskeletal:      Cervical back: Tenderness (posterior neck. no bony step offs or crepitus.) present. Normal range of motion. Right knee: No deformity. Decreased range of motion. Tenderness present. Right foot: Normal capillary refill. Swelling and tenderness present. No deformity. Legs:    Skin:     General: Skin is warm and dry. Neurological:      Mental Status: He is alert and oriented to person, place, and time. GCS: GCS eye subscore is 4. GCS verbal subscore is 5. GCS motor subscore is 6. Sensory: Sensation is intact. Motor: Motor function is intact. No abnormal muscle tone. Psychiatric:         Behavior: Behavior normal.         DIAGNOSTIC RESULTS   LABS:    Labs Reviewed   CBC WITH AUTO DIFFERENTIAL   COMPREHENSIVE METABOLIC PANEL W/ REFLEX TO MG FOR LOW K   ETHANOL   URINE DRUG SCREEN   URINALYSIS WITH MICROSCOPIC   TROPONIN       All other labs were within normal range or not returned as of this dictation. EKG: All EKG's are interpreted by the Emergency Department Physician in the absence of a cardiologist.  Please see their note for interpretation of EKG.       RADIOLOGY:   Non-plain film images such as CT, Ultrasound and MRI are read by the radiologist. [place] radiographic images are visualized andpreliminarily interpreted by the  ED Provider with the below findings:        Interpretation perthe Radiologist below, if available at the time of this note:    CT CERVICAL SPINE WO CONTRAST   Final Result   1. No evidence of acute intracranial abnormality. 2. Mild diffuse cerebral atrophy. 3. Posterior left caudate nucleus 4 mm diameter remote lacunar infarct. 4. C3/C4 borderline, C4/C5 borderline, C5/C6 mild central canal stenosis   secondary to encroachment by posterior disc osteophyte complex. 5. C5/C6 mild bilateral neural foraminal stenosis secondary to encroachment   by disc osteophyte complex. CT HEAD WO CONTRAST   Final Result   1. No evidence of acute intracranial abnormality. 2. Mild diffuse cerebral atrophy. 3. Posterior left caudate nucleus 4 mm diameter remote lacunar infarct. 4. C3/C4 borderline, C4/C5 borderline, C5/C6 mild central canal stenosis   secondary to encroachment by posterior disc osteophyte complex. 5. C5/C6 mild bilateral neural foraminal stenosis secondary to encroachment   by disc osteophyte complex. XR FOOT RIGHT (MIN 3 VIEWS)   Final Result   No radiographic evidence of acute abnormality involving the right knee or   right foot. XR KNEE RIGHT (3 VIEWS)   Final Result   No radiographic evidence of acute abnormality involving the right knee or   right foot. XR CHEST PORTABLE    (Results Pending)     XR KNEE RIGHT (3 VIEWS)    Result Date: 6/1/2021  EXAMINATION: THREE XRAY VIEWS OF THE RIGHT KNEE;   XRAY VIEWS OF THE RIGHT FOOT 6/1/2021 10:30 pm COMPARISON: None.  HISTORY: ORDERING SYSTEM PROVIDED HISTORY: pain and heard pop right knee TECHNOLOGIST PROVIDED HISTORY: Reason for exam:->pain and heard pop right knee Reason for Exam: pt states he tripped and his knee popped, c/o pain Acuity: Acute Type of Exam: Initial; ORDERING SYSTEM PROVIDED HISTORY: pain in right foot No radiographic evidence of acute abnormality involving the right knee or right foot. PROCEDURES   Unless otherwise noted below, none     Procedures    CRITICAL CARE TIME   N/A    CONSULTS:  None      EMERGENCY DEPARTMENT COURSE and DIFFERENTIAL DIAGNOSIS/MDM:   Vitals:    Vitals:    06/01/21 2207 06/02/21 0109   BP: (!) 158/98 (!) 184/102   Pulse: 89 97   Resp: 16 20   Temp: 98.5 °F (36.9 °C)    TempSrc: Oral    SpO2: 98% 98%   Weight: 180 lb (81.6 kg)    Height: 5' 10\" (1.778 m)        Patient was given thefollowing medications:  Medications   aspirin chewable tablet 324 mg (has no administration in time range)   ondansetron (ZOFRAN) injection 4 mg (has no administration in time range)   0.9 % sodium chloride bolus (has no administration in time range)   LORazepam (ATIVAN) tablet 1 mg (has no administration in time range)     Or   LORazepam (ATIVAN) injection 1 mg (has no administration in time range)     Or   LORazepam (ATIVAN) tablet 2 mg (has no administration in time range)     Or   LORazepam (ATIVAN) injection 2 mg (has no administration in time range)     Or   LORazepam (ATIVAN) tablet 3 mg (has no administration in time range)     Or   LORazepam (ATIVAN) injection 3 mg (has no administration in time range)     Or   LORazepam (ATIVAN) tablet 4 mg (has no administration in time range)     Or   LORazepam (ATIVAN) injection 4 mg (has no administration in time range)   nicotine (NICODERM CQ) 14 MG/24HR 1 patch (has no administration in time range)   oxyCODONE-acetaminophen (PERCOCET) 5-325 MG per tablet 1 tablet (1 tablet Oral Given 6/2/21 0111)   amLODIPine (NORVASC) tablet 7.5 mg (7.5 mg Oral Given 6/2/21 0111)       11:39 PM EDT  Patient requesting his nightly blood pressure medication. He takes Norvasc 7.5 mg twice a day this was ordered. 1:48 AM EDT  CT brain no acute intracranial abnormality, no hemorrhage, diffuse cerebral atrophy and remote lacunar infarct.   Cervical spine no fracture, multilevel degenerative changes. Right knee no fracture no dislocation no joint effusion. Right foot no fracture no dislocation. At discharge patient is now complaining of chest pain states worried he's having a heart attack. Hx unstable angina. Cardiac work up ordered. aspirin ordered. Patient is now anxious and tremoring. Elevated /102, elevated . States his whole body is now hurting. States he needs nicotine feels like he is going through withdrawal. Nicotine patch ordered. Daily drinker, usually drinks 6 beers a day states drank 4 or 5 today. No hx seizures from alcohol withdrawal. Notified Dr Dorina Baker of patient's new symptoms. Care transferred to ER attending physician Dr Dorian Baker, work up pending. FINAL IMPRESSION      1. Fall on same level, initial encounter    2. Contusion of right knee, initial encounter    3. Right foot sprain, initial encounter    4. Contusion of scalp, initial encounter    5. Closed head injury, initial encounter    6. Cervical strain, acute, initial encounter    7. DDD (degenerative disc disease), cervical    8.  Essential hypertension          DISPOSITION/PLAN   DISPOSITION     PATIENT REFERREDTO:  NENA Lazo [address]   [address] [place] [id number]    Schedule an appointment as soon as possible for a visit       [facility] ED  [address] [facility]  [phone]    If symptoms worsen    Pelon Shin MD  [facility] [address] [place] [address]  [phone]      [facility] orthopedics as needed      DISCHARGE MEDICATIONS:  New Prescriptions    No medications on file       DISCONTINUED MEDICATIONS:  Discontinued Medications    ASPIRIN 81 MG EC TABLET    Take 1 tablet by mouth daily              (Please note that portions ofthis note were completed with a voice recognition program.  Efforts were made to edit the dictations but occasionally words are mis-transcribed.)    Charity Montgomery PA-C (electronically signed)            Maria Elena Sena PA-C  06/02/21 9765

## 2021-06-02 NOTE — ED PROVIDER NOTES
I independently examined and evaluated Marbin Lowe. All diagnostic, treatment, and disposition decisions were made by myself in conjunction with the advanced practice provider. For all further details of the patient's emergency department visit, please see the advanced practice provider's documentation. Primary Care Physician: Prentiss Runner, APRN - CNP    History: This is a 61 y.o. male who presents to the Emergency Department with complaint of mechanical fall trauma imaging negative complained of knee and foot pain without signs of injury. At time of patient's discharge after he was initially evaluated by JOSEFA, patient then brought up that he was having chest pain    Patient was signed out to me pending work-up. On my evaluation patient states that he just did not feel well last couple days, states his biggest complaint is lack of sleep. Denies drug use. Patient was not having chest pain shortness of breath headache neck or back pain on my evaluation. Troponin less than 0.01, EKG without significant change compared to prior. Physical:     height is 5' 10\" (1.778 m) and weight is 180 lb (81.6 kg). His oral temperature is 98.5 °F (36.9 °C). His blood pressure is 184/102 (abnormal) and his pulse is 97. His respiration is 20 and oxygen saturation is 98%.    61 y.o. male   Alert oriented  Heart regular rhythm  Lungs clear  Abdomen soft nontender  MSK tenderness right knee right ankle no gross deformity imaging negative    Impression: Mechanical fall, knee sprain    Plan: Cardiac eval    EKG Interpretation    The Ekg interpreted by me shows  normal sinus rhythm with a rate of 88  Axis is   Normal  QTc is  normal  Intervals and Durations are unremarkable. ST Segments: nonspecific changes      CRITICAL CARE: There was a high probability of clinically significant/life threatening deterioration in this patient's condition which required my urgent intervention.   Total critical care time was 0 minutes. This excludes any time for separately reportable procedures. Zackery Gates DO  Emergency Physician        Comment: Please note this report has been produced using speech recognition software and may contain errors related to that system including errors in grammar, punctuation, and spelling, as well as words and phrases that may be inappropriate. If there are any questions or concerns please feel free to contact the dictating provider for clarification.           Zackery Gates DO  06/02/21 0142

## 2021-08-20 PROBLEM — R41.82 ALTERED MENTAL STATUS: Status: ACTIVE | Noted: 2021-01-01

## 2021-08-20 NOTE — Clinical Note
Patient Class: Inpatient [101]   REQUIRED: Diagnosis: Altered mental status [780.97. ICD-9-CM]   Estimated Length of Stay: Estimated stay of more than 2 midnights   Admitting Provider: Margareth Knowles [[id number]]   Telemetry/Cardiac Monitoring Required?: Yes
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
## 2021-08-21 NOTE — CARE COORDINATION
Case Management Assessment  Initial Evaluation      Patient Name: Angelique Lopes  YOB: 1960  Diagnosis: Altered mental status [R41.82]  Polysubstance abuse ([facility] [address] [F19.10]  Acute respiratory failure with hypoxia and hypercarbia ([facility] [address] [J96.01, J96.02]  Altered mental status, unspecified altered mental status type [R41.82]  Date / Time: 8/20/2021  8:00 PM    Admission status/Date:  08/21/2021  Chart Reviewed: Yes      Patient Interviewed: Yes   Family Interviewed:  No      Hospitalization in the last 30 days:  No      Health Care Decision Maker :   Primary Decision Maker: Diann Elbalavonne - Child - [phone]    (CM - must 1st enter selection under Navigator - emergency contact- [place] [address] Relationship and pick relationship)   Who do you trust or have selected to make healthcare decisions for you      Met with: daughter   Roxana Sahu conducted  (bedside/phone): phone    Current PCP: Jillian Zimmer, [address] [place] [address] required for SNF : Yes          3 night stay required - NA    ADLS  Support Systems/Care Needs:    Transportation: family    Meal Preparation: self    Housing  Living Arrangements: Lives w/ daughter  Steps: 4 steps onto porch w/HS  Intent for return to present living arrangements: Yes  Identified Issues: Recently moved to his daughters home and is staying there until he can find his own apartment.     Home Care Information  Active with Home Health Care : No - [facility] in past     Passport/Waiver : No  :                      Phone Number:    Passport/Waiver Services: NA          Durable Medical Equiptment   DME Provider: BHAKTI  Equipment:   Miladis Ca- does not use__Cane___RTS___ BSC___Shower Chair___Hospital Bed___W/C____Other________  02 at ____Liter(s)---wears(frequency)_______ [facility] ___ CPAP___ BiPap___   N/A____      Home O2 Use :  No      Informed of need for care provider to bring portable home O2 tank on day of discharge

## 2021-08-21 NOTE — ED NOTES
Bed: 02  Expected date:   Expected time:   Means of arrival:   Comments:  antoine Moise  08/20/21 2000

## 2021-08-21 NOTE — ED NOTES
2223- Perfect serve sent to Dr. Harpal Roca   [id number]- Dr. Sj Ambrose returned call   Tara Dominique  08/20/21 Alyssa  08/20/21 0879

## 2021-08-21 NOTE — ED PROVIDER NOTES
Emergency Department Provider Note  Location: [place] [address] ED  8/20/2021     Patient Rene Plaza is a 64 y.o. male    Chief Complaint  Shortness of Breath (fell this am and was fine but pascual SOB as the day wore on. EMS found on 80%)      Mode of Arrival  private car    HPI  (History provided by patient)  This is a 64 y.o. male with a PMH significant for CAD, CVA, HTN presented today for AMS, SOB. Patient moved in with his daughter 2 days ago. This morning, patient had a unwitnessed fall but was able to get up. Granddaughter helped the patient back into the house and patient insisted that he was okay. Throughout the day, patient became more confused. When daughter checked on the patient, he was very altered, minimally responsive, and appeared very short of breath. EMS arrived on scene and patient had O2 sat of 80% on room air with cyanosis noted on the tip of nose and fingers. EMS transported the patient with NRM and O2 sat on arrival was 97%. No further history available as patient was completely altered    ROS  Review of Systems   Unable to perform ROS: Mental status change       I have reviewed the following nursing documentation:  Allergies: Allergies   Allergen Reactions    Tramadol      Difficulty urinating. Past medical history:  has a past medical history of Allergic rhinitis, Anxiety (9/3/2014), Back pain, CAD (coronary artery disease), CVA (cerebral vascular accident) ([address] DDD (degenerative disc disease) (9/3/2014), Depression (4/17/2014), Fatty liver, Gout, Hepatitis C antibody positive in blood (10/24/2018), History of blood transfusion, Hyperlipidemia, Hypertension, Lumbar radiculopathy (9/3/2014), MI (myocardial infarction) ([address] Polyp of colon, PVD (peripheral vascular disease) ([address] Rheumatoid arthritis(714.0), and Tobacco abuse disorder. Past surgical history:  has a past surgical history that includes Appendectomy (1998);  Carpal tunnel release (Bilateral, 2000); Patella surgery (Right, 1978); Coronary artery bypass graft (04/22/2016); Vein Surgery (Left, 04/22/2016); Colonoscopy (N/A, 10/3/2018); femoral bypass (Left, 1/23/2019); incision and drainage (Left, 2/21/2019); Cardiac surgery; Small intestine surgery (N/A, 3/13/2020); and hemicolectomy (N/A, 3/18/2020). Home medications:   Prior to Admission medications    Medication Sig Start Date End Date Taking? Authorizing Provider   QUEtiapine (SEROQUEL) 25 MG tablet Take 2 tablets by mouth nightly for 7 days 4/1/20 4/8/20  Cheyenne Holloway MD   QUEtiapine (SEROQUEL) 25 MG tablet Take 1 tablet by mouth 2 times daily for 7 days Take 25 mg in the morning and at lunch 4/1/20 4/8/20  Cheyenne Holloway MD   HYDROcodone-acetaminophen 7.5-300 MG TABS Take 7.5 mg of opioid by mouth 3 times daily as needed for Pain.      Historical Provider, MD   atenolol (TENORMIN) 50 MG tablet Take 1 tablet by mouth daily 2/26/19   Julio Patricio MD   nicotine (NICODERM CQ) 21 MG/24HR Place 1 patch onto the skin daily 2/27/19   Julio Patricio MD   hydroxychloroquine (PLAQUENIL) 200 MG tablet Take 200 mg by mouth 2 times daily    Historical Provider, MD   albuterol sulfate  (90 Base) MCG/ACT inhaler Inhale 2 puffs into the lungs every 4-6 hours as needed    Historical Provider, MD   fluticasone-salmeterol (ADVAIR) 100-50 MCG/DOSE diskus inhaler Inhale 1 puff into the lungs every 12 hours    Historical Provider, MD   tiotropium (SPIRIVA RESPIMAT) 2.5 MCG/ACT AERS inhaler Inhale 2 puffs into the lungs daily    Historical Provider, MD   amLODIPine (NORVASC) 5 MG tablet Take 7.5 mg by mouth daily  8/29/17   Historical Provider, MD   atorvastatin (LIPITOR) 80 MG tablet TAKE 1 TABLET BY MOUTH NIGHTLY 6/16/17   Brandan Malin DO   traZODone (DESYREL) 100 MG tablet Take 1 tablet by mouth nightly  Patient taking differently: Take 150 mg by mouth nightly 1 1/2 TAB 6/10/16   Brandan Malin, DO   gabapentin (NEURONTIN) 300 MG capsule TAKE 1 CAPSULE BY MOUTH 3 TIMES DAILY. 5/13/16   Jasmyn GordonNENA - CNP       Social history:  reports that he has been smoking cigarettes. He started smoking about 45 years ago. He has a 0.50 pack-year smoking history. He has never used smokeless tobacco. He reports current alcohol use of about 3.0 standard drinks of alcohol per week. He reports that he does not use drugs. Family history:    Family History   Problem Relation Age of Onset    High Blood Pressure Mother     Cancer Mother         colon    High Blood Pressure Father     Glaucoma Father     Other Brother         suicide-GSW       Exam  ED Triage Vitals [08/20/21 2014]   BP Temp Temp Source Pulse Resp SpO2 Height Weight   (!) 129/91 98.6 °F (37 °C) Axillary 100 (!) 36 97 % 5' 10\" (1.778 m) 180 lb (81.6 kg)   Physical Exam  Vitals and nursing note reviewed. Constitutional:       Appearance: He is ill-appearing. He is not diaphoretic. Comments: Disheveled   HENT:      Head: Normocephalic and atraumatic. Eyes:      General: No scleral icterus. Right eye: No discharge. Left eye: No discharge. Comments: Pinpoint pupils bilaterally   Neck:      Trachea: No tracheal deviation. Cardiovascular:      Rate and Rhythm: Regular rhythm. Tachycardia present. Heart sounds: Normal heart sounds. No murmur heard. Pulmonary:      Effort: Tachypnea and respiratory distress present. Breath sounds: No stridor. No wheezing or rhonchi. Comments: Patient is very tachypneic but overall no rhonchi, wheezing or stridor  Abdominal:      General: Abdomen is flat. A surgical scar is present. There is no distension. Palpations: Abdomen is soft. Comments: Surgical scars well-healed   Musculoskeletal:         General: No deformity. Right lower leg: No edema. Left lower leg: No edema. Skin:     General: Skin is warm and dry. Findings: No erythema or rash.    Neurological: Mental Status: He is lethargic. Motor: No abnormal muscle tone or seizure activity. MDM/ED Course  EKG  The Ekg interpreted by me in the absence of a cardiologist shows. normal sinus rhythm with a rate of 89  Axis is   Normal  QTc is  prolonged  Significant motion artifact due to tachypnea but overall no specific ST-T wave changes appreciated. No evidence of acute ischemia. No significant change from prior EKG dated 6/1/21      Radiology  CT HEAD WO CONTRAST    Result Date: 8/20/2021  EXAMINATION: CT OF THE HEAD WITHOUT CONTRAST  8/20/2021 9:02 pm TECHNIQUE: CT of the head was performed without the administration of intravenous contrast. Dose modulation, iterative reconstruction, and/or weight based adjustment of the mA/kV was utilized to reduce the radiation dose to as low as reasonably achievable. COMPARISON: 06/02/2021. HISTORY: ORDERING SYSTEM PROVIDED HISTORY: fell this morning, now altered TECHNOLOGIST PROVIDED HISTORY: Has a \"code stroke\" or \"stroke alert\" been called? ->No Reason for exam:->fell this morning, now altered Reason for Exam: AMS, fall Acuity: Acute Type of Exam: Initial Mechanism of Injury: fall, possible head inj, AMS, pt unable to give hx FINDINGS: BRAIN/VENTRICLES: There is no acute intracranial hemorrhage, mass effect or midline shift. No abnormal extra-axial fluid collection. The gray-white differentiation is maintained without evidence of an acute infarct. There is prominence of the ventricles and sulci due to global parenchymal volume loss. There are nonspecific areas of hypoattenuation within the periventricular and subcortical white matter, which likely represent chronic microvascular ischemic change. Tiny left caudate old lacunar infarct. Atherosclerosis. ORBITS: The visualized portion of the orbits demonstrate no acute abnormality. SINUSES: The visualized paranasal sinuses and mastoid air cells demonstrate no acute abnormality.  SOFT TISSUES/SKULL: No acute abnormality of the visualized skull or soft tissues. No acute intracranial abnormality. Specifically, no acute intracranial hemorrhage or mass effect. Mild chronic small vessel ischemic disease. Tiny left caudate old lacunar infarct. XR CHEST PORTABLE    Result Date: 8/20/2021  EXAMINATION: ONE XRAY VIEW OF THE CHEST 8/20/2021 8:29 pm COMPARISON: 06/02/2021 radiograph HISTORY: ORDERING SYSTEM PROVIDED HISTORY: hypoxia TECHNOLOGIST PROVIDED HISTORY: Reason for exam:->hypoxia Reason for Exam: hypoxia Acuity: Acute Type of Exam: Initial FINDINGS: Prior surgical change of CABG. Heart and mediastinum appear normal otherwise. Moderate perihilar opacification asymmetric on the right. There is also moderate ground-glass opacification in the right lower lung zone. Band of opacity in the left lower lung zone as well. Increasing interstitial opacities in the upper zones bilaterally. No significant skeletal finding. Multifocal airspace opacities may represent developing pulmonary edema or pneumonitis.          Labs  Results for orders placed or performed during the hospital encounter of 08/20/21   COVID-19 & Influenza Combo    Specimen: Nasopharyngeal Swab; Nasal   Result Value Ref Range    SARS-CoV-2 RNA, RT PCR NOT DETECTED NOT DETECTED    INFLUENZA A NOT DETECTED NOT DETECTED    INFLUENZA B NOT DETECTED NOT DETECTED   CBC Auto Differential   Result Value Ref Range    WBC 9.8 4.0 - 11.0 K/uL    RBC 3.91 (L) 4.20 - 5.90 M/uL    Hemoglobin 13.5 13.5 - 17.5 g/dL    Hematocrit 39.7 (L) 40.5 - 52.5 %    .5 (H) 80.0 - 100.0 fL    MCH 34.5 (H) 26.0 - 34.0 pg    MCHC 34.0 31.0 - 36.0 g/dL    RDW 14.1 12.4 - 15.4 %    Platelets 97 (L) 008 - 450 K/uL    MPV 9.6 5.0 - 10.5 fL    PLATELET SLIDE REVIEW Decreased     Neutrophils % 88.5 %    Lymphocytes % 3.8 %    Monocytes % 7.6 %    Eosinophils % 0.0 %    Basophils % 0.1 %    Neutrophils Absolute 8.7 (H) 1.7 - 7.7 K/uL    Lymphocytes Absolute 0.4 (L) 1.0 - 5.1 13.5 - 17.5 g/dL    O2 Sat, Arterial 96.6 >92 %    Carboxyhgb, Arterial 2.0 (H) 0.0 - 1.5 %    Methemoglobin, Arterial 0.1 <1.5 %    TCO2, Arterial 27.3 Not Established mmol/L    O2 Therapy See comment    Ethanol   Result Value Ref Range    Ethanol Lvl None Detected mg/dL   Ammonia   Result Value Ref Range    Ammonia 17 16 - 60 umol/L   Urine Drug Screen   Result Value Ref Range    Amphetamine Screen, Urine POSITIVE (A) Negative <1000ng/mL    Barbiturate Screen, Ur Neg Negative <200 ng/mL    Benzodiazepine Screen, Urine Neg Negative <200 ng/mL    Cannabinoid Scrn, Ur POSITIVE (A) Negative <50 ng/mL    Cocaine Metabolite Screen, Urine Neg Negative <300 ng/mL    Opiate Scrn, Ur POSITIVE (A) Negative <300 ng/mL    PCP Screen, Urine Neg Negative <25 ng/mL    Methadone Screen, Urine Neg Negative <300 ng/mL    Propoxyphene Scrn, Ur Neg Negative <300 ng/mL    Oxycodone Urine Neg Negative <100 ng/ml    pH, UA 6.0     Drug Screen Comment: see below    Acetaminophen Level   Result Value Ref Range    Acetaminophen Level <5 (L) 10 - 30 ug/mL   Salicylate   Result Value Ref Range    Salicylate, Serum <9.3 (L) 15.0 - 30.0 mg/dL   Microscopic Urinalysis   Result Value Ref Range    Hyaline Casts, UA 3-5 (A) 0 - 2 /LPF    WBC, UA 0-2 0 - 5 /HPF    RBC, UA 5-10 (A) 0 - 4 /HPF    Epithelial Cells, UA 0-1 0 - 5 /HPF       - Patient seen and evaluated in room 2.  64 y.o. male presented for AMS, SOB after falling this morning. Differential wide as patient cannot provide details of his history. Patient was hypoxic and we had to monitor him closely. He eventually stabilized with nonrebreather and therefore I did not order BiPAP or intubate him. Patient gradually became more arousable on reassessment. He denies any pain but still cannot tell me what happened today.    - CT head did not show acute traumatic brain injury. Checks x-ray also did not show acute infiltrate. Urine did not show signs of infection.   ABG showed pH of 7.2 with hypercarbia. However patient has good respiratory drive and overall becoming more stable and therefore I did not put the patient on BiPAP. We confirmed with daughter that patient normally does not have an oxygen requirement. Patient is in acute respiratory failure with hypoxia and hypercarbia. - Alcohol level also normal.  Urine drug screen show multiple drugs, including amphetamine, opiate, and marijuana. The polysubstance abuse and potentially overdosing on drugs could have caused patient's altered mental status. We later got a hold of daughter who confirmed the patient is also on Vicodin. - Patient was placed on telemetry during his/her ED stay and no malignant dysrhythmia observed. - Pertinent old records reviewed. I spoke with Dr. Cinthya Atwood hospitalist. We thoroughly discussed the history, physical exam, laboratory and imaging studies, as well as, emergency department course. Based upon that discussion, we've decided to admit Veronica Franco for further observation and evaluation of Scot Carrasco's mental status change. As I have deemed necessary from their history, physical and studies, I have considered and evaluated Veronica Franco for the following diagnoses:  UTI, PNEUMONIA, DIABETES, INTRACRANIAL HEMORRHAGE, SEPSIS SUBARACHNOID HEMORRHAGE, SUBDURAL HEMATOMA, & STROKE. Patient is boarding in the ED, we attempted to wean his oxygen. We noticed we were unable to successfully wean him to nasal cannula. Given that patient has significant oxygen requirement that is new and currently no explanation for it, I ordered CT PE study. Clinical Impression:  1. Acute respiratory failure with hypoxia and hypercarbia (HCC)    2. Altered mental status, unspecified altered mental status type    3. Polysubstance abuse ([facility] [address]        Disposition:  Admit to telemetry in stable condition. Blood pressure 128/67, pulse 80, temperature 98.6 °F (37 °C), temperature source Axillary, resp.  rate 25, height 5' 10\" (1.778 m), weight 180 lb (81.6 kg), SpO2 100 %. Total critical care time is 40-45 minutes, which excludes separately billable procedures and updating family. Time spent is specifically for management of the presenting complaint and symptoms initially, direct bedside care, reevaluation, review of records, and consultation. There was a high probability of clinically significant life-threatening deterioration in the patient's condition, which required my urgent intervention. This chart was generated in part by using Dragon Dictation system and may contain errors related to that system including errors in grammar, punctuation, and spelling, as well as words and phrases that may be inappropriate. If there are any questions or concerns please feel free to contact the dictating provider for clarification.      Ravin Cartagena MD  [facility] John Sánchez MD  08/20/21 100 [facility] [address], MD  08/20/21 [address] [place] [address] Fco Garcia MD  08/20/21 0527

## 2021-08-21 NOTE — PROGRESS NOTES
ABG drawn from right radial x 1  attempt(s). Sight prepped per policy and procedure. Modified Wong's test positive. Pressure held to sight after procedure for five minutes. No hematoma present. Pulse present after procedure.  100% nrb mask

## 2021-08-21 NOTE — ACP (ADVANCE CARE PLANNING)
Advance Care Planning   Healthcare Decision Maker:    Primary Decision Maker: Juan Daniel Berman [facility] - [phone]      Today we documented Decision Maker(s) consistent with Legal Next of Kin hierarchy.

## 2021-08-21 NOTE — ED NOTES
Patient to radiology then IP room with IP RN. No noted distress.       Cara Chaves, TONIA  08/21/21 2425

## 2021-08-21 NOTE — PROGRESS NOTES
Hospitalist Progress Note      PCP: NENA Wilson - CNP    Date of Admission: 8/20/2021    Chief Complaint: AMS from home. Drug screen with opiates, amphetamine, cannabis. Subjective: pt is awake, confused and disoriented. He thinks he is a [employer]. He told me he just started taking his Norco again yesterday, though his OARS report shows consistent monthly refill of this medication. Medications:  Reviewed    Infusion Medications    sodium chloride      sodium chloride 1,000 mL (08/21/21 0838)     Scheduled Medications    sodium chloride flush  5-40 mL Intravenous 2 times per day    piperacillin-tazobactam  3,375 mg Intravenous Q8H    budesonide-formoterol  2 puff Inhalation BID    gabapentin  300 mg Oral TID    atorvastatin  80 mg Oral Nightly    QUEtiapine  25 mg Oral BID    nicotine  1 patch Transdermal Daily    levofloxacin  750 mg Intravenous Q24H    ipratropium-albuterol  1 ampule Inhalation Q4H    methylPREDNISolone  40 mg Intravenous Q12H    [START ON 8/22/2021] atenolol  25 mg Oral Daily    traZODone  100 mg Oral Nightly     PRN Meds: sodium chloride flush, sodium chloride, ondansetron **OR** ondansetron, polyethylene glycol, acetaminophen **OR** acetaminophen, HYDROcodone 5 mg - acetaminophen      Intake/Output Summary (Last 24 hours) at 8/21/2021 1447  Last data filed at 8/21/2021 0852  Gross per 24 hour   Intake 0 ml   Output 400 ml   Net -400 ml       Physical Exam Performed:    /71   Pulse 77   Temp 97 °F (36.1 °C) (Oral)   Resp 18   Ht 5' 7\" (1.702 m)   Wt 165 lb 3.2 oz (74.9 kg)   SpO2 97%   BMI 25.87 kg/m²     General appearance: No apparent distress, appears stated age and cooperative. HEENT: Pupils equal, round, and reactive to light. Conjunctivae/corneas clear. Neck: Supple, with full range of motion. No jugular venous distention. Trachea midline. Respiratory:  Normal respiratory effort.  Clear to auscultation, bilaterally without Rales/Wheezes/Rhonchi. Cardiovascular: Regular rate and rhythm with normal S1/S2 without murmurs, rubs or gallops. Abdomen: Soft, non-tender, non-distended with normal bowel sounds. Musculoskeletal: No clubbing, cyanosis or edema bilaterally. Full range of motion without deformity. Skin: Skin color, texture, turgor normal.  No rashes or lesions. Neurologic:  No focal neurological deficit, confused and disoriented. Psychiatric: Alert and disoriented. Capillary Refill: Brisk,3 seconds, normal   Peripheral Pulses: +2 palpable, equal bilaterally       Labs:   Recent Labs     08/20/21 2013   WBC 9.8   HGB 13.5   HCT 39.7*   PLT 97*     Recent Labs     08/20/21 2013   *   K 5.1   CL 88*   CO2 25   BUN 17   CREATININE 0.6*   CALCIUM 9.5     Recent Labs     08/20/21 2013   *   ALT 37   BILITOT 1.1*   ALKPHOS 82     No results for input(s): INR in the last 72 hours. Recent Labs     08/20/21  2103 08/21/21  0700 08/21/21  0702   CKTOTAL 4,155*  --  2,731*   TROPONINI  --  <0.01  --        Urinalysis:      Lab Results   Component Value Date    NITRU POSITIVE 08/20/2021    WBCUA 0-2 08/20/2021    RBCUA 5-10 08/20/2021    BLOODU LARGE 08/20/2021    SPECGRAV >=1.030 08/20/2021    GLUCOSEU 100 08/20/2021       Radiology:  CT CHEST PULMONARY EMBOLISM W CONTRAST   Final Result   No evidence of pulmonary embolism. Multifocal pneumonia involving the right lung. CT HEAD WO CONTRAST   Final Result   No acute intracranial abnormality. Specifically, no acute intracranial   hemorrhage or mass effect. Mild chronic small vessel ischemic disease. Tiny left caudate old lacunar   infarct. XR CHEST PORTABLE   Final Result   Multifocal airspace opacities may represent developing pulmonary edema or   pneumonitis. Assessment/Plan:    Active Hospital Problems    Diagnosis     Altered mental status [R41.82]        Acute Hypox and Hypercapnic Resp Failure.    R lung PNA organism

## 2021-08-21 NOTE — FLOWSHEET NOTE
Spoke with daughter Cordell Hester. Increased difficulty walking. Aakash Zuñiga this am. Confusion has been worsening over the last year. Family sts worsening confusion over the course of the day. Increased falls. Was living w roommate until this week. Moved in with daughter a few days ago. dtgr knows he takes HTN meds, gabapentin, vicodin. Poss liver hx. Unsure if he's taking meds correctly. No home o2 use. Uses a walker recently.  Increased bilateral leg weakness

## 2021-08-21 NOTE — PROGRESS NOTES
Some admission questions answered at this time. Pt is unable to answer most questions due to continuing confusion as well as difficulty breathing while speaking.

## 2021-08-21 NOTE — PROGRESS NOTES
Pt inquiring about pain medication. Explained to pt that home medication was not reordered and that he would have to ask the hospitalist for medication. Pt was noted to start breathing heavily and using accessory muscles due to straining to speak to this writer. Informed pt to stop talking and allow self to rest and catch breath. O2 sat still 94% @ this time.

## 2021-08-21 NOTE — PROGRESS NOTES
Patient admitted to room 316 from ED. Patient oriented to room, call light, bed rails, phone, lights and bathroom. Patient instructed about the schedule of the day including: vital sign frequency, lab draws, possible tests, frequency of MD and staff rounds, daily weights, I &O's and prescribed diet. Engaged bed alarm in place, patient aware of placement and reason. Telemetry box in place, patient aware of placement and reason. Bed locked, in lowest position, side rails up 2/4, call light within reach. Recliner Assessment  Patient is not able to demonstrated the ability to move from a reclining position to an upright position within the recliner. Patient is confused, demented and /or unable to follow instruction. 4 Eyes Skin Assessment     The patient is being assess for   Admission    I agree that 2 RN's have performed a thorough Head to Toe Skin Assessment on the patient. ALL assessment sites listed below have been assessed. Areas assessed for pressure by both nurses:   [x]   Head, Face, and Ears   [x]   Shoulders, Back, and Chest, Abdomen  [x]   Arms, Elbows, and Hands   [x]   Coccyx, Sacrum, and Ischium  [x]   Legs, Feet, and Heels        Skin Assessed Under all Medical Devices by both nurses:  O2 device tubing              All Mepilex Borders were peeled back and area peeked at by both nurses:  Yes  Please list where Mepilex Borders are located:  coccyx    Stage II noted to gluteal cleft from MASD, large bruise to L lower back. Scattered bruising and abrasions. Healed incision to abdomen, dry/flaky feet. **SHARE this note so that the co-signing nurse is able to place an eSignature**    Co-signer eSignature: Electronically signed by Renny Parker RN on 8/21/21 at 6:46 AM EDT    Does the Patient have Skin Breakdown related to pressure?   No              Nolberto Prevention initiated:  Yes   Wound Care Orders initiated:  No      [facility] nurse consulted for Pressure Injury (Stage 3,4, Unstageable, DTI, NWPT, Complex wounds)and New or Established Ostomies:  NA      Primary Nurse eSignature: Electronically signed by Basil Isaacs RN on 8/21/21 at 6:41 AM EDT
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
## 2021-08-22 NOTE — FLOWSHEET NOTE
AD consult. Pt said he'd look over forms, would call when he wants help with them. Daughter is go-to help for him; he was concerned about possessions at his house, things that could get stolen. Daughter is going to check and take care of motorcycle and other things that patient mentioned. Pt also mentioned losses, deaths. Not interested in talking much more at time of visit, \"let's talk a little later. [place] \"     Pt given forms and contact info; able to assist with completion as desired by patient. Suzi Treasure [phone]       08/22/21 2904   Encounter Summary   Services provided to: Patient   Referral/Consult From: Patient;Nurse   Support System Children  (daughter)   Continue Visiting   (8/22: worried, gave forms, will call for assistance.)   Complexity of Encounter Moderate   Length of Encounter 15 minutes   Routine   Type Initial   Assessment Tearful;Grieving   Intervention Nurtured hope; Active listening;Discussed meaning/purpose;Discussed belief system/Catholic practices/corbin   Outcome Expressed gratitude

## 2021-08-22 NOTE — PLAN OF CARE
Problem: Falls - Risk of:  Goal: Will remain free from falls  Description: Will remain free from falls  8/22/2021 1531 by Austen Campos RN  Outcome: Ongoing  Note: Telesitter in use. Bed in low position with side rails up x2; brake set & alarm set. Fall risk socks on the patient. Problem: Skin Integrity:  Goal: Will show no infection signs and symptoms  Description: Will show no infection signs and symptoms  Outcome: Ongoing  Note: Repositioned y5dbeat and PRN for comfort with pillow support. Problem: Pain:  Goal: Control of chronic pain  Description: Control of chronic pain  Outcome: Ongoing  Note: PRN pain medicine given as requested by the patient.

## 2021-08-22 NOTE — PLAN OF CARE
Problem: Falls - Risk of:  Goal: Will remain free from falls  Description: Will remain free from falls  Outcome: Ongoing  Note: Scot remained safe and free of falls during this shift, a bed alarm was used to ensure safety. Problem: Skin Integrity:  Goal: Absence of new skin breakdown  Description: Absence of new skin breakdown  Outcome: Ongoing  Note: Scot showed no signs of skin breakdown, he was able to turn himself during this shift. Problem: Pain:  Goal: Control of acute pain  Description: Control of acute pain  Outcome: Ongoing  Note: Scot was given PRN pain medication to help with his discomfort.

## 2021-08-22 NOTE — PROGRESS NOTES
Hospitalist Progress Note      PCP: Celso Philip, APRN - CNP    Date of Admission: 8/20/2021    Chief Complaint: AMS from home. Drug screen with opiates, amphetamine, cannabis. Subjective:     Coughing up thick greenish phlegm. Feels better - actually oriented to place and person and situation today - mental status improved. Medications:  Reviewed    Infusion Medications    dextrose      sodium chloride Stopped (08/21/21 2038)    sodium chloride 1,000 mL (08/22/21 1132)     Scheduled Medications    insulin lispro  0-12 Units Subcutaneous TID WC    insulin lispro  0-6 Units Subcutaneous Nightly    sodium chloride flush  5-40 mL Intravenous 2 times per day    piperacillin-tazobactam  3,375 mg Intravenous Q8H    budesonide-formoterol  2 puff Inhalation BID    gabapentin  300 mg Oral TID    atorvastatin  80 mg Oral Nightly    QUEtiapine  25 mg Oral BID    nicotine  1 patch Transdermal Daily    levofloxacin  750 mg Intravenous Q24H    ipratropium-albuterol  1 ampule Inhalation Q4H    methylPREDNISolone  40 mg Intravenous Q12H    atenolol  25 mg Oral Daily    traZODone  100 mg Oral Nightly     PRN Meds: glucose, dextrose, glucagon (rDNA), dextrose, sodium chloride flush, sodium chloride, ondansetron **OR** ondansetron, polyethylene glycol, acetaminophen **OR** acetaminophen, HYDROcodone 5 mg - acetaminophen      Intake/Output Summary (Last 24 hours) at 8/22/2021 1307  Last data filed at 8/22/2021 1236  Gross per 24 hour   Intake 3977.91 ml   Output 1475 ml   Net 2502.91 ml       Physical Exam Performed:    /80   Pulse 75   Temp 97.6 °F (36.4 °C) (Oral)   Resp 16   Ht 5' 7\" (1.702 m)   Wt 173 lb 3.2 oz (78.6 kg)   SpO2 91%   BMI 27.13 kg/m²     General appearance: No apparent distress, appears stated age and cooperative. HEENT: Pupils equal, round, and reactive to light. Conjunctivae/corneas clear. Neck: Supple, with full range of motion.  No jugular venous distention. Trachea midline. Respiratory:  Normal respiratory effort. Clear to auscultation, bilaterally without Rales/Wheezes/Rhonchi. Cardiovascular: Regular rate and rhythm with normal S1/S2 without murmurs, rubs or gallops. Abdomen: Soft, non-tender, non-distended with normal bowel sounds. Musculoskeletal: No clubbing, cyanosis or edema bilaterally. Full range of motion without deformity. Skin: Skin color, texture, turgor normal.  No rashes or lesions. Neurologic:  No focal neurological deficit, confused and disoriented. Psychiatric: Alert and disoriented. Capillary Refill: Brisk,3 seconds, normal   Peripheral Pulses: +2 palpable, equal bilaterally       Labs:   Recent Labs     08/20/21 2013 08/21/21  1501 08/22/21  0313   WBC 9.8 6.4 4.6   HGB 13.5 11.7* 11.0*   HCT 39.7* 35.4* 32.5*   PLT 97* 87* 83*     Recent Labs     08/20/21 2013 08/21/21  1502 08/22/21  0313   * 126* 127*   K 5.1 4.6 3.7   CL 88* 91* 94*   CO2 25 23 24   BUN 17 10 6*   CREATININE 0.6* <0.5* <0.5*   CALCIUM 9.5 8.6 8.6     Recent Labs     08/20/21 2013   *   ALT 37   BILITOT 1.1*   ALKPHOS 82     No results for input(s): INR in the last 72 hours. Recent Labs     08/20/21  2103 08/21/21  0700 08/21/21  0702 08/22/21  0313   CKTOTAL 4,155*  --  2,731* 616*   TROPONINI  --  <0.01  --   --        Urinalysis:      Lab Results   Component Value Date    NITRU POSITIVE 08/20/2021    WBCUA 0-2 08/20/2021    RBCUA 5-10 08/20/2021    BLOODU LARGE 08/20/2021    SPECGRAV >=1.030 08/20/2021    GLUCOSEU 100 08/20/2021       Radiology:  CT CHEST PULMONARY EMBOLISM W CONTRAST   Final Result   No evidence of pulmonary embolism. Multifocal pneumonia involving the right lung. CT HEAD WO CONTRAST   Final Result   No acute intracranial abnormality. Specifically, no acute intracranial   hemorrhage or mass effect. Mild chronic small vessel ischemic disease. Tiny left caudate old lacunar   infarct.          XR CHEST PORTABLE   Final Result   Multifocal airspace opacities may represent developing pulmonary edema or   pneumonitis. Assessment/Plan:    Active Hospital Problems    Diagnosis     Altered mental status [R41.82]        Acute Hypox and Hypercapnic Resp Failure. R lung PNA organism unspecified. COPD with AE POA  CT with multifocal unilateral R lung PNA. Plan:    - Iv Abx   - check MRSA swab. - follow cultures. - COVID and Flu (-)   - on levaquin and zosyn.    - Add BID solumedrol   - cont symbicort. - duoneb      Sepsis POA - due to above. Acute Metabolic and possibly toxic encephalopathy. 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 Hyponatremia - hypovolemic - improving with appropriate rate of correction. Cont IVF. Renal q12hrs. Mild Rhabdomyolysis without renal failure. CK 2700  IVF as above. Daily CK - improved - stop trending. Thrombocytopenia and Lymphopenia. COVID (-)  Will hold lovenox and ASA. Check fibrinogen, LDH, haptoglobin and HIT - all negative. Resume lovenox. Chronic Pain opiate dependent -  Resumed PTA norco PRN. DVT Prophylaxis: SCD, resume lovenox. Hold if platelets <95Y. Diet: ADULT DIET;  Dysphagia - Soft and Bite Sized  Code Status: Full Code      Dispo - cc    Pamela Hernandez MD

## 2021-08-23 NOTE — FLOWSHEET NOTE
following up for spiritual encouragement and any ACP interest. Pt will contact if decides to pursue. Pt has two dghts, but lost son toward end of war in [place]. Welcomed conversation and prayer. 08/23/21 2770   Encounter Summary   Services provided to: Patient   Rashaun Busch  (has two dghts)   Continue Visiting   (8/23 - Conversation, spiritual encourgement, prayer)   Complexity of Encounter Moderate   Length of Encounter 15 minutes   Routine   Assessment Approachable;Coping   Intervention Explored feelings, thoughts, concerns;Nurtured hope;Prayer   Outcome Engaged in conversation; Shared life review;Expressed feelings/needs/concerns;Receptive   Values / Beliefs   Do you have any ethnic, cultural, sacramental, or spiritual Jewish needs you would like us to be aware of while you are in the hospital? No

## 2021-08-23 NOTE — PLAN OF CARE
Problem: Falls - Risk of:  Goal: Will remain free from falls  Description: Will remain free from falls  8/23/2021 0110 by Robyn Pal RN  Outcome: Ongoing  Note: Scot remained safe and free of falls during this shift, a bed alarm was used to ensure safety. 8/22/2021 1531 by Ken Lewis RN  Outcome: Ongoing  Note: Telesitter in use. Bed in low position with side rails up x2; brake set & alarm set. Fall risk socks on the patient. Problem: Skin Integrity:  Goal: Will show no infection signs and symptoms  Description: Will show no infection signs and symptoms  8/22/2021 1531 by Ken Lewis RN  Outcome: Ongoing  Note: Repositioned [id number] and PRN for comfort with pillow support. Goal: Absence of new skin breakdown  Description: Absence of new skin breakdown  Outcome: Ongoing  Note: Scot showed no new signs of breakdown, a TERRI mattress was ordered to prevent further harm      Problem: Pain:  Goal: Control of chronic pain  Description: Control of chronic pain  8/23/2021 0110 by Robyn Pal RN  Outcome: Ongoing  Note: Scot was given PRN pain medication to help with his discomfort. 8/22/2021 1531 by Ken Lewis RN  Outcome: Ongoing  Note: PRN pain medicine given as requested by the patient. Problem: Airway Clearance - Ineffective:  Goal: Ability to maintain a clear airway will improve  Description: Ability to maintain a clear airway will improve  Outcome: Ongoing  Note: Scot remained on supplemental oxygen during this shift.

## 2021-08-23 NOTE — PROGRESS NOTES
Inpatient Physical Therapy Evaluation and Treatment    Unit: PCU  Date:  8/23/2021  Patient Name:    Veronica Franco  Admitting diagnosis:  Altered mental status [R41.82]  Polysubstance abuse ([facility] [address] [F19.10]  Acute respiratory failure with hypoxia and hypercarbia ([facility] [address] [J96.01, J96.02]  Altered mental status, unspecified altered mental status type [R41.82]  Admit Date:  8/20/2021  Precautions/Restrictions/WB Status/ Lines/ Wounds/ Oxygen: Fall risk, Bed/chair alarm, Lines -IV and Supplemental O2 (1.5L), Telemetry and Continuous pulse oximetry, telesitter, primoflow male external catheter. Treatment Time: 8455- 4156  Treatment Number:  1   Timed Code Treatment Minutes: 38 minutes  Total Treatment Minutes:  48  minutes    Patient Goals for Therapy: \" I don't know \"          Discharge Recommendations: SNF  DME needs for discharge: defer to facility       Therapy recommendation for EMS Transport: can transport by wheelchair    Therapy recommendations for staff:   Assist of 2 with use of rolling walker (RW) and gait belt for all transfers to/from [facility]  to/from chair  Via stand pivot transfer. History of Present Illness: Per H&P Dr. Cinthya Atwood 8/21: \"Timmy Ulysses Lass is 64 y.o. male with history of multiple comorbidities including HTN, CAD, PVD, history of CVA and rheumatoid arthritis  Is now brought to the ER by family member for altered mental status  Per report, patient had a fall earlier in the day and had possibly some concussion  He was able to get up on his own. At that time he insisted he is doing fine  However at the day progressed he became more confused  But later he was found slumped over the chair and he was in labored breathing  He became altered and minimally responsive     When EMS arrived his oxygen saturation was 80% on room air.    In the ED, patient is lethargic and somnolent and unable to give history  At the time of my interview, he was unable to give history due to lethargy\"    8/21 - Head CT neg for acute abnormality.    + PNA, Sepsis, toxic encephalopathy with substance abuse    Home Health S4 Level Recommendation:  NA  AM-PAC Mobility Score       AM-PAC Inpatient Mobility without Stair Climbing Raw Score : 13    Preadmission Environment    Pt recently moved in with daughter (4 days ago) per case mgmt notes. When asked where he intends to go after discharge, he says \"I don't know\". Below home info is for daughter's home. Pt. Lives with family (dtr) dtr works outside the home, pt would be home alone most of the time   Home environment:    mobile home/trailer  Steps to enter first floor: 4-5 steps to enter and hand rail bilateral  Steps to second floor: N/A  Bathroom: walk in shower, comfort height toilet and grab bars  Equipment owned: RW, shower chair/bench and home O2 (unknown volume) PRN     Preadmission Status:  Pt. Able to drive: Yes  Pt Fully independent with ADLs: Yes  Pt. Required assistance from family for: Independent PTA  Pt. independent for transfers and gait and walked with [employer]  History of falls Yes - \" a couple months ago\"     Pain   Yes  Location: back and shoulders  Ratin /10  Pain Medicine Status: RN notified    Cognition    A&O Person, Place and Situation   Able to follow 1 step commands    Subjective  Patient lying semifowlers with no family present. Pt agreeable to this PT eval & tx. Upper Extremity ROM/Strength  Please see OT evaluation. Lower Extremity ROM / Strength   AROM WFL: No  ROM limitations: Lacking ~25* TKE AROM, ~20* TKE PROM bilaterally     Strength Assessment (measured on a 0-5 scale):  R LE   Quad   2+   Ant Tib  3+   Hamstring 3   Iliopsoas 3  L LE  Quad   2+   Ant Tib  3+   Hamstring 3   Iliopsoas 3    Lower Extremity Sensation    Impaired - pt reports diminished sensation bilat LEs at baseline. Severe pain in bilat feet with transition to dependent position sup>sit.     Lower Extremity Proprioception:   [facility]    Coordination and Tone  Impaired   UE tremors at rest (L>R). Balance  Sitting:  Fair +; CGA initially, then SBA  Comments: Pt sat EOB ~7-8 minutes during examination and vitals assessment. Standing: Fair ; Min A static stance with RW  Comments: Fwd flexed at trunk, posterior COG, unable to correct into upright posture with verbal/tactile cuing. Bed Mobility   Supine to Sit:    Min A for bilat LE advancement, guiding CL UE to bedrail, and trunk propping. Sit to Supine:   Not Tested  Rolling:   Not Tested  Scooting in sitting: SBA  Scooting in supine:  Not Tested    Transfer Training     Sit to stand: Max A  and 2 persons for 1st attempt from EOB. Min A and 2 persons for 2nd attempt from EOB. Stand to sit:   Min A to control descent (weak eccentric control)  Bed to Chair:   Min A  and 2 persons with use of rolling walker (RW)    Gait gait deferred due to difficulty with transfers; pt ambulated 0 ft. Distance:      NA ft  Deviations (firm surface/linoleum):  N/A  Assistive Device Used:    N/A  Level of Assist:    N/A  Comment: NA    Stair Training deferred, pt unsafe/ not appropriate to complete stairs at this time  # of Steps:   N/A  Level of Assist:  N/A  UE Support:  NA  Assistive Device:  N/A  Pattern:   N/A  Comments: N/A    Activity Tolerance   Pt completed therapy session with Dizziness noted with EOB sitting. BP (mmHg)  HR (bpm)  SpO2 (%)    Semifowlers before activity   148/85    94% on 1.5L    EOB       Seated after activity        Positioning Needs   Pt up in chair, alarm set, positioned in proper neutral alignment and pressure relief provided. Call light provided and all needs within reach    Exercises Initiated  Suzette deferred secondary to treatment focus on functional mobility  NA    Other  None. Patient/Family Education   Pt educated on role of inpatient PT, POC, importance of continued activity, safety awareness, transfer techniques and calling for assist with mobility.     Assessment  Pt seen for Physical Therapy evaluation in acute care setting. Pt demonstrated decreased Activity tolerance, Balance, ROM, Safety and Strength as well as decreased independence with Ambulation, Bed Mobility  and Transfers. Pt will benefit from skilled PT in acute setting to promote activity tolerance and independent functional mobility. Recommending SNF upon discharge as patient functioning well below baseline, demonstrates good rehab potential and unable to return home due to limited or no family support, burden of care beyond caregiver ability and home environment not conducive to patient recovery. Goals : To be met in 3 visits:  1). Independent with LE Ex x 10 reps    To be met in 6 visits:  1). Supine to/from sit: Independent  2). Sit to/from stand: Modified Independent  3). Bed to chair: Modified Independent  4). Gait: Ambulate 150 ft.  with  Modified Independent and use of rolling walker (RW)  5). Tolerate B LE exercises 3 sets of 10-15 reps  6). Ascend/descend 4 steps with Independent with use of hand rail unilateral and No AD    Rehabilitation Potential: Good  Strengths for achieving goals include:   Family Support and Pt cooperative   Barriers to achieving goals include:    Pain and Weakness    Plan    To be seen 3-5 x / week  while in acute care setting for therapeutic exercises, bed mobility, transfers, progressive gait training, balance training, and family/patient education. Signature: Raman Newby, PT, DPT    If patient discharges from this facility prior to next visit, this note will serve as the Discharge Summary.

## 2021-08-23 NOTE — PROGRESS NOTES
Hospitalist Progress Note      PCP: NENA Sterling - CNP    Date of Admission: 8/20/2021    Chief Complaint: AMS from home. Drug screen with opiates, amphetamine, cannabis. Subjective:     Coughing up thick greenish phlegm. Feels better - actually oriented to place and person and situation today - mental status improved. Medications:  Reviewed    Infusion Medications    dextrose      sodium chloride Stopped (08/21/21 2038)    sodium chloride 1,000 mL (08/23/21 0658)     Scheduled Medications    insulin lispro  0-12 Units Subcutaneous TID WC    insulin lispro  0-6 Units Subcutaneous Nightly    enoxaparin  40 mg Subcutaneous Daily    sodium chloride flush  5-40 mL Intravenous 2 times per day    piperacillin-tazobactam  3,375 mg Intravenous Q8H    budesonide-formoterol  2 puff Inhalation BID    gabapentin  300 mg Oral TID    atorvastatin  80 mg Oral Nightly    QUEtiapine  25 mg Oral BID    nicotine  1 patch Transdermal Daily    levofloxacin  750 mg Intravenous Q24H    ipratropium-albuterol  1 ampule Inhalation Q4H    methylPREDNISolone  40 mg Intravenous Q12H    atenolol  25 mg Oral Daily    traZODone  100 mg Oral Nightly     PRN Meds: glucose, dextrose, glucagon (rDNA), dextrose, sodium chloride flush, sodium chloride, ondansetron **OR** ondansetron, polyethylene glycol, acetaminophen **OR** acetaminophen, HYDROcodone 5 mg - acetaminophen      Intake/Output Summary (Last 24 hours) at 8/23/2021 0744  Last data filed at 8/23/2021 0719  Gross per 24 hour   Intake 3553.14 ml   Output 3100 ml   Net 453.14 ml       Physical Exam Performed:    /84   Pulse 80   Temp 97.4 °F (36.3 °C) (Oral)   Resp 18   Ht 5' 7\" (1.702 m)   Wt 172 lb 9.6 oz (78.3 kg)   SpO2 95%   BMI 27.03 kg/m²         General:  Middle aged male, healthy appearing   Awake, alert and oriented.  Appears to be not in any distress  Mucous Membranes:  Pink , anicteric  Neck: No JVD, no carotid bruit, no thyromegaly  Chest:  Clear to auscultation bilaterally, no added sounds  Cardiovascular:  RRR S1S2 heard, no murmurs or gallops  Abdomen:  Soft, undistended, non tender, no organomegaly, BS present  Extremities: No edema or cyanosis. Distal pulses well felt  Neurological : grossly normal          Labs:   Recent Labs     08/22/21  0313 08/22/21  1509 08/23/21  0256   WBC 4.6 4.6 4.2   HGB 11.0* 11.1* 10.8*   HCT 32.5* 32.8* 32.2*   PLT 83* 78* 79*     Recent Labs     08/22/21  0313 08/22/21  1509 08/23/21  0255   * 128* 136   K 3.7 3.5 3.3*   CL 94* 95* 100   CO2 24 24 24   BUN 6* 6* 4*   CREATININE <0.5* <0.5* <0.5*   CALCIUM 8.6 8.3 8.0*     Recent Labs     08/20/21 2013   *   ALT 37   BILITOT 1.1*   ALKPHOS 82     No results for input(s): INR in the last 72 hours. Recent Labs     08/20/21  2103 08/21/21  0700 08/21/21  0702 08/22/21  0313 08/23/21  0300   CKTOTAL   < >  --  2,731* 616* 300   TROPONINI  --  <0.01  --   --   --     < > = values in this interval not displayed. Urinalysis:      Lab Results   Component Value Date    NITRU POSITIVE 08/20/2021    WBCUA 0-2 08/20/2021    RBCUA 5-10 08/20/2021    BLOODU LARGE 08/20/2021    SPECGRAV >=1.030 08/20/2021    GLUCOSEU 100 08/20/2021       Radiology:  CT CHEST PULMONARY EMBOLISM W CONTRAST   Final Result   No evidence of pulmonary embolism. Multifocal pneumonia involving the right lung. CT HEAD WO CONTRAST   Final Result   No acute intracranial abnormality. Specifically, no acute intracranial   hemorrhage or mass effect. Mild chronic small vessel ischemic disease. Tiny left caudate old lacunar   infarct. XR CHEST PORTABLE   Final Result   Multifocal airspace opacities may represent developing pulmonary edema or   pneumonitis. Blood cx NGTD    covid neg    resp cx - pending      Assessment/Plan:    Acute Hypoxic and Hypercapnic Resp Failure. R lung PNA organism unspecified.    COPD with AE POA  CT with multifocal unilateral R lung PNA  on levaquin and zosyn. BID solumedrol  cont symbicort. duonebs, cx remain neg, pt symptoms improved  - wean off oxygen and dc planning   Repeat imaging in 4 weeks       Sepsis POA - due to pna, s.p IVF boluses  Improved , BP stable        Acute Metabolic and possibly toxic encephalopathy. Sec to polysubstance use - urine tox with amphetamine, MJ and opiate   CT head with old left caudate lacunar CVA, but no acute findings. - blood cx remain neg  - wbc stable  - mentation improved   - pt denies using any drugs though    Hyponatremia - hypovolemic - improved with appropriate rate of correction. Cont IVF. Mild Rhabdomyolysis without renal failure. CK 2700 IVF as above. CK - improved  To 300 with IVF      Thrombocytopenia and Lymphopenia. COVID (-)  Likely with sepsis   Resume ASA ,   Checked fibrinogen, LDH, haptoglobin and HIT - all negative. Resume lovenox. Need f/w       Chronic Pain opiate dependent -  Resumed PTA norco PRN. Avoid excessive sedatives     Gen weakness - start PT      DVT Prophylaxis: SCD, resume lovenox. Hold if platelets <08T. Diet: ADULT DIET;  Dysphagia - Soft and Bite Sized  Code Status: Full Code      Dispo - cc    Wilberto Graves MD

## 2021-08-23 NOTE — PLAN OF CARE
Problem: Falls - Risk of:  Goal: Will remain free from falls  Description: Will remain free from falls  8/23/2021 1446 by Concetta Saldana RN  Outcome: Ongoing  8/23/2021 0110 by Attila Harris RN  Outcome: Ongoing  Note: Scot remained safe and free of falls during this shift, a bed alarm was used to ensure safety. Goal: Absence of physical injury  Description: Absence of physical injury  Outcome: Ongoing     Problem: Skin Integrity:  Goal: Will show no infection signs and symptoms  Description: Will show no infection signs and symptoms  Outcome: Ongoing  Goal: Absence of new skin breakdown  Description: Absence of new skin breakdown  8/23/2021 1446 by Concetta Saldana RN  Outcome: Ongoing  8/23/2021 0110 by Attila Harris RN  Outcome: Ongoing  Note: Scot showed no new signs of breakdown, a TERRI mattress was ordered to prevent further harm      Problem: Pain:  Goal: Pain level will decrease  Description: Pain level will decrease  Outcome: Ongoing  Goal: Control of acute pain  Description: Control of acute pain  Outcome: Ongoing  Goal: Control of chronic pain  Description: Control of chronic pain  8/23/2021 1446 by Concetta Saldana RN  Outcome: Ongoing  8/23/2021 0110 by Attila Harris RN  Outcome: Ongoing  Note: Scot was given PRN pain medication to help with his discomfort. Problem: Airway Clearance - Ineffective:  Goal: Clear lung sounds  Description: Clear lung sounds  Outcome: Ongoing  Goal: Ability to maintain a clear airway will improve  Description: Ability to maintain a clear airway will improve  8/23/2021 1446 by Concetta Saldana RN  Outcome: Ongoing  8/23/2021 0110 by Attila Harris RN  Outcome: Ongoing  Note: Scot remained on supplemental oxygen during this shift.

## 2021-08-23 NOTE — PROGRESS NOTES
Prolonged QTc. >500 (500-528). NSR with multiple PVC's. HR 93. See pharm notes. MD notified. MD instructions to hold Seroquel and Zofran.

## 2021-08-23 NOTE — PROGRESS NOTES
Inpatient Occupational Therapy  Evaluation and Treatment    Unit: PCU  Date:  8/23/2021  Patient Name:    Belinda Santizo  Admitting diagnosis:  Altered mental status [R41.82]  Polysubstance abuse ([facility] [address] [F19.10]  Acute respiratory failure with hypoxia and hypercarbia ([facility] [address] [J96.01, J96.02]  Altered mental status, unspecified altered mental status type [R41.82]  Admit Date:  8/20/2021  Precautions/Restrictions/WB Status/ Lines/ Wounds/ Oxygen: Fall risk, Bed/chair alarm, Lines -Supplemental O2 (1.5L), Telemetry, Continuous pulse oximetry and telesitter, primoflow male external catheter    Treatment Time:  5705 - 1225  Treatment Number: 1   Timed code treatment minutes 40 minutes   Total Treatment minutes:   50   minutes    Patient Goals for Therapy:  \" I don't know, it just depends on how I'm doing \"      Discharge Recommendations: SNF  DME needs for discharge: defer to facility       Therapy recommendations for staff:   Assist of 2 with use of rolling walker (RW) and gait belt for all transfers to/from [facility]  to/from chair via stand pivot transfer     History of Present Illness: Per H&P Dr. Guy Roca 8/21  PMH significant for CAD, CVA, HTN presented today for AMS, SOB. Patient moved in with his daughter 2 days ago. This morning, patient had a unwitnessed fall but was able to get up. Granddaughter helped the patient back into the house and patient insisted that he was okay. Throughout the day, patient became more confused. When daughter checked on the patient, he was very altered, minimally responsive, and appeared very short of breath. EMS arrived on scene and patient had O2 sat of 80% on room air with cyanosis noted on the tip of nose and fingers. EMS transported the patient with NRM and O2 sat on arrival was 97%. No further history available as patient was completely altered    Home Health S4 Level Recommendation:  NA  AM-PAC Score:  12    Preadmission Environment    Pt.  Lives with family (dtr) dtr works outside the home, pt would be home alone most of the time. Home environment:  mobile home/trailer  Steps to enter first floor: 4-5 steps to enter and hand rail bilateral  Steps to second floor: N/A  Bathroom: walk in shower, comfort height toilet and grab bars  Equipment owned: RW, shower chair/bench and home O2 (unknownL) PRN - wears 'when I remember to'    Preadmission Status:  Pt. Able to drive: Yes  Pt Fully independent with ADLs: Yes reports difficulty donning shirts PTA d/t shoulder pain   Assists with letting out dtr's 4 ' big dogs' by putting on a chain   Pt. Required assistance from family for: Independent PTA  Pt. independent for transfers and gait and walked with Candance March  History of falls Yes - \" a couple months ago\"     Pain  Yes  Ratin  Location: back and shoulders   Pain Medicine Status: RN notified      Cognition    A&O Person, Place and Situation   Able to follow 1 step commands inconsistently    Subjective  Patient lying supine in bed with no family present. Pt agreeable to this OT eval & tx. Upper Extremity ROM:    Impaired R shoulder flexion to ~90*  Impaired L shoulder flexion to ~90*    Upper Extremity Strength:    BUE strength impaired but not formally assessed w/ MMT pt reports significant pain in shoulder and elbow flexion, strength not assessed. Diminished LUE distal strength as compared to RUE         Upper Extremity Sensation    Diminished - fingers tips 'tingle'     Upper Extremity Proprioception:  Impaired - difficulty following verbal and physical cues for MMT and ROM testing, did not appear to be 2/2 cognition/ lack of understanding     Coordination and Tone  Diminished unable to complete alternating finger touches with 3rd and 4th digit bilaterally    Balance  Functional Sitting Balance:  WFL EOB, progressed from CGA to SBA d/t pt anxious and prefers to hold onto EOB.     Functional Standing Balance:Impaired min A @ RW (static)     Bed mobility:    Supine to sit:   Min A  to advance bilateral LE's, guide UE to bedrail and support trunk    Sit to supine:   Not Tested  Rolling:    Not Tested  Scooting in sitting:  SBA  Scooting to head of bed:   Not Tested    Bridging:   Not Tested    Transfers:    Sit to stand: Max A  and 2 persons first attempt, Min A and 2 persons second attempt   Stand to sit:  Min A  to control decent d/t poor trunk control   Bed to chair:   Min A , 2 persons and RW  Standard toilet: Not Tested  Bed to [facility]:  Not Tested    Dressing:      UE:   Not Tested  LE:    Not Tested and pt declined donning and doffing footies stating \"I don't feel like bending over right now\"    Bathing:    UE:  Not Tested  LE:  Not Tested    Eating:   Modified Independent took bites of pudding from spoon w/ set up     Toileting:  Not Tested    Activity Tolerance   Pt completed therapy session with Dizziness noted with EOB sitting , fatigue noted with stance     Start of session:  SpO2: 94% on 1.5L  BP: 148/85    Positioning Needs:   Pt up in chair, alarm set, positioned in proper neutral alignment and pressure relief provided. Call lights and needs within reach     Exercise / Activities Initiated: Suzette deferred secondary to treatment focus on functional mobility  N/A    Patient/Family Education:   Role of OT  Recommendations for DC  Energy conservation techniques    Assessment of Deficits: Pt seen for Occupational therapy evaluation in acute care setting. Pt demonstrated decreased Activity tolerance, ADLs, Balance , Bed mobility, ROM, Strength, Transfers and Cognition. Pt functioning below baseline and will likely benefit from skilled occupational therapy services to maximize safety and independence. Throughout eval/tx, pt required frequent cues to follow one step commands and often appeared to be inattentive to therapist direction.  At this time, recommending SNF upon d/c as patient is performing well below baseline and is unable to return home due to safety concerns and significance of patients therapy needs. Goal(s) : To be met in 3 Visits:  1). Bed to toilet/BSC: Modified Independent    To be met in 5 Visits:  1). Supine to/from Sit:  Modified Independent  2). Upper Body Bathing:   Modified Independent  3). Lower Body Bathing:   Modified Independent  4). Upper Body Dressing:  Independent  5). Lower Body Dressing:  Independent  6). Pt to demonstrate UE exs x 15 reps with minimal cues    Rehabilitation Potential:  Good for goals listed above. Strengths for achieving goals include: Family Support and Pt cooperative  Barriers to achieving goals include:  Pain and Weakness     Plan: To be seen 3-5 x/wk while in acute care setting for therapeutic exercises, bed mobility, transfers, dressing, bathing, family/patient education, ADL/IADL retraining, energy conservation training.      Quin Traore, OTR/L           If patient discharges from this facility prior to next visit, this note will serve as the Discharge Summary

## 2021-08-23 NOTE — CARE COORDINATION
INTERDISCIPLINARY PLAN OF CARE CONFERENCE    Date/Time: 8/23/2021 3:28 PM  Completed by: Iliana Eason RN, Case Management      Patient Name:  Lorel Holstein  YOB: 1960  Admitting Diagnosis: Altered mental status [R41.82]  Polysubstance abuse ([facility] [address] [F19.10]  Acute respiratory failure with hypoxia and hypercarbia ([facility] [address] [J96.01, J96.02]  Altered mental status, unspecified altered mental status type [R41.82]     Admit Date/Time:  8/20/2021  8:00 PM    Chart reviewed. Interdisciplinary team contacted or reviewed plan related to patient progress and discharge plans. Disciplines included Case Management, Nursing, and Dietitian. Current Status:inpt  PT/OT recommendation for discharge plan of care: SNF    Expected D/C Disposition:  Skilled nursing facility  Confirmed plan with patient and/or family Yes confirmed with: (name) pt's Rossana donaldson    Discharge Plan Comments: Reviewed chart. Pt from home with daughter and plan [facility] as pt's daughterRossana, works at the [employer]. Second choice is [facility] [employer] [facility]. Referral called to Flori at [facility]. SNF list provided to pt and family. Following.     Home O2 in place on admit: No

## 2021-08-23 NOTE — PROGRESS NOTES
Shift assessment complete- see flow sheet. Patient is A/O to person and place. VSS. Morning medications given without difficulty. Currently on 2 L HFNC, SpO2 WNL. No home oxygen at baseline. Lung sounds  wheezy. Denies worsening shortness of breath. Plts 79 this morning. Lovenox held. Patient denies any further needs. Call light explained and in reach. Bed alarm on. Will continue to monitor.

## 2021-08-23 NOTE — PROGRESS NOTES
HR sustaining in 170's. Pt reports feeling nervous.      Stat EKG ordered. Practiced Vagal maneuvers. Heart rate bouncing 100-150's. Pt reports to be feeling some relief.

## 2021-08-23 NOTE — FLOWSHEET NOTE
08/23/21 1400   Vital Signs   Temp 97.2 °F (36.2 °C)   Temp Source Oral   Pulse 76   Heart Rate Source Monitor   Resp 16   /88   BP Location Left upper arm   Patient Position Up in chair   Level of Consciousness Alert (0)   MEWS Score 1   Oxygen Therapy   SpO2 98 %   O2 Device High flow nasal cannula   O2 Flow Rate (L/min) 2 L/min     Pt setting in chair without complaints. More oriented than this morning.

## 2021-08-24 NOTE — PROGRESS NOTES
Patient yelling out, came into room to find patient diaphoretic, SOB and HR 170s. Vagal maneuver lowered HR to 130. Oxygen demand increased from 3.5L to 10L with sats at 91. . EKG and Chest Xray ordered.  [facility]. /95.  Temp 99.5

## 2021-08-24 NOTE — PROGRESS NOTES
Physical and Occupational Therapy    Spoke with pt's nurse who asked that therapy be held for today due to heart rate in the 170-180s. Will re-attempt another date as pt tolerates.     Cindy Hughes Augie 87, OTR/L  #[id number]

## 2021-08-24 NOTE — CONSULTS
[address]                 [address] [address]                                  CONSULTATION    PATIENT NAME: Ari Finley                 :        1960  MED REC NO:   [id number]                          ROOM:         ACCOUNT NO:   [de-identified]                           ADMIT DATE: 2021  PROVIDER:     Cassandra Menendez MD    CONSULT DATE:  2021    REASON FOR CONSULTATION:  Atrial fibrillation and tachycardia. HISTORY OF PRESENT ILLNESS:  A 80-year-old male presented to the  hospital with altered mental status, he was also noted to be short of  breath, subsequently diagnosed with pneumonia, developed atrial  fibrillation. For this reason, Cardiology consulted. Since the patient  developed shortness of breath about a day or so prior to admission, it  was severe, that was getting progressively worse. He had no associated  chest pain. He had not had symptoms like this before. He had no  associated fever. He was noted to be hypoxic. He had associated  lethargy. He has had no improvement since admission. There was no  precipitating factor. PAST MEDICAL HISTORY:  1. Coronary artery disease status post coronary artery bypass surgery  in 2016.  2.  Peripheral arterial disease. 3.  Hypertension. 4.  Hyperlipidemia. 5.  History of CVA. SOCIAL HISTORY:  He smokes. FAMILY HISTORY:  Positive for heart disease. ALLERGIES:  TRAMADOL. MEDICATIONS:  See list in the chart, which I have reviewed. REVIEW OF SYSTEMS:  No focal neurologic symptoms. No headache or visual  changes. No recent GI or  bleeding. No recent or upcoming surgeries. All other systems are negative except as in the present illness. PHYSICAL EXAMINATION:  VITAL SIGNS:  Blood pressure is 128/89, heart rate 140, respirations are  28, temperature is 97.7. He is 96% saturated on oxygen.   GENERAL:  Well-developed, well-nourished white male, mild respiratory  distress. HEENT:  Normocephalic, atraumatic. Oropharynx clear. Moist mucous  membranes. NECK:  Supple. CHEST:  Diffuse rales, scattered wheezes. CARDIAC:  Irregularly irregular S1 and S2. There is no S3 or S4 gallop. There is no significant murmur. Jugular venous pressure is mildly  elevated. ABDOMEN:  Soft, nontender. Positive bowel sounds. EXTREMITIES:  Trace edema. Peripheral pulses are intact. NEUROLOGIC:  Grossly nonfocal.  He is lethargic. PSYCHIATRIC:  Calm. SKIN:  Warm and dry. DIAGNOSTIC AND LABORATORY DATA:  Significant laboratory data includes a  platelet count of 79. Troponin is less than 0.01. ProBNP is 10,552. Chest x-ray shows multifocal airspace disease. EKG, atrial  fibrillation, rapid ventricular response, diffuse ST segment depression. IMPRESSION:  1.  New onset atrial fibrillation and the patient without any prior  known history. Tachycardia due to atrial fibrillation. 2.  Shortness of breath due to airspace disease. 3.  Diffuse airspace disease, probable pneumonia, possible component of  pulmonary edema as well. 4.  Altered mental status. 5.  Polysubstance abuse. 6.  Pneumonia. 7.  Hypoxia. 8.  Coronary artery disease, status post coronary artery bypass surgery  in 2016.  9.  Peripheral arterial disease. 10.  Hypertension. 11.  Hyperlipidemia. 12.  History of CVA. 13.  Smoking. 14.  Left ventricular ejection fraction by echocardiogram in 03/2020. RECOMMENDATIONS:  1. Increase beta-blocker. 2.  Scheduled digoxin. 3.  Add Lasix and assess response. 4.  Full-dose anticoagulation. The patient was admitted with  thrombocytopenia, which has progressed somewhat since admission. At  this time, I believe the benefit of full-dose anticoagulation outweighs  the risk; however, if he develops progressive thrombocytopenia, this  will need to be reconsidered.   It is noteworthy that he had significant  thrombocytopenia on admission which was presumably prior to him  receiving any type of anticoagulation.   5.  Echocardiogram.        Maite Yuan MD    D: 08/24/2021 9:22:44       T: 08/24/2021 11:40:39     [email or web address]  Job#: [id number]     Doc#: [id number]    CC:

## 2021-08-24 NOTE — PROGRESS NOTES
This RN reached out to Cardiology and the hospitalist at [address] [address] this morning with concern for patient condition and elevated heart rate. Orders where entered by MD's and followed through by this RN.

## 2021-08-24 NOTE — PROGRESS NOTES
Patient Oxygen level increased to 8L by respiratory. Patient is using accessory muscles to breath and heart rate remains in the 170-180's.
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
## 2021-08-24 NOTE — PROGRESS NOTES
Hospitalist Progress Note      PCP: Ruperto Aviles, APRN - CNP    Date of Admission: 8/20/2021    Chief Complaint: [address] Course: 61m admitted with AMS, tox screen (+) for amphetamine, Opiate, Cannabinoid, hypoxemic with neg CT chest for pe, (+) R pna on zosyn. Sensorium / hypoxemia improved since admission, patient remaining afebrile, developed afib rvr    Subjective: Patient is awake, alert, responsive, denying chest pain.  Remains confused / disoriented      Medications:  Reviewed    Infusion Medications    dilTIAZem (CARDIZEM) 125 mg in dextrose 5% 125 mL infusion 15 mg/hr (08/23/21 2125)    dextrose      sodium chloride Stopped (08/21/21 2038)     Scheduled Medications    potassium chloride  40 mEq Oral BID WC    digoxin        QUEtiapine  25 mg Oral Nightly    aspirin  81 mg Oral Daily    gabapentin  300 mg Oral TID    insulin lispro  0-12 Units Subcutaneous TID WC    insulin lispro  0-6 Units Subcutaneous Nightly    enoxaparin  40 mg Subcutaneous Daily    sodium chloride flush  5-40 mL Intravenous 2 times per day    piperacillin-tazobactam  3,375 mg Intravenous Q8H    budesonide-formoterol  2 puff Inhalation BID    atorvastatin  80 mg Oral Nightly    nicotine  1 patch Transdermal Daily    levofloxacin  750 mg Intravenous Q24H    ipratropium-albuterol  1 ampule Inhalation Q4H    methylPREDNISolone  40 mg Intravenous Q12H    atenolol  25 mg Oral Daily     PRN Meds: glucose, dextrose, glucagon (rDNA), dextrose, sodium chloride flush, sodium chloride, ondansetron **OR** ondansetron, polyethylene glycol, acetaminophen **OR** acetaminophen, HYDROcodone 5 mg - acetaminophen      Intake/Output Summary (Last 24 hours) at 8/24/2021 0251  Last data filed at 8/23/2021 2029  Gross per 24 hour   Intake 1812.12 ml   Output 2750 ml   Net -937.88 ml       Physical Exam Performed:    /74   Pulse 170   Temp 97.6 °F (36.4 °C) (Axillary)   Resp 24   Ht 5' 7\" (1.702 m)   Wt 172 lb 9.6 caudate old lacunar   infarct. XR CHEST PORTABLE   Final Result   Multifocal airspace opacities may represent developing pulmonary edema or   pneumonitis.          XR CHEST PORTABLE    (Results Pending)           Assessment/Plan:    Active Hospital Problems    Diagnosis Date Noted    Acute respiratory failure with hypoxia and hypercarbia ([facility]) [J96.01, J96.02]     Polysubstance abuse ([facility] [F19.10]     Community acquired pneumonia of right lower lobe of lung [J18.9]     COPD exacerbation ([facility]) [J44.1]     Thrombocytopenia ([facility] [D69.6]     Altered mental status [R41.82] 08/20/2021       Afib rvr  - cardizem gtts 20 mcg/hr  - dig ordered  - trend trop  - replace mag , potassium  - poss etoh withdrawal vs PE vs CAD    Hypoxemia  - worsened following 500 mL NS bolus  - stat cxr  - abg, bnp, pct       Tot crit care time > 35 min    Skyla Campuzano MD

## 2021-08-24 NOTE — PROGRESS NOTES
Pharmacy Note  Vancomycin Consult    Curt Garcia is a 64 y.o. male started on Vancomycin for PNA; consult received from Dr. Good Do manage therapy. Also receiving the following antibiotics: Zosyn. Patient Active Problem List   Diagnosis    Gout    Back pain, lumbosacral    Rheumatoid arthritis, adult (HCC)    Depression    Vitamin D deficiency    Hypotestosteronism    DDD (degenerative disc disease)    Anxiety    Lumbar radiculopathy    Bilateral leg numbness    Bilateral leg pain    DDD (degenerative disc disease), lumbar    Pain medication agreement broken    Face lesion    Unstable angina (HCC)    NSTEMI (non-ST elevated myocardial infarction) ([facility]    Coronary artery disease involving native coronary artery of native heart without angina pectoris    Essential hypertension    Alcohol dependence with withdrawal delirium ([facility]    S/P CABG x 4    Hypomagnesemia    Tobacco dependence in remission    Primary insomnia    Pure hypercholesterolemia    Former smoker    Polyp of transverse colon    Abnormal liver enzymes    Severe claudication (HCC)    Wound of left groin    Infection    Alcohol use    Fatty liver    Colon cancer (HCC)    Incisional hernia with obstruction but no gangrene    Altered mental status    Acute respiratory failure with hypoxia and hypercarbia ([facility])    Polysubstance abuse ([facility]    Community acquired pneumonia of right lower lobe of lung    COPD exacerbation ([facility]    Thrombocytopenia (HCC)     Allergies:  Tramadol     Temp max:     Recent Labs     08/22/21  1509 08/23/21  0255 08/23/21  0256 08/24/21  0102 08/24/21  0346   BUN 6*   < >  --  7 8   CREATININE <0.5*   < >  --  <0.5* <0.5*   WBC 4.6  --  4.2  --   --     < > = values in this interval not displayed.        Intake/Output Summary (Last 24 hours) at 8/24/2021 0843  Last data filed at 8/24/2021 0433  Gross per 24 hour   Intake 60 ml   Output 1850 ml   Net -1790 ml     Culture Date      Source Results  NGTD    Ht Readings from Last 1 Encounters:   08/21/21 5' 7\" (1.702 m)        Wt Readings from Last 1 Encounters:   08/24/21 171 lb 9.6 oz (77.8 kg)       Body mass index is 26.88 kg/m². CrCl cannot be calculated (This lab value cannot be used to calculate CrCl because it is not a number: <0.5). Goal AUC: 400-600    Auto-comment: implemented 8 doses: 1250 mg every 12 hours, starting on Aug 24th 2021, 21:00 EDT  Target exposure: AUC24 (range) 400-600 mg/L.hr (  AUC24,ss: 463 mg/L.hr(  Probability of AUC24 > 400: 65 %(  Ctrough,ss: 13.2 mg/L(  Probability of Ctrough,ss > 20: 21 %(  Probability of nephrotoxicity (Lodise VENANCIO 2009): 8 %  Assessment/Plan:  Will initiate Vancomycin with a one time loading dose of 1750mg x1, followed by 1250mg IV every 12 hours. Trough to be on 8/25 at 2030. Thank you for the consult. Will continue to follow.   Letitia Walton Pharm D 1/59/59584:87 AM  .

## 2021-08-24 NOTE — PROGRESS NOTES
Shift assessment completed. See flow sheet. Medications given. Patient responds verbally to questions but is confused and disoriented. Medications given. Patient is on 5 L O2 and using accessory muscles to breath. Saturations at 96%. Cardizem drip @ 20 mL/hr. Heart rate between 140- 180 bpm and in A fib. Call light within reach. Will continue to monitor.

## 2021-08-24 NOTE — CARE COORDINATION
INTERDISCIPLINARY PLAN OF CARE CONFERENCE    Date/Time: 8/24/2021 2:58 PM  Completed by: Darryl Bartlett RN, Case Management      Patient Name:  Charity Wade  YOB: 1960  Admitting Diagnosis: Altered mental status [R41.82]  Polysubstance abuse ([facility] [F19.10]  Acute respiratory failure with hypoxia and hypercarbia ([facility] [J96.01, J96.02]  Altered mental status, unspecified altered mental status type [R41.82]     Admit Date/Time:  8/20/2021  8:00 PM    Chart reviewed. Interdisciplinary team contacted or reviewed plan related to patient progress and discharge plans. Disciplines included Case Management, Nursing, and Dietitian. Current Status:ongoing  PT/OT recommendation for discharge plan of care: SNF    Expected D/C Disposition:  Skilled nursing facility  Confirmed plan with patient and/or family Yes confirmed with: (name) Diego Dean (daughter [phone])  Met with: Diego Dean (daughter [phone])  Discharge Plan Comments: Reviewed chart. Role of discharge planner explained and patient's daughter, Diego Dean, verbalized understanding. Pt is confused a tthis time. Pt is from home with daughter, Diego Dean. PT/OT recommends SNF. [facility] was [facility], but [facility] is now not able to accept pt because of the positive tox screen and confusion. Diego Dean asks for CM to look at (1) [facility], (2) [address], (3) [facility]. Addendum 8/24/2021 1551: Florentino Gomez with [facility] states that she will look at referral and let CM know if able to accept, as she is not sure when she will have a bed. Currently pt is not ready, as pt is on 7L of O2. Pt is having a MICHELLE and cardioversion on 8/25/2021. Awaiting to hear if [facility] can accept pt. CM spoke with Josefina Martin RN and asked her to update pt;s daughter, Diego Dean. Josefina Martin RN states that she will.        Home O2 in place on admit: No  Pt informed of need to bring portable home O2 tank on day of discharge for nursing to connect prior to leaving:  Not Indicated  Verbalized agreement/Understanding:  Not Indicated

## 2021-08-24 NOTE — PROGRESS NOTES
Pharmacy Note  Vancomycin Consult    Fernando Andrade is a 64 y.o. male started on Vancomycin for PNA; consult received from Dr. Alvin Pabon manage therapy. Also receiving the following antibiotics: Zosyn. Patient Active Problem List   Diagnosis    Gout    Back pain, lumbosacral    Rheumatoid arthritis, adult (HCC)    Depression    Vitamin D deficiency    Hypotestosteronism    DDD (degenerative disc disease)    Anxiety    Lumbar radiculopathy    Bilateral leg numbness    Bilateral leg pain    DDD (degenerative disc disease), lumbar    Pain medication agreement broken    Face lesion    Unstable angina (HCC)    NSTEMI (non-ST elevated myocardial infarction) ([address]    Coronary artery disease involving native coronary artery of native heart without angina pectoris    Essential hypertension    Alcohol dependence with withdrawal delirium ([address]    S/P CABG x 4    Hypomagnesemia    Tobacco dependence in remission    Primary insomnia    Pure hypercholesterolemia    Former smoker    Polyp of transverse colon    Abnormal liver enzymes    Severe claudication (HCC)    Wound of left groin    Infection    Alcohol use    Fatty liver    Colon cancer (HCC)    Incisional hernia with obstruction but no gangrene    Altered mental status    Acute respiratory failure with hypoxia and hypercarbia (HCC)    Polysubstance abuse ([address]    Community acquired pneumonia of right lower lobe of lung    COPD exacerbation ([facility]    Thrombocytopenia (HCC)     Allergies:  Tramadol     Temp max:     Recent Labs     08/22/21  1509 08/23/21  0255 08/23/21  0256 08/24/21  0102 08/24/21  0346   BUN 6*   < >  --  7 8   CREATININE <0.5*   < >  --  <0.5* <0.5*   WBC 4.6  --  4.2  --   --     < > = values in this interval not displayed.        Intake/Output Summary (Last 24 hours) at 8/24/2021 0843  Last data filed at 8/24/2021 0433  Gross per 24 hour   Intake 60 ml   Output 1850 ml   Net -1790 ml     Culture Date      Source Results  NGTD    Ht Readings from Last 1 Encounters:   08/21/21 5' 7\" (1.702 m)        Wt Readings from Last 1 Encounters:   08/24/21 171 lb 9.6 oz (77.8 kg)       Body mass index is 26.88 kg/m². CrCl cannot be calculated (This lab value cannot be used to calculate CrCl because it is not a number: <0.5). Goal AUC: 400-600    Assessment/Plan:  Will initiate Vancomycin with a one time loading dose of 1750mg x1, followed by 1250mg IV every 12 hours. Trough to be on 8/25 at 2030. Thank you for the consult. Will continue to follow.   Angela Capellan Pharm D 6/69/45202:64 AM  .

## 2021-08-24 NOTE — CONSULTS
[id number]    New AF  Tachycardia  SOB  AMS  PSA  Resp failure  PNA  Hypoxia  CAD  CABG 2016  PAD  HTN  HLD  H/O CVA  Smoker  Normal LVEF 3/2020    Increase BBlk  Add dig  Add lasix, assess response  Anticoag  Echo

## 2021-08-24 NOTE — PROGRESS NOTES
Pt seen by nocturnist this am  Rapid Afibb with high rates to 170  No change with IV BB or digoxin or cardizem  Add IV digoxin, cards consult  Lasix x 1   Correct elctrolytes  Consider CV     Stop levaquin for prolonged qtc

## 2021-08-25 NOTE — PROGRESS NOTES
Patient amiodarone gtt to be decreased to 0.5mg/min (16.7ml/hr). Dose adjusted as ordered/  Remains in Afib, w/ rates 150s. Denies complaints of dizziness/lightheadedness, headache. Call light in patient's reach.

## 2021-08-25 NOTE — PROGRESS NOTES
Patient in bilateral wrist restraints. Still confused but seems less anxious and more aware of surroundings. wrist restraints removed. telesitter in place. Patient in agreement to not pull at lines/tubes. Will further monitor. External catheter in place.

## 2021-08-25 NOTE — PROGRESS NOTES
Pt confused, pulling his tele leads, pulse-ox probe and I/V out, paged Dr. Tasneem Timmons MD ordered restraints.

## 2021-08-25 NOTE — PROGRESS NOTES
Perfectserve sent to  at this time in regards to potassium being 3.3 and sodium dropping to 126. He states to use current protocol. Updated Dickie Opitz states that patient needs to be placed in covid iso and a PCR needs to be sent. RN asked if we could send the covid/flu pcr since patient needs MICHELLE in AM.  states to not send the combo. But to send the regular PCR due to sensitivity.
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
## 2021-08-25 NOTE — PROGRESS NOTES
Patient transferred to ICU room [phone] w/ belongings and chart. Remains on amiodarone at 16.7mg/hr w/ HR 140s. Care transferred to [facility].

## 2021-08-25 NOTE — PROGRESS NOTES
Hospitalist Progress Note      PCP: Vincent Fields, APRN - CNP    Date of Admission: 8/20/2021    Chief Complaint: AMS from home. Drug screen with opiates, amphetamine, cannabis.        Subjective:     Femi Nicholson worsened in last 2 days with worsening hypoxia, confusion development of new Afibb with high HR    Did not respond to IV BB, cardizem boluses and Gtt or digoxin boluses   Planned for MICHELLE cardioversion today but more confused and more dyspneic     Seen restrained this am for confusion, given ativan overnight   Oriented to name and place         Medications:  Reviewed    Infusion Medications    dilTIAZem (CARDIZEM) 125 mg in dextrose 5% 125 mL infusion 20 mg/hr (08/25/21 0200)    dextrose      sodium chloride 25 mL (08/24/21 0846)     Scheduled Medications    potassium phosphate IVPB  15 mmol IntraVENous Once    vancomycin  1,250 mg IntraVENous Q12H    metoprolol tartrate  50 mg Oral BID    furosemide  40 mg IntraVENous BID    enoxaparin  1 mg/kg Subcutaneous BID    cefepime  2,000 mg IntraVENous Q12H    ipratropium  2 puff Inhalation Q4H    levalbuterol  2 puff Inhalation Q4H    [Held by provider] QUEtiapine  25 mg Oral Nightly    aspirin  81 mg Oral Daily    gabapentin  300 mg Oral TID    insulin lispro  0-12 Units Subcutaneous TID WC    insulin lispro  0-6 Units Subcutaneous Nightly    sodium chloride flush  5-40 mL IntraVENous 2 times per day    budesonide-formoterol  2 puff Inhalation BID    atorvastatin  80 mg Oral Nightly    nicotine  1 patch Transdermal Daily    methylPREDNISolone  40 mg IntraVENous Q12H     PRN Meds: potassium chloride, perflutren lipid microspheres, HYDROcodone 5 mg - acetaminophen, glucose, dextrose, glucagon (rDNA), dextrose, sodium chloride flush, sodium chloride, ondansetron **OR** ondansetron, polyethylene glycol, acetaminophen **OR** acetaminophen      Intake/Output Summary (Last 24 hours) at 8/25/2021 1888  Last data filed at 8/25/2021 3147  Gross nonspecific bilateral upper lung ground-glass opacities due to an   infectious/inflammatory process including atypical viral pneumonia. 2. Unchanged right pneumonia. 3. New small right and trace left pleural effusions. XR CHEST PORTABLE   Final Result   Right greater than left lower lung multifocal ill-defined consolidation   consistent with pneumonia, not significantly changed. CT CHEST PULMONARY EMBOLISM W CONTRAST   Final Result   No evidence of pulmonary embolism. Multifocal pneumonia involving the right lung. CT HEAD WO CONTRAST   Final Result   No acute intracranial abnormality. Specifically, no acute intracranial   hemorrhage or mass effect. Mild chronic small vessel ischemic disease. Tiny left caudate old lacunar   infarct. XR CHEST PORTABLE   Final Result   Multifocal airspace opacities may represent developing pulmonary edema or   pneumonitis. Blood cx NGTD    covid neg    resp cx - pending      Assessment/Plan:    Acute Hypoxic and Hypercapnic Resp Failure. Rt lung PNA organism unspecified. COPD with AE POA  CT with multifocal unilateral Rt lung PNA  on levaquin and zosyn. Changed to cefepime and vanc with worsening hypoxia and imaging  BID solumedrol IV for copd exacerbation . duonebs, cx remain neg  Obtain pulm consult for worsening symptoms         Sepsis POA - due to pna, s.p IVF boluses  Improved , BP stable      Afibb with RVR - new onset   - rates Did not respond to IV BB, cardizem boluses and Gtt or digoxin boluses   Planned for MICHELLE cardioversion today but more confused and more dyspneic   For amio bolus and gtt today   If he does not improve, need elective intubation and MICHELLE cardioversion  D.w cardiology  Continue lovenox full dose    Acute Metabolic and possibly toxic encephalopathy. Sec to polysubstance use - urine tox with amphetamine, MJ and opiate   CT head with old left caudate lacunar CVA, but no acute findings.    - worsening mentation likely with steroids , doubt alcohol withdrawal  - supportive care     Hyponatremia - improving initially with iv NS but worsened with fluid overload, now ongoing diuresis   >128    Mild Rhabdomyolysis without renal failure. CK 2700 IVF as above. CK - improved  To 300 with IVF      Thrombocytopenia and Lymphopenia. COVID (-)  Likely with sepsis - improving   Resumed ASA ,   Checked fibrinogen, LDH, haptoglobin and HIT - all negative. Resume lovenox bid now . Monitor cbc       Chronic Pain opiate dependent -  Resumed PTA norco PRN. Avoid excessive sedatives     Gen weakness - start PT      DVT Prophylaxis: SCD, resume lovenox. Hold if platelets <53A.    Diet: Diet NPO  Code Status: Full Code      Dispo - cc    Yousif Bain MD

## 2021-08-25 NOTE — PROGRESS NOTES
Discussed with MD patients continuing elevated heart rate. MD performed carotid massage with no success.

## 2021-08-25 NOTE — CONSULTS
Pharmacy to dose remdesivir per Dr. Bradly Mosher. At this time patient does not meet the criteria for remdesivir use, as patient has tested negative for COVID-19. Patient also has elevated liver enzymes on labs done 8/20, that would preclude the use of remdesivir in this patient. Plan:    No remdesivir for this patient at this time.

## 2021-08-25 NOTE — PROGRESS NOTES
4 Eyes Skin Assessment     The patient is being assess for   Transfer to New Unit    I agree that 2 RN's have performed a thorough Head to Toe Skin Assessment on the patient. ALL assessment sites listed below have been assessed. Areas assessed for pressure by both nurses:   [x]   Head, Face, and Ears   [x]   Shoulders, Back, and Chest, Abdomen  [x]   Arms, Elbows, and Hands   [x]   Coccyx, Sacrum, and Ischium  [x]   Legs, Feet, and Heels        Skin is warm and dry,  Purple bruising to left inner arm and right flank area (both soft)    Skin Assessed Under all Medical Devices by both nurses:  O2 device tubing              All Mepilex Borders were peeled back and area peeked at by both nurses:  Yes  Please list where Mepilex Borders are located:  coccyx where stage 2 wound is noted. **SHARE this note so that the co-signing nurse is able to place an eSignature**    Co-signer eSignature: Electronically signed by Manish Marquez RN on 8/25/21 at 7:20 PM EDT    Does the Patient have Skin Breakdown related to pressure?   Yes LDA WOUND CARE was Initiated documentation include the Rayna-wound, Wound Assessment, Measurements, Dressing Treatment, Drainage, and Color\",     (Insert Photo here)         Nolberto Prevention initiated:  Yes   Wound Care Orders initiated:  Yes      [id number]  nurse consulted for Pressure Injury (Stage 3,4, Unstageable, DTI, NWPT, Complex wounds)and New or Established Ostomies:  NA      Primary Nurse eSignature: Electronically signed by Adrian Lopez RN on 8/25/21 at 5:09 PM EDT

## 2021-08-25 NOTE — PROGRESS NOTES
cardizem gtt discontinued, amiodarone bolus initiated. BP stable as per flowsheet  -  HR in 160s Afib.  resp 28, patient anxious, oriented to self, disoriented to place and time. Restraints continued at present time for patient safety.

## 2021-08-25 NOTE — PROGRESS NOTES
Physical and Occupational Therapy    Spoke with pt's nurse who asked that therapy continue to be held for today due to elevated heart rate. Pt was placed in restraints earlier this morning. Will re-attempt another date as pt is found appropriate.     [facility] [address], OTR/L  #[id number]  Elías Denise, PT, DPT, OMT-C  #[id number]

## 2021-08-25 NOTE — PROGRESS NOTES
Pt's -180's. Informed Dr. Brooklyn Lopez MD at bedside to evaluate the pt. PCR covid test send. Pt confused. NPO for MICHELLE and possible cardio version. Tele sitter in use. MD ordered Lorazepam 1 mg I/V. Given it the pt . 's now.
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
## 2021-08-25 NOTE — PROGRESS NOTES
Bedside report and transfer of care given to Pradeep Velasquez [facility]. Pt currently resting in bed with the call light within reach. Pt denies any other care needs at this time. Pt stable at this time.

## 2021-08-25 NOTE — PROGRESS NOTES
[address] Daily Progress Note      Admit Date:  8/20/2021    Subjective:  Mr. Bin Augustine is being seen today for f/u afib with RVR. He is confused and in wrist restraints this AM. Answers some questions appropriately and confused on others. He denies specific complaints and cannot tell me what is wrong with him. Tele reviewed showing rapid afib 170bpm. Dr. Teri Golden at bedside with me as well.     Objective:   BP 96/69   Pulse 173   Temp 97.7 °F (36.5 °C) (Axillary)   Resp 24   Ht 5' 7\" (1.702 m)   Wt 175 lb 12.8 oz (79.7 kg)   SpO2 94%   BMI 27.53 kg/m²     Intake/Output Summary (Last 24 hours) at 8/25/2021 0805  Last data filed at 8/25/2021 2084  Gross per 24 hour   Intake 1504.6 ml   Output 1400 ml   Net 104.6 ml       TELEMETRY: Atrial fibrillation with RVR    Physical Exam:  General:  Awake, alert, pulling at tele leads; mildly confused; thin and dissheveled  Skin:  Warm and dry  Neck:  JVD none  Chest:  + bilateral expiratory wheezing  Cardiovascular: +irregularly irregular and tachycardic; S1S2  Abdomen:  Soft NT  Extremities:  no edema    Medications:    potassium phosphate IVPB  15 mmol IntraVENous Once    vancomycin  1,250 mg IntraVENous Q12H    metoprolol tartrate  50 mg Oral BID    furosemide  40 mg IntraVENous BID    enoxaparin  1 mg/kg Subcutaneous BID    cefepime  2,000 mg IntraVENous Q12H    ipratropium  2 puff Inhalation Q4H    levalbuterol  2 puff Inhalation Q4H    [Held by provider] QUEtiapine  25 mg Oral Nightly    aspirin  81 mg Oral Daily    gabapentin  300 mg Oral TID    insulin lispro  0-12 Units Subcutaneous TID     insulin lispro  0-6 Units Subcutaneous Nightly    sodium chloride flush  5-40 mL IntraVENous 2 times per day    budesonide-formoterol  2 puff Inhalation BID    atorvastatin  80 mg Oral Nightly    nicotine  1 patch Transdermal Daily    methylPREDNISolone  40 mg IntraVENous Q12H      dilTIAZem (CARDIZEM) 125 mg in dextrose 5% 125 mL infusion 20 mg/hr (08/25/21 0200)    dextrose      sodium chloride 25 mL (08/24/21 0846)     potassium chloride, perflutren lipid microspheres, HYDROcodone 5 mg - acetaminophen, glucose, dextrose, glucagon (rDNA), dextrose, sodium chloride flush, sodium chloride, ondansetron **OR** ondansetron, polyethylene glycol, acetaminophen **OR** acetaminophen    Lab Data:  CBC:   Recent Labs     08/22/21  1509 08/23/21  0256 08/25/21  0456   WBC 4.6 4.2 6.7   HGB 11.1* 10.8* 11.8*   HCT 32.8* 32.2* 35.0*   .2* 101.2* 99.9   PLT 78* 79* 125*     BMP:   Recent Labs     08/24/21  0102 08/24/21  0102 08/24/21  0346 08/24/21  1714 08/25/21  0456   *   < > 133* 125* 128*   K 3.1*   < > 3.3* 3.3* 3.9   CL 96*   < > 96* 87* 92*   CO2 26   < > 24 29 25   PHOS 1.8*  1.9*  --   --   --   --    BUN 7   < > 8 8 12   CREATININE <0.5*   < > <0.5* <0.5* <0.5*    < > = values in this interval not displayed. Moriah Jana 6/18/16       Summary    There is normal isotope uptake at stress and rest. There is no evidence of    myocardial ischemia or scar. Hyperdynamic LV systolic function with MORELAND>83%    with uniform wall motion. 3/12/20 ECHO Summary:   Technically difficult examination. Normal left ventricle systolic function with an estimated EF of 55-60%. Definity contrast administered with no evidence of left ventricular mass or   thrombus noted. No regional wall motion abnormalities are seen. Normal left ventricular diastolic filling pressure. Assessment:    1.  Atrial fibrillation with RVR:   -admit EKG NSR    -subsequent 8/23 and 8/24 EKG's with afib with RVR    -he has received IV metoprolol, IV digoxin loading,   -currently on diltiazem gtt   -originally scheduled for MICHELLE/CV today but given hypoxic resp failure with wheezing and  confusion with mental status changes he is higher risk for sedation with MICHELLE   -concern for possible need of intubation if sedated and Dr. Breanna Soler wants to avoid   -will d/c diltiazem gtt and load with IV amiodarone to see if will help decrease HR   -continue lovenox 80mg SQ BID for AC   -continue metoprolol 50mg BID   -ECHO 3/12/20 EF=55-60%; no wall abnls    2. CAD s/p CABG    -surgery 2016   -continue baby asa qd, lipitor 80mg qd, metoprolol 50mg BID     3. Mental status change:    -note admit tox screen + amphetamine, opiate, cannabinoid   -? ETOH withdrawal but not certain   -underlying medical illness contributing as well     4.  Hypoxic resp failure and PNA:   -CXR with R>LLL multifocal ill-defined consolidaton c/w PNA   -abx vancomycin, cefepime per IM team   -IV solumedrol per IM team      Patient Active Problem List    Diagnosis Date Noted    Alcohol dependence with withdrawal delirium ([address] 04/22/2016    Unstable angina ([address] 04/20/2016    NSTEMI (non-ST elevated myocardial infarction) ([address]     Coronary artery disease involving native coronary artery of native heart without angina pectoris     Pain medication agreement broken 05/04/2015    Back pain, lumbosacral 04/17/2014    Rheumatoid arthritis, adult ([address] 04/17/2014    Essential hypertension     Lumbar radiculopathy 09/03/2014    Gout 04/17/2014    Hypotestosteronism 05/14/2014    Acute respiratory failure with hypoxia and hypercarbia ([address]     Polysubstance abuse ([address]     Community acquired pneumonia of right lower lobe of lung     COPD exacerbation ([address]     Thrombocytopenia ([address]     Altered mental status 08/20/2021    Incisional hernia with obstruction but no gangrene     Colon cancer ([address] 03/13/2020    Alcohol use     Fatty liver     Wound of left groin 02/21/2019    Infection     Severe claudication ([address] 01/11/2019    Abnormal liver enzymes 10/15/2018    Polyp of transverse colon 10/03/2018    Pure hypercholesterolemia 07/20/2016    Former smoker 07/20/2016    Tobacco dependence in remission 06/08/2016    Primary insomnia 06/08/2016    S/P CABG x 4     Hypomagnesemia     Face lesion 05/15/2015    DDD (degenerative disc disease), lumbar 04/15/2015    Bilateral leg numbness 01/09/2015    Bilateral leg pain 01/09/2015    DDD (degenerative disc disease) 09/03/2014    Anxiety 09/03/2014    Vitamin D deficiency 04/21/2014    Depression 04/17/2014     Godwin Cummings MD, MD 8/25/2021 8:05 AM

## 2021-08-26 NOTE — PROGRESS NOTES
Speech Language Pathology    SLP acknowledged order for BSE, reviewed pt's chart, spoke with RN. Unable to complete BSE this date d/t scheduling, will complete tomorrow AM as pt is able and appropriate. RN reported pt extubated earlier this afternoon and is tolerating thin liquids without difficulty. SLP encouraged completion of 3 oz water screen prior to initiation of possible PO diet. *If pt has overt s/s of aspiration with PO intake or worsening respiratory status, recommend downgrade to strict NPO until seen by SLP. RN aware.     Geovanna Wetzel M.S. [facility]  Speech-language pathologist  [phone]

## 2021-08-26 NOTE — PROGRESS NOTES
Order for rapid sequence intubation obtained. Patient pre-oxygenated to with HFNC to a saturation 98 %. 2 mg of Versed ordered and administered at 8:58 AM   3 mg of Versed ordered and Administered at 9:02 AM     20 mg of Etomidate ordered and administered at 9:04 AM       50 MCG of Fentanyl ordered and administered at 8:58 AM   Additional 50mcg of IV Fentanyl administered at 9:03 AM         250mL NS Blous given 9:00 AM, /92    Verbal to start propofol at 40mcg 9:01 AM        Recent Labs     08/24/21  0102 08/24/21  0346 08/24/21  1714 08/24/21  1714 08/25/21  0456 08/25/21  2019 08/26/21  0425   *   < > 125*   < > 128* 134* 133*   K 3.1*   < > 3.3*   < > 3.9 3.0* 3.2*   BUN 7   < > 8   < > 12 12 16   CREATININE <0.5*   < > <0.5*   < > <0.5* <0.5* 0.6*   MG 1.70*  --  2.00  --   --  1.90  --     < > = values in this interval not displayed. Dr. Jacob Johnson inserted a number  7.5 ET tube at 9:05 AM. The ET tube is secured at 24 cm measured at the patients lip line. Breath sounds are equal bilaterally. There is a positive color change noted in the colorimetric CO2 detector. RT connected the patient to a ventilator. See orders for ventilator orders. Bedside bronch being completed. Consent obtained prior to. Primary RN at bedside. OG tube inserted to a depth of 65 cm. Ausculation of air bolus is positive. Aspirated stomach contents are CXR to Verify placement. POST Intubation ORDERS    ABG ordered in 2 hours  Portable Chest x-ray ordered to confirm placement of the patients ET tube and gastric tube. Bilateral wrist restraints ordered and initiated. Pulses present distal to restraints. Propofol drip ordered for sedation. See EMAR and orders. See physician note to follow.  Electronically signed by Mary Baugh RN on 8/26/2021 at 8:52 AM

## 2021-08-26 NOTE — PROGRESS NOTES
Pharmacy Vancomycin Consult     Vancomycin Day: 2  Current Dosin mg q12h  Current indication: PNA    Recent Labs     21  0256 21  0102 21  1714 21  0456   BUN  --    < > 8 12   CREATININE  --    < > <0.5* <0.5*   WBC 4.2  --   --  6.7    < > = values in this interval not displayed. Intake/Output Summary (Last 24 hours) at 2021  Last data filed at 2021 1330  Gross per 24 hour   Intake 1444.92 ml   Output 2200 ml   Net -755.08 ml     Ht Readings from Last 1 Encounters:   21 6' (1.829 m)        Wt Readings from Last 1 Encounters:   21 136 lb 14.5 oz (62.1 kg)       Body mass index is 18.57 kg/m². CrCl cannot be calculated (This lab value cannot be used to calculate CrCl because it is not a number: <0.5). Trough: 15.7    Assessment/Plan:  Pt should continue current dose of 1250 mg q12h. Another trough will be checked . Pharmacy will continue to monitor.      Nilesh Esteves PharmD, [facility], 2021 9:25 PM

## 2021-08-26 NOTE — PROGRESS NOTES
Patient having afib with RVR up into the 180's and intermittent 4-5 beats of vtach, notified Dr. Mounika Zepeda of this, ordered stat BMP, MG, Phos labs

## 2021-08-26 NOTE — PROGRESS NOTES
Occupational Therapy/ Physical Therapy  Pt transferred to ICU and will need new orders to resume OT/PT.    Haris Ruano OTR/L [id number]

## 2021-08-26 NOTE — PROGRESS NOTES
Spoke with daughter ROMANA HSU and updated on patient's plan of care.  No further questions, all concerns addressed

## 2021-08-26 NOTE — PROGRESS NOTES
Pt intubated with a 7. 5ETT @28 lip. Equal breath sounds and color change occurred. Pt tolerated well. AC18/470/50%/+5.

## 2021-08-26 NOTE — PROCEDURES
H&P Update    I have reviewed the history and physical and examined the patient and find no relevant changes. I have reviewed with the patient and/or family the risks, benefits, and alternatives to the procedure. Pre-sedation Assessment    Patient:  Malu Bermudez   :   1960  Intended Procedure: MICHELLE and electrical CV for rapid afib not responding to [facility] nurses notes reviewed and agreed. Medications reviewed  Allergies: Allergies   Allergen Reactions    Tramadol      Difficulty urinating. Pre-Procedure Assessment/Plan:  ASA 3 - Patient with moderate systemic disease with functional limitations    Level of Sedation Plan: Propofol gtt 50mcg/kg/hr with IVP 30mg propofol during MICHELLE    Post Procedure plan: Return to same level of care    Prelim MICHELLE findings:  -probe passed on 2nd attempt without difficulty  -Negative for LA appendage or intracardiac thrombus    DCCV:  -1 synchronized biphasic shock 200J applied at 10:04AM  -initially converted to NSR but almost immediately went back into rapid afib  -2 more synchronized biphasic shocks 200J delivered with similar results  -after 3 shock and almost immediate return to rapid afib I decided not to pursue further intervention as likely to be unsuccessful    Will start diltiazem gtt along with current amiodarone gtt and d/w EP partner for further recs. I discussed case with Dr. Graciela Castellon. No complications. Tolerated procedures well.

## 2021-08-26 NOTE — PROGRESS NOTES
[address] Daily Progress Note      Admit Date:  8/20/2021    Subjective:  Mr. Jamel Barraza is being seen today for f/u afib with RVR. He has been intubated and sedated for procedure and cannot give history. No issues per nursing overnight.  Tele reviewed showing rapid afib 150's bpm.     Objective:   BP (!) 114/92   Pulse 182   Temp 98.1 °F (36.7 °C) (Oral)   Resp 17   Ht 6' (1.829 m)   Wt 136 lb 14.5 oz (62.1 kg)   SpO2 90%   BMI 18.57 kg/m²       Intake/Output Summary (Last 24 hours) at 8/26/2021 0715  Last data filed at 8/26/2021 7459  Gross per 24 hour   Intake 417.03 ml   Output 1900 ml   Net -1482.97 ml       TELEMETRY: Atrial fibrillation with RVR    Physical Exam:  General:  Intubated and sedated  Skin:  Warm and dry  Neck:  JVD none  Chest:  + bilateral expiratory wheezing  Cardiovascular: +irregularly irregular and tachycardic; S1S2  Abdomen:  Soft NT  Extremities:  no edema    Medications:    sodium chloride flush  10 mL IntraVENous 2 times per day    amiodarone bolus  150 mg IntraVENous Once    methylPREDNISolone  40 mg IntraVENous Daily    folic acid, thiamine, multi-vitamin with vitamin K infusion   IntraVENous Daily    potassium phosphate IVPB  15 mmol IntraVENous Once    vancomycin  1,250 mg IntraVENous Q12H    metoprolol tartrate  50 mg Oral BID    furosemide  40 mg IntraVENous BID    enoxaparin  1 mg/kg Subcutaneous BID    cefepime  2,000 mg IntraVENous Q12H    ipratropium  2 puff Inhalation Q4H    levalbuterol  2 puff Inhalation Q4H    [Held by provider] QUEtiapine  25 mg Oral Nightly    aspirin  81 mg Oral Daily    gabapentin  300 mg Oral TID    insulin lispro  0-12 Units Subcutaneous TID WC    insulin lispro  0-6 Units Subcutaneous Nightly    sodium chloride flush  5-40 mL IntraVENous 2 times per day    atorvastatin  80 mg Oral Nightly    nicotine  1 patch Transdermal Daily      lactated ringers      sodium chloride      amiodarone 0.5 mg/min (08/25/21 4860)    dextrose      sodium chloride 25 mL (08/24/21 0846)     sodium chloride flush, sodium chloride, potassium chloride, perflutren lipid microspheres, HYDROcodone 5 mg - acetaminophen, glucose, dextrose, glucagon (rDNA), dextrose, sodium chloride flush, sodium chloride, ondansetron **OR** ondansetron, polyethylene glycol, acetaminophen **OR** acetaminophen    Lab Data:  CBC:   Recent Labs     08/25/21  0456 08/26/21  0425   WBC 6.7 9.0   HGB 11.8* 12.0*   HCT 35.0* 35.9*   MCV 99.9 100.3*   * 132*     BMP:   Recent Labs     08/24/21  0102 08/24/21  0346 08/25/21 0456 08/25/21 2019 08/26/21  0425   *   < > 128* 134* 133*   K 3.1*   < > 3.9 3.0* 3.2*   CL 96*   < > 92* 90* 92*   CO2 26   < > 25 31 31   PHOS 1.8*  1.9*  --   --  2.4*  --    BUN 7   < > 12 12 16   CREATININE <0.5*   < > <0.5* <0.5* 0.6*    < > = values in this interval not displayed. Sim Slim 6/18/16       Summary    There is normal isotope uptake at stress and rest. There is no evidence of    myocardial ischemia or scar. Hyperdynamic LV systolic function with NL>19%    with uniform wall motion. 3/12/20 ECHO Summary:   Technically difficult examination. Normal left ventricle systolic function with an estimated EF of 55-60%. Definity contrast administered with no evidence of left ventricular mass or   thrombus noted. No regional wall motion abnormalities are seen. Normal left ventricular diastolic filling pressure. Assessment:    1. Atrial fibrillation with RVR:   -admit EKG NSR    -subsequent 8/23 and 8/24 EKG's with afib with RVR    -he has received IV metoprolol, IV digoxin loading, and diltiazem gtt prior   -currently on amiodarone gtt and received 2nd 150mg bolus x 1 earlier 8/26 AM   -continue lovenox 80mg SQ BID for AC   -continue metoprolol 50mg BID   -ECHO 3/12/20 EF=55-60%; no wall abnls   -plan for MICHELLE and CV today    2.  CAD s/p CABG    -surgery 2016   -continue baby asa qd, lipitor 80mg qd, metoprolol 50mg BID     3. Mental status change:    -note admit tox screen + amphetamine, opiate, cannabinoid   -? ETOH withdrawal but not certain   -underlying medical illness contributing as well     4.  Hypoxic resp failure, multifocal PNA, and COPDE:   -CXR with R>LLL multifocal ill-defined consolidaton c/w PNA   -abx vancomycin, cefepime per IM team   -IV solumedrol per IM team      Patient Active Problem List    Diagnosis Date Noted    Alcohol dependence with withdrawal delirium ([address] 04/22/2016    Unstable angina ([address] 04/20/2016    NSTEMI (non-ST elevated myocardial infarction) ([address]     Coronary artery disease involving native heart without angina pectoris     Pain medication agreement broken 05/04/2015    Back pain, lumbosacral 04/17/2014    Rheumatoid arthritis, adult ([address] 04/17/2014    Essential hypertension     Lumbar radiculopathy 09/03/2014    Gout 04/17/2014    Hypotestosteronism 05/14/2014    Atrial fibrillation with rapid ventricular response ([address]     Acute respiratory failure with hypoxia (HCC)     Multifocal pneumonia     Atrial fibrillation with RVR ([address]     Acute encephalopathy     Acute respiratory failure with hypoxia and hypercarbia (HCC)     Polysubstance abuse ([address]     Community acquired pneumonia of right lower lobe of lung     COPD exacerbation ([address]     Thrombocytopenia ([address]     Altered mental status 08/20/2021    Incisional hernia with obstruction but no gangrene     Colon cancer ([address] 03/13/2020    Alcohol use     Fatty liver     Wound of left groin 02/21/2019    Infection     Severe claudication ([address] 01/11/2019    Abnormal liver enzymes 10/15/2018    Polyp of transverse colon 10/03/2018    Pure hypercholesterolemia 07/20/2016    Former smoker 07/20/2016    Tobacco dependence in remission 06/08/2016    Primary insomnia 06/08/2016    S/P CABG x 4     Hypomagnesemia     Face lesion 05/15/2015    DDD (degenerative disc disease), lumbar 04/15/2015    Bilateral leg numbness 01/09/2015    Bilateral leg pain 01/09/2015    DDD (degenerative disc disease) 09/03/2014    Anxiety 09/03/2014    Vitamin D deficiency 04/21/2014    Depression 04/17/2014     Boo Viera MD, MD 8/26/2021 7:15 AM

## 2021-08-26 NOTE — PROGRESS NOTES
Vancomycin Day: 3    Patient's labs, cultures, vitals, and vancomycin regimen reviewed. No changes today.   Trough due on 8/27  Fabiana Laboy Pharm D 8/26/202111:59 AM  .

## 2021-08-26 NOTE — PROGRESS NOTES
Comprehensive Nutrition Assessment    Type and Reason for Visit:  Initial (LOS x 6 days; stage 2 wound on sacrum but RD was not consulted nor was this disclosed on MST/nutrition screening upon admission)    Nutrition Recommendations/Plan:   1. Continue NPO status until patient is medically cleared for po diet order to be resumed. 2. Will add ONS once po diet order is resumed. 3. Monitor mental status and whether diet order is resumed. 4. Please confirm that patient's CBW is correct since patient weighed 175# 12.8 oz on 8/24/21 and his CBW is 136# 14.5 oz on 8/26/21.   5. Monitor nutrition-related labs, bowel function, and weight trends. Nutrition Assessment:  patient is nutritionally compromised AEB poor appetite/po intake PTA, worsening SOB, altered mental status, and unwitnessed fall PTA and he is at risk for further compromise d/t patient has consumed < 50% of po intake x 5 days admission, altered nutrition-related labs, NPO status, and altered mental status continues; will continue NPO status and monitor nutrition status    Malnutrition Assessment:  Malnutrition Status: At risk for malnutrition    Context:  Acute Illness     Findings of the 6 clinical characteristics of malnutrition:  Energy Intake:  7 - 50% or less of estimated energy requirements for 5 or more days  Weight Loss:  Unable to assess     Body Fat Loss:  Unable to assess (droplet + precautions)     Muscle Mass Loss:  Unable to assess (droplet + precautions)    Fluid Accumulation:  No significant fluid accumulation     Strength:  Not Performed    Estimated Daily Nutrient Needs:  Energy (kcal):  1426 - 1550 kcals based on 23-25 kcals/kg/CBW; Weight Used for Energy Requirements:  Current     Protein (g):  87 - 93 g protein based on 1.4-1.5 g/kg/CBW;  Weight Used for Protein Requirements:  Current        Fluid (ml/day):  1426 - 1550 ml; Method Used for Fluid Requirements:  1 ml/kcal      Nutrition Related Findings:  patient is A & O x 1 (person only); he has poor dentition; he presented to ER with c/o SOB, altered mental status, and unwitnessed fall at home PTA; per notes, patient had moved in with his daughter ~ 2 days PTA; last BM was on 8/23/21; abdomen is round, tender, and bowel sounds are active; + surgical scar on abdomen; Na, Cl, K, and h/h are low; patient has amiodarone, gabapentin, lovenox, med-dose SSI, solumedrol, K Phos in D5, vanc in D5, phenylephrine in D5, and LR at 125 ml/hr ordered at this time; patient consumed 1-25% x 1 meal, 26-50% x 1 meal, and 51-75% x 1 meal on 8/22/21; he consumed 26-50% x 1 meal and 51-75% x 1 meal on 8/21/21; no additional po intake documented during this admission and patient is currently in NPO status      Wounds:  Pressure Injury, Stage II (stage 2 pressure wound on mid-sacrum)       Current Nutrition Therapies:    Diet NPO    Anthropometric Measures:  · Height: 6' (182.9 cm)  · Current Body Weight: 136 lb 14.5 oz (62.1 kg) (obtained on 8/25/21)   · Admission Body Weight: 165 lb 3.2 oz (74.9 kg) (obtained on 8/21/21; actual weight)    · Usual Body Weight: 176 lb 9.6 oz (80.1 kg) (obtained on 6/24/20; actual weight)     · Ideal Body Weight: 178 lbs; % Ideal Body Weight 76.9 %   · BMI: 18.6  · BMI Categories: Normal Weight (BMI 18.5-24. 9)       Nutrition Diagnosis:   · Inadequate oral intake related to inadequate protein-energy intake, impaired respiratory function, psychological cause or life stress, increase demand for energy/nutrients as evidenced by NPO or clear liquid status due to medical condition, intake 0-25%, intake 26-50%, intake 51-75%, poor intake prior to admission, wounds, weight loss, lab values      Nutrition Interventions:   Food and/or Nutrient Delivery:  Continue NPO  Nutrition Education/Counseling:  No recommendation at this time   Coordination of Nutrition Care:  Continue to monitor while inpatient, Interdisciplinary Rounds    Goals:  patient will adhere to NPO status until medically cleared to receive nutrition therapy       Nutrition Monitoring and Evaluation:   Behavioral-Environmental Outcomes:  None Identified   Food/Nutrient Intake Outcomes:  Diet Advancement/Tolerance, IVF Intake  Physical Signs/Symptoms Outcomes:  Biochemical Data, Chewing or Swallowing, GI Status, Hemodynamic Status, Skin, Weight     Discharge Planning:     Too soon to determine     Electronically signed by Ruma Joaquin RD, LD on 8/26/21 at 10:21 AM EDT    Contact: [phone]

## 2021-08-26 NOTE — PROGRESS NOTES
Shift assessment is complete. Pt is alert/oriented. Pt continues on 8 liters high flow with no issues. He has (3) peripheral IV's. Amiodarone continues for afib with RVR. Heart rates continue to 170-180. Explained to patient the procedures of MICHELLE, Cardioversion, bronchoscopy, and the steps of intubation. He verbalizes understanding and MD explained these procedures yesterday as well. Patient signed consents. Plans for all of the procedures to begin around 0830, and then cardioversion at 09:30. Blood pressures are stable. Pt asks multiple time for \"when he will be getting a \"perk\".

## 2021-08-26 NOTE — PROGRESS NOTES
Pulmonary & Critical Care Medicine ICU Progress Note    CC: respiratory failure, afib RVR    Events of Last 24 hours: transferred to the ICU for Afib RVR, intubated this morning for MICHELLE and cardioversion. Cardioversion was not successful. Invasive Lines: PIV    MV:       / / /   Recent Labs     08/24/21  0300   PHART 7.497*   VXN1XFP 36.9   PO2ART 71.9*       IV:   lactated ringers      sodium chloride      amiodarone 0.5 mg/min (08/25/21 1749)    dextrose      sodium chloride 25 mL (08/24/21 0846)       Vitals:  BP (!) 114/92   Pulse 182   Temp 98.1 °F (36.7 °C) (Oral)   Resp 17   Ht 6' (1.829 m)   Wt 136 lb 14.5 oz (62.1 kg)   SpO2 90%   BMI 18.57 kg/m²   on vent    Intake/Output Summary (Last 24 hours) at 8/26/2021 0734  Last data filed at 8/26/2021 0526  Gross per 24 hour   Intake 417.03 ml   Output 1900 ml   Net -1482.97 ml     EXAM:  Constitutional: ill appearing. Eyes: PERRL. No sclera icterus. ENT: Normal Nose. ETT in place   Neck:  Trachea is midline. No thyroid tenderness. Respiratory: No accessory muscle usage. decreased breath sounds. No wheezes. No rales. No Rhonchi. Cardiovascular: Normal S1S2. Karthikeyan Robertson No murmur. No digit cyanosis. No digit clubbing. No LE edema. GI: Non-tender. Non-distended. Normal bowel sounds. No masses. No umbilical hernia. Skin: No rash on the exposed extremities. No Nodules on exposed extremities. Neuro: sedated. Moves all extremities. Psych: No agitation, no anxiety.     Scheduled Meds:   sodium chloride flush  10 mL IntraVENous 2 times per day    amiodarone bolus  150 mg IntraVENous Once    methylPREDNISolone  40 mg IntraVENous Daily    folic acid, thiamine, multi-vitamin with vitamin K infusion   IntraVENous Daily    potassium phosphate IVPB  15 mmol IntraVENous Once    vancomycin  1,250 mg IntraVENous Q12H    metoprolol tartrate  50 mg Oral BID    furosemide  40 mg IntraVENous BID    enoxaparin  1 mg/kg Subcutaneous BID    cefepime  2,000 mg IntraVENous Q12H    ipratropium  2 puff Inhalation Q4H    levalbuterol  2 puff Inhalation Q4H    [Held by provider] QUEtiapine  25 mg Oral Nightly    aspirin  81 mg Oral Daily    gabapentin  300 mg Oral TID    insulin lispro  0-12 Units Subcutaneous TID WC    insulin lispro  0-6 Units Subcutaneous Nightly    sodium chloride flush  5-40 mL IntraVENous 2 times per day    atorvastatin  80 mg Oral Nightly    nicotine  1 patch Transdermal Daily     PRN Meds:  sodium chloride flush, sodium chloride, potassium chloride, perflutren lipid microspheres, HYDROcodone 5 mg - acetaminophen, glucose, dextrose, glucagon (rDNA), dextrose, sodium chloride flush, sodium chloride, ondansetron **OR** ondansetron, polyethylene glycol, acetaminophen **OR** acetaminophen    Results:  CBC:   Recent Labs     08/25/21  0456 08/26/21  0425   WBC 6.7 9.0   HGB 11.8* 12.0*   HCT 35.0* 35.9*   MCV 99.9 100.3*   * 132*     BMP:   Recent Labs     08/24/21  0102 08/24/21  0346 08/25/21  0456 08/25/21  2019 08/26/21  0425   *   < > 128* 134* 133*   K 3.1*   < > 3.9 3.0* 3.2*   CL 96*   < > 92* 90* 92*   CO2 26   < > 25 31 31   PHOS 1.8*  1.9*  --   --  2.4*  --    BUN 7   < > 12 12 16   CREATININE <0.5*   < > <0.5* <0.5* 0.6*    < > = values in this interval not displayed. LIVER PROFILE:   Recent Labs     08/25/21  1515   AST 68*   ALT 86*   BILIDIR 0.3   BILITOT 0.9   ALKPHOS 90   8/21/22 MRSA nasal probe neg  8/22/21 resp cx NRF  8/24/21 resp PCR neg  8/26/21 flu/covid PCR neg    8/24/21 Procalcitonin 0.34    CTPA 8/24 imaging was reviewed by me and showed   1. New nonspecific bilateral upper lung ground-glass opacities due to an   infectious/inflammatory process including atypical viral pneumonia. 2. Unchanged right pneumonia.    3. New small right and trace left pleural effusions        ASSESSMENT:  · Acute hypoxemic respiratory failure-multifocal pneumonia, COPD exacerbation, and probable element of pulmonary edema.  · Multifocal pneumonia, worse RLL-could be aspiration related to altered mental status  · COPD with acute exacerbation  · A. fib with RVR-loaded today with amiodarone  · Acute encephalopathy/metabolic encephalopathy  · CAD post CABG  · Tobacco abuse  · Alcohol abuse        PLAN:  · Supplemental oxygen to maintain SaO2 >92%; wean as tolerated  · Amiodarone gtt  · Add Dilt gtt  · Loaded with Digoxin yesterday  · Cardiology to discuss with EP  · D/c droplet precautions  · Replace K  · IV antibiotics to include vancomycin, cefepime day #3. Completed 3 days of Levaquin and 4 days of Zosyn. · BRonch for BAL  · Inhaled bronchodilators-Xopenex and Atrovent. · Solu-Medrol daily  · TSH, LFTs and ammonia level  · Rally pack  · Therapeutic dose Lovenox  · Due to at least single organ failure and risk of rapid deterioration, I spent 40 minutes of Critical care time reviewing labs/films, examining patient, collaborating with other physicians. This does not include time performing critical procedures. ICU sedation continued for MICHELLE and cardioversion with Dr. Maulik Vallejo. Plan to extubate after the procedure.

## 2021-08-26 NOTE — PROGRESS NOTES
Assisted M.D. with bronchoscopy. Pt placed on PCV 15 and 100% Fio2 during procedure. Pt. samia well and without complication. Returned to previous settings after procedure.

## 2021-08-26 NOTE — PROGRESS NOTES
End of shift note. Pt is now on 6 liters nasal canula high flow with no issues. Pt is now on a cardizem gtt, amiodarone was changed to p.o., and gtt turned off 30 minutes later. HR continues 140-150. Pt ate a minced and moist dinner with no issues, speech is following and will evaluate swallowing further tomorrow.

## 2021-08-26 NOTE — PROGRESS NOTES
Hospitalist Progress Note      PCP: Xander Moreno, APRN - CNP    Date of Admission: 8/20/2021    Chief Complaint: AMS from home. Drug screen with opiates, amphetamine, cannabis.      worsened since adm with worsening hypoxia, confusion development of new Afibb with high HR    Did not respond to IV BB, cardizem boluses and Gtt or digoxin boluses   Transferred to ICU , had MICHELLE cardioversion after intubation this am with no success, added cardizem gtt to amio gtt now    Subjective:     Almita Nicholson seen off vent from this am, had Bronch with BAL   Up in bed, mentation improved and responding better, watching TV    Denies any sob or chest pain, on 4 L oxygen       Medications:  Reviewed    Infusion Medications    lactated ringers      sodium chloride      amiodarone 0.5 mg/min (08/25/21 3127)    dextrose      sodium chloride 25 mL (08/24/21 7504)     Scheduled Medications    sodium chloride flush  10 mL IntraVENous 2 times per day    amiodarone bolus  150 mg IntraVENous Once    methylPREDNISolone  40 mg IntraVENous Daily    folic acid, thiamine, multi-vitamin with vitamin K infusion   IntraVENous Daily    potassium phosphate IVPB  15 mmol IntraVENous Once    vancomycin  1,250 mg IntraVENous Q12H    metoprolol tartrate  50 mg Oral BID    [Held by provider] furosemide  40 mg IntraVENous BID    enoxaparin  1 mg/kg Subcutaneous BID    cefepime  2,000 mg IntraVENous Q12H    ipratropium  2 puff Inhalation Q4H    levalbuterol  2 puff Inhalation Q4H    [Held by provider] QUEtiapine  25 mg Oral Nightly    aspirin  81 mg Oral Daily    gabapentin  300 mg Oral TID    insulin lispro  0-12 Units Subcutaneous TID WC    insulin lispro  0-6 Units Subcutaneous Nightly    sodium chloride flush  5-40 mL IntraVENous 2 times per day    atorvastatin  80 mg Oral Nightly    nicotine  1 patch Transdermal Daily     PRN Meds: sodium chloride flush, sodium chloride, potassium chloride, perflutren lipid microspheres, 5-10 08/20/2021    BLOODU LARGE 08/20/2021    SPECGRAV >=1.030 08/20/2021    GLUCOSEU 100 08/20/2021       Radiology:  CT CHEST PULMONARY EMBOLISM W CONTRAST   Final Result   1. New nonspecific bilateral upper lung ground-glass opacities due to an   infectious/inflammatory process including atypical viral pneumonia. 2. Unchanged right pneumonia. 3. New small right and trace left pleural effusions. XR CHEST PORTABLE   Final Result   Right greater than left lower lung multifocal ill-defined consolidation   consistent with pneumonia, not significantly changed. CT CHEST PULMONARY EMBOLISM W CONTRAST   Final Result   No evidence of pulmonary embolism. Multifocal pneumonia involving the right lung. CT HEAD WO CONTRAST   Final Result   No acute intracranial abnormality. Specifically, no acute intracranial   hemorrhage or mass effect. Mild chronic small vessel ischemic disease. Tiny left caudate old lacunar   infarct. XR CHEST PORTABLE   Final Result   Multifocal airspace opacities may represent developing pulmonary edema or   pneumonitis. Blood cx NGTD    covid neg    resp cx - pending  BAL pending      Assessment/Plan:    Acute Hypoxic and Hypercapnic Resp Failure. Rt lung PNA organism unspecified. COPD with AE POA  CT with multifocal unilateral Rt lung PNA  on levaquin and zosyn. Changed to cefepime and vanc with worsening hypoxia and imaging  BID solumedrol IV for copd exacerbation .     duonebs, cx remain neg  Obtained pulm consult for worsening symptoms   Sp BAL today while pt intubated for MICHELLE  F/w cx   Improving resp status, now on4 L         Sepsis POA - due to pna, s.p IVF boluses  Improved , BP stable      Afibb with RVR - new onset   - rates Did not respond to IV BB, cardizem boluses and Gtt or digoxin or Amio gtt   Unsuccessful MICHELLE cardioversion today   Added cardizem gtt to amio gtt today , rates improving  Cardiology managing  Continue lovenox full dose    Acute Metabolic and possibly toxic encephalopathy. Sec to polysubstance use - urine tox with amphetamine, MJ and opiate   CT head with old left caudate lacunar CVA, but no acute findings. - worsening mentation likely with steroids , doubt alcohol withdrawal  - supportive care , improving slowly now     Hyponatremia - improving initially with iv NS but worsened with fluid overload, now ongoing diuresis   >128 >133    Mild Rhabdomyolysis without renal failure. CK 2700 IVF as above. CK - improved  To 300 with IVF      Thrombocytopenia and Lymphopenia. COVID (-)  Likely with sepsis - improving   Resumed ASA ,   Checked fibrinogen, LDH, haptoglobin and HIT - all negative. Resumed lovenox bid now . Monitor cbc       Chronic Pain opiate dependent -  Resumed PTA norco PRN. Avoid excessive sedatives     Gen weakness - start PT    Obtain SLP eval and start diet    DVT Prophylaxis: SCD, resume lovenox. Hold if platelets <89M.    Diet: Diet NPO  Code Status: Full Code      Dispo - cc    Romulo Barros MD

## 2021-08-26 NOTE — PROGRESS NOTES
Propofol was earlier stopped, to wake patient and extubate. Pt extubated to high flow nasal canula 4 liters, oxygen saturations 94%. Dany gtt is now stopped. Daughter was called and updated with events of the morning.  VSS, telemetry SR.

## 2021-08-26 NOTE — PROGRESS NOTES
Perfectserved electrolyte results to Dr. Awa Arroyo, MG 1.9, Phos 2.4, K+ 3.0    Replace potassium with 60 meq

## 2021-08-26 NOTE — PROGRESS NOTES
A dany gtt was short-term, for stable blood pressures during cardioversion. Dany was started at 100 mcg. Propofol continues at 50 mcg for sedation while intubated. 4206, during MICHELLE there were needs for further sedation, Dr. Abdulaziz Watson at bedside and pushed 10 ml propofol. (3) Shocks delivered beginning at 1004, patient converted momentarily and reverted back to rhythm of afib with RVR. Plans to begin cardizem bolus and drip. Plans to also later extubate patient.

## 2021-08-26 NOTE — CARE COORDINATION
INTERDISCIPLINARY PLAN OF CARE CONFERENCE    Date/Time: 8/26/2021 11:39 AM  Completed by:  LILIA Navarro. Case Management      Patient Name:  Tyrone Lara  YOB: 1960  Admitting Diagnosis: Altered mental status [R41.82]  Polysubstance abuse ([facility] [F19.10]  Acute respiratory failure with hypoxia and hypercarbia ([facility] [J96.01, J96.02]  Altered mental status, unspecified altered mental status type [R41.82]     Admit Date/Time:  8/20/2021  8:00 PM    Chart reviewed. Interdisciplinary team contacted or reviewed plan related to patient progress and discharge plans. Disciplines included Case Management, Nursing, and Dietitian. Current Status:Ongoing. Vent. SBT today  PT/OT recommendation for discharge plan of care: Need new orders    Expected D/C Disposition:  Skilled nursing facility    Discharge Plan Comments: Chart review completed. RN RAMONA received voicemail from [facility] at [facility] stating they have no beds. Spoke with Jose Gallego at the [address] stated they can accept pt when medically stable pending precert but she needs updated PT/OT notes to start precert when appropriate. She is aware pt is currently in the ICU. Message left for pt's daughter Jaleel Catalan requesting a call back to update. Home O2 in place on admit: No    Addendum at 1:30pm: Received voicemail from pt's daughter Jaleel Catalan. Called and spoke with Jaleel Catalan. Jaleel Catalan was updated on the above and remains agreeable to [address]. She stated she would call CM back on what transportation company they would want to use. She is aware writer unable to guarantee insurance would cover transportation but they would receive a bill if not covered and she stated understanding. She is aware CM has not tried [employer] as it was the 3rd choice and [address] [facility] accepting [employer]. Offered to make a referral to [facility] and she declined.

## 2021-08-26 NOTE — PROCEDURES
See History and Physical or progress note for additional findings. Pertinent changes recorded below if present. Pre/post procedure diagnosis: pneumonia    Allergies and medications have been reviewed    HENT: Airway patent and reviewed. ETT in place. Cardiovascular: Normal rate, regular rhythm, normal heart sounds. Pulmonary/Chest: No wheezes. No rhonchi. No rales. Abdominal: Soft. Bowel sounds are normal. No distension. ASA: Class 3 - A patient with severe systemic disease that limits activity but is not incapacitating  Level of Sedation Plan: Continue ICU sedation    Post Procedure Plan   Continue ICU care.   ______________________     The risks and benefits as well as alternatives to the procedure have been discussed with the POA. The  POA understands and agrees to proceed. Signed Consent in chart. PROCEDURE:  BRONCHOSCOPY      The risks and benefits as well as alternatives to the procedure have been discussed with the patient or POA. The patient or POA understands and agrees to proceed. Consent signed. DESCRIPTION OF PROCEDURE: A time out was taken. ICU sedation continued. The scope was passed with ease via the ETT. A complete airway inspection was performed. Normal anatomy. No endobronchial lesion was identified. Mucosa appeared normal. There were tan secretions in the LLL and RLL secretions. Washings were obtained throughout the airways. A Bronchoalveolar lavage was obtained from the RML with good return. The patient tolerated the procedure well. EBL none. Recovery will be per endoscopy protocol. Will discharge home or return to same level of care per recovery protocol. FOLLOW UP:  Cultures .

## 2021-08-26 NOTE — PROGRESS NOTES
MICHELLE completed. Cardioversion performed x 3 attempts at 200 J each. Pt remains in At. Fib. Defib pads removed and skin WNL under pad sites.

## 2021-08-27 NOTE — PLAN OF CARE
Problem: Falls - Risk of:  Goal: Will remain free from falls  Description: Will remain free from falls  Outcome: Ongoing  Goal: Absence of physical injury  Description: Absence of physical injury  Outcome: Ongoing     Problem: Skin Integrity:  Goal: Will show no infection signs and symptoms  Description: Will show no infection signs and symptoms  Outcome: Ongoing  Goal: Absence of new skin breakdown  Description: Absence of new skin breakdown  Outcome: Ongoing     Problem: Pain:  Goal: Pain level will decrease  Description: Pain level will decrease  Outcome: Ongoing  Goal: Control of acute pain  Description: Control of acute pain  Outcome: Ongoing  Goal: Control of chronic pain  Description: Control of chronic pain  Outcome: Ongoing     Problem: Airway Clearance - Ineffective:  Goal: Clear lung sounds  Description: Clear lung sounds  Outcome: Ongoing  Goal: Ability to maintain a clear airway will improve  Description: Ability to maintain a clear airway will improve  Outcome: Ongoing     Problem: Nutrition  Goal: Optimal nutrition therapy  8/26/2021 2205 by Eric Hicks RN  Outcome: Ongoing  8/26/2021 1020 by Donnice Siemens, RD, LD  Outcome: Not Met This Shift  Goal: Understanding of nutritional guidelines  8/26/2021 2205 by Eric Hicks RN  Outcome: Ongoing  8/26/2021 1020 by Donnice Siemens, RD, LD  Outcome: Not Met This Shift

## 2021-08-27 NOTE — PROGRESS NOTES
Speech Language Pathology  Facility/Department: [facility] ICU   CLINICAL BEDSIDE SWALLOW EVALUATION      Instrumentation: may be indicated to correlate clinical findings d/t pt's baseline congested cough (with and without PO intake)   Diet recommendation: IDDSI 5 Minced and moist Solids; IDDSI 0 Thin Liquids; Meds whole with thin liquids or puree  Risk management: upright for all intake, stay upright for at least 30 mins after intake, small bites/sips, oral care 2-3x/day to reduce adverse affects in the event of aspiration, alternate bites/sips, slow rate of intake, general aspiration precautions and hold PO and contact SLP if s/s of aspiration or worsening respiratory status develop. NAME: Last Hylton  : 1960  MRN: [id number]    ADMISSION DATE: 2021  ADMITTING DIAGNOSIS: has Gout; Back pain, lumbosacral; Rheumatoid arthritis, adult ([address] Depression; Vitamin D deficiency; Hypotestosteronism; DDD (degenerative disc disease); Anxiety; Lumbar radiculopathy; Bilateral leg numbness; Bilateral leg pain; DDD (degenerative disc disease), lumbar; Pain medication agreement broken; Face lesion; Unstable angina [facility]); NSTEMI (non-ST elevated myocardial infarction) ([address] Coronary artery disease involving native heart without angina pectoris; Essential hypertension; Alcohol dependence with withdrawal delirium ([address] S/P CABG x 4; Hypomagnesemia; Tobacco dependence in remission; Primary insomnia; Pure hypercholesterolemia; Former smoker; Polyp of transverse colon; Abnormal liver enzymes; Severe claudication ([address] Wound of left groin; Infection; Alcohol use; Fatty liver; Colon cancer ([address] Incisional hernia with obstruction but no gangrene; Altered mental status; Acute respiratory failure with hypoxia and hypercarbia ([address] Polysubstance abuse ([address] Community acquired pneumonia of right lower lobe of lung; COPD exacerbation ([address] Thrombocytopenia ([address]  Atrial fibrillation with rapid ventricular response ([address] Acute respiratory failure with hypoxia ([address] Multifocal pneumonia; Atrial fibrillation with RVR ([address] and Acute encephalopathy on their problem list.  ONSET DATE: Pt admitted to [facility] ICU on 8/25/21    Recent CT of Chest (8/24/21): Impression   1. New nonspecific bilateral upper lung ground-glass opacities due to an   infectious/inflammatory process including atypical viral pneumonia. 2. Unchanged right pneumonia. 3. New small right and trace left pleural effusions. Date of Eval: 8/27/2021  Evaluating Therapist: LEOBARDO Noriega    Current Diet level:  Current Diet : Dysphagia Minced and Moist (Dysphagia II)  Current Liquid Diet : Thin      Primary Complaint  Patient Complaint: Per MD H&P, \"Scot Mendoza is 64 y.o. male who presented with complaint of SOB . Symptom onset was acute for a time period of 1 day. The severity is described as severe. The course of his symptoms over time is worsening. The symptoms oxygen with rest and worsened with none. The patient's symptom is associated with altered mental status .     Scot Harvey Reading is 64 y.o. male with history of multiple comorbidities including HTN, CAD, PVD, history of CVA and rheumatoid arthritis  Is now brought to the ER by family member for altered mental status  Per report, patient had a fall earlier in the day and had possibly some concussion  He was able to get up on his own. At that time he insisted he is doing fine  However at the day progressed he became more confused  But later he was found slumped over the chair and he was in labored breathing  He became altered and minimally responsive     When EMS arrived his oxygen saturation was 80% on room air. In the ED, patient is lethargic and somnolent and unable to give history  At the time of my interview, he was unable to give history due to lethargy\".     Pain:  Pain Assessment  Pain Assessment: 0-10  Pain Level: 8  Patient's Stated Pain Goal: No pain  Pain Type: Chronic pain  Pain Location: Back  Pain Orientation: Lower, Mid  Pain Descriptors: Aching  Pain Frequency: Continuous  Pain Onset: On-going  Clinical Progression: Gradually worsening  Functional Pain Assessment: Prevents or interferes some active activities and ADLs  Non-Pharmaceutical Pain Intervention(s): Rest  Response to Pain Intervention: Asleep with RR greater than 10    Reason for Referral  Curt Garcia was referred for a bedside swallow evaluation to assess the efficiency of his swallow function, identify signs and symptoms of aspiration and make recommendations regarding safe dietary consistencies, effective compensatory strategies, and safe eating environment. Impression  Dysphagia Diagnosis: Mild oral stage dysphagia;Mild pharyngeal stage dysphagia  Dysphagia Outcome Severity Scale: Level 4: Mild moderate dysphagia- Intermittent supervision/cueing.  One - two diet consistencies restricted     Medical record review/interview:   Predisposing dysphagia risk factors: Hx of CVA, CHF  Clinical signs of possible chronic dysphagia: N/A  Precipitating dysphagia risk factors: increased O2 demands, AMS and recent intubation    Vitals/labs:   Temp: n/a  SpO2: 93-94%  RR: 20-24/min; noted increase to ~28/min with movement (pt eating breakfast)  BP: 147/128  HR: 140 initially; fluctuated throughout  WBCs: 8.9    Cranial nerve exam:   OME unremarkable    Laryngeal function exam:   Secretions: no significant oral / lingual secretions  Vocal quality: Adequate  MPT: DNT  S/Z ratio: DNT  Pitch range: DNT  Cough: baseline congested and nonproductive cough; noted throughout BSE, with and without PO intake    PO trials:   Ice: DNT  IDDSI 0 (thin):   - 5cc bolus (tsp): DNT  - Cup: no anterior bolus loss , swallow timing subjectively appears timely, coughing after the swallow, dry vocal quality and vitals stable  - Straw: pt declined straw  IDDSI 4 (puree): no anterior bolus loss , suspect functional A-P bolus transit, swallow timing subjectively appears timely, grossly functional mastication, oral clearance grossly WFL, coughing after the swallow, dry vocal quality and vitals stable  IDDSI 5 (minced and moist): no anterior bolus loss , suspect functional A-P bolus transit, swallow timing subjectively appears timely, grossly functional mastication, oral clearance grossly WFL, coughing after the swallow, dry vocal quality and vitals stable  IDDSI 7 (regular): no anterior bolus loss , suspect reduced/impaired A-P bolus transit, prolonged mastication, delayed cough , dry vocal quality and vitals stable  3 oz water: DNT  **Intermittent cough noted with and without PO intake -- congested, nonproductive. Clinical signs of oropharyngeal dysphagia likely acute related to increased O2 demands, AMS, impulsivity and intubation. Swallow prognosis is good. Instrumental swallow study may be indicated. Given stable respiratory status and potential for spontaneous improvement, pt is for oral diet at this time . Consider completion of instrumental study to correlate clinical findings. Instrumentation: may be indicated to correlate clinical findings d/t pt's baseline congested cough (with and without PO intake)   Diet recommendation: IDDSI 5 Minced and moist Solids; IDDSI 0 Thin Liquids; Meds whole with thin liquids  Risk management: upright for all intake, stay upright for at least 30 mins after intake, small bites/sips, oral care 2-3x/day to reduce adverse affects in the event of aspiration, alternate bites/sips, slow rate of intake, general aspiration precautions and hold PO and contact SLP if s/s of aspiration or worsening respiratory status develop. Treatment Plan  Requires SLP Intervention: Yes  Duration/Frequency of Treatment: 3-5x/week for LOS  D/C Recommendations:  To be determined       Recommended Diet and Intervention  Diet Solids Recommendation: Dysphagia Minced and Moist (Dysphagia II)  Liquid Consistency Recommendation: Thin  Recommended Form of Meds: Whole with water  Recommendations: Dysphagia treatment; Modified barium swallow study  Therapeutic Interventions: Diet tolerance monitoring;Patient/Family education    Compensatory Swallowing Strategies  Compensatory Swallowing Strategies: Alternate solids and liquids;Eat/Feed slowly;Upright as possible for all oral intake;Remain upright for 30-45 minutes after meals;Small bites/sips; External pacing    Treatment/Goals  Short-term Goals  Timeframe for Short-term Goals: 5 days (9/1/21)  Long-term Goals  Timeframe for Long-term Goals: 7 days (9/3/21)  Goal 1: The pt will tolerate safest and least restrictive diet without clinical s/s of aspiration. Dysphagia Goals: The patient will tolerate recommended diet without observed clinical signs of aspiration; The patient will tolerate instrumental swallowing procedure; The patient/caregiver will demonstrate understanding of compensatory strategies for improved swallowing safety. ;The patient will tolerate regular consistency solids 10/10. General  Chart Reviewed: Yes  Comments: Pt admitted d/t AMS. Pt s/p bronchoscopy 8/26, intubated 8/26 AM to PM.  Pt has PMHx of CVA per chart. Behavior/Cognition: Alert; Cooperative;Pleasant mood;Confused  Respiratory Status: O2 via nasual cannula  O2 Device: Nasal cannula  Liters of Oxygen: 3 L (3.5)  Communication Observation: Functional  Follows Directions: Simple  Dentition: Some missing teeth;Poor dental/oral hygeine  Patient Positioning: Upright in bed  Baseline Vocal Quality: Normal  Volitional Cough: Strong;Congested (Nonproductive)  Volitional Swallow:  (Timely)  Prior Dysphagia History: No hx of dysphagia per chart review. Consistencies Administered: Reg solid; Dysphagia Minced and Moist (Dysphagia II); Dysphagia Pureed (Dysphagia I); Thin - cup    Vision/Hearing  Vision  Vision: Within Functional Limits  Hearing  Hearing: Within functional limits    Oral Motor Deficits  Oral/Motor  Oral Motor: Within functional limits    Oral Phase Dysfunction  Oral Phase  Oral Phase: Exceptions  Oral Phase Dysfunction  Decreased Anterior to Posterior Transit: Reg solid  Oral Phase  Oral Phase - Comment: Pt benefited from cues to alternate bite / sip during meal, take single bites at a time. Indicators of Pharyngeal Phase Dysfunction   Pharyngeal Phase  Pharyngeal Phase: Exceptions  Indicators of Pharyngeal Phase Dysfunction  Cough - Immediate: All  Cough - Delayed: All  Pharyngeal Phase   Pharyngeal: *Pt with baseline, congested, and nonproductive cough noted at baseline and intermittently with PO intake. Difficult to discern whether r/t to PO intake, of note pt has hx of CHF per RN. Consider MBSS to correlate clinical findings. Prognosis  Prognosis  Prognosis for safe diet advancement: good  Barriers/Prognosis Comment: Baseline congested cough, hx of CHF per RN  Individuals consulted  Consulted and agree with results and recommendations: Patient;RN    Education  Patient Education:Pt educated on reason for referral, role of ST, assessment results and recommendations. Patient Education Response: Verbalizes understanding;Needs reinforcement  Safety Devices in place: Yes  Type of devices: Call light within reach;Nurse notified; Left in bed;Bed alarm in place       Therapy Time  SLP Individual Minutes  Time In: 1597  Time Out: 0945  Minutes: 35; dysphagia eval and tx    ANTOLIN Bahena  Speech-language pathologist  [phone]

## 2021-08-27 NOTE — PROGRESS NOTES
Hospitalist Progress Note      PCP: NENA Keith - CNP    Date of Admission: 8/20/2021    Chief Complaint: AMS from home. Drug screen with opiates, amphetamine, cannabis.      worsened since adm with worsening hypoxia, confusion development of new Afibb with high HR    Did not respond to IV BB, cardizem boluses and Gtt or digoxin boluses   Transferred to ICU , had MICHELLE cardioversion after intubation this am with no success, added cardizem gtt to amio gtt now    Subjective:-    Jamel Nicholson seen up in bed watching TV, on 2 L oxygen  Tolerated diet and feels ok today     Off amio gtt , now loading oral dose, continued on cardizem gtt         Medications:  Reviewed    Infusion Medications    sodium chloride      dilTIAZem 15 mg/hr (08/27/21 0600)    dextrose      sodium chloride Stopped (08/25/21 2019)     Scheduled Medications    sodium chloride flush  10 mL IntraVENous 2 times per day    mupirocin   Nasal BID    enoxaparin  1 mg/kg Subcutaneous BID    amiodarone  400 mg Oral TID WC    methylPREDNISolone  40 mg IntraVENous Daily    folic acid, thiamine, multi-vitamin with vitamin K infusion   IntraVENous Daily    potassium phosphate IVPB  15 mmol IntraVENous Once    vancomycin  1,250 mg IntraVENous Q12H    metoprolol tartrate  50 mg Oral BID    [Held by provider] furosemide  40 mg IntraVENous BID    cefepime  2,000 mg IntraVENous Q12H    ipratropium  2 puff Inhalation Q4H    levalbuterol  2 puff Inhalation Q4H    [Held by provider] QUEtiapine  25 mg Oral Nightly    aspirin  81 mg Oral Daily    gabapentin  300 mg Oral TID    insulin lispro  0-12 Units Subcutaneous TID WC    insulin lispro  0-6 Units Subcutaneous Nightly    sodium chloride flush  5-40 mL IntraVENous 2 times per day    atorvastatin  80 mg Oral Nightly    nicotine  1 patch Transdermal Daily     PRN Meds: sodium chloride flush, sodium chloride, potassium chloride, perflutren lipid microspheres, HYDROcodone 5 mg - acetaminophen, glucose, dextrose, glucagon (rDNA), dextrose, sodium chloride flush, sodium chloride, ondansetron **OR** ondansetron, polyethylene glycol, acetaminophen **OR** acetaminophen      Intake/Output Summary (Last 24 hours) at 8/27/2021 0736  Last data filed at 8/27/2021 0600  Gross per 24 hour   Intake 2419.41 ml   Output 1275 ml   Net 1144.41 ml       Physical Exam Performed:    /86   Pulse 135   Temp 99.4 °F (37.4 °C) (Bladder)   Resp 20   Ht 6' (1.829 m)   Wt 157 lb 6.5 oz (71.4 kg)   SpO2 93%   BMI 21.35 kg/m²         General:  Middle aged male, up in bed,    More oriented today and watching TV  . Appears to be not in any distress  Mucous Membranes:  Pink , anicteric  Neck: No JVD, no carotid bruit, no thyromegaly  Chest: improving wheeze and  rhonchi and bibasilar crackles   Cardiovascular: irregular  S1S2 heard, no murmurs or gallops  Abdomen:  Soft, undistended, non tender, no organomegaly, BS present  Extremities: No edema or cyanosis. Distal pulses well felt  Neurological :improving mentation and better oriented      Labs:   Recent Labs     08/25/21  0456 08/26/21  0425 08/27/21  0443   WBC 6.7 9.0 8.9   HGB 11.8* 12.0* 11.7*   HCT 35.0* 35.9* 34.8*   * 132* 129*     Recent Labs     08/25/21 2019 08/26/21  0425 08/27/21  0443   * 133* 129*   K 3.0* 3.2* 3.7   CL 90* 92* 93*   CO2 31 31 28   BUN 12 16 13   CREATININE <0.5* 0.6* <0.5*   CALCIUM 8.7 8.4 8.3   PHOS 2.4*  --   --      Recent Labs     08/25/21  1515 08/27/21  0443   AST 68* 49*   ALT 86* 66*   BILIDIR 0.3 0.3   BILITOT 0.9 0.9   ALKPHOS 90 75     No results for input(s): INR in the last 72 hours.   Recent Labs     08/24/21  0858   TROPONINI <0.01       Urinalysis:      Lab Results   Component Value Date    NITRU POSITIVE 08/20/2021    WBCUA 0-2 08/20/2021    RBCUA 5-10 08/20/2021    BLOODU LARGE 08/20/2021    SPECGRAV >=1.030 08/20/2021    GLUCOSEU 100 08/20/2021       Radiology:  CT CHEST PULMONARY EMBOLISM W with old left caudate lacunar CVA, but no acute findings. - worsening mentation likely with steroids and sepsis  , doubt alcohol withdrawal  - supportive care , improved now       Hyponatremia - improving initially with iv NS but worsened with fluid overload, now ongoing diuresis   >129    Mild Rhabdomyolysis without renal failure. CK 2700 IVF as above. CK - improved  To 300 with IVF      Thrombocytopenia and Lymphopenia. COVID (-)  Likely with sepsis - improving   Resumed ASA ,   Checked fibrinogen, LDH, haptoglobin and HIT - all negative. Resumed lovenox bid now . Monitor cbc       Chronic Pain opiate dependent -  Resumed PTA norco PRN. Avoid excessive sedatives     Gen weakness - start PT      DVT Prophylaxis: SCD, resume lovenox. Hold if platelets <08W. Diet: ADULT DIET;  Dysphagia - Minced and Moist  Code Status: Full Code      Transfer to PCU  Can change lovenox to macey Mc MD

## 2021-08-27 NOTE — PROGRESS NOTES
Pulmonary & Critical Care Medicine ICU Progress Note    CC: respiratory failure, afib RVR    Events of Last 24 hours: extubated after cardioversion attempt  Dilt gtt at 15mg/hr  Invasive Lines: PIV    MV:    Vent Mode: PS  / / /FiO2 : 50 %  No results for input(s): PHART, TEY4DZY, PO2ART in the last 72 hours. IV:   sodium chloride      dilTIAZem 15 mg/hr (08/27/21 0938)    dextrose      sodium chloride Stopped (08/25/21 2019)       Vitals:  /73   Pulse 155   Temp 98 °F (36.7 °C) (Bladder)   Resp 16   Ht 6' (1.829 m)   Wt 157 lb 6.5 oz (71.4 kg)   SpO2 97%   BMI 21.35 kg/m²   on 4 L  EXAM:  Constitutional: ill appearing. Eyes: PERRL. No sclera icterus. ENT: Normal Nose. ETT in place   Neck:  Trachea is midline. No thyroid tenderness. Respiratory: No accessory muscle usage. decreased breath sounds. + wheezes. No rales. No Rhonchi. Cardiovascular: Irregularly irregular. S2. No murmur. No digit cyanosis. No digit clubbing. No LE edema. GI: Non-tender. Non-distended. Normal bowel sounds. No masses. No umbilical hernia. Skin: No rash on the exposed extremities. No Nodules on exposed extremities. Neuro: sedated. Moves all extremities. Psych: No agitation, no anxiety.     Scheduled Meds:   sodium chloride flush  10 mL IntraVENous 2 times per day    mupirocin   Nasal BID    enoxaparin  1 mg/kg Subcutaneous BID    amiodarone  400 mg Oral TID     methylPREDNISolone  40 mg IntraVENous Daily    potassium phosphate IVPB  15 mmol IntraVENous Once    vancomycin  1,250 mg IntraVENous Q12H    metoprolol tartrate  50 mg Oral BID    [Held by provider] furosemide  40 mg IntraVENous BID    cefepime  2,000 mg IntraVENous Q12H    ipratropium  2 puff Inhalation Q4H    levalbuterol  2 puff Inhalation Q4H    [Held by provider] QUEtiapine  25 mg Oral Nightly    aspirin  81 mg Oral Daily    gabapentin  300 mg Oral TID    insulin lispro  0-12 Units Subcutaneous TID     insulin lispro  0-6 Units Subcutaneous Nightly    sodium chloride flush  5-40 mL IntraVENous 2 times per day    atorvastatin  80 mg Oral Nightly    nicotine  1 patch Transdermal Daily     PRN Meds:  sodium chloride flush, sodium chloride, potassium chloride, perflutren lipid microspheres, HYDROcodone 5 mg - acetaminophen, glucose, dextrose, glucagon (rDNA), dextrose, sodium chloride flush, sodium chloride, ondansetron **OR** ondansetron, polyethylene glycol, acetaminophen **OR** acetaminophen    Results:  CBC:   Recent Labs     08/25/21  0456 08/26/21  0425 08/27/21  0443   WBC 6.7 9.0 8.9   HGB 11.8* 12.0* 11.7*   HCT 35.0* 35.9* 34.8*   MCV 99.9 100.3* 100.4*   * 132* 129*     BMP:   Recent Labs     08/25/21 2019 08/26/21  0425 08/27/21  0443   * 133* 129*   K 3.0* 3.2* 3.7   CL 90* 92* 93*   CO2 31 31 28   PHOS 2.4*  --   --    BUN 12 16 13   CREATININE <0.5* 0.6* <0.5*     LIVER PROFILE:   Recent Labs     08/25/21  1515 08/27/21  0443   AST 68* 49*   ALT 86* 66*   BILIDIR 0.3 0.3   BILITOT 0.9 0.9   ALKPHOS 90 75   8/21/22 MRSA nasal probe neg  8/22/21 resp cx NRF  8/24/21 resp PCR neg  8/26/21 flu/covid PCR neg  8/26/21 BAL    8/24/21 Procalcitonin 0.34    CTPA 8/24 imaging was reviewed by me and showed   1. New nonspecific bilateral upper lung ground-glass opacities due to an   infectious/inflammatory process including atypical viral pneumonia. 2. Unchanged right pneumonia. 3. New small right and trace left pleural effusions        ASSESSMENT:  · Acute hypoxemic respiratory failure-multifocal pneumonia, COPD exacerbation, and probable element of pulmonary edema.   · Multifocal pneumonia, worse RLL-could be aspiration related to altered mental status  · COPD with acute exacerbation  · A. fib with RVR-loaded today with amiodarone  · Acute encephalopathy/metabolic encephalopathy  · CAD post CABG  · Tobacco abuse  · Alcohol abuse     PLAN:  · Supplemental oxygen to maintain SaO2 >92%; wean as tolerated  · Amiodarone PO  · Dilt gtt  · Cardiology to discuss with EP  · Cancomycin, cefepime day #4. Completed 3 days of Levaquin and 4 days of Zosyn. · Inhaled bronchodilators-Xopenex and Atrovent. · Solu-Medrol to Prednisone 30mg  · Rally pack  · Therapeutic dose Lovenox  · Due to at least single organ failure and risk of rapid deterioration, I spent 31 minutes of Critical care time reviewing labs/films, examining patient, collaborating with other physicians. This does not include time performing critical procedures.

## 2021-08-27 NOTE — PROGRESS NOTES
4 Eyes Skin Assessment     The patient is being assess for   Transfer to New Unit    I agree that 2 RN's have performed a thorough Head to Toe Skin Assessment on the patient. ALL assessment sites listed below have been assessed. Areas assessed for pressure by both nurses:   [x]   Head, Face, and Ears   [x]   Shoulders, Back, and Chest, Abdomen  [x]   Arms, Elbows, and Hands   [x]   Coccyx, Sacrum, and Ischium  [x]   Legs, Feet, and Heels    Stage 2 Coccyx  Scattered bruises        Skin Assessed Under all Medical Devices by both nurses:  radhika              All Mepilex Borders were peeled back and area peeked at by both nurses:  Yes  Please list where Mepilex Borders are located:  sacrum               **SHARE this note so that the co-signing nurse is able to place an eSignature**    Co-signer eSignature: Electronically signed by Richar Granados RN on 8/27/21 at 6:34 PM EDT    Does the Patient have Skin Breakdown related to pressure?   Yes LDA WOUND CARE was Initiated documentation include the Rayna-wound, Wound Assessment, Measurements, Dressing Treatment, Drainage, and Color\",              Nolberto Prevention initiated:  Yes   Wound Care Orders initiated:  Yes      [address]  nurse consulted for Pressure Injury (Stage 3,4, Unstageable, DTI, NWPT, Complex wounds)and New or Established Ostomies:  NA      Primary Nurse eSignature: Electronically signed by Elliott Miranda RN on 8/27/21 at 4:40 PM EDT
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
## 2021-08-27 NOTE — FLOWSHEET NOTE
Patient reports is feeling much better, and affect confirms. Engaged in conversation with much more energy. Reports that many of his family members are now gone. One dght who leaves near with whom he is close. Welcomed prayer and encouragement. 08/27/21 1034   Encounter Summary   Services provided to: Patient   Referral/Consult From: [facility] Family members  (though pt reports many are gone now)   Continue Visiting   (8/27 - Spiritual support, prayer)   Complexity of Encounter Low   Length of Encounter 15 minutes   Routine   Type Follow up   Assessment Calm; Hopeful;Peaceful   Intervention Explored feelings, thoughts, concerns;Prayer   Outcome Expressed gratitude;Engaged in conversation;Encouraged; Hopeful;Receptive

## 2021-08-27 NOTE — PROGRESS NOTES
Bedside report and transfer of care given to [facility], [address] [place] [address]. Pt currently resting in bed with the call light within reach. Pt denies any other care needs at this time. Pt stable at this time.

## 2021-08-27 NOTE — PROGRESS NOTES
Pt seems to be having only slight confusion regarding his location due to fact that he was on PCU then moved to ICU. Pt a&ox4. Pt eating at this time and states he does not feel heart rate being fast or palpitations.  Pt pleasant and not showing any s/s ETHO withdrawal.

## 2021-08-27 NOTE — PROGRESS NOTES
[address] Daily Progress Note      Admit Date:  8/20/2021    Subjective:  Mr. Mane Fang is being seen today for f/u afib with RVR. He is alert this AM. He c/o being hungry and arthritis pain. Mild confusion but overall mental status much improved. He denies CP, SOB, or palpitations. S/P bronch 8/26/21.  Tele reviewed showing rapid afib 140-169 bpm.     Objective:   /86   Pulse 135   Temp 99.4 °F (37.4 °C) (Bladder)   Resp 20   Ht 6' (1.829 m)   Wt 157 lb 6.5 oz (71.4 kg)   SpO2 93%   BMI 21.35 kg/m²       Intake/Output Summary (Last 24 hours) at 8/27/2021 8023  Last data filed at 8/27/2021 0600  Gross per 24 hour   Intake 2419.41 ml   Output 1275 ml   Net 1144.41 ml       TELEMETRY: Atrial fibrillation with RVR    Physical Exam:  General: Alert in NAD  Skin:  Warm and dry  Neck:  JVD none  Chest:  Diffusely soft breath sounds  Cardiovascular: +irregularly irregular and tachycardic; S1S2  Abdomen:  Soft NT  Extremities:  no edema    Medications:    sodium chloride flush  10 mL IntraVENous 2 times per day    mupirocin   Nasal BID    enoxaparin  1 mg/kg Subcutaneous BID    amiodarone  400 mg Oral TID WC    methylPREDNISolone  40 mg IntraVENous Daily    folic acid, thiamine, multi-vitamin with vitamin K infusion   IntraVENous Daily    potassium phosphate IVPB  15 mmol IntraVENous Once    vancomycin  1,250 mg IntraVENous Q12H    metoprolol tartrate  50 mg Oral BID    [Held by provider] furosemide  40 mg IntraVENous BID    cefepime  2,000 mg IntraVENous Q12H    ipratropium  2 puff Inhalation Q4H    levalbuterol  2 puff Inhalation Q4H    [Held by provider] QUEtiapine  25 mg Oral Nightly    aspirin  81 mg Oral Daily    gabapentin  300 mg Oral TID    insulin lispro  0-12 Units Subcutaneous TID WC    insulin lispro  0-6 Units Subcutaneous Nightly    sodium chloride flush  5-40 mL IntraVENous 2 times per day    atorvastatin  80 mg Oral Nightly    nicotine  1 patch Transdermal Daily      sodium chloride      dilTIAZem 15 mg/hr (08/27/21 0600)    dextrose      sodium chloride Stopped (08/25/21 2019)     sodium chloride flush, sodium chloride, potassium chloride, perflutren lipid microspheres, HYDROcodone 5 mg - acetaminophen, glucose, dextrose, glucagon (rDNA), dextrose, sodium chloride flush, sodium chloride, ondansetron **OR** ondansetron, polyethylene glycol, acetaminophen **OR** acetaminophen    Lab Data:  CBC:   Recent Labs     08/25/21 0456 08/26/21 0425 08/27/21  0443   WBC 6.7 9.0 8.9   HGB 11.8* 12.0* 11.7*   HCT 35.0* 35.9* 34.8*   MCV 99.9 100.3* 100.4*   * 132* 129*     BMP:   Recent Labs     08/25/21 2019 08/26/21 0425 08/27/21 0443   * 133* 129*   K 3.0* 3.2* 3.7   CL 90* 92* 93*   CO2 31 31 28   PHOS 2.4*  --   --    BUN 12 16 13   CREATININE <0.5* 0.6* <0.5*       Lexiscan myoview 6/18/16       Summary    There is normal isotope uptake at stress and rest. There is no evidence of    myocardial ischemia or scar. Hyperdynamic LV systolic function with VT>29%    with uniform wall motion. 3/12/20 ECHO Summary:   Technically difficult examination. Normal left ventricle systolic function with an estimated EF of 55-60%. Definity contrast administered with no evidence of left ventricular mass or   thrombus noted. No regional wall motion abnormalities are seen. Normal left ventricular diastolic filling pressure. MICHELLE 8/26/21 Summary   There is no evidence of mass or thrombus in the left atrium or appendage. Mild mitral and tricuspid regurgitation. Rapid atrial fibrillation during study. Assessment:    1.  Atrial fibrillation with RVR:   -admit EKG NSR    -subsequent 8/23 and 8/24 EKG's with afib with RVR    -he has received IV metoprolol, IV digoxin loading, and diltiazem gtt prior   -[facility]   -continue metoprolol 50mg BID   -ECHO 3/12/20 EF=55-60%; no wall abnls   -s/p MICHELLE and CV 8/26/21; unsuccessful CV with return to rapid afib with each of 3  successive shocks   -I d/w EP Dr. Regine Irby and will load po amiodarone and after total 5gm if still rapid  afib will repeat CV middle next week   -continue diltiazem gtt   -LFT's mildly increased but not significant and expected given amiodarone loading    2. CAD s/p CABG    -surgery 2016   -continue baby asa qd, lipitor 80mg qd, metoprolol 50mg BID     3. Mental status change:    -note admit tox screen + amphetamine, opiate, cannabinoid   -improved overall     4.  Hypoxic resp failure, multifocal PNA, and COPDE:   -CXR with R>LLL multifocal ill-defined consolidaton c/w PNA   -abx vancomycin, cefepime per IM team   -IV solumedrol per IM team      Patient Active Problem List    Diagnosis Date Noted    Alcohol dependence with withdrawal delirium ([address] 04/22/2016    Unstable angina ([address] 04/20/2016    NSTEMI (non-ST elevated myocardial infarction) ([address]     Coronary artery disease involving native heart without angina pectoris     Pain medication agreement broken 05/04/2015    Back pain, lumbosacral 04/17/2014    Rheumatoid arthritis, adult ([address] 04/17/2014    Essential hypertension     Lumbar radiculopathy 09/03/2014    Gout 04/17/2014    Hypotestosteronism 05/14/2014    Atrial fibrillation with rapid ventricular response ([address]     Acute respiratory failure with hypoxia (HCC)     Multifocal pneumonia     Atrial fibrillation with RVR ([address]     Acute encephalopathy     Acute respiratory failure with hypoxia and hypercarbia (HCC)     Polysubstance abuse ([address]     Community acquired pneumonia of right lower lobe of lung     COPD exacerbation ([address]     Thrombocytopenia ([address]     Altered mental status 08/20/2021    Incisional hernia with obstruction but no gangrene     Colon cancer ([address] 03/13/2020    Alcohol use     Fatty liver     Wound of left groin 02/21/2019    Infection     Severe claudication ([address] 01/11/2019    Abnormal liver enzymes 10/15/2018  Polyp of transverse colon 10/03/2018    Pure hypercholesterolemia 07/20/2016    Former smoker 07/20/2016    Tobacco dependence in remission 06/08/2016    Primary insomnia 06/08/2016    S/P CABG x 4     Hypomagnesemia     Face lesion 05/15/2015    DDD (degenerative disc disease), lumbar 04/15/2015    Bilateral leg numbness 01/09/2015    Bilateral leg pain 01/09/2015    DDD (degenerative disc disease) 09/03/2014    Anxiety 09/03/2014    Vitamin D deficiency 04/21/2014    Depression 04/17/2014     Teresa Garcia MD, MD 8/27/2021 7:52 AM

## 2021-08-27 NOTE — PLAN OF CARE
Problem: Nutrition  Intervention: Swallowing evaluation  Note: Bedside swallow evaluation completed this date. Ruperto Blum M.S. [id number] [facility]  Speech-language pathologist  [id number]         Problem: Nutrition  Intervention: Aspiration precautions  Note: Bedside swallow evaluation completed this date.     Ruperto Blum M.S. [id number] [facility]  Speech-language pathologist  [id number]

## 2021-08-27 NOTE — FLOWSHEET NOTE
spoke with pt's Suzanna kovacs, by phone to provide spiritual encouragement. Conversation welcomed. Reports: \"Just trying to do the best I an for him, and keeping up with things here at home. \"     08/27/21 1500   Encounter Summary   Services provided to: Family  (Suzanna kovacs, by phone)   Referral/Consult From: [address] [place] [address] Road Visiting   (8/27 - Spiritual support, encouragement)   Complexity of Encounter Low   Length of Encounter 15 minutes   Routine   Type Follow up   Assessment Approachable; Hopeful   Intervention Explored feelings, thoughts, concerns;Nurtured hope   Outcome Expressed gratitude;Encouraged; Hopeful;Receptive

## 2021-08-27 NOTE — PROGRESS NOTES
Spoke to Dr. Jeannette Walker concerning patients heart rate and the Diltiazem drip. Per Dr. Jeannette Walker leave Diltiazem drip running at 5 ml/hr until 0800 on 8/28/21, then titrate down as per [facility] and turn off IF patient is in NSR with a normal heart rate. No PO Diltiazem needed to titrate off because patient is on Amiodarone PO.

## 2021-08-27 NOTE — CARE COORDINATION
INTERDISCIPLINARY PLAN OF CARE CONFERENCE    Date/Time: 8/27/2021 11:19 AM  Completed by:  LILIA Campos. Case Management      Patient Name:  Juanito Momin  YOB: 1960  Admitting Diagnosis: Altered mental status [R41.82]  Polysubstance abuse ([facility] [F19.10]  Acute respiratory failure with hypoxia and hypercarbia ([facility] [address] [J96.01, J96.02]  Altered mental status, unspecified altered mental status type [R41.82]     Admit Date/Time:  8/20/2021  8:00 PM    Chart reviewed. Interdisciplinary team contacted or reviewed plan related to patient progress and discharge plans. Disciplines included Case Management, Nursing, and Dietitian. Current Status:Ongoing. ICU. PT/OT recommendation for discharge plan of care: Need new orders    Expected D/C Disposition:  Skilled nursing facility    Discharge Plan Comments: Chart review completed. Pt remains in the ICU and is now on 3.5 high flow nasal canula. Perfect serve sent to Dr. Jose Cruz Bronwlee to request PT/OT when appropriate as this is needed for precert. Spoke with Rogelio Lopez at the [facility] who confirmed they can accept pending precert and are in network with [facility]. She stated they can't start precert until there is updated PT/OT notes. Met with pt at bedside. He was updated that CM has been working with his daughter and the plan is the [facility] for rehab. He stated okay and had no questions or concerns for CM.     Home O2 in place on admit: No

## 2021-08-28 NOTE — PROGRESS NOTES
Pt states pain as a 9 out of 10. Pt was given pain medicine. Pt denies any further needs at this time. Call light in reach will continue to monitor.

## 2021-08-28 NOTE — PROGRESS NOTES
Cerrato catheter removed at this time. Patient tolerated well. Cardizem gtt off at this time. Remains in NSR at this time.

## 2021-08-28 NOTE — PROGRESS NOTES
Inpatient Occupational Therapy  Re- Evaluation and Treatment    Unit: PCU  Date:  8/28/2021  Patient Name:    Nicole Sanchez  Admitting diagnosis:  Altered mental status [R41.82]  Polysubstance abuse ([facility] [address] [F19.10]  Acute respiratory failure with hypoxia and hypercarbia ([facility] [address] [J96.01, J96.02]  Altered mental status, unspecified altered mental status type [R41.82]  Admit Date:  8/20/2021  Precautions/Restrictions/WB Status/ Lines/ Wounds/ Oxygen: Fall risk, Bed/chair alarm, Lines -IV and Cerrato catheter, Telemetry, Continuous pulse oximetry and . Treatment Time: 923-950, [id number]  Treatment Number: 1   Timed code treatment minutes 57 minutes   Total Treatment minutes:  67  minutes    Patient Goals for Therapy: \"to get stronger\"      Discharge Recommendations: SNF  DME needs for discharge: defer to facility       Therapy recommendations for staff:   Assist of 2 with use of rolling walker (RW) and gait belt for all transfers to/from [facility]  to/from [facility] via stand pivot transfer     History of Present Illness: Per H&P Dr. Bell Shells 8/21  PMH significant for CAD, CVA, HTN presented today for AMS, SOB. Patient moved in with his daughter 2 days ago. This morning, patient had a unwitnessed fall but was able to get up. Granddaughter helped the patient back into the house and patient insisted that he was okay. Throughout the day, patient became more confused. When daughter checked on the patient, he was very altered, minimally responsive, and appeared very short of breath. EMS arrived on scene and patient had O2 sat of 80% on room air with cyanosis noted on the tip of nose and fingers. EMS transported the patient with NRM and O2 sat on arrival was 97%. No further history available as patient was completely altered    Home Health S4 Level Recommendation:  NA  AM-PAC Score: AM-PAC Inpatient Daily Activity Raw Score: 14    Preadmission Environment    Pt.  Lives with family (dtr) dtr works outside the home, pt would be home alone most of the time. Home environment:  mobile home/trailer  Steps to enter first floor: 4-5 steps to enter and hand rail bilateral  Steps to second floor: N/A  Bathroom: walk in shower, comfort height toilet and grab bars  Equipment owned: RW, shower chair/bench and home O2 (unknownL) PRN - wears 'when I remember to'    Preadmission Status:  Pt. Able to drive: Yes  Pt Fully independent with ADLs: Yes reports difficulty donning shirts PTA d/t shoulder pain   Assists with letting out dtr's 4 ' big dogs' by putting on a chain   Pt. Required assistance from family for: Independent PTA  Pt. independent for transfers and gait and walked with Minta Ruffing  History of falls Yes - \" a couple months ago\"     Pain  No  Rating:NA  Location:    Pain Medicine Status: No request made      Cognition    A&O Person, Place and Situation   Able to follow 1 step commands    Subjective  Patient lying supine in bed with no family present. Pt agreeable to this OT eval & tx. \"I feel like the whole ceiling in on me\" when standing expressed fatigue    Upper Extremity ROM:    Impaired R shoulder flexion to ~90*  Impaired L shoulder flexion to ~90*    Upper Extremity Strength:    BUE strength impaired but not formally assessed w/ MMT pt reports significant pain in shoulder and elbow flexion, strength not assessed. Diminished LUE distal strength as compared to RUE         Upper Extremity Sensation    Diminished - fingers tips 'tingle'     Upper Extremity Proprioception:  NT -    Coordination and Tone  WFL     Balance  Functional Sitting Balance:  WFL SBA   Functional Standing Balance:Impaired CGA    Bed mobility:    Supine to sit:   SBA   Sit to supine:   SBA  Rolling:    Not Tested  Scooting in sitting:  SBA  Scooting to head of bed:   Not Tested    Bridging:   Not Tested    Transfers:    Sit to stand:   Mod A  and 2 persons  To RW and STEDY  Stand to sit:  Min A    Bed to chair:   2 persons and STEDY  Standard toilet: Not Tested  Bed to BSC:  Not Tested    Dressing:      UE:   Not Tested  LE:    Min A  socks    Bathing:    UE:  Not Tested  LE:  Not Tested    Eating:   Not Tested     Toileting:  Not Tested, fixated on feeling like he needed to urinate due to catheter    Activity Tolerance   Pt completed therapy session with Dizziness noted with standing initially,   Supine  Start of session:  SpO2: 94% on RA  HR: 69  BP: 131/82  Sitting:   /81  HR 77  SPO2 95%  Standing:  /86  End of session:  HR 77  SPO2 96%      Positioning Needs:   Pt up in chair, alarm set, positioned in proper neutral alignment and pressure relief provided. Call lights and needs within reach     Exercise / Activities Initiated:   N/A    Patient/Family Education:   Role of OT  Recommendations for DC  Energy conservation techniques    Assessment of Deficits: Pt seen for Occupational therapy evaluation in acute care setting. Pt demonstrated decreased Activity tolerance, ADLs, Balance , Bed mobility, ROM, Strength, Transfers and Cognition. Pt functioning below baseline and will likely benefit from skilled occupational therapy services to maximize safety and independence. At this time, recommending SNF upon d/c as patient is performing well below baseline and is unable to return home due to safety concerns and significance of patients therapy needs. Goal(s) : To be met in 3 Visits:  1). Bed to toilet/BSC: Min A     To be met in 5 Visits:  1). Supine to/from Sit:  Supervision  2). Upper Body Bathing:   SBA  3). Lower Body Bathing:   Min A   4). Upper Body Dressing:  SBA  5). Lower Body Dressing:  Min A   6). Pt to demonstrate UE exs x 15 reps with minimal cues    Rehabilitation Potential:  Good for goals listed above. Strengths for achieving goals include: Family Support and Pt cooperative  Barriers to achieving goals include:  Pain and Weakness     Plan:   To be seen 3-5 x/wk while in acute care setting for therapeutic exercises, bed mobility, transfers,

## 2021-08-28 NOTE — PROGRESS NOTES
Vanco day 4:  Dose due this AM was given late (at around noon). Trough drawn at 2018. Will continue same Rx and recheck a Trough tomorrow at 2130 to see if Vanco labs are appropriate. Result of 22mcg/mL tonight is likely due to dose time/ trough time. Per Rx insight results are within expected range based on the timing.   MICHELLE [facility] PharmD 8/27/2021 9:30 PM

## 2021-08-28 NOTE — PROGRESS NOTES
Pt awake in bed. Assessment completed. Pt denies any further needs at this time. Call light in reach. Will continue to monitor.

## 2021-08-28 NOTE — PROGRESS NOTES
Hospitalist Progress Note      PCP: Ruperto Aviles, APRN - CNP    Date of Admission: 8/20/2021    Chief Complaint: AMS from home. Drug screen with opiates, amphetamine, cannabis. worsened since adm with worsening hypoxia, confusion development of new Afibb with high HR    Did not respond to IV BB, cardizem boluses and Gtt or digoxin boluses   Transferred to ICU , had MICHELLE cardioversion after intubation this am with no success, added cardizem gtt to amio gtt now    Subjective:-    Mane Nicholson seen up in bed watching TV, on 2 L oxygen  Tolerated diet and feels ok today     Off amio gtt , now loading oral dose, continued on cardizem gtt       Now in sinus rhythm.   Now on room air        Medications:  Reviewed    Infusion Medications    sodium chloride 5 mL/hr at 08/28/21 0347    dilTIAZem 5 mg/hr (08/28/21 0347)    dextrose      sodium chloride Stopped (08/25/21 2019)     Scheduled Medications    predniSONE  30 mg Oral Daily    vancomycin  1,250 mg IntraVENous Q12H    sodium chloride flush  10 mL IntraVENous 2 times per day    mupirocin   Nasal BID    enoxaparin  1 mg/kg Subcutaneous BID    amiodarone  400 mg Oral TID WC    potassium phosphate IVPB  15 mmol IntraVENous Once    metoprolol tartrate  50 mg Oral BID    [Held by provider] furosemide  40 mg IntraVENous BID    cefepime  2,000 mg IntraVENous Q12H    ipratropium  2 puff Inhalation Q4H    levalbuterol  2 puff Inhalation Q4H    aspirin  81 mg Oral Daily    gabapentin  300 mg Oral TID    insulin lispro  0-12 Units Subcutaneous TID WC    insulin lispro  0-6 Units Subcutaneous Nightly    sodium chloride flush  5-40 mL IntraVENous 2 times per day    atorvastatin  80 mg Oral Nightly    nicotine  1 patch Transdermal Daily     PRN Meds: sodium chloride flush, sodium chloride, potassium chloride, perflutren lipid microspheres, HYDROcodone 5 mg - acetaminophen, glucose, dextrose, glucagon (rDNA), dextrose, sodium chloride flush, sodium chloride, ondansetron **OR** ondansetron, polyethylene glycol, acetaminophen **OR** acetaminophen      Intake/Output Summary (Last 24 hours) at 8/28/2021 0942  Last data filed at 8/28/2021 0347  Gross per 24 hour   Intake 1282.84 ml   Output 1750 ml   Net -467.16 ml       Physical Exam Performed:    /80   Pulse 71   Temp 97.2 °F (36.2 °C) (Oral)   Resp 18   Ht 6' (1.829 m)   Wt 158 lb 3 oz (71.8 kg)   SpO2 95%   BMI 21.45 kg/m²         General:  Middle aged male, up in bed,    More oriented today   . Appears to be not in any distress  Mucous Membranes:  Pink , anicteric  Neck: No JVD, no carotid bruit, no thyromegaly  Chest: improving wheeze and  rhonchi and bibasilar crackles   Cardiovascular: irregular  S1S2 heard, no murmurs or gallops  Abdomen:  Soft, undistended, non tender, no organomegaly, BS present  Extremities: No edema or cyanosis. Distal pulses well felt  Neurological :improving mentation and better oriented      Labs:   Recent Labs     08/26/21  0425 08/27/21  0443 08/28/21  0454   WBC 9.0 8.9 8.5   HGB 12.0* 11.7* 11.5*   HCT 35.9* 34.8* 34.6*   * 129* 132*     Recent Labs     08/25/21 2019 08/25/21  2019 08/26/21  0425 08/27/21  0443 08/28/21  0454   *   < > 133* 129* 133*   K 3.0*   < > 3.2* 3.7 4.0   CL 90*   < > 92* 93* 96*   CO2 31   < > 31 28 30   BUN 12   < > 16 13 10   CREATININE <0.5*   < > 0.6* <0.5* <0.5*   CALCIUM 8.7   < > 8.4 8.3 8.3   PHOS 2.4*  --   --   --   --     < > = values in this interval not displayed. Recent Labs     08/25/21  1515 08/27/21  0443   AST 68* 49*   ALT 86* 66*   BILIDIR 0.3 0.3   BILITOT 0.9 0.9   ALKPHOS 90 75     No results for input(s): INR in the last 72 hours. No results for input(s): Reyne Kosovan in the last 72 hours.     Urinalysis:      Lab Results   Component Value Date    NITRU POSITIVE 08/20/2021    WBCUA 0-2 08/20/2021    RBCUA 5-10 08/20/2021    BLOODU LARGE 08/20/2021    SPECGRAV >=1.030 08/20/2021    GLUCOSEU 100 08/20/2021       Radiology:  CT CHEST PULMONARY EMBOLISM W CONTRAST   Final Result   1. New nonspecific bilateral upper lung ground-glass opacities due to an   infectious/inflammatory process including atypical viral pneumonia. 2. Unchanged right pneumonia. 3. New small right and trace left pleural effusions. XR CHEST PORTABLE   Final Result   Right greater than left lower lung multifocal ill-defined consolidation   consistent with pneumonia, not significantly changed. CT CHEST PULMONARY EMBOLISM W CONTRAST   Final Result   No evidence of pulmonary embolism. Multifocal pneumonia involving the right lung. CT HEAD WO CONTRAST   Final Result   No acute intracranial abnormality. Specifically, no acute intracranial   hemorrhage or mass effect. Mild chronic small vessel ischemic disease. Tiny left caudate old lacunar   infarct. XR CHEST PORTABLE   Final Result   Multifocal airspace opacities may represent developing pulmonary edema or   pneumonitis. Blood cx NGTD    covid neg    resp cx - pending  BAL pending      Assessment/Plan:    Acute Hypoxic and Hypercapnic Resp Failure. Rt lung PNA organism unspecified. COPD with AE POA  CT with multifocal unilateral Rt lung PNA  on levaquin and zosyn. Changed to cefepime and vanc with worsening hypoxia and imaging  BID solumedrol IV for copd exacerbation . duonebs, cx remain neg  Obtained pulm consult for worsening symptoms   Sp BAL  while pt intubated for MICHELLE  F/w cx   Improving resp status, now on RA     Sepsis POA - due to pna, s.p IVF boluses  Improved , BP stable      Afibb with RVR - new onset   - rates Did not respond to IV BB, cardizem Gtt or digoxin or Amio gtt   Unsuccessful MICHELLE cardioversion 8/26   Added cardizem gtt to amio gtt , rates improving  Changed amio to oral dose   Cardiology managing  Continue lovenox full dose  Now in sinus rhythm    Acute Metabolic and possibly toxic encephalopathy.    Sec to polysubstance use - urine tox with amphetamine, MJ and opiate   CT head with old left caudate lacunar CVA, but no acute findings. - worsening mentation likely with steroids and sepsis  , doubt alcohol withdrawal  - supportive care , improved now     Hyponatremia - improving initially with iv NS but worsened with fluid overload  >129--133    Mild Rhabdomyolysis without renal failure. CK 2700 IVF as above. CK - improved  To 300 with IVF    Thrombocytopenia and Lymphopenia. COVID (-)  Likely with sepsis - improving   Resumed ASA ,   Checked fibrinogen, LDH, haptoglobin and HIT - all negative. Resumed lovenox bid now . Monitor cbc     Chronic Pain opiate dependent -  Resumed PTA norco PRN. Avoid excessive sedatives     Gen weakness - start PT      DVT Prophylaxis: SCD, resume lovenox. Hold if platelets <85G. Diet: ADULT DIET;  Dysphagia - Minced and Moist  Code Status: Full Code        Vince Blanca MD

## 2021-08-28 NOTE — PROGRESS NOTES
Pulmonary & Critical Care Medicine ICU Progress Note    CC: respiratory failure, afib RVR    Events of Last 24 hours: no SOB, + cough    MV:    Vent Mode: PS  / / /FiO2 : 50 %  No results for input(s): PHART, GSW4AWV, PO2ART in the last 72 hours. IV:   sodium chloride 5 mL/hr at 08/28/21 0347    dextrose      sodium chloride Stopped (08/25/21 2019)       Vitals:  /80   Pulse 71   Temp 97.2 °F (36.2 °C) (Oral)   Resp 18   Ht 6' (1.829 m)   Wt 158 lb 3 oz (71.8 kg)   SpO2 96%   BMI 21.45 kg/m²   on RA  EXAM:  Constitutional: NAD   Eyes: PERRL. No sclera icterus. ENT: Normal Nose. Neck:  Trachea is midline. No thyroid tenderness. Respiratory: No accessory muscle usage. decreased breath sounds. no wheezes. No rales. + Rhonchi. Cardiovascular: regular SiS2. No murmur. No digit cyanosis. No digit clubbing. No LE edema. GI: Non-tender. Non-distended. Normal bowel sounds. No masses. No umbilical hernia. Skin: No rash on the exposed extremities. No Nodules on exposed extremities  Psych: No agitation, no anxiety.     Scheduled Meds:   [START ON 8/30/2021] amiodarone  400 mg Oral Daily    predniSONE  30 mg Oral Daily    vancomycin  1,250 mg IntraVENous Q12H    sodium chloride flush  10 mL IntraVENous 2 times per day    mupirocin   Nasal BID    enoxaparin  1 mg/kg Subcutaneous BID    amiodarone  400 mg Oral TID WC    potassium phosphate IVPB  15 mmol IntraVENous Once    metoprolol tartrate  50 mg Oral BID    cefepime  2,000 mg IntraVENous Q12H    ipratropium  2 puff Inhalation Q4H    levalbuterol  2 puff Inhalation Q4H    aspirin  81 mg Oral Daily    gabapentin  300 mg Oral TID    insulin lispro  0-12 Units Subcutaneous TID WC    insulin lispro  0-6 Units Subcutaneous Nightly    sodium chloride flush  5-40 mL IntraVENous 2 times per day    atorvastatin  80 mg Oral Nightly    nicotine  1 patch Transdermal Daily     PRN Meds:  sodium chloride flush, sodium chloride, potassium chloride, perflutren lipid microspheres, HYDROcodone 5 mg - acetaminophen, glucose, dextrose, glucagon (rDNA), dextrose, sodium chloride flush, sodium chloride, ondansetron **OR** ondansetron, polyethylene glycol, acetaminophen **OR** acetaminophen    Results:  CBC:   Recent Labs     08/26/21  0425 08/27/21  0443 08/28/21  0454   WBC 9.0 8.9 8.5   HGB 12.0* 11.7* 11.5*   HCT 35.9* 34.8* 34.6*   .3* 100.4* 100.6*   * 129* 132*     BMP:   Recent Labs     08/25/21 2019 08/25/21 2019 08/26/21  0425 08/27/21  0443 08/28/21  0454   *   < > 133* 129* 133*   K 3.0*   < > 3.2* 3.7 4.0   CL 90*   < > 92* 93* 96*   CO2 31   < > 31 28 30   PHOS 2.4*  --   --   --   --    BUN 12   < > 16 13 10   CREATININE <0.5*   < > 0.6* <0.5* <0.5*    < > = values in this interval not displayed. LIVER PROFILE:   Recent Labs     08/25/21  1515 08/27/21  0443   AST 68* 49*   ALT 86* 66*   BILIDIR 0.3 0.3   BILITOT 0.9 0.9   ALKPHOS 90 75   8/21/22 MRSA nasal probe neg  8/22/21 resp cx NRF  8/24/21 resp PCR neg  8/26/21 flu/covid PCR neg  8/26/21 BAL neg    8/24/21 Procalcitonin 0.34    CTPA 8/24 imaging was reviewed by me and showed   1. New nonspecific bilateral upper lung ground-glass opacities due to an   infectious/inflammatory process including atypical viral pneumonia. 2. Unchanged right pneumonia. 3. New small right and trace left pleural effusions        ASSESSMENT:  · Acute hypoxemic respiratory failure- resolved  · Multifocal pneumonia, worse RLL-could be aspiration related to altered mental status  · COPD with acute exacerbation  · A. fib with RVR-loaded today with amiodarone  · Acute encephalopathy/metabolic encephalopathy  · CAD post CABG  · Tobacco abuse  · Alcohol abuse     PLAN:  · Stop abx: Completed 5 days Vancomycin, cefepime and prior 3 days of Levaquin/Zosyn. · Converted to PO Dilt and Amio  · Cardiology following  · Inhaled bronchodilators-Xopenex and Atrovent.   · Prednisone 30mg -stop  · Therapeutic dose Lovenox  · I will sign off, please call with questions

## 2021-08-28 NOTE — FLOWSHEET NOTE
08/28/21 0837   Vital Signs   Temp 97.2 °F (36.2 °C)   Temp Source Oral   Pulse 71   Resp 18   /80   BP Location Left upper arm   Level of Consciousness Alert (0)   MEWS Score 1   Oxygen Therapy   SpO2 95 %   O2 Device None (Room air)   O2 Flow Rate (L/min) 0 L/min     Patient resting quietly in bed. No s/s of distress noted. Shift assessment complete, see flow sheet. NSR on tele monitor. Denies needs at this time. Call light in reach. Will monitor.

## 2021-08-28 NOTE — PROGRESS NOTES
[address]   Progress Note  Cardiology      Scot IBARRA [id number]   Admission date:  8/20/2021  CC-f/up a.fib  Subjective:  He feels better.  He has converted to sinus      Objective:  Medications/Labs all Reviewed     [START ON 8/30/2021] amiodarone  400 mg Oral Daily    predniSONE  30 mg Oral Daily    vancomycin  1,250 mg IntraVENous Q12H    sodium chloride flush  10 mL IntraVENous 2 times per day    mupirocin   Nasal BID    enoxaparin  1 mg/kg Subcutaneous BID    amiodarone  400 mg Oral TID WC    potassium phosphate IVPB  15 mmol IntraVENous Once    metoprolol tartrate  50 mg Oral BID    [Held by provider] furosemide  40 mg IntraVENous BID    cefepime  2,000 mg IntraVENous Q12H    ipratropium  2 puff Inhalation Q4H    levalbuterol  2 puff Inhalation Q4H    aspirin  81 mg Oral Daily    gabapentin  300 mg Oral TID    insulin lispro  0-12 Units Subcutaneous TID WC    insulin lispro  0-6 Units Subcutaneous Nightly    sodium chloride flush  5-40 mL IntraVENous 2 times per day    atorvastatin  80 mg Oral Nightly    nicotine  1 patch Transdermal Daily       BMP:   Lab Results   Component Value Date     08/28/2021    K 4.0 08/28/2021    K 5.1 08/20/2021    CL 96 08/28/2021    CO2 30 08/28/2021    BUN 10 08/28/2021    CREATININE <0.5 08/28/2021    MG 1.90 08/25/2021     CBC:    Lab Results   Component Value Date    WBC 8.5 08/28/2021    RBC 3.44 08/28/2021    HGB 11.5 08/28/2021    HCT 34.6 08/28/2021    .6 08/28/2021    RDW 14.3 08/28/2021     08/28/2021      PT/INR:    Lab Results   Component Value Date    INR 1.06 03/26/2020    PROTIME 12.3 03/26/2020     Cardiac Enzymes:    Lab Results   Component Value Date    CKTOTAL 300 08/23/2021     Lab Results   Component Value Date    TROPONINI <0.01 08/24/2021    TROPONINI <0.01 08/24/2021    TROPONINI <0.01 08/24/2021     BNP:  No results found for: BNP  FASTING LIPID PANEL:    Lab Results   Component Value Date    CHOL 109 12/31/2018    HDL 44 12/31/2018    TRIG 108 03/26/2020       Physical Examination:    /80   Pulse 71   Temp 97.2 °F (36.2 °C) (Oral)   Resp 18   Ht 6' (1.829 m)   Wt 158 lb 3 oz (71.8 kg)   SpO2 95%   BMI 21.45 kg/m²      Respiratory:  · Resp Assessment: Normal respiratory effort  · Resp Auscultation: Clear to auscultation bilaterally   Cardiovascular:  · Auscultation: regular rate and rhythm, normal S1S2, no murmur, rub or gallop  · Palpation:  Nl PMI  · JVP:  normal  · Extremities: No Edema  Abdomen:  · Soft, non-tender  · Normal bowel sounds  Extremities:  ·  somewhat mottled,good pulses,very thin  Neurological/Psychiatric:  · Oriented to time, place, and person  · Non-anxious  Skin Warm and dry    Assessment:    Active Problems:    Coronary artery disease involving native heart without angina pectoris      Altered mental status  Plan: improved    Acute respiratory failure with hypoxia and hypercarbia (HCC)  Plan:improved    Polysubstance abuse (HCC)  Plan: advised on alcohol and smoking cessation,he says he has quit both    Community acquired pneumonia of right lower lobe of lung      COPD exacerbation (HCC)      Thrombocytopenia ([address]      Atrial fibrillation with rapid ventricular response ([address]  Plan: converted to sinus        Plan:  1.  stop cardizem drip,continue amiodarone load,will reduce dose to amiodarone 400 mg daily on Monday. Hope that he ultimately get off amiodarone as not a great candidate for long term amiodarone use unless he can achieve smoking and alcohol cessation permanently.  Pao Harper for discharge by tomorrow from a cardiac standpoint  I have spent  35  minutes of face to face time with the patient with more than 50%  spent  counseling and coordinating care [facility]

## 2021-08-28 NOTE — PROGRESS NOTES
Inpatient Physical Therapy  Re-Evaluation and Treatment    Unit: PCU  Date:  8/28/2021  Patient Name:    Flaquita Cooley  Admitting diagnosis:  Altered mental status [R41.82]  Polysubstance abuse ([facility] [address] [F19.10]  Acute respiratory failure with hypoxia and hypercarbia ([facility] [address] [J96.01, J96.02]  Altered mental status, unspecified altered mental status type [R41.82]  Admit Date:  8/20/2021  Precautions/Restrictions/WB Status/ Lines/ Wounds/ Oxygen: Fall risk, Bed/chair alarm, Lines -IV and Cerrato catheter and Telemetry    Treatment Time:  9:23-10:00& 10:00-10:30  Treatment Number:  1   Timed Code Treatment Minutes:  57minutes  Total Treatment Minutes:  67  minutes    Patient Goals for Therapy: \" to get stronger \"          Discharge Recommendations: SNF  DME needs for discharge: defer to facility       Therapy recommendation for EMS Transport: requires transport by cot due to requires equiptment to transfer from bed to chair    Therapy recommendations for staff:   Assist of 2 with use of Selestino Fillers for all transfers to/from [facility]  to/from chair    History of Present Illness: Per H&P Dr. Mehreen Bauer 8/21  \"PMH significant for CAD, CVA, HTN presented today for AMS, SOB.  Patient moved in with his daughter 2 days ago.  This morning, patient had a unwitnessed fall but was able to get up.  Granddaughter helped the patient back into the house and patient insisted that he was okay.  Throughout the day, patient became more confused.  When daughter checked on the patient, he was very altered, minimally responsive, and appeared very short of breath.  EMS arrived on scene and patient had O2 sat of 80% on room air with cyanosis noted on the tip of nose and fingers. EMS transported the patient with NRM and O2 sat on arrival was 97%.  No further history available as patient was completely altered\"  Per dr. Lorena Tafoya this date:   \"Did not respond to IV BB, cardizem boluses and Gtt or digoxin boluses   Transferred to ICU , had MICHELLE cardioversion after intubation this am with no success, added cardizem gtt to amio gtt now\"  Patient transferred to ICU on 8/26, returned to PCU 8/27  Home Health S4 Level Recommendation:  NA  AM-PAC Mobility Score    AM-PAC Inpatient Mobility Raw Score : 11  AM-PAC Inpatient Mobility without Stair Climbing Raw Score : 13  Scot Colvin scored a 11/24 on the AM-PAC short mobility form. Current research shows that an AM-PAC score of 17 or less is typically not associated with a discharge to the patient's home setting. If patient discharges prior to next session this note will serve as a discharge summary. Please see below for the latest assessment towards goals. Preadmission Environment  (copied from initial evaluation 8/23/21)  Pt. Lives with family (dtr) dtr works outside the home, pt would be home alone most of the time. Home environment:    mobile home/trailer  Steps to enter first floor: 4-5 steps to enter and hand rail bilateral  Steps to second floor: N/A  Bathroom: walk in shower, comfort height toilet and grab bars  Equipment owned: RW, shower chair/bench and home O2 (unknownL) PRN - wears 'when I remember to'     Preadmission Status:  Pt. Able to drive: Yes  Pt Fully independent with ADLs: Yes reports difficulty donning shirts PTA d/t shoulder pain   Assists with letting out dtr's 4 ' big dogs' by putting on a chain   Pt. Required assistance from family for: Independent PTA  Pt. independent for transfers and gait and walked with Videdressing  History of falls Yes - \" a couple months ago\"       Pain   none reported    Cognition    A&O Person, Place and Situation   Able to follow 1 step commands inconsistently    Subjective  Patient lying supine in bed with no family present. Pt agreeable to this PT eval & tx. Upper Extremity ROM/Strength  Please see OT evaluation. Lower Extremity ROM / Strength   AROM WFL: Yes  ROM limitations:     Strength not formally assessed with mmt.     Impaired, but WFL to stand , transfer bed to chair with stedy    Lower Extremity Sensation    Impaired BLE    Lower Extremity Proprioception:   NT    Coordination and Tone  noted tremors/shakiness throughout session    Balance  Sitting:  Good ; Independent  Comments:     Standing: Fair ; CGA  Comments: using RW    Bed mobility:    Supine to sit:                           Min A  to advance bilateral LE's, guide UE to bedrail and support trunk    Sit to supine:                           Not Tested  Rolling:                                    Not Tested  Scooting in sitting:                   SBA  Scooting to head of bed:         Not Tested                    Bridging:                                  Not Tested     Transfers:    Sit to stand: Max A  and 2 persons first attempt, Min A and 2 persons second attempt   Stand to sit:                 Min A  to control decent d/t poor trunk control   Bed to chair:                Min A , 2 persons and RW  Standard toilet:            Not Tested  Bed to [facility]:                Not Tested    Gait gait deferred due to difficulty with transfers; pt ambulated 0 ft. Stair Training deferred, pt unsafe/ not appropriate to complete stairs at this time    Activity Tolerance   Pt completed therapy session with Dizziness noted with standing at RW level, resoleved with sitting, no BP change   BP SpO2 HR    Supine, at rest 131/82 94% on RA 69    Seated at /81  81    Standing 124/88 % 80       Positioning Needs   Pt up in chair, alarm set, positioned in proper neutral alignment and pressure relief provided. Call light provided and all needs within reach    Exercises Initiated  Suzette deferred secondary to pt fatigued  NA    Other  None. Patient/Family Education   Pt educated on role of inpatient PT, POC, importance of continued activity, DC recommendations, safety awareness, transfer techniques and calling for assist with mobility.     Assessment  Pt seen for Physical Therapy evaluation in acute care setting. Pt demonstrated decreased Activity tolerance, Balance, Safety and Strength as well as decreased independence with Ambulation, Bed Mobility  and Transfers. PAtient will benefit from skilled PT while in acute care. Recommending SNF upon discharge as patient functioning well below baseline, demonstrates good rehab potential and unable to return home due to inability to negotiate stairs to enter home/bedroom/bathroom, burden of care beyond caregiver ability and home environment not conducive to patient recovery. Goals : To be met in 3 visits:  1). Independent with LE Ex x 10 reps  2.) Bed to chair: Mod A     To be met in 6 visits:  1). Supine to/from sit: Supervision  2). Sit to/from stand: Min A   3). Bed to chair: Min A   4). Gait: Ambulate 50 ft.  with  Min A  and use of rolling walker (RW)  5). Tolerate B LE exercises 3 sets of 10-15 reps  6). Ascend/descend 5 steps with Min A  with use of hand rail unilateral and LRAD (least restrictive assistive device)    Rehabilitation Potential: Good  Strengths for achieving goals include:   Pt motivated, PLOF, Family Support and Pt cooperative   Barriers to achieving goals include:    Complexity of condition and Weakness    Plan    To be seen 3-5 x / week  while in acute care setting for therapeutic exercises, bed mobility, transfers, progressive gait training, balance training, and family/patient education. Signature: Mardell Moritz, PT #[id number]     If patient discharges from this facility prior to next visit, this note will serve as the Discharge Summary.

## 2021-08-28 NOTE — PROGRESS NOTES
Speech Language Pathology  Facility/Department: [facility] PCU TELEMETRY  Dysphagia Daily Treatment Note    NAME: Angel Luis Yepez  : 1960  MRN: [id number]     Solids: Upgrade to IDDSI 6 Soft and bite-sized solids  Liquids: IDDSI 0 Thin Liquids    Patient Diagnosis(es):   Patient Active Problem List    Diagnosis Date Noted    Alcohol dependence with withdrawal delirium ([address] 2016    Unstable angina ([address] 2016    NSTEMI (non-ST elevated myocardial infarction) ([address]     Coronary artery disease involving native heart without angina pectoris     Pain medication agreement broken 2015    Back pain, lumbosacral 2014    Rheumatoid arthritis, adult ([address] 2014    Essential hypertension     Lumbar radiculopathy 2014    Gout 2014    Hypotestosteronism 2014    Atrial fibrillation with rapid ventricular response (HCC)     Acute respiratory failure with hypoxia (HCC)     Multifocal pneumonia     Atrial fibrillation with RVR (HCC)     Acute encephalopathy     Acute respiratory failure with hypoxia and hypercarbia (HCC)     Polysubstance abuse ([address]     Community acquired pneumonia of right lower lobe of lung     COPD exacerbation (HCC)     Thrombocytopenia ([address]     Altered mental status 2021    Incisional hernia with obstruction but no gangrene     Colon cancer ([address] 2020    Alcohol use     Fatty liver     Wound of left groin 2019    Infection     Severe claudication ([address] 2019    Abnormal liver enzymes 10/15/2018    Polyp of transverse colon 10/03/2018    Pure hypercholesterolemia 2016    Former smoker 2016    Tobacco dependence in remission 2016    Primary insomnia 2016    S/P CABG x 4     Hypomagnesemia     Face lesion 05/15/2015    DDD (degenerative disc disease), lumbar 04/15/2015    Bilateral leg numbness 2015    Bilateral leg pain 2015    DDD (degenerative disc disease) 09/03/2014    Anxiety 09/03/2014    Vitamin D deficiency 04/21/2014    Depression 04/17/2014     Allergies: Allergies   Allergen Reactions    Tramadol      Difficulty urinating. Onset Date: 08/20/2021    Subjective:Pt seen sitting up in chair eating lunch. Pt reports cough occurs without po intake. Pleasant and cooperative. Diet Tolerance:  Patient tolerating current diet level without signs/symptoms of aspiration. Pain: 8/10 back, neck; Rn notified. Pt denies need for pain intervention while ST is present and states he will wait until after lunch to request intervention. Current Diet: ADULT DIET; Dysphagia - Minced and Moist    Diet Tolerance:  Patient tolerating current diet level without signs/symptoms of penetration / aspiration. Dysphagia Treatment and Impressions:   Pt seen in room at bedside with RN permission   Chart Review/Interview (pt or rn complaints, reports of choking/tolerating diet in chart, etc.):Jerri/RN reports no complaints of observed difficulty swallowing.  Respiratory Status: Pt with SPO2% of 95%on RA with RR of 16   Liquid PO Trials:    IDDSI 0 Thin:  Assessed via cup. No anterior bolus loss, good A-P bolus transit and no clinical s/s of aspiration       Solid PO Trials   IDDSI 4 Puree:   Anterior bolus loss, good A-P bolus transit, good oral clearance, vitals stable. Delayed cough occurs x1 after the swallow (1 of 7), however cough does not appear to be related to swallow that preceded it. No additional signs of aspiration observed.   IDDSI 5 Minced and Moist:   No anterior bolus loss, grossly functional mastication, good oral clearance, vitals stable and no clinical s/s of aspiration   IDDSI 6 Soft and Bite-Sized.: Mastication appears functional and requires oral prep time similar to that observed with minced and moist solids. No oral residue observed. No clinical signs of aspiration observed.    Education: SLP educated pt re: Role of SLP, Rationale for dysphagia tx, Recommended compensatory strategies, Aspiration precautions, Evidenced based practice for current recommendations and treatment and Importance of oral care to reduce adverse affects in the event of aspiration. Pt verbalized understanding and would benefit from ongoing education   Assessment: Pt tolerating recommended diet with no clinical s/s of aspiration. Good carryover of recommended compensatory strategies   Recommendations: SLP recommends to advance to IDDSI 6 Soft and Bite Sized Solids; IDDSI 0 Thin Liquids; Meds whole with thin liquids or puree   Risk Management: upright for all intake, stay upright for at least 30 mins after intake, small bites/sips, oral care 2-3x/day to reduce adverse affects in the event of aspiration, general aspiration precautions and hold PO and contact SLP if s/s of aspiration or worsening respiratory status develop. . If the patient exhibits s/s of aspiration and/or worsening respiratory status, hold PO and contact SLP. Dysphagia Goals:  Timeframe for Long-term Goals: 7 days (9/3/21)  Goal 1: The pt will tolerate safest and least restrictive diet without clinical s/s of aspiration. 08/28 Goal addressed. Ongoing. See above. Advanced to IDDSI 6 Soft and bite-sized solids this day. Short-term Goals  Timeframe for Short-term Goals: 5 days (9/1/21)  Dysphagia Goals:   1. The patient will tolerate recommended diet without observed clinical signs of aspiration, 08/28 Goal addressed. Ongoing. See above. 2. The patient will tolerate instrumental swallowing procedure, 08/28 not addressed this day; not warranted at this time. Consider if pt's lung status does not approve as anticipated with course of medical treatment. 3.The patient/caregiver will demonstrate understanding of compensatory strategies for improved swallowing safety. , 08/28 Goal addressed. Ongoing. See above.    4.The patient will tolerate regular consistency solids 10/10. 8/28 Goal partially addressed. Progressing. Upgraded to soft and bite-sized solids. Speech/Language/Cog Goals: N/A                        Recommendations:  Solid Consistency: IDDSI 6 Soft and Bite Sized Solids  Liquid Consistency: IDDSI 0 Thin Liquids  Medication: Meds whole with thin liquids or puree    Patient/Family/Caregiver Education: Role of SLP, Rationale for dysphagia tx, Recommended compensatory strategies, Aspiration precautions, POC, Evidenced based practice for current recommendations and treatment and Importance of oral care to reduce adverse affects in the event of aspiration    Compensatory Strategies: Upright for all intake, Remain upright at least 30 mins after intake, Small bites and Small sips    Plan:    Continued Dysphagia treatment with goals per plan of care. Discharge Recommendations: TBD    If pt discharges from hospital prior to Speech/Swallowing discharge, this note serves as tx and discharge summary. Total Treatment Time / Charges     Time in Time out Total Time / units   Cognitive Tx         Speech Tx      Dysphagia Tx [id number] 27 min/1 unit     Signature:  Joseph Toussaint. Parrish Torres M.A.  [id number] [facility]  Speech-Language Pathologist

## 2021-08-29 NOTE — PROGRESS NOTES
ill-defined consolidation   consistent with pneumonia, not significantly changed. CT CHEST PULMONARY EMBOLISM W CONTRAST   Final Result   No evidence of pulmonary embolism. Multifocal pneumonia involving the right lung. CT HEAD WO CONTRAST   Final Result   No acute intracranial abnormality. Specifically, no acute intracranial   hemorrhage or mass effect. Mild chronic small vessel ischemic disease. Tiny left caudate old lacunar   infarct. XR CHEST PORTABLE   Final Result   Multifocal airspace opacities may represent developing pulmonary edema or   pneumonitis. Blood cx NGTD    covid neg    resp cx - pending  BAL pending      Assessment/Plan:    Acute Hypoxic and Hypercapnic Resp Failure. Rt lung PNA organism unspecified. COPD with AE POA  CT with multifocal unilateral Rt lung PNA  on levaquin and zosyn. Changed to cefepime and vanc with worsening hypoxia and imaging  BID solumedrol IV for copd exacerbation . duonebs, cx remain neg  Obtained pulm consult for worsening symptoms   Sp BAL  while pt intubated for MICHELLE  F/w cx   Improving resp status, now on RA   Mild wheezing today   pred taper     Sepsis POA - due to pna, s.p IVF boluses  Improved , BP stable      Afibb with RVR - new onset   - rates Did not respond to IV BB, cardizem Gtt or digoxin or Amio gtt   Unsuccessful MICHELLE cardioversion 8/26   Added cardizem gtt to amio gtt , rates improving  Changed amio to oral dose   Cardiology managing  Continue lovenox full dose- switch to eliquis   Now in sinus rhythm    Acute Metabolic and possibly toxic encephalopathy. Sec to polysubstance use - urine tox with amphetamine, MJ and opiate   CT head with old left caudate lacunar CVA, but no acute findings.    - worsening mentation likely with steroids and sepsis  , doubt alcohol withdrawal  - supportive care , improved now     Hyponatremia - improving initially with iv NS but worsened with fluid overload  NA

## 2021-08-29 NOTE — PROGRESS NOTES
Pt in bed eyes closed. Pt shows no signs of distress. Call light in reach. Will continue to monitor. no

## 2021-08-29 NOTE — FLOWSHEET NOTE
08/29/21 0806   Vital Signs   Temp 97.5 °F (36.4 °C)   Temp Source Oral   Pulse 68   Resp 18   BP (!) 158/87   BP Location Left upper arm   Level of Consciousness Alert (0)   MEWS Score 1   Oxygen Therapy   SpO2 97 %   O2 Device None (Room air)     Patient resting quietly in bed. No s/s of distress noted. Shift assessment complete, see flow sheet. Remains NSR on tele monitor. Denies needs at this time. Call light in reach. Will monitor.

## 2021-08-29 NOTE — FLOWSHEET NOTE
08/29/21 0200   Vital Signs   Temp 97.1 °F (36.2 °C)   Temp Source Oral   Pulse 70   Heart Rate Source Monitor   Resp 18   BP (!) 143/86   BP Location Left upper arm   Level of Consciousness Alert (0)   MEWS Score 1     Pt awake in bed stated pain was a 9 out of 10. Pain medicine was given. Will continue to monitor.

## 2021-08-29 NOTE — PROGRESS NOTES
[address]   Progress Note  Cardiology      Scot IBARRA Sr [id number]   Admission date:  8/20/2021  CC-f/up a.fib  Subjective:  He feels better.  He has converted to sinus      Objective:  Medications/Labs all Reviewed     [START ON 8/30/2021] amiodarone  400 mg Oral Daily    sodium chloride flush  10 mL IntraVENous 2 times per day    mupirocin   Nasal BID    enoxaparin  1 mg/kg Subcutaneous BID    potassium phosphate IVPB  15 mmol IntraVENous Once    metoprolol tartrate  50 mg Oral BID    ipratropium  2 puff Inhalation Q4H    levalbuterol  2 puff Inhalation Q4H    aspirin  81 mg Oral Daily    gabapentin  300 mg Oral TID    insulin lispro  0-12 Units Subcutaneous TID WC    insulin lispro  0-6 Units Subcutaneous Nightly    sodium chloride flush  5-40 mL IntraVENous 2 times per day    atorvastatin  80 mg Oral Nightly    nicotine  1 patch Transdermal Daily       BMP:   Lab Results   Component Value Date     08/29/2021    K 3.8 08/29/2021    K 5.1 08/20/2021    CL 98 08/29/2021    CO2 29 08/29/2021    BUN 11 08/29/2021    CREATININE 0.6 08/29/2021    MG 1.90 08/25/2021     CBC:    Lab Results   Component Value Date    WBC 8.9 08/29/2021    RBC 3.64 08/29/2021    HGB 12.3 08/29/2021    HCT 36.7 08/29/2021    .0 08/29/2021    RDW 14.6 08/29/2021     08/29/2021      PT/INR:    Lab Results   Component Value Date    INR 1.06 03/26/2020    PROTIME 12.3 03/26/2020     Cardiac Enzymes:    Lab Results   Component Value Date    CKTOTAL 300 08/23/2021     Lab Results   Component Value Date    TROPONINI <0.01 08/24/2021    TROPONINI <0.01 08/24/2021    TROPONINI <0.01 08/24/2021     BNP:  No results found for: BNP  FASTING LIPID PANEL:    Lab Results   Component Value Date    CHOL 109 12/31/2018    HDL 44 12/31/2018    TRIG 108 03/26/2020       Physical Examination:    BP (!) 158/87   Pulse 68   Temp 97.5 °F (36.4 °C) (Oral)   Resp 18   Ht 6' (1.829 m)   Wt 160 lb 6.4 oz (72.8 kg) SpO2 97%   BMI 21.75 kg/m²      Respiratory:  · Resp Assessment: Normal respiratory effort  · Resp Auscultation: Clear to auscultation bilaterally   Cardiovascular:  · Auscultation: regular rate and rhythm, normal S1S2, no murmur, rub or gallop  · Palpation:  Nl PMI  · JVP:  normal  · Extremities: No Edema  Abdomen:  · Soft, non-tender  · Normal bowel sounds  Extremities:  ·  somewhat mottled,good pulses,very thin  Neurological/Psychiatric:  · Oriented to time, place, and person  · Non-anxious  Skin Warm and dry    Assessment:    Active Problems:    Coronary artery disease involving native heart without angina pectoris      Altered mental status  Plan: improved    Acute respiratory failure with hypoxia and hypercarbia (HCC)  Plan:improved    Polysubstance abuse ([address]  Plan: advised on alcohol and smoking cessation,he says he has quit both    Community acquired pneumonia of right lower lobe of lung      COPD exacerbation (HCC)      Thrombocytopenia (HCC)      Atrial fibrillation with rapid ventricular response ([address]  Plan: remains with sinus        Plan:  1.  Continue oral amiodarone with dose reduction to 400 mg daily starting tomorrow

## 2021-08-29 NOTE — PROGRESS NOTES
Pt awake in bed. Assessment completed. Pt denies any further needs at this time. Call light in reach will continue to monitor.

## 2021-08-29 NOTE — PLAN OF CARE
Problem: Falls - Risk of:  Goal: Will remain free from falls  Description: Will remain free from falls  Outcome: Ongoing  Goal: Absence of physical injury  Description: Absence of physical injury  Outcome: Ongoing     Problem: Skin Integrity:  Goal: Will show no infection signs and symptoms  Description: Will show no infection signs and symptoms  Outcome: Ongoing  Goal: Absence of new skin breakdown  Description: Absence of new skin breakdown  Outcome: Ongoing     Problem: Pain:  Goal: Pain level will decrease  Description: Pain level will decrease  Outcome: Ongoing  Goal: Control of acute pain  Description: Control of acute pain  Outcome: Ongoing  Goal: Control of chronic pain  Description: Control of chronic pain  Outcome: Ongoing     Problem: Airway Clearance - Ineffective:  Goal: Clear lung sounds  Description: Clear lung sounds  Outcome: Ongoing  Goal: Ability to maintain a clear airway will improve  Description: Ability to maintain a clear airway will improve  Outcome: Ongoing     Problem: Nutrition  Goal: Optimal nutrition therapy  Outcome: Ongoing  Goal: Understanding of nutritional guidelines  Outcome: Ongoing

## 2021-08-30 NOTE — PROGRESS NOTES
Comprehensive Nutrition Assessment    Type and Reason for Visit:  Reassess    Nutrition Recommendations/Plan:   1. Will continue ADULT DIET; Dysphagia Soft and Bite Sized and monitor nutrition progression  2. Add Ensure Enlive to all meals  3. Monitor appetite, meal intake and ONS acceptance/intake. 4. Monitor weight trends, bowel function, nutrition related lab values, respiratory status, and POC. Nutrition Assessment:  patient is improving from a nutritional standpoint AEB pt tolerating diet consistency + po intake slightly improving during admission and respiratory function is improving, however he remains at risk for further compromise d/t weight loss x 10 days admission, need for altered diet consistency d/t swallowing difficulty, altered nutrition related lab values, and increased nutrient needs r/t ongoing wound; Will continue ADULT DIET; Dysphagia Soft and Bite Sized and add Ensure Enlive with all meals    Malnutrition Assessment:  Malnutrition Status: At risk for malnutrition (Comment)    Context:  Acute Illness     Findings of the 6 clinical characteristics of malnutrition:  Energy Intake:  Mild decrease in energy intake (Comment) (poor po intake x 5 days beginning of admission, however po intake is improving)  Weight Loss:  7 - Greater than 2% over 1 week (2.9% or -4.8# x 10 days admission)     Body Fat Loss:  Unable to assess (pt with PT/OT at time of assessment)     Muscle Mass Loss:  Unable to assess (pt with PT/OT at time of assessment)    Fluid Accumulation:  No significant fluid accumulation     Strength:  Not Performed    Estimated Daily Nutrient Needs:  Energy (kcal):  2538-8633 kcals based on 25-27 kcals/kg/CBW; Weight Used for Energy Requirements:  Current     Protein (g):  87-85 g protein based on 1.2-1.3 g/kg/CBW;  Weight Used for Protein Requirements:  Current        Fluid (ml/day):  6472-1886 ml; Method Used for Fluid Requirements:  1 ml/kcal      Nutrition Related Findings: PT/OT with pt at time of assessment; per MD note, pt is more alert today; on room airl abdomen rounded, distended, + surgical scar on abdomen, BS active, last BM on 8/28; BG is elevated; Cl, Creat and H/H were low on 8/29, no new labs for today 8/30; per SLP, pt is tolerating diet consistency with no clinical s/s of aspiration; pt po intake has slightly improved since previous assessment, consuming 51-75% and % of meals; noted pt [place] [address] [place]; pt has humalog, prednisone, and potassium phos ordered at this time; Wounds:  Pressure Injury, Stage II (pressure injury stage 2 sacrum)       Current Nutrition Therapies:    ADULT DIET; Dysphagia - Soft and Bite Sized    Anthropometric Measures:  · Height: 6' (182.9 cm)  · Current Body Weight: 160 lb 6.4 oz (72.8 kg) (obtained 8/29; actual weight)   · Admission Body Weight: 165 lb 3.2 oz (74.9 kg) (obtained 8/21/21; actual weight)    · Usual Body Weight: 165 lb 3.2 oz (74.9 kg) (obtained 8/21/21; actual weight)     · Ideal Body Weight: 178 lbs; % Ideal Body Weight 90.1 %   · BMI: 21.7  · BMI Categories: Normal Weight (BMI 18.5-24. 9)       Nutrition Diagnosis:   · Inadequate oral intake related to inadequate protein-energy intake, impaired respiratory function, psychological cause or life stress, increase demand for energy/nutrients, swallowing difficulty as evidenced by intake 51-75%, poor intake prior to admission, wounds, lab values, weight loss    Nutrition Interventions:   Food and/or Nutrient Delivery:  Continue Current Diet, Start Oral Nutrition Supplement  Nutrition Education/Counseling:  No recommendation at this time   Coordination of Nutrition Care:  Continue to monitor while inpatient    Goals:  patient will consume 75% or greater of meals on ADULT DIET;  Dysphagia Soft and Bite Sized + will accept and consume 75% or greater of Ensure Enlive with all meals       Nutrition Monitoring and Evaluation:   Behavioral-Environmental Outcomes:  None Identified   Food/Nutrient Intake Outcomes:  Food and Nutrient Intake, Supplement Intake  Physical Signs/Symptoms Outcomes:  Biochemical Data, Chewing or Swallowing, GI Status, Hemodynamic Status, Nutrition Focused Physical Findings, Skin, Weight     Discharge Planning:    Continue current diet, Continue Oral Nutrition Supplement     Electronically signed by Maria De Jesus Vargas RD, LD on 8/30/21 at 3:36 PM EDT    Contact: [phone]

## 2021-08-30 NOTE — PROGRESS NOTES
Speech Language Pathology  Facility/Department: [facility] PCU TELEMETRY  Dysphagia Daily Treatment Note    NAME: Flaquita Cooley  : 1960  MRN: [id number]     Solids: IDDSI 6 Soft and bite-sized solids  Liquids: IDDSI 0 Thin Liquids  Meds: whole with TL or puree    Patient Diagnosis(es):   Patient Active Problem List    Diagnosis Date Noted    Alcohol dependence with withdrawal delirium ([address] 2016    Unstable angina ([address] 2016    NSTEMI (non-ST elevated myocardial infarction) ([address]     Coronary artery disease involving native heart without angina pectoris     Pain medication agreement broken 2015    Back pain, lumbosacral 2014    Rheumatoid arthritis, adult ([address] 2014    Essential hypertension     Lumbar radiculopathy 2014    Gout 2014    Hypotestosteronism 2014    Atrial fibrillation with rapid ventricular response (HCC)     Acute respiratory failure with hypoxia (HCC)     Multifocal pneumonia     Atrial fibrillation with RVR (HCC)     Acute encephalopathy     Acute respiratory failure with hypoxia and hypercarbia (HCC)     Polysubstance abuse ([address]     Community acquired pneumonia of right lower lobe of lung     COPD exacerbation (HCC)     Thrombocytopenia ([address]     Altered mental status 2021    Incisional hernia with obstruction but no gangrene     Colon cancer ([address] 2020    Alcohol use     Fatty liver     Wound of left groin 2019    Infection     Severe claudication ([address] 2019    Abnormal liver enzymes 10/15/2018    Polyp of transverse colon 10/03/2018    Pure hypercholesterolemia 2016    Former smoker 2016    Tobacco dependence in remission 2016    Primary insomnia 2016    S/P CABG x 4     Hypomagnesemia     Face lesion 05/15/2015    DDD (degenerative disc disease), lumbar 04/15/2015    Bilateral leg numbness 2015    Bilateral leg pain 2015    DDD puree   Risk Management: upright for all intake, stay upright for at least 30 mins after intake, small bites/sips, oral care 2-3x/day to reduce adverse affects in the event of aspiration, general aspiration precautions and hold PO and contact SLP if s/s of aspiration or worsening respiratory status develop. . If the patient exhibits s/s of aspiration and/or worsening respiratory status, hold PO and contact SLP. Dysphagia Goals:  Timeframe for Long-term Goals: 7 days (9/3/21)  Goal 1: The pt will tolerate safest and least restrictive diet without clinical s/s of aspiration. 08/30 Goal addressed. Ongoing. See above. Continue current diet. Short-term Goals  Timeframe for Short-term Goals: 5 days (9/1/21)  Dysphagia Goals:   1. The patient will tolerate recommended diet without observed clinical signs of aspiration, 08/30 Goal addressed. Ongoing. See above. 2. The patient will tolerate instrumental swallowing procedure, 08/30; not warranted at this time. Consider if pt's lung status does not approve as anticipated with course of medical treatment. 3.The patient/caregiver will demonstrate understanding of compensatory strategies for improved swallowing safety. , 08/30 Goal addressed. Ongoing. See above.    4.The patient will tolerate regular consistency solids 10/10. 08/30 pt declined solid PO trials    Speech/Language/Cog Goals: N/A    Recommendations:  Solid Consistency: IDDSI 6 Soft and Bite Sized Solids  Liquid Consistency: IDDSI 0 Thin Liquids  Medication: Meds whole with thin liquids or puree    Patient/Family/Caregiver Education: Role of SLP, Rationale for dysphagia tx, Recommended compensatory strategies, Aspiration precautions, POC, Evidenced based practice for current recommendations and treatment and Importance of oral care to reduce adverse affects in the event of aspiration    Compensatory Strategies: Upright for all intake, Remain upright at least 30 mins after intake, Small bites and Small sips    Plan:    Continued Dysphagia treatment with goals per plan of care. Decrease frequency of POC to 2-4x/week d/t continued diet tolerance. Discharge Recommendations: per PT/OT recs    If pt discharges from hospital prior to Speech/Swallowing discharge, this note serves as tx and discharge summary.      Total Treatment Time / Charges     Time in Time out Total Time / units   Cognitive Tx         Speech Tx      Dysphagia Tx 0900 0910 10 min / 1 unit     Signature:  ANTOLIN Key  Speech-language pathologist  [id number]  Speech Desk Phone: [phone]

## 2021-08-30 NOTE — PROGRESS NOTES
Bedside report and transfer of care given to [address] [place] [address] [place], Rn. Pt currently resting in bed with the call light within reach. Pt denies any other care needs at this time. Pt stable at this time.

## 2021-08-30 NOTE — PROGRESS NOTES
Patient is sleeping comfortably in bed and denies further needs at this time. Call light within reach. Will continue to monitor.

## 2021-08-30 NOTE — PROGRESS NOTES
[address] Daily Progress Note      Admit Date:  8/20/2021    Subjective:  Mr. Waldo Colvin is being seen today for f/u afib with RVR. He denies specific complaints other than bruising and feeling cold. Much more alert today. He denies CP, SOB, or palpitations. S/P bronch 8/26/21.  Tele reviewed showing NSR 60's bpm.    Objective:   /73   Pulse 66   Temp 97.3 °F (36.3 °C) (Oral)   Resp 18   Ht 6' (1.829 m)   Wt 160 lb 6.4 oz (72.8 kg)   SpO2 96%   BMI 21.75 kg/m²       Intake/Output Summary (Last 24 hours) at 8/30/2021 0727  Last data filed at 8/29/2021 1842  Gross per 24 hour   Intake 351.34 ml   Output 650 ml   Net -298.66 ml       TELEMETRY: NSR    Physical Exam:  General: Alert in NAD  Skin:  Warm and dry  Neck:  JVD none  Chest:  Diffusely soft breath sounds  Cardiovascular: RRR; S1S2  Abdomen:  Soft NT  Extremities:  no edema    Medications:    predniSONE  30 mg Oral Daily    apixaban  5 mg Oral BID    amiodarone  400 mg Oral Daily    sodium chloride flush  10 mL IntraVENous 2 times per day    mupirocin   Nasal BID    potassium phosphate IVPB  15 mmol IntraVENous Once    metoprolol tartrate  50 mg Oral BID    ipratropium  2 puff Inhalation Q4H    levalbuterol  2 puff Inhalation Q4H    gabapentin  300 mg Oral TID    insulin lispro  0-12 Units Subcutaneous TID WC    insulin lispro  0-6 Units Subcutaneous Nightly    sodium chloride flush  5-40 mL IntraVENous 2 times per day    atorvastatin  80 mg Oral Nightly    nicotine  1 patch Transdermal Daily      sodium chloride Stopped (08/29/21 1441)    dextrose      sodium chloride Stopped (08/25/21 2019)     sodium chloride flush, sodium chloride, potassium chloride, perflutren lipid microspheres, HYDROcodone 5 mg - acetaminophen, glucose, dextrose, glucagon (rDNA), dextrose, sodium chloride flush, sodium chloride, ondansetron **OR** ondansetron, polyethylene glycol, acetaminophen **OR** acetaminophen    Lab Data:  CBC:   Recent Labs     08/28/21  0454 08/29/21  0527   WBC 8.5 8.9   HGB 11.5* 12.3*   HCT 34.6* 36.7*   .6* 101.0*   * 162     BMP:   Recent Labs     08/28/21  0454 08/29/21  0527   * 136   K 4.0 3.8   CL 96* 98*   CO2 30 29   BUN 10 11   CREATININE <0.5* 0.6*       Lexiscan myoview 6/18/16       Summary    There is normal isotope uptake at stress and rest. There is no evidence of    myocardial ischemia or scar. Hyperdynamic LV systolic function with HZ>44%    with uniform wall motion. 3/12/20 ECHO Summary:   Technically difficult examination. Normal left ventricle systolic function with an estimated EF of 55-60%. Definity contrast administered with no evidence of left ventricular mass or   thrombus noted. No regional wall motion abnormalities are seen. Normal left ventricular diastolic filling pressure. MICHELLE 8/26/21 Summary   There is no evidence of mass or thrombus in the left atrium or appendage. Mild mitral and tricuspid regurgitation. Rapid atrial fibrillation during study. [id number] Score for Atrial Fibrillation Stroke Risk   Risk   Factors  Component Value   C CHF No 0   H HTN Yes 1   A2 Age >= 76 No,  (62 y.o.) 0   D DM No 0   S2 Prior Stroke/TIA No 0   V Vascular Disease Yes 1   A Age 74-69 No,  (62 y.o.) 0   Sc Sex male 0    [id number]  Score  2   Score last updated 8/30/21 10:12 AM EDT      Assessment:    1. Atrial fibrillation with RVR:   -admit EKG NSR    -subsequent 8/23 and 8/24 EKG's with afib with RVR    -he has received IV metoprolol, IV digoxin loading, and diltiazem gtt prior   -continue eliquis 5mg BID   -continue metoprolol 50mg BID   -ECHO 3/12/20 EF=55-60%; no wall abnls   -s/p MICHELLE and CV 8/26/21; unsuccessful CV with return to rapid afib with each of 3  successive shocks   -converted to NSR over weekend and remains sinus   -LFT's mildly increased but not significant and expected given amiodarone loadin   -TSH=2.57     2.  CAD s/p CABG    -surgery 2016   -continue baby asa qd, lipitor 80mg qd, metoprolol 50mg BID     3. Mental status change:    -note admit tox screen + amphetamine, opiate, cannabinoid   -improved overall     4. Hypoxic resp failure, multifocal PNA, and COPDE:   -CXR with R>LLL multifocal ill-defined consolidaton c/w PNA   -abx vancomycin, cefepime discontinued   -steroids per IM team    Note he should remain on amiodarone 400mg daily x 1 week and then drop back to 200mg daily thereafter. Continue eliquis 5mg BID for AC. Ideally would use baby aspirin as well given hx CAD s/p CABG but given polysubstance abuse and ETOH I would just use NOAC. OK for d/c from cardiac standpoint when IM doc feels OK. Will make f/u appt for 1 month in my office. Signing off. Thanks.        Patient Active Problem List    Diagnosis Date Noted    Alcohol dependence with withdrawal delirium ([address] 04/22/2016    Unstable angina ([address] 04/20/2016    NSTEMI (non-ST elevated myocardial infarction) ([address]     Coronary artery disease involving native heart without angina pectoris     Pain medication agreement broken 05/04/2015    Back pain, lumbosacral 04/17/2014    Rheumatoid arthritis, adult ([address] 04/17/2014    Essential hypertension     Lumbar radiculopathy 09/03/2014    Gout 04/17/2014    Hypotestosteronism 05/14/2014    Atrial fibrillation with rapid ventricular response (HCC)     Acute respiratory failure with hypoxia (HCC)     Multifocal pneumonia     Atrial fibrillation with RVR (HCC)     Acute encephalopathy     Acute respiratory failure with hypoxia and hypercarbia (HCC)     Polysubstance abuse ([address]     Community acquired pneumonia of right lower lobe of lung     COPD exacerbation (HCC)     Thrombocytopenia ([address]     Altered mental status 08/20/2021    Incisional hernia with obstruction but no gangrene     Colon cancer ([address] 03/13/2020    Alcohol use     Fatty liver     Wound of left groin 02/21/2019    Infection     Severe claudication ([facility] 01/11/2019    Abnormal liver enzymes 10/15/2018    Polyp of transverse colon 10/03/2018    Pure hypercholesterolemia 07/20/2016    Former smoker 07/20/2016    Tobacco dependence in remission 06/08/2016    Primary insomnia 06/08/2016    S/P CABG x 4     Hypomagnesemia     Face lesion 05/15/2015    DDD (degenerative disc disease), lumbar 04/15/2015    Bilateral leg numbness 01/09/2015    Bilateral leg pain 01/09/2015    DDD (degenerative disc disease) 09/03/2014    Anxiety 09/03/2014    Vitamin D deficiency 04/21/2014    Depression 04/17/2014     Taim Avila MD, MD 8/30/2021 7:27 AM

## 2021-08-30 NOTE — PROGRESS NOTES
Patient was discussing his situation with me and indicated that he felt alcohol is a big issue in his life and he needs at to make some changes. I asked the patient how much he was drinking and he did not answer directly, but did say he was even drinking whiskey heavily. Patient also stated he was drinking heavily before admission.

## 2021-08-30 NOTE — PROGRESS NOTES
Inpatient Physical Therapy Daily Treatment Note    Unit: PCU  Date:  8/30/2021  Patient Name:    Melissa Diehl  Admitting diagnosis:  Altered mental status [R41.82]  Polysubstance abuse ([facility] [F19.10]  Acute respiratory failure with hypoxia and hypercarbia ([facility] [J96.01, J96.02]  Altered mental status, unspecified altered mental status type [R41.82]  Admit Date:  8/20/2021  Precautions/Restrictions/WB Status/ Lines/ Wounds/ Oxygen: Fall risk, Bed/chair alarm, Lines -IV and Cerrato catheter and Telemetry    Treatment Time:  1546-7242  Treatment Number:  2   Timed Code Treatment Minutes: 35  minutes  Total Treatment Minutes:  35  minutes    Patient Goals for Therapy: \" to get stronger \"          Discharge Recommendations: SNF  DME needs for discharge: defer to facility       Therapy recommendation for EMS Transport: requires transport by cot due to requires equiptment to transfer from bed to chair    Therapy recommendations for staff:   Assist of 2 with use of Mary Chrissy for all transfers to/from [facility]  to/from chair    History of Present Illness: Per H&P Dr. León Haji 8/21  \"PMH significant for CAD, CVA, HTN presented today for AMS, SOB.  Patient moved in with his daughter 2 days ago.  This morning, patient had a unwitnessed fall but was able to get up.  Granddaughter helped the patient back into the house and patient insisted that he was okay.  Throughout the day, patient became more confused.  When daughter checked on the patient, he was very altered, minimally responsive, and appeared very short of breath.  EMS arrived on scene and patient had O2 sat of 80% on room air with cyanosis noted on the tip of nose and fingers. EMS transported the patient with NRM and O2 sat on arrival was 97%.  No further history available as patient was completely altered\"  Per dr. Naomie Benavides this date:   \"Did not respond to IV BB, cardizem boluses and Gtt or digoxin boluses   Transferred to ICU , had MICHELLE cardioversion after intubation this am with no success, added cardizem gtt to amio gtt now\"  Patient transferred to ICU on , returned to PCU     Home Health S4 Level Recommendation:  NA     AM-PAC Mobility Score    AM-PAC Inpatient Mobility Raw Score : 11  AM-PAC Inpatient Mobility without Stair Climbing Raw Score : 13  Scot Espino scored a  on the AM-PAC short mobility form. Current research shows that an AM-PAC score of 17 or less is typically not associated with a discharge to the patient's home setting. If patient discharges prior to next session this note will serve as a discharge summary. Please see below for the latest assessment towards goals. Pain   Yes  Location: back, shoulders  Ratin/10  Pain Medicine Status: RN called to room to administer meds    Cognition    A&O Person, Place and Situation   Able to follow 1 step commands inconsistently    Subjective  Patient lying supine in bed with no family present. Pt agreeable to this PT eval & tx. Balance  Sitting:  Good ; Independent  Comments: at EOB. Mild UE tremors noted. Standing: Fair ; CGA  Comments: using RW. Moderate A/P sway in static stance. Pt tolerated 2 bouts of ~15 second static standing. He reports needing to sit due to fatigue although no overt signs of fatigue noted. Bed mobility:    Supine to sit:                           SBA with HOB elevated   Sit to supine:                           Not Tested  Rolling:                                    Not Tested  Scooting in sitting:                   SBA  Scooting to head of bed:         Not Tested                    Bridging:                                  Not Tested     Transfers:    Sit to stand:                 Min A from EOB with STEDY; Min A 1st attempt and CGA 2nd attempt from recliner with standard walker. Stand to sit:                 Min A  to control decent d/t poor trunk control   Bed to chair:                Total A with STEDY.     Gait gait deferred due to pt's reported fatigue in static stance; pt ambulated 0 ft. Comment: likely that pt could complete at least a few steps with the walker with ~Min A when pt is willing to tolerate an attempt. Today, pt states \"I can't walk\". Stair Training deferred, pt unsafe/ not appropriate to complete stairs at this time    Activity Tolerance   Pt completed therapy session with Pain noted with rest and activity   BP SpO2 HR   Seated at EOB Could not obtain reading due to device error 96% on -132   Seated in chair 131/83 97 % on RA 66      Positioning Needs   Pt up in chair, alarm set, positioned in proper neutral alignment and pressure relief provided. Call light provided and all needs within reach    Exercises Initiated  all completed bilaterally unless indicated  heel/toe raises in sitting x 10 reps   Heel/toe raises in standing x 3 reps   Hip abduction isometric in sitting x 10 reps  LAQ x 5 reps  Pt self limited due to concern for incontinence. Other  None. Patient/Family Education   Pt educated on role of inpatient PT, POC, importance of continued activity, DC recommendations, safety awareness, transfer techniques and calling for assist with mobility. Assessment  Pt seen for Physical Therapy follow up treatment. Pt is progressing but still requiring some assist for transfers and has limited tolerance to standing activity (~15 seconds). He is limited by generalized fatigue. Standing balance is Fair(+). Pt will benefit from skilled PT in acute setting to promote activity tolerance and independent functional mobility. Recommending SNF upon discharge as patient functioning well below baseline, demonstrates good rehab potential and unable to return home due to inability to negotiate stairs to enter home/bedroom/bathroom, burden of care beyond caregiver ability and home environment not conducive to patient recovery. Goals : To be met in 3 visits:  1).  Independent with LE Ex x 10 reps  2.) Bed to chair: Mod A To be met in 6 visits:  1). Supine to/from sit: Supervision  2). Sit to/from stand: Min A   3). Bed to chair: Min A   4). Gait: Ambulate 50 ft.  with  Min A  and use of rolling walker (RW)  5). Tolerate B LE exercises 3 sets of 10-15 reps  6). Ascend/descend 5 steps with Min A  with use of hand rail unilateral and LRAD (least restrictive assistive device)    Rehabilitation Potential: Good  Strengths for achieving goals include:   Pt motivated, PLOF, Family Support and Pt cooperative   Barriers to achieving goals include:    Complexity of condition and Weakness    Plan    To be seen 3-5 x / week  while in acute care setting for therapeutic exercises, bed mobility, transfers, progressive gait training, balance training, and family/patient education. Signature: Tramaine Jacobs, PT, DPT    If patient discharges from this facility prior to next visit, this note will serve as the Discharge Summary.

## 2021-08-30 NOTE — PLAN OF CARE
Nutrition Problem #1: Inadequate oral intake  Intervention: Food and/or Nutrient Delivery: Continue Current Diet, Start Oral Nutrition Supplement  Nutritional Goals: patient will consume 75% or greater of meals on ADULT DIET;  Dysphagia Soft and Bite Sized + will accept and consume 75% or greater of Ensure Enlive with all meals

## 2021-08-30 NOTE — PROGRESS NOTES
Occupational Therapy Daily Treatment Note    Unit: PCU  Date:  2021  Patient Name:    Angel Luis Yepez  Admitting diagnosis:  Altered mental status [R41.82]  Polysubstance abuse ([facility] [F19.10]  Acute respiratory failure with hypoxia and hypercarbia ([facility] [facility] [J96.01, J96.02]  Altered mental status, unspecified altered mental status type [R41.82]  Admit Date:  2021  Precautions/Restrictions:    Fall risk, Bed/chair alarm, Lines -IV and Cerrato catheter and Telemetry      Discharge Recommendations: SNF  DME needs for discharge: defer to facility       Therapy recommendations for staff:   Assist of 2 with use of MAG STEDY for all transfers to/from chair    AM-PAC Score: AM-PAC Inpatient Daily Activity Raw Score: 14  Home Health S4 Level: NA       Treatment Time:  8417-0273  Treatment number:  2   Timed code treatment minute 25  minutes    History of Present Illness:   Per H&P Dr. Quentin Garg   PMH significant for CAD, CVA, HTN presented today for AMS, SOB.  Patient moved in with his daughter 2 days ago.  This morning, patient had a unwitnessed fall but was able to get up.  Granddaughter helped the patient back into the house and patient insisted that he was okay.  Throughout the day, patient became more confused.  When daughter checked on the patient, he was very altered, minimally responsive, and appeared very short of breath.  EMS arrived on scene and patient had O2 sat of 80% on room air with cyanosis noted on the tip of nose and fingers.  EMS transported the patient with NRM and O2 sat on arrival was 97%.  No further history available as patient was completely altered  Subjective:  Pt in the bed and participated in therapy with encouragement     Pain   Yes  Ratin/10  Location:back , UEs   Pain Medicine Status: RN notified      Bed Mobility:   Supine to Sit:  SBA  Sit to Supine:  Not Tested  Rolling:           Not Tested  Scooting:        SBA    Transfer Training:   Sit to stand:   Min A to the stedy and min to CGA to stand to the walker from recliner   Stand to sit:  Min A   Bed to Chair:  Total A with the stedy   Bed to [facility]:   Not Tested  Standard toilet:   Not Tested    Activity Tolerance   Pt completed therapy session with pain , fatigue     ADL Training:   Upper body dressing: Not Tested  Upper body bathing:  Not Tested  Lower body dressing:  SBA to don and doff socks  Lower body bathing:  Not Tested  Toileting:   Not Tested  Grooming/Hygiene:  Not Tested    Therapeutic Exercise:   N/A    Patient Education:   Role of OT    Positioning Needs:   Pt up in chair, alarm set, positioned in proper neutral alignment and pressure relief provided. Family Present:  No    Assessment:   The pt was SBA for supine to sit and for scooting to the edge of the bed. The pt was min assist to stand into the stedy and total assist to transfer to the chair. The pt expressed he was concerned with falling. The pt sit stand to the walker with encouragement. The pt stood approx 20 seconds. The pt would not attempt to take a step. The pt was SBA for donning and doffing socks. Pt needs continued OT to improve functional IND. GOALS  To be met in 3 Visits:  1). Bed to toilet/BSC: Min A      To be met in 5 Visits:  1). Supine to/from Sit:             Supervision  2). Upper Body Bathing:         SBA  3). Lower Body Bathing:         Min A   4). Upper Body Dressing:       SBA  5). Lower Body Dressing:       Min A   6).  Pt to demonstrate UE exs x 15 reps with minimal cues      Plan: cont with POC      Angel Lopez OTR/L [id number]      If patient discharges from this facility prior to next visit, this note will serve as the Discharge Summary

## 2021-08-30 NOTE — PROGRESS NOTES
Hospitalist Progress Note      PCP: Roge Fried, APRN - CNP    Date of Admission: 8/20/2021    Chief Complaint: AMS from home. Drug screen with opiates, amphetamine, cannabis. worsened since adm with worsening hypoxia, confusion development of new Afibb with high HR    Did not respond to IV BB, cardizem boluses and Gtt or digoxin boluses   Transferred to ICU , had MICHELLE cardioversion after intubation this am with no success, added cardizem gtt to amio gtt now    Subjective:-    Matthew Nicholson seen up in bed watching TV, on 2 L oxygen  Tolerated diet and feels ok today     Off amio gtt , now loading oral dose, continued on cardizem gtt       Now in sinus rhythm.   Now on room air        Medications:  Reviewed    Infusion Medications    sodium chloride Stopped (08/29/21 1441)    dextrose      sodium chloride Stopped (08/25/21 2019)     Scheduled Medications    predniSONE  30 mg Oral Daily    apixaban  5 mg Oral BID    amiodarone  400 mg Oral Daily    sodium chloride flush  10 mL IntraVENous 2 times per day    mupirocin   Nasal BID    potassium phosphate IVPB  15 mmol IntraVENous Once    metoprolol tartrate  50 mg Oral BID    ipratropium  2 puff Inhalation Q4H    levalbuterol  2 puff Inhalation Q4H    gabapentin  300 mg Oral TID    insulin lispro  0-12 Units SubCUTAneous TID WC    insulin lispro  0-6 Units SubCUTAneous Nightly    sodium chloride flush  5-40 mL IntraVENous 2 times per day    atorvastatin  80 mg Oral Nightly    nicotine  1 patch Transdermal Daily     PRN Meds: sodium chloride flush, sodium chloride, potassium chloride, perflutren lipid microspheres, HYDROcodone 5 mg - acetaminophen, glucose, dextrose, glucagon (rDNA), dextrose, sodium chloride flush, sodium chloride, ondansetron **OR** ondansetron, polyethylene glycol, acetaminophen **OR** acetaminophen      Intake/Output Summary (Last 24 hours) at 8/30/2021 0925  Last data filed at 8/30/2021 0846  Gross per 24 hour   Intake 231.34 ml   Output 400 ml   Net -168.66 ml       Physical Exam Performed:    /73   Pulse 66   Temp 97.3 °F (36.3 °C) (Oral)   Resp 18   Ht 6' (1.829 m)   Wt 160 lb 6.4 oz (72.8 kg)   SpO2 97%   BMI 21.75 kg/m²         General:  Middle aged male, up in bed,    More oriented today   . Appears to be not in any distress  Mucous Membranes:  Pink , anicteric  Neck: No JVD, no carotid bruit, no thyromegaly  Chest: improving wheeze and  rhonchi and bibasilar crackles   Cardiovascular: irregular  S1S2 heard, no murmurs or gallops  Abdomen:  Soft, undistended, non tender, no organomegaly, BS present  Extremities: No edema or cyanosis. Distal pulses well felt  Neurological :improving mentation and better oriented      Labs:   Recent Labs     08/28/21 0454 08/29/21  0527   WBC 8.5 8.9   HGB 11.5* 12.3*   HCT 34.6* 36.7*   * 162     Recent Labs     08/28/21 0454 08/29/21  0527   * 136   K 4.0 3.8   CL 96* 98*   CO2 30 29   BUN 10 11   CREATININE <0.5* 0.6*   CALCIUM 8.3 8.6     No results for input(s): AST, ALT, BILIDIR, BILITOT, ALKPHOS in the last 72 hours. No results for input(s): INR in the last 72 hours. No results for input(s): Omar Snowball in the last 72 hours. Urinalysis:      Lab Results   Component Value Date    NITRU POSITIVE 08/20/2021    WBCUA 0-2 08/20/2021    RBCUA 5-10 08/20/2021    BLOODU LARGE 08/20/2021    SPECGRAV >=1.030 08/20/2021    GLUCOSEU 100 08/20/2021       Radiology:  CT CHEST PULMONARY EMBOLISM W CONTRAST   Final Result   1. New nonspecific bilateral upper lung ground-glass opacities due to an   infectious/inflammatory process including atypical viral pneumonia. 2. Unchanged right pneumonia. 3. New small right and trace left pleural effusions. XR CHEST PORTABLE   Final Result   Right greater than left lower lung multifocal ill-defined consolidation   consistent with pneumonia, not significantly changed.          CT CHEST PULMONARY EMBOLISM W CONTRAST Final Result   No evidence of pulmonary embolism. Multifocal pneumonia involving the right lung. CT HEAD WO CONTRAST   Final Result   No acute intracranial abnormality. Specifically, no acute intracranial   hemorrhage or mass effect. Mild chronic small vessel ischemic disease. Tiny left caudate old lacunar   infarct. XR CHEST PORTABLE   Final Result   Multifocal airspace opacities may represent developing pulmonary edema or   pneumonitis. Blood cx NGTD    covid neg    resp cx - pending  BAL pending      Assessment/Plan:    Acute Hypoxic and Hypercapnic Resp Failure. Rt lung PNA organism unspecified. COPD with AE POA  CT with multifocal unilateral Rt lung PNA  on levaquin and zosyn. Changed to cefepime and vanc with worsening hypoxia and imaging  BID solumedrol IV for copd exacerbation . duonebs, cx remain neg  Obtained pulm consult for worsening symptoms   Sp BAL  while pt intubated for MICHELLE  F/w cx   Improving resp status, now on RA   Mild wheezing today   pred taper - no wheezing today    Sepsis POA - due to pna, s.p IVF boluses  Improved , BP stable      Afibb with RVR - new onset   - rates Did not respond to IV BB, cardizem Gtt or digoxin or Amio gtt   Unsuccessful MICHELLE cardioversion 8/26   Added cardizem gtt to amio gtt , rates improving  Changed amio to oral dose   Cardiology managing  Continue lovenox full dose- switch to eliquis   Now in sinus rhythm    Acute Metabolic and possibly toxic encephalopathy. Sec to polysubstance use - urine tox with amphetamine, MJ and opiate   CT head with old left caudate lacunar CVA, but no acute findings. - worsening mentation likely with steroids and sepsis  , doubt alcohol withdrawal  - supportive care , improved now     Hyponatremia - improving initially with iv NS but worsened with fluid overload  >129--133    Mild Rhabdomyolysis without renal failure. CK 2700 IVF as above.    CK - improved  To 300 with IVF    Thrombocytopenia and Lymphopenia. COVID (-)  Likely with sepsis - improving   Resumed ASA ,   Checked fibrinogen, LDH, haptoglobin and HIT - all negative. Resumed lovenox bid now- switched to eliquis     Chronic Pain opiate dependent -  Resumed PTA norco PRN. Avoid excessive sedatives     Gen weakness - start PT      DVT Prophylaxis: SCD, resume lovenox. Hold if platelets <16W. Diet: ADULT DIET;  Dysphagia - Soft and Bite Sized  Code Status: Full Code    Ot/pt    Will need precert for SNF     Kang Foster MD

## 2021-08-30 NOTE — CARE COORDINATION
INTERDISCIPLINARY PLAN OF CARE CONFERENCE    Date/Time: 8/30/2021 2:11 PM  Completed by: Charli Lund RN, Case Management      Patient Name:  Patrick Olivo  YOB: 1960  Admitting Diagnosis: Altered mental status [R41.82]  Polysubstance abuse ([facility] [address] [F19.10]  Acute respiratory failure with hypoxia and hypercarbia ([facility] [address] [J96.01, J96.02]  Altered mental status, unspecified altered mental status type [R41.82]     Admit Date/Time:  8/20/2021  8:00 PM    Chart reviewed. Interdisciplinary team contacted or reviewed plan related to patient progress and discharge plans. Disciplines included Case Management, Nursing, and Dietitian. Current Status: IP 08/21/2021  PT/OT recommendation for discharge plan of care: needs update for pre-cert    Expected D/C Disposition:  Skilled nursing facility  Confirmed plan with patient's daughter Milli Day  DCP: Chart reviewed. Met with pt's daughter Milli Day at bedside and explained the role of the CM. Plans to go to rehab at [facility], +bed. Need updated PT/OT for pre-cert, added to priority list to be seen today.  +CM following    Home O2 in place on admit: No  Pt informed of need to bring portable home O2 tank on day of discharge for nursing to connect prior to leaving:  Not Indicated  Verbalized agreement/Understanding:  Not Indicated

## 2021-08-31 NOTE — PLAN OF CARE
Problem: Falls - Risk of:  Goal: Will remain free from falls  Description: Will remain free from falls  8/30/2021 2223 by Cathleen Gomes RN  Outcome: Ongoing  8/30/2021 1502 by Joyce Egan RN  Outcome: Ongoing  Goal: Absence of physical injury  Description: Absence of physical injury  8/30/2021 2223 by Cathleen Gomes RN  Outcome: Ongoing  8/30/2021 1502 by Joyce Egan RN  Outcome: Ongoing     Problem: Skin Integrity:  Goal: Will show no infection signs and symptoms  Description: Will show no infection signs and symptoms  8/30/2021 2223 by Cathleen Gomes RN  Outcome: Ongoing  8/30/2021 1502 by Joyce Egan RN  Outcome: Ongoing  Goal: Absence of new skin breakdown  Description: Absence of new skin breakdown  8/30/2021 2223 by Cathleen Gomes RN  Outcome: Ongoing  8/30/2021 1502 by Joyce Egan RN  Outcome: Ongoing     Problem: Pain:  Goal: Pain level will decrease  Description: Pain level will decrease  8/30/2021 1502 by Joyce Egan RN  Outcome: Ongoing  Goal: Control of acute pain  Description: Control of acute pain  8/30/2021 1502 by Joyce Egan RN  Outcome: Ongoing  Goal: Control of chronic pain  Description: Control of chronic pain  8/30/2021 1502 by Joyce Egan RN  Outcome: Ongoing     Problem: Airway Clearance - Ineffective:  Goal: Clear lung sounds  Description: Clear lung sounds  8/30/2021 1502 by Joyce Egan RN  Outcome: Ongoing  Goal: Ability to maintain a clear airway will improve  Description: Ability to maintain a clear airway will improve  8/30/2021 1502 by Joyce Egan RN  Outcome: Ongoing     Problem: Nutrition  Goal: Optimal nutrition therapy  8/30/2021 1535 by Kaylee Cardoza RD, ROBER  Outcome: Ongoing  8/30/2021 1502 by Joyce Egan RN  Outcome: Ongoing  Goal: Understanding of nutritional guidelines  8/30/2021 1535 by Kaylee Cardoza RD, ROBER  Outcome: Ongoing  8/30/2021 1502 by Joyce Egan RN  Outcome: Ongoing

## 2021-08-31 NOTE — PROGRESS NOTES
Patient educated on discharge instructions as well as new medications use, dosage, administration and possible side effects. Patient verified knowledge. IV removed without difficulty and dry dressing in place. Telemetry monitor removed and returned to [facility]. Pt left facility in stable condition to [facility] via EMS with all of their personal belongings.

## 2021-08-31 NOTE — DISCHARGE SUMMARY
Name:  Marcell Hinds  Room:  /9678-88  MRN:    [id number]    Discharge Summary      This discharge summary is in conjunction with a complete physical exam done on the day of discharge. Discharging Physician: Dr. Dalia Bonds: 8/20/2021  Discharge:  8/31/2021    HPI:  Vanessa Meyers is 64 y.o. male who presented with complaint of SOB . Symptom onset was acute for a time period of 1 day. The severity is described as severe. The course of his symptoms over time is worsening. The symptoms oxygen with rest and worsened with none. The patient's symptom is associated with altered mental status .     Timmy Dionicia Hammans is 64 y.o. male with history of multiple comorbidities including HTN, CAD, PVD, history of CVA and rheumatoid arthritis  Is now brought to the ER by family member for altered mental status  Per report, patient had a fall earlier in the day and had possibly some concussion  He was able to get up on his own. At that time he insisted he is doing fine  However at the day progressed he became more confused  But later he was found slumped over the chair and he was in labored breathing  He became altered and minimally responsive     When EMS arrived his oxygen saturation was 80% on room air. In the ED, patient is lethargic and somnolent and unable to give history  At the time of my interview, he was unable to give history due to lethargy       Diagnoses this Admission and Hospital Course   Acute Hypoxic and Hypercapnic Resp Failure. Rt lung PNA organism unspecified. COPD with AE POA  CT with multifocal unilateral Rt lung PNA  on levaquin and zosyn. Changed to cefepime and vanc with worsening hypoxia and imaging  BID solumedrol IV for copd exacerbation .     duonebs, cx remain neg  Obtained pulm consult for worsening symptoms   Sp BAL while pt intubated for MICHELLE  F/w cx   Improving resp status, now on RA   Mild wheezing today   pred taper - no wheezing today     Sepsis POA - due to pna, s. p IVF boluses  Improved, BP stable       Afibb with RVR - new onset   - rates Did not respond to IV BB, cardizem Gtt or digoxin or Amio gtt   Unsuccessful MICHELLE cardioversion 8/26   Added cardizem gtt to amio gtt , rates improving  Changed amio to oral dose   Cardiology managing  Continued lovenox full dose- switched to eliquis   Now in sinus rhythm     Acute Metabolic and possibly toxic encephalopathy. Sec to polysubstance use - urine tox with amphetamine, MJ and opiate   CT head with old left caudate lacunar CVA, but no acute findings. - worsening mentation likely with steroids and sepsis, doubt alcohol withdrawal  - supportive care, improved now      Hyponatremia - improving initially with iv NS but worsened with fluid overload  >129--133     Mild Rhabdomyolysis without renal failure. CK 2700 IVF as above. CK - improved  To 300 with IVF     Thrombocytopenia and Lymphopenia. COVID (-)  Likely with sepsis - improving   Resumed ASA ,   Checked fibrinogen, LDH, haptoglobin and HIT - all negative. Resumed lovenox bid now- switched to eliquis      Chronic Pain opiate dependent -  Resumed PTA norco PRN.      Avoid excessive sedatives      Gen weakness - started PT/OT. D/c to SNF        DVT Prophylaxis: SCD, resume lovenox. Held if platelets <06T.       Procedures (Please Review Full Report for Details)  Bronchoscopy  Intubation  MICHELLE, Cardioversion    Consults    Cardiology  Pulmonology   Podiatry      Physical Exam at Discharge:    /68   Pulse 75   Temp 97.3 °F (36.3 °C) (Oral)   Resp 18   Ht 6' (1.829 m)   Wt 173 lb 5 oz (78.6 kg)   SpO2 91%   BMI 23.51 kg/m²     See today's progress note    CBC: Recent Labs     08/29/21 0527   WBC 8.9   HGB 12.3*   HCT 36.7*   .0*        BMP:   Recent Labs     08/29/21 0527      K 3.8   CL 98*   CO2 29   BUN 11   CREATININE 0.6*       U/A:    Lab Results   Component Value Date    COLORU Yellow 08/20/2021    WBCUA 0-2 08/20/2021 RBCUA 5-10 08/20/2021    CLARITYU Clear 08/20/2021    SPECGRAV >=1.030 08/20/2021    LEUKOCYTESUR Negative 08/20/2021    BLOODU LARGE 08/20/2021    GLUCOSEU 100 08/20/2021       ABG    Lab Results   Component Value Date    VDW9WJS 27.9 08/24/2021    BEART 4.6 08/24/2021    D3RHCEFP 95.7 08/24/2021    PHART 7.497 08/24/2021    XTT3XAV 36.9 08/24/2021    PO2ART 71.9 08/24/2021    OQV5MOG 29.1 08/24/2021         CULTURES  COVID: not detected  Respiratory: NGTD  Respiratory PCR: negative  Blood: NGTD  MRSA nasal screen: negative    RADIOLOGY  CT CHEST PULMONARY EMBOLISM W CONTRAST   Final Result   1. New nonspecific bilateral upper lung ground-glass opacities due to an   infectious/inflammatory process including atypical viral pneumonia. 2. Unchanged right pneumonia. 3. New small right and trace left pleural effusions. XR CHEST PORTABLE   Final Result   Right greater than left lower lung multifocal ill-defined consolidation   consistent with pneumonia, not significantly changed. CT CHEST PULMONARY EMBOLISM W CONTRAST   Final Result   No evidence of pulmonary embolism. Multifocal pneumonia involving the right lung. CT HEAD WO CONTRAST   Final Result   No acute intracranial abnormality. Specifically, no acute intracranial   hemorrhage or mass effect. Mild chronic small vessel ischemic disease. Tiny left caudate old lacunar   infarct. XR CHEST PORTABLE   Final Result   Multifocal airspace opacities may represent developing pulmonary edema or   pneumonitis.                Discharge Medications     Medication List      START taking these medications    amiodarone 200 MG tablet  Commonly known as: CORDARONE  2 qd- 6 days, 1 qd thereafter     apixaban 5 MG Tabs tablet  Commonly known as: ELIQUIS  Take 1 tablet by mouth 2 times daily     metoprolol tartrate 50 MG tablet  Commonly known as: LOPRESSOR  Take 1 tablet by mouth 2 times daily     predniSONE 10 MG tablet  Commonly known as: DELTASONE  3 qd- 2 days,2 qd- 3 days,1 qd- 3 days        CHANGE how you take these medications    QUEtiapine 50 MG tablet  Commonly known as: SEROQUEL  Take 1 tablet by mouth 2 times daily  What changed:   · medication strength  · how much to take        CONTINUE taking these medications    albuterol sulfate  (90 Base) MCG/ACT inhaler     atorvastatin 80 MG tablet  Commonly known as: LIPITOR  TAKE 1 TABLET BY MOUTH NIGHTLY     fluticasone-salmeterol 100-50 MCG/DOSE diskus inhaler  Commonly known as: ADVAIR     gabapentin 300 MG capsule  Commonly known as: NEURONTIN  TAKE 1 CAPSULE BY MOUTH 3 TIMES DAILY. HYDROcodone-acetaminophen 7.5-300 MG Tabs  Take 7.5 mg of opioid by mouth 3 times daily as needed for Pain for up to 2 days. nicotine 21 MG/24HR  Commonly known as: [address] [facility] 1 patch onto the skin daily     Spiriva Respimat 2.5 MCG/ACT Aers inhaler  Generic drug: tiotropium     traZODone 100 MG tablet  Commonly known as: DESYREL        STOP taking these medications    amLODIPine 5 MG tablet  Commonly known as: NORVASC     aspirin 325 MG EC tablet     atenolol 25 MG tablet  Commonly known as: TENORMIN           Where to Get Your Medications      You can get these medications from any pharmacy    Bring a paper prescription for each of these medications  · HYDROcodone-acetaminophen 7.5-300 MG Tabs     Information about where to get these medications is not yet available    Ask your nurse or doctor about these medications  · amiodarone 200 MG tablet  · apixaban 5 MG Tabs tablet  · metoprolol tartrate 50 MG tablet  · predniSONE 10 MG tablet  · QUEtiapine 50 MG tablet     Pharmacy Instructions:     [facility]   [address]  [place], [address]  [phone]              Discharged in stable condition to SNF. Follow Up: Follow up with PCP. Total time spent on discharge is 35 minutes    SANDRA Alicea.

## 2021-08-31 NOTE — PROGRESS NOTES
pt c/o pain 8/10 this morning. PRN pain medication given. See MAR. Pt denies any further needs. Will continue to monitor.

## 2021-08-31 NOTE — PROGRESS NOTES
RN called report to [facility] @ [address], pt awaiting EMS for transport.

## 2021-08-31 NOTE — PROGRESS NOTES
Pt resting in bed this evening watching TV, shift assessment complete and all meds given per MAR. Pt reporting pain 9/10, PRN pain medication given. Pts , coverage provided. Beverage and snack offered. Bed in lowest position and call light within reach. Pt denies any further needs at this time. Will continue to monitor and assess.
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
## 2021-08-31 NOTE — PROGRESS NOTES
Hospitalist Progress Note      PCP: NENA Horvath - CNP    Date of Admission: 8/20/2021    Chief Complaint: AMS from home. Drug screen with opiates, amphetamine, cannabis. worsened since adm with worsening hypoxia, confusion development of new Afibb with high HR    Did not respond to IV BB, cardizem boluses and Gtt or digoxin boluses   Transferred to ICU , had MICHELLE cardioversion after intubation this am with no success, added cardizem gtt to amio gtt now    Subjective:-    Lenore Nicholson seen up in bed watching TV, on 2 L oxygen  Tolerated diet and feels ok today     Off amio gtt , now loading oral dose, continued on cardizem gtt       Now in sinus rhythm.   Now on room air        Medications:  Reviewed    Infusion Medications    sodium chloride Stopped (08/29/21 1441)    dextrose      sodium chloride Stopped (08/25/21 2019)     Scheduled Medications    predniSONE  30 mg Oral Daily    apixaban  5 mg Oral BID    amiodarone  400 mg Oral Daily    sodium chloride flush  10 mL IntraVENous 2 times per day    potassium phosphate IVPB  15 mmol IntraVENous Once    metoprolol tartrate  50 mg Oral BID    ipratropium  2 puff Inhalation Q4H    levalbuterol  2 puff Inhalation Q4H    gabapentin  300 mg Oral TID    insulin lispro  0-12 Units SubCUTAneous TID WC    insulin lispro  0-6 Units SubCUTAneous Nightly    sodium chloride flush  5-40 mL IntraVENous 2 times per day    atorvastatin  80 mg Oral Nightly    nicotine  1 patch Transdermal Daily     PRN Meds: sodium chloride flush, sodium chloride, potassium chloride, perflutren lipid microspheres, HYDROcodone 5 mg - acetaminophen, glucose, dextrose, glucagon (rDNA), dextrose, sodium chloride flush, sodium chloride, ondansetron **OR** ondansetron, polyethylene glycol, acetaminophen **OR** acetaminophen      Intake/Output Summary (Last 24 hours) at 8/31/2021 0851  Last data filed at 8/31/2021 0839  Gross per 24 hour   Intake 702 ml   Output 550 ml   Net CT HEAD WO CONTRAST   Final Result   No acute intracranial abnormality. Specifically, no acute intracranial   hemorrhage or mass effect. Mild chronic small vessel ischemic disease. Tiny left caudate old lacunar   infarct. XR CHEST PORTABLE   Final Result   Multifocal airspace opacities may represent developing pulmonary edema or   pneumonitis. Blood cx NGTD    covid neg    resp cx - pending  BAL pending      Assessment/Plan:    Acute Hypoxic and Hypercapnic Resp Failure. Rt lung PNA organism unspecified. COPD with AE POA  CT with multifocal unilateral Rt lung PNA  on levaquin and zosyn. Changed to cefepime and vanc with worsening hypoxia and imaging  BID solumedrol IV for copd exacerbation . duonebs, cx remain neg  Obtained pulm consult for worsening symptoms   Sp BAL  while pt intubated for MICHELLE  F/w cx   Improving resp status, now on RA   Mild wheezing today   pred taper - no wheezing today    Sepsis POA - due to pna, s.p IVF boluses  Improved , BP stable      Afibb with RVR - new onset   - rates Did not respond to IV BB, cardizem Gtt or digoxin or Amio gtt   Unsuccessful MICHELLE cardioversion 8/26   Added cardizem gtt to amio gtt , rates improving  Changed amio to oral dose   Cardiology managing  Continue lovenox full dose- switch to eliquis   Now in sinus rhythm    Acute Metabolic and possibly toxic encephalopathy. Sec to polysubstance use - urine tox with amphetamine, MJ and opiate   CT head with old left caudate lacunar CVA, but no acute findings. - worsening mentation likely with steroids and sepsis  , doubt alcohol withdrawal  - supportive care , improved now     Hyponatremia - improving initially with iv NS but worsened with fluid overload  >129--133    Mild Rhabdomyolysis without renal failure. CK 2700 IVF as above. CK - improved  To 300 with IVF    Thrombocytopenia and Lymphopenia.    COVID (-)  Likely with sepsis - improving   Resumed ASA ,   Checked fibrinogen, LDH, haptoglobin and HIT - all negative. Resumed lovenox bid now- switched to eliquis     Chronic Pain opiate dependent -  Resumed PTA norco PRN. Avoid excessive sedatives     Gen weakness - start PT      DVT Prophylaxis: SCD, resume lovenox. Hold if platelets <45B. Diet: ADULT DIET;  Dysphagia - Soft and Bite Sized  Adult Oral Nutrition Supplement; Standard High Calorie/High Protein Oral Supplement  Code Status: Full Code    Ot/pt  precert pending for SNF     Mohsen Jaquez MD

## 2021-08-31 NOTE — CARE COORDINATION
DISCHARGE ORDER  Date/Time 2021 11:06 AM  Completed by: Shiva Ortega RN, Case Management    Patient Name: Adriane Dahl    : 1960      Admit order Date and Status: IP 2021  Noted discharge order. (verify MD's last order for status of admission/Traditional Medicare 3 [place] Inpatient qualifying stay required for SNF)    Confirmed discharge plan with:              Patient: Kari Frausto (daughter)                Discharge to Facility:    Name: [facility] Gotti Address: [address] [place] [address] Phone: [phone]   Fax: [phone]      Facility phone number for staff giving report: see above   Pre-certification completed: [facility] Exemption Notification (HENS) completed: EVELIO  Discharge orders and Continuity of Care faxed to facility: YES     Transportation:               Medical Transport explained with choice list offered to pt/family. Choice: (no preference)  Agency used: [facility]   time:   13;00 PM      Pt/family/Nursing/Facility aware of  time:  Patient  Call placed to daughter Kari Frausto (VM left)  [facility] admissions)  Adriana/ Janee ()      Comments: NA    Pt is being d/c'd to [facility] today. Pt's O2 sats are 98% on RA. Discharge timeout done with Theo Metzger RN. All discharge needs and concerns addressed. Discharging nurse to complete IMTIAZ, reconcile AVS, and place final copy with patient's discharge packet. Discharging RN to ensure that written prescriptions for  Level II medications are sent with patient to the facility as per protocol.
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
## 2022-01-01 ENCOUNTER — OFFICE VISIT (OUTPATIENT)
Dept: CARDIOLOGY CLINIC | Age: 62
End: 2022-01-01
Payer: MEDICAID

## 2022-01-01 ENCOUNTER — APPOINTMENT (OUTPATIENT)
Dept: CT IMAGING | Age: 62
DRG: 055 | End: 2022-01-01
Attending: INTERNAL MEDICINE
Payer: MEDICAID

## 2022-01-01 ENCOUNTER — HOSPITAL ENCOUNTER (EMERGENCY)
Age: 62
Discharge: ANOTHER HOSPITAL | End: 2022-05-18
Attending: STUDENT IN AN ORGANIZED HEALTH CARE EDUCATION/TRAINING PROGRAM
Payer: MEDICAID

## 2022-01-01 ENCOUNTER — HOSPITAL ENCOUNTER (INPATIENT)
Age: 62
LOS: 1 days | DRG: 951 | End: 2022-05-21
Attending: INTERNAL MEDICINE | Admitting: INTERNAL MEDICINE
Payer: COMMERCIAL

## 2022-01-01 ENCOUNTER — HOSPITAL ENCOUNTER (INPATIENT)
Age: 62
LOS: 2 days | Discharge: HOSPICE | DRG: 055 | End: 2022-05-20
Attending: INTERNAL MEDICINE | Admitting: INTERNAL MEDICINE
Payer: MEDICAID

## 2022-01-01 ENCOUNTER — APPOINTMENT (OUTPATIENT)
Dept: CT IMAGING | Age: 62
End: 2022-01-01
Payer: MEDICAID

## 2022-01-01 ENCOUNTER — APPOINTMENT (OUTPATIENT)
Dept: GENERAL RADIOLOGY | Age: 62
End: 2022-01-01
Payer: MEDICAID

## 2022-01-01 VITALS
HEART RATE: 73 BPM | DIASTOLIC BLOOD PRESSURE: 98 MMHG | WEIGHT: 184 LBS | SYSTOLIC BLOOD PRESSURE: 138 MMHG | TEMPERATURE: 98.6 F | OXYGEN SATURATION: 99 % | BODY MASS INDEX: 24.95 KG/M2

## 2022-01-01 VITALS
SYSTOLIC BLOOD PRESSURE: 105 MMHG | HEIGHT: 72 IN | BODY MASS INDEX: 25.56 KG/M2 | WEIGHT: 188.71 LBS | DIASTOLIC BLOOD PRESSURE: 70 MMHG | TEMPERATURE: 98.1 F | RESPIRATION RATE: 22 BRPM | HEART RATE: 69 BPM | OXYGEN SATURATION: 84 %

## 2022-01-01 VITALS
TEMPERATURE: 98.8 F | OXYGEN SATURATION: 85 % | BODY MASS INDEX: 25.56 KG/M2 | RESPIRATION RATE: 23 BRPM | HEIGHT: 72 IN | HEART RATE: 77 BPM | DIASTOLIC BLOOD PRESSURE: 70 MMHG | SYSTOLIC BLOOD PRESSURE: 105 MMHG | WEIGHT: 188.71 LBS

## 2022-01-01 VITALS
BODY MASS INDEX: 25.5 KG/M2 | OXYGEN SATURATION: 99 % | HEART RATE: 90 BPM | WEIGHT: 188 LBS | SYSTOLIC BLOOD PRESSURE: 219 MMHG | RESPIRATION RATE: 27 BRPM | DIASTOLIC BLOOD PRESSURE: 113 MMHG

## 2022-01-01 DIAGNOSIS — I10 ESSENTIAL HYPERTENSION: ICD-10-CM

## 2022-01-01 DIAGNOSIS — I48.91 ATRIAL FIBRILLATION WITH RAPID VENTRICULAR RESPONSE (HCC): ICD-10-CM

## 2022-01-01 DIAGNOSIS — Z95.1 S/P CABG X 4: ICD-10-CM

## 2022-01-01 DIAGNOSIS — Z87.891 HISTORY OF TOBACCO ABUSE: ICD-10-CM

## 2022-01-01 DIAGNOSIS — I25.10 CORONARY ARTERY DISEASE INVOLVING NATIVE CORONARY ARTERY OF NATIVE HEART WITHOUT ANGINA PECTORIS: ICD-10-CM

## 2022-01-01 DIAGNOSIS — E78.00 PURE HYPERCHOLESTEROLEMIA: ICD-10-CM

## 2022-01-01 DIAGNOSIS — R06.02 SOB (SHORTNESS OF BREATH): Primary | ICD-10-CM

## 2022-01-01 DIAGNOSIS — I61.9 INTRAPARENCHYMAL HEMORRHAGE OF BRAIN (HCC): Primary | ICD-10-CM

## 2022-01-01 LAB
A/G RATIO: 1.2 (ref 1.1–2.2)
ABO/RH: NORMAL
ACETAMINOPHEN LEVEL: <5 UG/ML (ref 10–30)
ALBUMIN SERPL-MCNC: 3.3 G/DL (ref 3.4–5)
ALBUMIN SERPL-MCNC: 3.5 G/DL (ref 3.4–5)
ALBUMIN SERPL-MCNC: 3.9 G/DL (ref 3.4–5)
ALP BLD-CCNC: 184 U/L (ref 40–129)
ALT SERPL-CCNC: 42 U/L (ref 10–40)
AMPHETAMINE SCREEN, URINE: ABNORMAL
ANION GAP SERPL CALCULATED.3IONS-SCNC: 12 MMOL/L (ref 3–16)
ANION GAP SERPL CALCULATED.3IONS-SCNC: 14 MMOL/L (ref 3–16)
ANION GAP SERPL CALCULATED.3IONS-SCNC: 15 MMOL/L (ref 3–16)
ANION GAP SERPL CALCULATED.3IONS-SCNC: 16 MMOL/L (ref 3–16)
ANION GAP SERPL CALCULATED.3IONS-SCNC: 22 MMOL/L (ref 3–16)
ANTIBODY SCREEN: NORMAL
AST SERPL-CCNC: 93 U/L (ref 15–37)
BARBITURATE SCREEN URINE: ABNORMAL
BASE EXCESS ARTERIAL: 4.1 MMOL/L (ref -3–3)
BASE EXCESS VENOUS: -5.6 MMOL/L (ref -3–3)
BASOPHILS ABSOLUTE: 0 K/UL (ref 0–0.2)
BASOPHILS ABSOLUTE: 0 K/UL (ref 0–0.2)
BASOPHILS ABSOLUTE: 0.1 K/UL (ref 0–0.2)
BASOPHILS ABSOLUTE: 0.1 K/UL (ref 0–0.2)
BASOPHILS RELATIVE PERCENT: 0.2 %
BASOPHILS RELATIVE PERCENT: 0.4 %
BASOPHILS RELATIVE PERCENT: 0.7 %
BASOPHILS RELATIVE PERCENT: 0.9 %
BENZODIAZEPINE SCREEN, URINE: ABNORMAL
BILIRUB SERPL-MCNC: 0.8 MG/DL (ref 0–1)
BILIRUBIN URINE: NEGATIVE
BLOOD, URINE: ABNORMAL
BUN BLDV-MCNC: 11 MG/DL (ref 7–20)
BUN BLDV-MCNC: 3 MG/DL (ref 7–20)
BUN BLDV-MCNC: 4 MG/DL (ref 7–20)
BUN BLDV-MCNC: 5 MG/DL (ref 7–20)
BUN BLDV-MCNC: 6 MG/DL (ref 7–20)
CALCIUM IONIZED: 1.05 MMOL/L (ref 1.12–1.32)
CALCIUM SERPL-MCNC: 5.5 MG/DL (ref 8.3–10.6)
CALCIUM SERPL-MCNC: 8.5 MG/DL (ref 8.3–10.6)
CALCIUM SERPL-MCNC: 8.9 MG/DL (ref 8.3–10.6)
CALCIUM SERPL-MCNC: 8.9 MG/DL (ref 8.3–10.6)
CALCIUM SERPL-MCNC: 9 MG/DL (ref 8.3–10.6)
CANNABINOID SCREEN URINE: ABNORMAL
CARBOXYHEMOGLOBIN ARTERIAL: 1.2 % (ref 0–1.5)
CARBOXYHEMOGLOBIN: 4.1 % (ref 0–1.5)
CHLORIDE BLD-SCNC: 108 MMOL/L (ref 99–110)
CHLORIDE BLD-SCNC: 91 MMOL/L (ref 99–110)
CHLORIDE BLD-SCNC: 92 MMOL/L (ref 99–110)
CHLORIDE BLD-SCNC: 94 MMOL/L (ref 99–110)
CHLORIDE BLD-SCNC: 98 MMOL/L (ref 99–110)
CLARITY: CLEAR
CO2: 17 MMOL/L (ref 21–32)
CO2: 18 MMOL/L (ref 21–32)
CO2: 23 MMOL/L (ref 21–32)
CO2: 24 MMOL/L (ref 21–32)
CO2: 26 MMOL/L (ref 21–32)
COCAINE METABOLITE SCREEN URINE: ABNORMAL
COLOR: YELLOW
CREAT SERPL-MCNC: 0.6 MG/DL (ref 0.8–1.3)
CREAT SERPL-MCNC: 1.2 MG/DL (ref 0.8–1.3)
CREAT SERPL-MCNC: <0.5 MG/DL (ref 0.8–1.3)
EKG ATRIAL RATE: 64 BPM
EKG DIAGNOSIS: NORMAL
EKG P AXIS: 78 DEGREES
EKG P-R INTERVAL: 142 MS
EKG Q-T INTERVAL: 488 MS
EKG QRS DURATION: 102 MS
EKG QTC CALCULATION (BAZETT): 503 MS
EKG R AXIS: 88 DEGREES
EKG T AXIS: 77 DEGREES
EKG VENTRICULAR RATE: 64 BPM
EOSINOPHILS ABSOLUTE: 0 K/UL (ref 0–0.6)
EOSINOPHILS ABSOLUTE: 0.1 K/UL (ref 0–0.6)
EOSINOPHILS RELATIVE PERCENT: 0 %
EOSINOPHILS RELATIVE PERCENT: 0.1 %
EOSINOPHILS RELATIVE PERCENT: 0.1 %
EOSINOPHILS RELATIVE PERCENT: 0.8 %
ESTIMATED AVERAGE GLUCOSE: 111.2 MG/DL
ETHANOL: NORMAL MG/DL (ref 0–0.08)
GFR AFRICAN AMERICAN: >60
GFR NON-AFRICAN AMERICAN: >60
GLUCOSE BLD-MCNC: 128 MG/DL (ref 70–99)
GLUCOSE BLD-MCNC: 147 MG/DL (ref 70–99)
GLUCOSE BLD-MCNC: 197 MG/DL (ref 70–99)
GLUCOSE BLD-MCNC: 208 MG/DL (ref 70–99)
GLUCOSE BLD-MCNC: 220 MG/DL (ref 70–99)
GLUCOSE URINE: 250 MG/DL
HBA1C MFR BLD: 5.5 %
HCO3 ARTERIAL: 29 MMOL/L (ref 21–29)
HCO3 VENOUS: 18.9 MMOL/L (ref 23–29)
HCT VFR BLD CALC: 38.8 % (ref 40.5–52.5)
HCT VFR BLD CALC: 40.2 % (ref 40.5–52.5)
HCT VFR BLD CALC: 40.6 % (ref 40.5–52.5)
HCT VFR BLD CALC: 41.9 % (ref 40.5–52.5)
HCT VFR BLD CALC: 45.3 % (ref 40.5–52.5)
HEMOGLOBIN, ART, EXTENDED: 14.1 G/DL
HEMOGLOBIN: 13 G/DL (ref 13.5–17.5)
HEMOGLOBIN: 13.4 G/DL (ref 13.5–17.5)
HEMOGLOBIN: 13.9 G/DL (ref 13.5–17.5)
HEMOGLOBIN: 14.1 G/DL (ref 13.5–17.5)
HEMOGLOBIN: 14.8 G/DL (ref 13.5–17.5)
INFLUENZA A: NOT DETECTED
INFLUENZA B: NOT DETECTED
INR BLD: 1.24 (ref 0.88–1.12)
KETONES, URINE: NEGATIVE MG/DL
LACTIC ACID, SEPSIS: 10 MMOL/L (ref 0.4–1.9)
LACTIC ACID: 1.4 MMOL/L (ref 0.4–2)
LACTIC ACID: 1.8 MMOL/L (ref 0.4–2)
LEUKOCYTE ESTERASE, URINE: NEGATIVE
LYMPHOCYTES ABSOLUTE: 1 K/UL (ref 1–5.1)
LYMPHOCYTES ABSOLUTE: 1.1 K/UL (ref 1–5.1)
LYMPHOCYTES ABSOLUTE: 1.4 K/UL (ref 1–5.1)
LYMPHOCYTES ABSOLUTE: 2 K/UL (ref 1–5.1)
LYMPHOCYTES RELATIVE PERCENT: 10.5 %
LYMPHOCYTES RELATIVE PERCENT: 26 %
LYMPHOCYTES RELATIVE PERCENT: 6 %
LYMPHOCYTES RELATIVE PERCENT: 7.5 %
Lab: ABNORMAL
MAGNESIUM: 1.4 MG/DL (ref 1.8–2.4)
MAGNESIUM: 2.1 MG/DL (ref 1.8–2.4)
MAGNESIUM: 2.6 MG/DL (ref 1.8–2.4)
MCH RBC QN AUTO: 29.4 PG (ref 26–34)
MCH RBC QN AUTO: 30 PG (ref 26–34)
MCH RBC QN AUTO: 30.1 PG (ref 26–34)
MCH RBC QN AUTO: 30.2 PG (ref 26–34)
MCH RBC QN AUTO: 30.8 PG (ref 26–34)
MCHC RBC AUTO-ENTMCNC: 32.6 G/DL (ref 31–36)
MCHC RBC AUTO-ENTMCNC: 33.3 G/DL (ref 31–36)
MCHC RBC AUTO-ENTMCNC: 33.4 G/DL (ref 31–36)
MCHC RBC AUTO-ENTMCNC: 33.6 G/DL (ref 31–36)
MCHC RBC AUTO-ENTMCNC: 34.2 G/DL (ref 31–36)
MCV RBC AUTO: 89.2 FL (ref 80–100)
MCV RBC AUTO: 89.8 FL (ref 80–100)
MCV RBC AUTO: 90.2 FL (ref 80–100)
MCV RBC AUTO: 90.2 FL (ref 80–100)
MCV RBC AUTO: 90.5 FL (ref 80–100)
METHADONE SCREEN, URINE: ABNORMAL
METHEMOGLOBIN ARTERIAL: 0.3 % (ref 0–1.4)
METHEMOGLOBIN VENOUS: 0.3 %
MICROSCOPIC EXAMINATION: YES
MONOCYTES ABSOLUTE: 0.6 K/UL (ref 0–1.3)
MONOCYTES ABSOLUTE: 0.7 K/UL (ref 0–1.3)
MONOCYTES ABSOLUTE: 0.9 K/UL (ref 0–1.3)
MONOCYTES ABSOLUTE: 0.9 K/UL (ref 0–1.3)
MONOCYTES RELATIVE PERCENT: 4.9 %
MONOCYTES RELATIVE PERCENT: 5.7 %
MONOCYTES RELATIVE PERCENT: 6.3 %
MONOCYTES RELATIVE PERCENT: 7.4 %
NEUTROPHILS ABSOLUTE: 11.1 K/UL (ref 1.7–7.7)
NEUTROPHILS ABSOLUTE: 12.8 K/UL (ref 1.7–7.7)
NEUTROPHILS ABSOLUTE: 13.9 K/UL (ref 1.7–7.7)
NEUTROPHILS ABSOLUTE: 5 K/UL (ref 1.7–7.7)
NEUTROPHILS RELATIVE PERCENT: 65.4 %
NEUTROPHILS RELATIVE PERCENT: 82.4 %
NEUTROPHILS RELATIVE PERCENT: 86.6 %
NEUTROPHILS RELATIVE PERCENT: 88.1 %
NITRITE, URINE: NEGATIVE
O2 CONTENT, VEN: 21 VOL %
O2 SAT, ARTERIAL: >100 % (ref 93–100)
O2 SAT, VEN: 97 %
O2 THERAPY: ABNORMAL
OPIATE SCREEN URINE: ABNORMAL
OSMOLALITY URINE: 287 MOSM/KG (ref 390–1070)
OSMOLALITY: 287 MOSM/KG (ref 278–305)
OXYCODONE URINE: POSITIVE
PCO2 ARTERIAL: 45.1 MMHG (ref 35–45)
PCO2, VEN: 34.4 MMHG (ref 40–50)
PDW BLD-RTO: 16.1 % (ref 12.4–15.4)
PDW BLD-RTO: 16.7 % (ref 12.4–15.4)
PDW BLD-RTO: 16.8 % (ref 12.4–15.4)
PDW BLD-RTO: 16.8 % (ref 12.4–15.4)
PDW BLD-RTO: 17 % (ref 12.4–15.4)
PH ARTERIAL: 7.42 (ref 7.35–7.45)
PH UA: 7.5
PH UA: 7.5 (ref 5–8)
PH VENOUS: 7.36 (ref 7.35–7.45)
PH VENOUS: 7.39 (ref 7.35–7.45)
PHENCYCLIDINE SCREEN URINE: ABNORMAL
PHOSPHORUS: 2.8 MG/DL (ref 2.5–4.9)
PHOSPHORUS: 4 MG/DL (ref 2.5–4.9)
PLATELET # BLD: 147 K/UL (ref 135–450)
PLATELET # BLD: 205 K/UL (ref 135–450)
PLATELET # BLD: 219 K/UL (ref 135–450)
PLATELET # BLD: 220 K/UL (ref 135–450)
PLATELET # BLD: 221 K/UL (ref 135–450)
PMV BLD AUTO: 8.4 FL (ref 5–10.5)
PMV BLD AUTO: 8.5 FL (ref 5–10.5)
PMV BLD AUTO: 8.8 FL (ref 5–10.5)
PMV BLD AUTO: 8.9 FL (ref 5–10.5)
PMV BLD AUTO: 9 FL (ref 5–10.5)
PO2 ARTERIAL: 164 MMHG (ref 75–108)
PO2, VEN: 89.9 MMHG (ref 25–40)
POTASSIUM REFLEX MAGNESIUM: 4.2 MMOL/L (ref 3.5–5.1)
POTASSIUM SERPL-SCNC: 2.4 MMOL/L (ref 3.5–5.1)
POTASSIUM SERPL-SCNC: 3.2 MMOL/L (ref 3.5–5.1)
POTASSIUM SERPL-SCNC: 3.2 MMOL/L (ref 3.5–5.1)
POTASSIUM SERPL-SCNC: 3.6 MMOL/L (ref 3.5–5.1)
PROPOXYPHENE SCREEN: ABNORMAL
PROTEIN UA: 100 MG/DL
PROTHROMBIN TIME: 14.1 SEC (ref 9.9–12.7)
RBC # BLD: 4.29 M/UL (ref 4.2–5.9)
RBC # BLD: 4.46 M/UL (ref 4.2–5.9)
RBC # BLD: 4.52 M/UL (ref 4.2–5.9)
RBC # BLD: 4.7 M/UL (ref 4.2–5.9)
RBC # BLD: 5.02 M/UL (ref 4.2–5.9)
RBC UA: NORMAL /HPF (ref 0–4)
SALICYLATE, SERUM: <0.3 MG/DL (ref 15–30)
SARS-COV-2 RNA, RT PCR: NOT DETECTED
SODIUM BLD-SCNC: 130 MMOL/L (ref 136–145)
SODIUM BLD-SCNC: 132 MMOL/L (ref 136–145)
SODIUM BLD-SCNC: 134 MMOL/L (ref 136–145)
SODIUM BLD-SCNC: 137 MMOL/L (ref 136–145)
SODIUM BLD-SCNC: 137 MMOL/L (ref 136–145)
SPECIFIC GRAVITY UA: 1.02 (ref 1–1.03)
TCO2 ARTERIAL: 31 MMOL/L
TCO2 CALC VENOUS: 20 MMOL/L
TOTAL CK: 131 U/L (ref 39–308)
TOTAL PROTEIN: 7.2 G/DL (ref 6.4–8.2)
TROPONIN: <0.01 NG/ML
URINE REFLEX TO CULTURE: ABNORMAL
URINE TYPE: ABNORMAL
UROBILINOGEN, URINE: 0.2 E.U./DL
WBC # BLD: 13.5 K/UL (ref 4–11)
WBC # BLD: 14.7 K/UL (ref 4–11)
WBC # BLD: 15.7 K/UL (ref 4–11)
WBC # BLD: 7.6 K/UL (ref 4–11)
WBC # BLD: 8.9 K/UL (ref 4–11)
WBC UA: NORMAL /HPF (ref 0–5)

## 2022-01-01 PROCEDURE — 71045 X-RAY EXAM CHEST 1 VIEW: CPT

## 2022-01-01 PROCEDURE — 5A1945Z RESPIRATORY VENTILATION, 24-96 CONSECUTIVE HOURS: ICD-10-PCS | Performed by: INTERNAL MEDICINE

## 2022-01-01 PROCEDURE — 2580000003 HC RX 258: Performed by: STUDENT IN AN ORGANIZED HEALTH CARE EDUCATION/TRAINING PROGRAM

## 2022-01-01 PROCEDURE — 94003 VENT MGMT INPAT SUBQ DAY: CPT

## 2022-01-01 PROCEDURE — 2500000003 HC RX 250 WO HCPCS: Performed by: STUDENT IN AN ORGANIZED HEALTH CARE EDUCATION/TRAINING PROGRAM

## 2022-01-01 PROCEDURE — C9113 INJ PANTOPRAZOLE SODIUM, VIA: HCPCS | Performed by: STUDENT IN AN ORGANIZED HEALTH CARE EDUCATION/TRAINING PROGRAM

## 2022-01-01 PROCEDURE — 85025 COMPLETE CBC W/AUTO DIFF WBC: CPT

## 2022-01-01 PROCEDURE — 80048 BASIC METABOLIC PNL TOTAL CA: CPT

## 2022-01-01 PROCEDURE — 83605 ASSAY OF LACTIC ACID: CPT

## 2022-01-01 PROCEDURE — 6360000002 HC RX W HCPCS: Performed by: STUDENT IN AN ORGANIZED HEALTH CARE EDUCATION/TRAINING PROGRAM

## 2022-01-01 PROCEDURE — 85027 COMPLETE CBC AUTOMATED: CPT

## 2022-01-01 PROCEDURE — 2700000000 HC OXYGEN THERAPY PER DAY

## 2022-01-01 PROCEDURE — 4A03X5D MEASUREMENT OF ARTERIAL FLOW, INTRACRANIAL, EXTERNAL APPROACH: ICD-10-PCS | Performed by: RADIOLOGY

## 2022-01-01 PROCEDURE — 94761 N-INVAS EAR/PLS OXIMETRY MLT: CPT

## 2022-01-01 PROCEDURE — 6360000004 HC RX CONTRAST MEDICATION: Performed by: STUDENT IN AN ORGANIZED HEALTH CARE EDUCATION/TRAINING PROGRAM

## 2022-01-01 PROCEDURE — 99291 CRITICAL CARE FIRST HOUR: CPT

## 2022-01-01 PROCEDURE — 82330 ASSAY OF CALCIUM: CPT

## 2022-01-01 PROCEDURE — 99214 OFFICE O/P EST MOD 30 MIN: CPT | Performed by: INTERNAL MEDICINE

## 2022-01-01 PROCEDURE — 36415 COLL VENOUS BLD VENIPUNCTURE: CPT

## 2022-01-01 PROCEDURE — 86901 BLOOD TYPING SEROLOGIC RH(D): CPT

## 2022-01-01 PROCEDURE — 99232 SBSQ HOSP IP/OBS MODERATE 35: CPT | Performed by: NURSE PRACTITIONER

## 2022-01-01 PROCEDURE — 82077 ASSAY SPEC XCP UR&BREATH IA: CPT

## 2022-01-01 PROCEDURE — 1250000000 HC SEMI PRIVATE HOSPICE R&B

## 2022-01-01 PROCEDURE — 0BH17EZ INSERTION OF ENDOTRACHEAL AIRWAY INTO TRACHEA, VIA NATURAL OR ARTIFICIAL OPENING: ICD-10-PCS | Performed by: INTERNAL MEDICINE

## 2022-01-01 PROCEDURE — 80069 RENAL FUNCTION PANEL: CPT

## 2022-01-01 PROCEDURE — 82550 ASSAY OF CK (CPK): CPT

## 2022-01-01 PROCEDURE — 6370000000 HC RX 637 (ALT 250 FOR IP): Performed by: INTERNAL MEDICINE

## 2022-01-01 PROCEDURE — 87636 SARSCOV2 & INF A&B AMP PRB: CPT

## 2022-01-01 PROCEDURE — 82803 BLOOD GASES ANY COMBINATION: CPT

## 2022-01-01 PROCEDURE — 85610 PROTHROMBIN TIME: CPT

## 2022-01-01 PROCEDURE — 83930 ASSAY OF BLOOD OSMOLALITY: CPT

## 2022-01-01 PROCEDURE — 94002 VENT MGMT INPAT INIT DAY: CPT

## 2022-01-01 PROCEDURE — 80307 DRUG TEST PRSMV CHEM ANLYZR: CPT

## 2022-01-01 PROCEDURE — 80179 DRUG ASSAY SALICYLATE: CPT

## 2022-01-01 PROCEDURE — 6360000002 HC RX W HCPCS: Performed by: INTERNAL MEDICINE

## 2022-01-01 PROCEDURE — 96365 THER/PROPH/DIAG IV INF INIT: CPT

## 2022-01-01 PROCEDURE — 81001 URINALYSIS AUTO W/SCOPE: CPT

## 2022-01-01 PROCEDURE — 6360000002 HC RX W HCPCS: Performed by: PHYSICIAN ASSISTANT

## 2022-01-01 PROCEDURE — 93000 ELECTROCARDIOGRAM COMPLETE: CPT | Performed by: INTERNAL MEDICINE

## 2022-01-01 PROCEDURE — 93005 ELECTROCARDIOGRAM TRACING: CPT | Performed by: PHYSICIAN ASSISTANT

## 2022-01-01 PROCEDURE — 99223 1ST HOSP IP/OBS HIGH 75: CPT | Performed by: INTERNAL MEDICINE

## 2022-01-01 PROCEDURE — 2000000000 HC ICU R&B

## 2022-01-01 PROCEDURE — 2580000003 HC RX 258: Performed by: PHYSICIAN ASSISTANT

## 2022-01-01 PROCEDURE — 86850 RBC ANTIBODY SCREEN: CPT

## 2022-01-01 PROCEDURE — 83735 ASSAY OF MAGNESIUM: CPT

## 2022-01-01 PROCEDURE — 2500000003 HC RX 250 WO HCPCS: Performed by: PHYSICIAN ASSISTANT

## 2022-01-01 PROCEDURE — 36600 WITHDRAWAL OF ARTERIAL BLOOD: CPT

## 2022-01-01 PROCEDURE — 84484 ASSAY OF TROPONIN QUANT: CPT

## 2022-01-01 PROCEDURE — 83036 HEMOGLOBIN GLYCOSYLATED A1C: CPT

## 2022-01-01 PROCEDURE — 70498 CT ANGIOGRAPHY NECK: CPT

## 2022-01-01 PROCEDURE — 93010 ELECTROCARDIOGRAM REPORT: CPT | Performed by: INTERNAL MEDICINE

## 2022-01-01 PROCEDURE — 80143 DRUG ASSAY ACETAMINOPHEN: CPT

## 2022-01-01 PROCEDURE — 70450 CT HEAD/BRAIN W/O DYE: CPT

## 2022-01-01 PROCEDURE — 83935 ASSAY OF URINE OSMOLALITY: CPT

## 2022-01-01 PROCEDURE — 51702 INSERT TEMP BLADDER CATH: CPT

## 2022-01-01 PROCEDURE — 99222 1ST HOSP IP/OBS MODERATE 55: CPT | Performed by: NURSE PRACTITIONER

## 2022-01-01 PROCEDURE — 96375 TX/PRO/DX INJ NEW DRUG ADDON: CPT

## 2022-01-01 PROCEDURE — 80053 COMPREHEN METABOLIC PANEL: CPT

## 2022-01-01 PROCEDURE — 99233 SBSQ HOSP IP/OBS HIGH 50: CPT | Performed by: INTERNAL MEDICINE

## 2022-01-01 PROCEDURE — 31500 INSERT EMERGENCY AIRWAY: CPT

## 2022-01-01 PROCEDURE — 86900 BLOOD TYPING SEROLOGIC ABO: CPT

## 2022-01-01 RX ORDER — SCOLOPAMINE TRANSDERMAL SYSTEM 1 MG/1
1 PATCH, EXTENDED RELEASE TRANSDERMAL
Status: DISCONTINUED | OUTPATIENT
Start: 2022-05-22 | End: 2022-05-21 | Stop reason: HOSPADM

## 2022-01-01 RX ORDER — SUCCINYLCHOLINE CHLORIDE 20 MG/ML
INJECTION INTRAMUSCULAR; INTRAVENOUS DAILY PRN
Status: COMPLETED | OUTPATIENT
Start: 2022-01-01 | End: 2022-01-01

## 2022-01-01 RX ORDER — FENTANYL CITRATE-0.9 % NACL/PF 10 MCG/ML
25-200 PLASTIC BAG, INJECTION (ML) INTRAVENOUS CONTINUOUS
Status: DISCONTINUED | OUTPATIENT
Start: 2022-01-01 | End: 2022-01-01 | Stop reason: HOSPADM

## 2022-01-01 RX ORDER — MAGNESIUM SULFATE IN WATER 40 MG/ML
2000 INJECTION, SOLUTION INTRAVENOUS
Status: COMPLETED | OUTPATIENT
Start: 2022-01-01 | End: 2022-01-01

## 2022-01-01 RX ORDER — ONDANSETRON 2 MG/ML
4 INJECTION INTRAMUSCULAR; INTRAVENOUS EVERY 6 HOURS PRN
Status: DISCONTINUED | OUTPATIENT
Start: 2022-01-01 | End: 2022-01-01 | Stop reason: HOSPADM

## 2022-01-01 RX ORDER — ONDANSETRON 4 MG/1
4 TABLET, ORALLY DISINTEGRATING ORAL EVERY 8 HOURS PRN
Status: DISCONTINUED | OUTPATIENT
Start: 2022-01-01 | End: 2022-01-01 | Stop reason: HOSPADM

## 2022-01-01 RX ORDER — ONDANSETRON 2 MG/ML
4 INJECTION INTRAMUSCULAR; INTRAVENOUS EVERY 6 HOURS PRN
Status: CANCELLED | OUTPATIENT
Start: 2022-01-01

## 2022-01-01 RX ORDER — PROPOFOL 10 MG/ML
5-50 INJECTION, EMULSION INTRAVENOUS CONTINUOUS
Status: DISCONTINUED | OUTPATIENT
Start: 2022-01-01 | End: 2022-01-01 | Stop reason: HOSPADM

## 2022-01-01 RX ORDER — LORAZEPAM 2 MG/ML
1 INJECTION INTRAMUSCULAR ONCE
Status: COMPLETED | OUTPATIENT
Start: 2022-01-01 | End: 2022-01-01

## 2022-01-01 RX ORDER — MORPHINE SULFATE 4 MG/ML
4 INJECTION, SOLUTION INTRAMUSCULAR; INTRAVENOUS
Status: CANCELLED | OUTPATIENT
Start: 2022-01-01

## 2022-01-01 RX ORDER — LORAZEPAM 2 MG/ML
2 INJECTION INTRAMUSCULAR
Status: CANCELLED | OUTPATIENT
Start: 2022-01-01

## 2022-01-01 RX ORDER — SODIUM CHLORIDE 9 MG/ML
50 INJECTION, SOLUTION INTRAVENOUS ONCE
Status: DISCONTINUED | OUTPATIENT
Start: 2022-01-01 | End: 2022-01-01 | Stop reason: HOSPADM

## 2022-01-01 RX ORDER — ATORVASTATIN CALCIUM 80 MG/1
80 TABLET, FILM COATED ORAL NIGHTLY
Status: DISCONTINUED | OUTPATIENT
Start: 2022-01-01 | End: 2022-01-01

## 2022-01-01 RX ORDER — ONDANSETRON 4 MG/1
4 TABLET, ORALLY DISINTEGRATING ORAL EVERY 8 HOURS PRN
Status: CANCELLED | OUTPATIENT
Start: 2022-01-01

## 2022-01-01 RX ORDER — AMIODARONE HYDROCHLORIDE 200 MG/1
200 TABLET ORAL DAILY
Status: DISCONTINUED | OUTPATIENT
Start: 2022-01-01 | End: 2022-01-01

## 2022-01-01 RX ORDER — MORPHINE SULFATE 4 MG/ML
4 INJECTION, SOLUTION INTRAMUSCULAR; INTRAVENOUS
Status: DISCONTINUED | OUTPATIENT
Start: 2022-01-01 | End: 2022-05-21 | Stop reason: HOSPADM

## 2022-01-01 RX ORDER — ONDANSETRON 4 MG/1
4 TABLET, ORALLY DISINTEGRATING ORAL EVERY 8 HOURS PRN
Status: DISCONTINUED | OUTPATIENT
Start: 2022-01-01 | End: 2022-05-21 | Stop reason: HOSPADM

## 2022-01-01 RX ORDER — ACETAMINOPHEN 325 MG/1
650 TABLET ORAL EVERY 4 HOURS PRN
Status: DISCONTINUED | OUTPATIENT
Start: 2022-01-01 | End: 2022-01-01 | Stop reason: HOSPADM

## 2022-01-01 RX ORDER — POTASSIUM CHLORIDE 7.45 MG/ML
10 INJECTION INTRAVENOUS
Status: DISCONTINUED | OUTPATIENT
Start: 2022-01-01 | End: 2022-01-01

## 2022-01-01 RX ORDER — ONDANSETRON 2 MG/ML
4 INJECTION INTRAMUSCULAR; INTRAVENOUS EVERY 6 HOURS PRN
Status: DISCONTINUED | OUTPATIENT
Start: 2022-01-01 | End: 2022-05-21 | Stop reason: HOSPADM

## 2022-01-01 RX ORDER — ETOMIDATE 2 MG/ML
INJECTION INTRAVENOUS DAILY PRN
Status: COMPLETED | OUTPATIENT
Start: 2022-01-01 | End: 2022-01-01

## 2022-01-01 RX ORDER — MORPHINE SULFATE 20 MG/ML
5 SOLUTION ORAL
Status: CANCELLED | OUTPATIENT
Start: 2022-01-01

## 2022-01-01 RX ORDER — LISINOPRIL 5 MG/1
5 TABLET ORAL DAILY
Qty: 90 TABLET | Refills: 3 | Status: SHIPPED | OUTPATIENT
Start: 2022-01-01

## 2022-01-01 RX ORDER — LORAZEPAM 2 MG/ML
1 INJECTION INTRAMUSCULAR EVERY 6 HOURS PRN
Status: DISCONTINUED | OUTPATIENT
Start: 2022-01-01 | End: 2022-01-01

## 2022-01-01 RX ORDER — MORPHINE SULFATE 2 MG/ML
2 INJECTION, SOLUTION INTRAMUSCULAR; INTRAVENOUS
Status: DISCONTINUED | OUTPATIENT
Start: 2022-01-01 | End: 2022-01-01 | Stop reason: HOSPADM

## 2022-01-01 RX ORDER — SCOLOPAMINE TRANSDERMAL SYSTEM 1 MG/1
1 PATCH, EXTENDED RELEASE TRANSDERMAL
Status: CANCELLED | OUTPATIENT
Start: 2022-05-22

## 2022-01-01 RX ORDER — LORAZEPAM 2 MG/ML
2 INJECTION INTRAMUSCULAR
Status: DISCONTINUED | OUTPATIENT
Start: 2022-01-01 | End: 2022-01-01 | Stop reason: HOSPADM

## 2022-01-01 RX ORDER — ASPIRIN 81 MG/1
81 TABLET ORAL DAILY
Status: DISCONTINUED | OUTPATIENT
Start: 2022-01-01 | End: 2022-01-01

## 2022-01-01 RX ORDER — ACETAMINOPHEN 325 MG/1
650 TABLET ORAL EVERY 4 HOURS PRN
Status: CANCELLED | OUTPATIENT
Start: 2022-01-01

## 2022-01-01 RX ORDER — SODIUM CHLORIDE 9 MG/ML
50 INJECTION, SOLUTION INTRAVENOUS ONCE
Status: DISCONTINUED | OUTPATIENT
Start: 2022-01-01 | End: 2022-01-01

## 2022-01-01 RX ORDER — MORPHINE SULFATE 2 MG/ML
2 INJECTION, SOLUTION INTRAMUSCULAR; INTRAVENOUS
Status: CANCELLED | OUTPATIENT
Start: 2022-01-01

## 2022-01-01 RX ORDER — AMIODARONE HYDROCHLORIDE 200 MG/1
400 TABLET ORAL DAILY
Status: DISCONTINUED | OUTPATIENT
Start: 2022-01-01 | End: 2022-01-01

## 2022-01-01 RX ORDER — MORPHINE SULFATE 4 MG/ML
4 INJECTION, SOLUTION INTRAMUSCULAR; INTRAVENOUS
Status: DISCONTINUED | OUTPATIENT
Start: 2022-01-01 | End: 2022-01-01 | Stop reason: HOSPADM

## 2022-01-01 RX ORDER — MORPHINE SULFATE 2 MG/ML
2 INJECTION, SOLUTION INTRAMUSCULAR; INTRAVENOUS
Status: DISCONTINUED | OUTPATIENT
Start: 2022-01-01 | End: 2022-05-21 | Stop reason: HOSPADM

## 2022-01-01 RX ORDER — POTASSIUM CHLORIDE 7.45 MG/ML
10 INJECTION INTRAVENOUS ONCE
Status: COMPLETED | OUTPATIENT
Start: 2022-01-01 | End: 2022-01-01

## 2022-01-01 RX ORDER — SODIUM CHLORIDE 9 MG/ML
INJECTION, SOLUTION INTRAVENOUS CONTINUOUS
Status: DISCONTINUED | OUTPATIENT
Start: 2022-01-01 | End: 2022-01-01

## 2022-01-01 RX ORDER — SODIUM CHLORIDE 9 MG/ML
INJECTION, SOLUTION INTRAVENOUS PRN
Status: DISCONTINUED | OUTPATIENT
Start: 2022-01-01 | End: 2022-01-01 | Stop reason: HOSPADM

## 2022-01-01 RX ORDER — ACETAMINOPHEN 325 MG/1
650 TABLET ORAL EVERY 4 HOURS PRN
Status: DISCONTINUED | OUTPATIENT
Start: 2022-01-01 | End: 2022-05-21 | Stop reason: HOSPADM

## 2022-01-01 RX ORDER — LORAZEPAM 2 MG/ML
2 INJECTION INTRAMUSCULAR
Status: DISCONTINUED | OUTPATIENT
Start: 2022-01-01 | End: 2022-05-21 | Stop reason: HOSPADM

## 2022-01-01 RX ORDER — SCOLOPAMINE TRANSDERMAL SYSTEM 1 MG/1
1 PATCH, EXTENDED RELEASE TRANSDERMAL
Status: DISCONTINUED | OUTPATIENT
Start: 2022-01-01 | End: 2022-01-01 | Stop reason: HOSPADM

## 2022-01-01 RX ORDER — MORPHINE SULFATE 20 MG/ML
5 SOLUTION ORAL
Status: DISCONTINUED | OUTPATIENT
Start: 2022-01-01 | End: 2022-05-21 | Stop reason: HOSPADM

## 2022-01-01 RX ORDER — PANTOPRAZOLE SODIUM 40 MG/10ML
40 INJECTION, POWDER, LYOPHILIZED, FOR SOLUTION INTRAVENOUS DAILY
Status: DISCONTINUED | OUTPATIENT
Start: 2022-01-01 | End: 2022-01-01

## 2022-01-01 RX ORDER — MORPHINE SULFATE 20 MG/ML
5 SOLUTION ORAL
Status: DISCONTINUED | OUTPATIENT
Start: 2022-01-01 | End: 2022-01-01 | Stop reason: HOSPADM

## 2022-01-01 RX ADMIN — PROPOFOL 50 MCG/KG/MIN: 10 INJECTION, EMULSION INTRAVENOUS at 21:49

## 2022-01-01 RX ADMIN — SODIUM CHLORIDE 7.5 MG/HR: 9 INJECTION, SOLUTION INTRAVENOUS at 07:58

## 2022-01-01 RX ADMIN — IOPAMIDOL 80 ML: 755 INJECTION, SOLUTION INTRAVENOUS at 22:44

## 2022-01-01 RX ADMIN — LORAZEPAM 1 MG: 2 INJECTION INTRAMUSCULAR; INTRAVENOUS at 16:28

## 2022-01-01 RX ADMIN — LEVETIRACETAM 500 MG: 100 INJECTION, SOLUTION, CONCENTRATE INTRAVENOUS at 18:34

## 2022-01-01 RX ADMIN — SODIUM CHLORIDE 5 MG/HR: 9 INJECTION, SOLUTION INTRAVENOUS at 01:44

## 2022-01-01 RX ADMIN — PROPOFOL 50 MCG/KG/MIN: 10 INJECTION, EMULSION INTRAVENOUS at 13:18

## 2022-01-01 RX ADMIN — FENTANYL CITRATE 25 MCG/HR: 0.05 INJECTION, SOLUTION INTRAMUSCULAR; INTRAVENOUS at 17:55

## 2022-01-01 RX ADMIN — POTASSIUM CHLORIDE 10 MEQ: 7.46 INJECTION, SOLUTION INTRAVENOUS at 20:54

## 2022-01-01 RX ADMIN — PROTHROMBIN, COAGULATION FACTOR VII HUMAN, COAGULATION FACTOR IX HUMAN, COAGULATION FACTOR X HUMAN, PROTEIN C, PROTEIN S HUMAN, AND WATER 2053 UNITS: KIT at 18:59

## 2022-01-01 RX ADMIN — MAGNESIUM SULFATE HEPTAHYDRATE 2000 MG: 2 INJECTION, SOLUTION INTRAVENOUS at 01:25

## 2022-01-01 RX ADMIN — PROPOFOL 50 MCG/KG/MIN: 10 INJECTION, EMULSION INTRAVENOUS at 09:42

## 2022-01-01 RX ADMIN — PROPOFOL 51.1 MCG/KG/MIN: 10 INJECTION, EMULSION INTRAVENOUS at 01:14

## 2022-01-01 RX ADMIN — LORAZEPAM 2 MG: 2 INJECTION INTRAMUSCULAR; INTRAVENOUS at 14:44

## 2022-01-01 RX ADMIN — PROPOFOL 40 MCG/KG/MIN: 10 INJECTION, EMULSION INTRAVENOUS at 10:31

## 2022-01-01 RX ADMIN — MORPHINE SULFATE 4 MG: 4 INJECTION INTRAVENOUS at 20:33

## 2022-01-01 RX ADMIN — LORAZEPAM 1 MG: 2 INJECTION INTRAMUSCULAR; INTRAVENOUS at 16:52

## 2022-01-01 RX ADMIN — PROPOFOL 50 MCG/KG/MIN: 10 INJECTION, EMULSION INTRAVENOUS at 12:36

## 2022-01-01 RX ADMIN — PROPOFOL 50 MCG/KG/MIN: 10 INJECTION, EMULSION INTRAVENOUS at 17:45

## 2022-01-01 RX ADMIN — DEXTROSE MONOHYDRATE 5 MG/HR: 50 INJECTION, SOLUTION INTRAVENOUS at 18:43

## 2022-01-01 RX ADMIN — ETOMIDATE INJECTION 20 MG: 2 SOLUTION INTRAVENOUS at 18:32

## 2022-01-01 RX ADMIN — PROPOFOL 50 MCG/KG/MIN: 10 INJECTION, EMULSION INTRAVENOUS at 01:46

## 2022-01-01 RX ADMIN — LEVETIRACETAM 500 MG: 100 INJECTION, SOLUTION, CONCENTRATE INTRAVENOUS at 07:29

## 2022-01-01 RX ADMIN — MORPHINE SULFATE 4 MG: 4 INJECTION INTRAVENOUS at 17:25

## 2022-01-01 RX ADMIN — LEVETIRACETAM 1000 MG: 100 INJECTION, SOLUTION INTRAVENOUS at 18:48

## 2022-01-01 RX ADMIN — POTASSIUM CHLORIDE: 2 INJECTION, SOLUTION, CONCENTRATE INTRAVENOUS at 23:51

## 2022-01-01 RX ADMIN — PANTOPRAZOLE SODIUM 40 MG: 40 INJECTION, POWDER, FOR SOLUTION INTRAVENOUS at 07:59

## 2022-01-01 RX ADMIN — POTASSIUM CHLORIDE: 2 INJECTION, SOLUTION, CONCENTRATE INTRAVENOUS at 02:20

## 2022-01-01 RX ADMIN — MAGNESIUM SULFATE HEPTAHYDRATE 2000 MG: 2 INJECTION, SOLUTION INTRAVENOUS at 23:14

## 2022-01-01 RX ADMIN — PROPOFOL 50 MCG/KG/MIN: 10 INJECTION, EMULSION INTRAVENOUS at 04:53

## 2022-01-01 RX ADMIN — LEVETIRACETAM 500 MG: 100 INJECTION, SOLUTION, CONCENTRATE INTRAVENOUS at 05:53

## 2022-01-01 RX ADMIN — PANTOPRAZOLE SODIUM 40 MG: 40 INJECTION, POWDER, FOR SOLUTION INTRAVENOUS at 08:01

## 2022-01-01 RX ADMIN — FENTANYL CITRATE 50 MCG/HR: 0.05 INJECTION, SOLUTION INTRAMUSCULAR; INTRAVENOUS at 11:24

## 2022-01-01 RX ADMIN — MORPHINE SULFATE 4 MG: 4 INJECTION INTRAVENOUS at 14:44

## 2022-01-01 RX ADMIN — PROPOFOL 51.1 MCG/KG/MIN: 10 INJECTION, EMULSION INTRAVENOUS at 22:10

## 2022-01-01 RX ADMIN — POTASSIUM CHLORIDE: 2 INJECTION, SOLUTION, CONCENTRATE INTRAVENOUS at 00:53

## 2022-01-01 RX ADMIN — SUCCINYLCHOLINE CHLORIDE 100 MG: 20 INJECTION, SOLUTION INTRAMUSCULAR; INTRAVENOUS at 18:32

## 2022-01-01 RX ADMIN — SODIUM CHLORIDE: 9 INJECTION, SOLUTION INTRAVENOUS at 22:53

## 2022-01-01 ASSESSMENT — PULMONARY FUNCTION TESTS
PIF_VALUE: 22
PIF_VALUE: 18
PIF_VALUE: 15
PIF_VALUE: 15
PIF_VALUE: 16
PIF_VALUE: 21
PIF_VALUE: 19
PIF_VALUE: 23
PIF_VALUE: 15
PIF_VALUE: 15
PIF_VALUE: 35
PIF_VALUE: 21
PIF_VALUE: 16
PIF_VALUE: 20
PIF_VALUE: 16
PIF_VALUE: 20
PIF_VALUE: 19
PIF_VALUE: 38
PIF_VALUE: 30
PIF_VALUE: 15
PIF_VALUE: 19
PIF_VALUE: 17
PIF_VALUE: 15
PIF_VALUE: 16
PIF_VALUE: 22
PIF_VALUE: 18
PIF_VALUE: 20
PIF_VALUE: 18
PIF_VALUE: 15
PIF_VALUE: 21
PIF_VALUE: 23
PIF_VALUE: 21
PIF_VALUE: 20
PIF_VALUE: 23
PIF_VALUE: 29
PIF_VALUE: 15
PIF_VALUE: 15
PIF_VALUE: 18
PIF_VALUE: 19
PIF_VALUE: 16
PIF_VALUE: 16
PIF_VALUE: 18
PIF_VALUE: 15
PIF_VALUE: 27
PIF_VALUE: 18
PIF_VALUE: 31
PIF_VALUE: 22
PIF_VALUE: 17
PIF_VALUE: 32
PIF_VALUE: 15
PIF_VALUE: 18
PIF_VALUE: 31
PIF_VALUE: 34
PIF_VALUE: 27
PIF_VALUE: 15
PIF_VALUE: 34
PIF_VALUE: 16
PIF_VALUE: 15
PIF_VALUE: 20
PIF_VALUE: 19
PIF_VALUE: 17
PIF_VALUE: 31
PIF_VALUE: 18
PIF_VALUE: 20
PIF_VALUE: 15
PIF_VALUE: 19
PIF_VALUE: 15
PIF_VALUE: 27
PIF_VALUE: 21
PIF_VALUE: 29
PIF_VALUE: 23
PIF_VALUE: 21
PIF_VALUE: 23
PIF_VALUE: 23
PIF_VALUE: 32
PIF_VALUE: 16
PIF_VALUE: 31
PIF_VALUE: 21
PIF_VALUE: 20
PIF_VALUE: 20
PIF_VALUE: 35
PIF_VALUE: 15
PIF_VALUE: 18
PIF_VALUE: 17
PIF_VALUE: 19
PIF_VALUE: 29
PIF_VALUE: 19
PIF_VALUE: 30
PIF_VALUE: 22

## 2022-05-13 NOTE — PROGRESS NOTES
[facility]   Cardiac Follow Up    Referring Provider:  NENA Pérez CNP     Chief Complaint   Patient presents with    Follow-up     8 month office visit    Coronary Artery Disease    Hypertension    Hyperlipidemia    Other     History of CABG *4    Other     No new concerns          History of Present Illness:  Olena Aguirre 1960 is a 61yo male here today for hospital follow up. I saw as inpatient in 8/21. Refered back by NENA Pérez CNP . He has a PMH significant for CAD s/p CABG 2016, PAD, hx afib 8/21 s/p unsuccessful CV, polysubstance abuse,  HTN, HLD, and CVA. Prior saw Dr. Corina Jackson 3/11/2020. Most recent Echo 3/12/2020  EF=55-60%. He presented to [facility] 8/20/2021 with altered mental status and also noted sob. Diagnosed with pneumonia and developed new atrial fibrillation. Drug screen positive for amphetamines, cannabinoid and opiates. Most recent MICHELLE 8/26/2021 noted no mass or thrombus, mild TR and MR with rapid afib. Most recent DCCV 8/26/2021 was unsuccessful. Cardizem gtt added to amio gtt with improvement in HR. Patient was placed on amiodarone 200 mg, Eliquis 5 mg BID and metoprolol tartrate 50 mg BID upon discharge. He was discharged to [address] No f/u since then and not certain why? He presents in wheelchair. Today, he reports being chilled and c/o some SOB  today in office. Reports only eating once daily and lives with daughter in trailer in [place]. Patient with no complaints of chest pain, palpitations, dizziness, edema, or orthopnea/PND. Note difficult to communicate as not certain if listening or retaining what I'm saying. ? Type of dementia. Not certain? History and meds are possibly unreliable. Weight=184# today.     Past Medical History:   has a past medical history of Allergic rhinitis, Anxiety, Back pain, CAD (coronary artery disease), CVA (cerebral vascular accident) ([facility] [address] DDD (degenerative disc disease), Depression, Fatty liver, Gout, Hepatitis C antibody positive in blood, History of blood transfusion, Hyperlipidemia, Hypertension, Lumbar radiculopathy, MI (myocardial infarction) ([facility] [address] Polyp of colon, PVD (peripheral vascular disease) ([facility] [address] Rheumatoid arthritis(714.0), and Tobacco abuse disorder. Surgical History:   has a past surgical history that includes Appendectomy (1998); Carpal tunnel release (Bilateral, 2000); Patella surgery (Right, 1978); Coronary artery bypass graft (04/22/2016); Vein Surgery (Left, 04/22/2016); Colonoscopy (N/A, 10/3/2018); femoral bypass (Left, 1/23/2019); incision and drainage (Left, 2/21/2019); Cardiac surgery; Small intestine surgery (N/A, 3/13/2020); hemicolectomy (N/A, 3/18/2020); transesophageal echocardiogram (N/A, 8/26/2021); and Cardioversion (N/A, 8/26/2021). Social History:   reports that he has quit smoking. His smoking use included cigarettes. He started smoking about 46 years ago. He has a 0.50 pack-year smoking history. He has never used smokeless tobacco. He reports previous alcohol use of about 6.0 standard drinks of alcohol per week. He reports that he does not use drugs. He lives with daughter in [place]. Family History:  family history includes Cancer in his mother; Glaucoma in his father; High Blood Pressure in his father and mother; Other in his brother. Home Medications:  Prior to Admission medications    Medication Sig Start Date End Date Taking? Authorizing Provider   gabapentin (NEURONTIN) 600 MG tablet Take 600 mg by mouth 3 times daily. Yes Historical Provider, MD   sertraline (ZOLOFT) 50 MG tablet Take 50 mg by mouth nightly   Yes Historical Provider, MD   UNABLE TO FIND Santyl ointment 250units/GM nightly to right foot   Yes Historical Provider, MD   oxyCODONE-acetaminophen (PERCOCET) 5-325 MG per tablet Take 1 tablet by mouth every 4 hours as needed for Pain.  1-2 tablets every 4 hours prn pain   Yes Historical Provider, MD   aspirin 81 MG EC tablet Take 81 mg by mouth daily   Yes Historical Provider, MD   lisinopril (PRINIVIL;ZESTRIL) 2.5 MG tablet Take 2.5 mg by mouth daily   Yes Historical Provider, MD   ipratropium (ATROVENT HFA) 17 MCG/ACT inhaler Inhale 2 puffs into the lungs daily   Yes Historical Provider, MD   carvedilol (COREG) 3.125 MG tablet Take 3.125 mg by mouth 2 times daily (with meals)   Yes Historical Provider, MD   apixaban (ELIQUIS) 5 MG TABS tablet Take 1 tablet by mouth 2 times daily 8/31/21  Yes Sandy White MD   QUEtiapine (SEROQUEL) 50 MG tablet Take 1 tablet by mouth 2 times daily  Patient taking differently: Take 25 mg by mouth 2 times daily  8/31/21  Yes Sandy White MD   amiodarone (CORDARONE) 200 MG tablet 2 qd- 6 days, 1 qd thereafter  Patient taking differently: 400 mg daily 2 qd- 6 days, 1 qd thereafter 8/31/21  Yes Sandy White MD   fluticasone-salmeterol (ADVAIR) 100-50 MCG/DOSE diskus inhaler Inhale 1 puff into the lungs every 12 hours   Yes Historical Provider, MD   atorvastatin (LIPITOR) 80 MG tablet TAKE 1 TABLET BY MOUTH NIGHTLY 6/16/17  Yes Brandan Malin DO        Allergies:  Tramadol     Review of Systems:   · Constitutional: there has been no unanticipated weight loss. There's been no change in energy level, sleep pattern, or activity level. · Eyes: No visual changes or diplopia. No scleral icterus. · ENT: No Headaches, hearing loss or vertigo. No mouth sores or sore throat. · Cardiovascular: Reviewed in HPI  · Respiratory: No cough or wheezing, no sputum production. No hematemesis. · Gastrointestinal: No abdominal pain, appetite loss, blood in stools. No change in bowel or bladder habits. · Genitourinary: No dysuria, trouble voiding, or hematuria. · Musculoskeletal:  No gait disturbance, weakness or joint complaints. · Integumentary: No rash or pruritis. · Neurological: No headache, diplopia, change in muscle strength, numbness or tingling.  No change in gait, balance, coordination, mood, affect, memory, mentation, behavior. · Psychiatric: No anxiety, no depression. · Endocrine: No malaise, fatigue or temperature intolerance. No excessive thirst, fluid intake, or urination. No tremor. · Hematologic/Lymphatic: No abnormal bruising or bleeding, blood clots or swollen lymph nodes. · Allergic/Immunologic: No nasal congestion or hives. Physical Examination:    Vitals:    05/17/22 1505   BP: (!) 138/98   Pulse: 73   Temp: 98.6   SpO2: 99%        Constitutional and General Appearance: NAD  Respiratory:  · Normal excursion and expansion without use of accessory muscles  · Resp Auscultation: Normal breath sounds without dullness  Cardiovascular:  · The apical impulses not displaced  · Heart tones are crisp and normal  · Cervical veins are not engorged  · The carotid upstroke is normal in amplitude and contour without delay or bruit  · Normal S1S2, No S3, No Murmur  · Peripheral pulses are symmetrical and full  · There is no clubbing, cyanosis of the extremities. · Trace BLE edema s/p partial right foot amputation  · Femoral Arteries: 2+ and equal  · Pedal Pulses: 2+ and equal   Abdomen:  · No masses or tenderness  · Liver/Spleen: No Abnormalities Noted  Neurological/Psychiatric:  · Alert and oriented  · Moves all extremities well  · Exhibits normal gait balance and coordination  Skin:  · Skin: warm and dry.     Lab Results   Component Value Date     08/29/2021    K 3.8 08/29/2021    CL 98 (L) 08/29/2021    CO2 29 08/29/2021    BUN 11 08/29/2021    CREATININE 0.6 (L) 08/29/2021    GLUCOSE 94 08/29/2021    CALCIUM 8.6 08/29/2021    PROT 5.8 (L) 08/27/2021    LABALBU 3.0 (L) 08/27/2021    BILITOT 0.9 08/27/2021    ALKPHOS 75 08/27/2021    AST 49 (H) 08/27/2021    ALT 66 (H) 08/27/2021    LABGLOM >60 08/29/2021    GFRAA >60 08/29/2021    AGRATIO 0.8 (L) 08/20/2021    GLOB 4.3 08/20/2021       Lab Results   Component Value Date    WBC 8.9 08/29/2021    HGB 12.3 (L) 08/29/2021    HCT 36.7 (L) 08/29/2021    .0 (H) 08/29/2021     08/29/2021     Lab Results   Component Value Date    TSH 2.57 08/25/2021     Lab Results   Component Value Date    CHOL 109 12/31/2018    CHOL 112 04/20/2016    CHOL 154 04/15/2015     Lab Results   Component Value Date    TRIG 108 03/26/2020    TRIG 111 12/31/2018    TRIG 98 04/20/2016     Lab Results   Component Value Date    HDL 44 12/31/2018    HDL 40 04/20/2016    HDL 69 (H) 04/15/2015     Lab Results   Component Value Date    LDLCALC 43 12/31/2018    LDLCALC 52 04/20/2016    LDLCALC 65 04/15/2015     Lab Results   Component Value Date    LABVLDL 22 12/31/2018    LABVLDL 20 04/20/2016    LABVLDL 20 04/15/2015     No results found for: CHOLHDLRATIO      Assessment:     1. S/P CABG x 4: Surgery in 2016 and there are no concerning symptoms for angina currently. Need to continue med mgt with baby asa, coreg 3.125mg BID, lisinopril, and lipitor 80mg qd. There are no concerning symptoms for angina currently. 2. Atrial fibrillation with rapid ventricular response ([facility] [address]): Diagnosed 8/21 underwent MICHELLE and unsuccessful CV. Started on amiodarone. Remains NSR now. Need to check LFT's and TSH given on amiodarone. 3. Essential hypertension: Suboptimal blood pressure control and will need adjustment of antihypertensive medical regimen. Will increase lisinopril to 5mg daily. 4. Coronary artery disease involving native coronary artery of native heart without angina pectoris      5. SOB: Complaint today and uncertain etiology? Concern for new cardiomyopathy or possible amiodarone side  effects. Need to check ECHO and CXR. Plan:  1. Medications reviewed. 2. Continue current course  3. EKG today shows NSR 70bpm.   4. Fasting labs in 1 week with CMP, CBC, TSH, and lipids after increasing lisinopril dose. Plan to follow up in 3-4 months    This note was scribed in the presence of Elvin Vizcarra MD by Amy Chow RN. I, Dr. Jared Campo, personally performed the services described in this documentation, as scribed by the above signed scribe in my presence. It is both accurate and complete to my knowledge. I agree with the details independently gathered by the clinical support staff, while the remaining scribed note accurately describes my personal service to the patient. Cost of prescription medications and patient compliance have been reviewed with patient. All questions answered. Thank you for allowing me to participate in the care of this individual.    Halina Rosario.  Christin Shrestha M.D., [facility]

## 2022-05-17 NOTE — PATIENT INSTRUCTIONS
Plan:  1. Medications reviewed. 2. Continue current course  3. EKG today  4. Fasting labs in 1 week after starting the increase in Lisinopril dose  5.  Increase Lisinopril to 5 mg daily    Call [phone] to schedule ECHO    Plan to follow up in 6 months

## 2022-05-18 PROBLEM — I61.9 INTRACEREBRAL HEMORRHAGE, NONTRAUMATIC (HCC): Status: ACTIVE | Noted: 2022-01-01

## 2022-05-18 NOTE — ED NOTES
Provider to ambulance entrance to assess pt; pt positive for nystagmus.      Rocio El RN  05/18/22 1557
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
## 2022-05-18 NOTE — PLAN OF CARE
Discussed patient with PA in ED. As reported to me. Patient found down and brought to ED. Normally is fully functional with CAD. On Eliqius and LDASA. Very hypertensive. May have had seizure in ambulance. Exam: Moves all extremity non purposefully to pain. Opens eyes to pain. No vocalization. Protecting airway right now.  or so. CTH reviewed showing huge left posterior temporal/parietal hemorrhage - lobar with extension into the ventricle. I recommended Cleviprex for smooth reduction of SBP to 110-160, Keppra, intubation, and acute transfer to [facility]. I recommended that he get a follow up [facility] and CTA at [facility] on the way to the ICU and that the patient be admitted to the ICU team. Hematoma evacuation will be considered. I communicated with Dr. Estrellita Ortez who is on primary neurovascular call today and informed him of the transfer.

## 2022-05-18 NOTE — ED NOTES
1852-Call placed to dispatch for [address] arrived      [address] [facility]  [facility]22 [facility]  05/18/22 190

## 2022-05-18 NOTE — ED NOTES
1752- Call placed to transfer center for neuro surgery.      1805- Call was returned by Dr. Joana Newsome    Call received by [facility]    Pt is accepted at [facility] by Dr. Erica Kern     Pt will go straight to ICU     Okay to send without a bed per house nurse          Dianne Meter  05/18/22 [address]  05/18/22 2244
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
## 2022-05-18 NOTE — ED NOTES
Pt left department with rona and rn addy at approximately 1915.   Report given now to jae almanzar in icu at [facility]  05/18/22 5823
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
## 2022-05-19 NOTE — PROGRESS NOTES
No change in neuro status, pt minimally responsive to noxious stimuli. VSS for now. [facility] representative updated, will follow until further notice. Per request, RN needs to call [facility] if pt becomes unstable or prior to extubation. Will continue to monitor.
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
## 2022-05-19 NOTE — SIGNIFICANT EVENT
Mr. Godwin Wylie patient is a 80-year-old male with past medical history of CVA, CABG 2016 presented to the ED as a transfer for subarachnoid hemorrhage. Discussed case with Neurosurgery Dr. Nadia Meza appreciated. I was notified that the patient's best outcome with surgery would be dysphasia with hemiparetic. I contacted family spoke with the daughters Elo Isaacs and [place]. The decision was taken that Mr. Godwin Wylie would not want to proceed with surgery given prognosis. We talked about patient's current clinical status and proceeded with code status DNR CC. Comfort care orders and Hospice care consulted.

## 2022-05-19 NOTE — PROGRESS NOTES
[facility]:  Reviewed chart and spoke with daughter Parrish Ragsdale on the phone. Parrish Ragsdale would like to have the evening to talk with her  about her father's decline and states they will most likely remove the ventilator tomorrow. She is agreeable to follow up phone call tomorrow and also states she will be calling [facility] CNP tomorrow. RN and CM updated.      Thank you,  Lb Phillips RN [facility]

## 2022-05-19 NOTE — PROGRESS NOTES
CHG bath done, garrido care done with soap and water. Linens changed. Pt became tachypneic, RR 40s. o2 sats sustained in low 80s after bathing completed. ICU residents aware. Fentanyl gtt ordered for comfort. RT notified and vent settings adjusted. Will continue to monitor.

## 2022-05-19 NOTE — PROGRESS NOTES
Pt admitted to room 4526 from [facility]. Propofol and nicardipine infusing on arrival. Neuro exam complete at bedside - see flowsheets. Chlorhexidine bath given, sacral heart applied.

## 2022-05-19 NOTE — PROGRESS NOTES
ICU Consult Note    Admit Date: 5/18/2022  Day: 1  Vent Day: 1  IV Access:Peripheral  IV Fluids:None  Vasopressors:None                Antibiotics: None  Diet: Diet NPO    CC: Unresponsive      Interval history:   NAEON. Patient is unresponsive to verbal or painful stimuli. BP controlled with cardene gtt. HPI:     José Garcia is a 71-year-old who presents to the hospital after found to have a parenchymal hematoma with a subarachnoid hemorrhage. PMH includes HTN, drug abuse, CABG 2016, history CVA, hep C, history A. fib on Eliquis. The remainder of the history is gathered from chart review/daughter as patient is intubated and nonverbal.  Since 5/13 patient has been having headaches and daughter noticed patient was dropping fork and other items from hand. No slurred speech or any deficit and was able to ambulate at home. Last seen well in the evening of 5/17. Daughter checked on patient at around 15 PM on 5/18 and was found laying face down after what appeared to be fall from bed. Was more lethargic than normal in the a.m. prior to being found on the ground. Was rushed to the ED and in transit had what appeared to be tonic-clonic seizure-like activity (per ED note Ativan not administered however per verbal signout by nursing Ativan was administered). CT head was completed while in the ED which showed large hematoma with mass affect.      No immediate intervention was planned but neurovascular on call was made aware. CT head and CTA was ordered on arrival to ICU at [facility]. Had in-depth conversation with daughters about goals of care which ultimately both had decided that no surgery would be completed given best prognosis after surgery. Further discussion was held with family by Dr. Danilo Best about goals of care and it was ultimately decided that patient would be elected for comfort care. Comfort care orders were added and hospice was reached.      Past Medical History:   Diagnosis Date    Allergic rhinitis      Anxiety 9/3/2014    Back pain      CAD (coronary artery disease)       H/O CABG    CVA (cerebral vascular accident) [address] [address]       r sided weakness uses cane at home    DDD (degenerative disc disease) 9/3/2014    Depression 4/17/2014    Fatty liver      Gout      Hepatitis C antibody positive in blood 10/24/2018    History of blood transfusion      Hyperlipidemia      Hypertension      Lumbar radiculopathy 9/3/2014    MI (myocardial infarction) ([facility] [address]      Polyp of colon      PVD (peripheral vascular disease) (HCC)      Rheumatoid arthritis(714.0)      Tobacco abuse disorder      Social Hx  Unobtainable - Patient intubated    Family history  Hypertension  Colon cancer  Glaucoma    Medications:     Scheduled Meds:   atorvastatin  80 mg Oral Nightly    levetiracetam  500 mg IntraVENous Q12H    pantoprazole  40 mg IntraVENous Daily    amiodarone  200 mg Oral Daily    scopolamine  1 patch TransDERmal Q72H     Continuous Infusions:   niCARdipine 7.5 mg/hr (05/19/22 0654)    propofol 35 mcg/kg/min (05/19/22 0747)     PRN Meds:acetaminophen, ondansetron **OR** ondansetron, LORazepam, HYDROmorphone **OR** HYDROmorphone    Objective:   Vitals:   T-max:  Patient Vitals for the past 8 hrs:   BP Temp Temp src Pulse Resp SpO2 Height   05/19/22 0730 (!) 154/78 -- -- 96 26 95 % --   05/19/22 0715 (!) 155/83 -- -- 97 25 97 % --   05/19/22 0700 (!) 163/79 -- -- 96 25 98 % --   05/19/22 0600 (!) 158/84 -- -- 91 18 99 % --   05/19/22 0500 (!) 156/86 -- -- 88 22 100 % --   05/19/22 0415 -- -- -- 83 20 100 % --   05/19/22 0400 (!) 160/91 98.2 °F (36.8 °C) Axillary 80 19 100 % --   05/19/22 0300 138/82 -- -- 76 19 100 % --   05/19/22 0200 128/77 -- -- 73 18 100 % --   05/19/22 0100 134/81 -- -- 70 17 100 % --   05/19/22 0010 -- -- -- 68 18 99 % 6' (1.829 m)   05/19/22 0000 122/78 98.9 °F (37.2 °C) Axillary 68 18 100 % --       Intake/Output Summary (Last 24 hours) at 5/19/2022 0754  Last data filed at 5/19/2022 0600  Gross per 24 hour   Intake 1599 ml   Output 1750 ml   Net -151 ml       Review of Systems Unobtainable - Patient intubated      Physical Exam  Constitutional:       Appearance: Normal appearance. HENT:      Head: Normocephalic and atraumatic. Cardiovascular:      Rate and Rhythm: Normal rate and regular rhythm. Pulses: Normal pulses. Heart sounds: Normal heart sounds. Pulmonary:      Effort: Pulmonary effort is normal.      Breath sounds: Normal breath sounds. Musculoskeletal:      Right lower leg: No edema. Left lower leg: No edema. Neurological:      Mental Status: He is alert. LABS:    CBC:   Recent Labs     05/18/22 1838 05/18/22 2142 05/19/22  0415   WBC 8.9 14.7* 13.5*   HGB 13.4* 14.1 13.9   HCT 40.2* 41.9 40.6    221 205   MCV 90.2 89.2 89.8     Renal:    Recent Labs     05/18/22 1838 05/18/22 2142 05/19/22  0415    132* 130*   K 2.4* 3.2* 3.6    92* 91*   CO2 17* 26 24   BUN 3* 6* 5*   CREATININE <0.5* <0.5* <0.5*   GLUCOSE 208* 220* 147*   CALCIUM 5.5* 8.9 8.5   MG  --  1.40* 2.60*   PHOS  --   --  2.8   ANIONGAP 12 14 15     Hepatic:   Recent Labs     05/18/22 1650 05/19/22 0415   AST 93*  --    ALT 42*  --    BILITOT 0.8  --    PROT 7.2  --    LABALBU 3.9 3.5   ALKPHOS 184*  --      Troponin:   Recent Labs     05/18/22 1650   TROPONINI <0.01     BNP: No results for input(s): BNP in the last 72 hours. Lipids: No results for input(s): CHOL, HDL in the last 72 hours. Invalid input(s): LDLCALCU, TRIGLYCERIDE  ABGs:    Recent Labs     05/18/22  2320   PHART 7.422   ZYF2GJL 45.1*   PO2ART 164.0*   RBC9GKC 29   BEART 4.1*   Z5VZHHRR >100   GXY1TLA 31       INR:   Recent Labs     05/18/22  1650   INR 1.24*     Lactate: No results for input(s): LACTATE in the last 72 hours. Cultures:  -----------------------------------------------------------------  RAD:   CT head without contrast   Final Result   1.  Slight increase in size and slight increase in the intraventricular extension of hemorrhage as described above. There is slight increase in the midline shift compared to the previous exam. Total hematoma volume estimated at 107 mL. CTA head neck with contrast   Final Result      Neck      1. Bilateral carotid calcifications with no stenosis. 2. Very small right vertebral artery ending before the intracranial portion with distal reconstitution from the left. Head      1. No significant intracranial vascular abnormality. 2. Large left cerebral hematoma with extensive intraventricular extension as described on the noncontrast head CT. Assessment/Plan:     Tori Morgan is a 64 y.o. male, who has a PMH of:  HTN, drug abuse, CABG 2016, history CVA, hep C, history A. fib on Eliquis     Intracerebral hemorrhage  Parenchymal Hematoma in the left temporoparietal lobe with mass effect  Midline shift of 1.1 cm  Acute subarachnoid hemorrhage in left parietal lobe  - After lengthy discussion no plans for surgery and would pursue hospice care  -Comfort care orders admin  - On nicardipine with a SBP <160  - On keppra 500mg BID  - On propofol  - Intubated with vent settings 16/400/40%/5     Electrolyte abnormalities Mg/Ca/K  - Replete electrolytes  - Continue to monitor     Chronic Disease  Afib - On amio 200mg daily  HLD - Continue home lipitor  CAD s/p CABG - holding asa and eliquis  Hep C - Continue to monitor     Code Status:Full Code  FEN: Diet NPO  PPX:  Protonix              DISPO: Hospice    Code Status: DNR-CC  FEN: NPO  PPX: protonix  DISPO: ICU    Deacon Ritter MD, PGY-1  05/19/22  7:54 AM    This patient has been staffed and discussed with Dr Gordon Ramsay MD.     [facility]    Patient seen and examined. I agree with Dr. Leidy Rodney history, physical, lab findings, assessment and plan. Unfortunately Greg Devlin has had a catastrophic intracranial hemorrhage measuring 7.5 x 5 cm with intraventricular extension.   His Eliquis has been reversed with Dannial Class    He was transferred to [facility]. ICU for neurosurgical evaluation and management. Family was offered surgical decompression to survive but have declined that option given his expected degree of disability. He currently is DNR CC with hospice consultation pending. He remains intubated at this time to allow family from out of town to arrive. Plan is for [facility] referral and extubation to comfort care when family ready.   Until then continue propofol for comfort    Sandeep Singleton MD

## 2022-05-19 NOTE — PLAN OF CARE
Problem: Chronic Conditions and Co-morbidities  Goal: Patient's chronic conditions and co-morbidity symptoms are monitored and maintained or improved  Outcome: Progressing     Problem: Discharge Planning  Goal: Discharge to home or other facility with appropriate resources  Outcome: Progressing     Problem: Safety - Medical Restraint  Goal: Remains free of injury from restraints (Restraint for Interference with Medical Device)  Description: INTERVENTIONS:  1. Determine that other, less restrictive measures have been tried or would not be effective before applying the restraint  2. Evaluate the patient's condition at the time of restraint application  3. Inform patient/family regarding the reason for restraint  4.  Q2H: Monitor safety, psychosocial status, comfort, nutrition and hydration  Outcome: Progressing     Problem: Pain  Goal: Verbalizes/displays adequate comfort level or baseline comfort level  Outcome: Progressing

## 2022-05-19 NOTE — PROGRESS NOTES
Progress Note  Admit Date: 5/18/2022       Overnight Events: admitted    CC: F/U for  parenchymal hematoma with a subarachnoid hemorrhage. Interval History:     Pt is minimally responsive to pain in LUE. Non-purposeful movement noted in LLE. Pupils reactive/equal/sluggish. Admitting hospitalist spoke with family and decision was made for hospice/ comfort care. Hospice has been consulted. Pt currently appears to be comfortable.  atorvastatin  80 mg Oral Nightly    levetiracetam  500 mg IntraVENous Q12H    pantoprazole  40 mg IntraVENous Daily    amiodarone  200 mg Oral Daily    scopolamine  1 patch TransDERmal Q72H      PRN Medications: acetaminophen, ondansetron **OR** ondansetron, LORazepam, HYDROmorphone **OR** HYDROmorphone  Diet: Diet NPO  Continuous Infusions:   niCARdipine Stopped (05/19/22 1025)    propofol 40 mcg/kg/min (05/19/22 1031)     PHYSICAL EXAM:  BP (!) 144/81   Pulse 103   Temp 99 °F (37.2 °C) (Axillary)   Resp 30   Ht 6' (1.829 m)   SpO2 95%   BMI 25.50 kg/m²   No results for input(s): POCGLU in the last 72 hours. Intake/Output Summary (Last 24 hours) at 5/19/2022 1134  Last data filed at 5/19/2022 1000  Gross per 24 hour   Intake 1599 ml   Output 2200 ml   Net -601 ml       Constitutional:       Appearance: Normal appearance. HENT:      Head: Normocephalic and atraumatic. Cardiovascular:      Rate and Rhythm: Normal rate and regular rhythm. Pulses: Normal pulses. Heart sounds: Normal heart sounds. Pulmonary:      Effort: Pulmonary effort is normal.      Breath sounds: Normal breath sounds. Musculoskeletal:      Right lower leg: No edema. Left lower leg: No edema.    Neurological:      Mental Status: not responsive to verbal or painful stimuli     LABS:  Recent Labs     05/18/22  1838 05/18/22  2142 05/19/22  0415   WBC 8.9 14.7* 13.5*   HGB 13.4* 14.1 13.9   HCT 40.2* 41.9 40.6    221 205 Recent Labs     05/18/22  1838 05/18/22  2142 05/19/22  0415    132* 130*   K 2.4* 3.2* 3.6    92* 91*   CO2 17* 26 24   BUN 3* 6* 5*   CREATININE <0.5* <0.5* <0.5*   GLUCOSE 208* 220* 147*     Recent Labs     05/18/22  1650   AST 93*   ALT 42*   BILITOT 0.8   ALKPHOS 184*     Recent Labs     05/18/22  1650   TROPONINI <0.01     No results for input(s): BNP in the last 72 hours. No results for input(s): CHOL, HDL in the last 72 hours. Invalid input(s): LDLCALCU  Recent Labs     05/18/22  1650   INR 1.24*     Assessment & Plan:    Patient Active Problem List:    Hunter Bell is a 64 y.o. male, who has a PMH of:  HTN, drug abuse, CABG 2016, history CVA, hep C, history A. fib on Eliquis     Intracerebral hemorrhage  Parenchymal Hematoma in the left temporoparietal lobe with mass effect  Midline shift of 1.1 cm  Acute subarachnoid hemorrhage in left parietal lobe  - After lengthy discussion no plans for surgery and would pursue hospice care  -Comfort care orders admin, hospice consulted, palliative care team consulted per nursing request to assist family with transition.     - On nicardipine with a SBP <160  - On keppra 500mg BID  - On propofol     Electrolyte abnormalities Mg/Ca/K  - Replete electrolytes  - Continue to monitor            The patient and / or the family were informed of the results of any tests, a time was given to answer questions, a plan was proposed and they agreed with plan. Disposition: TBD after hospice assessment.    DNR-CC

## 2022-05-19 NOTE — PROGRESS NOTES
Clinical Pharmacy Progress Note    IV in NS - Management by Pharmacy    Consult Date(s): 5/18/22  Consulting Provider(s): Betsy Sen    Assessment / Plan    Intracerebral Hemorrhage - All IVs in Normal Saline   Drips will be adjusted to normal saline as appropriate based on compatibility, in an effort to avoid fluid shifts, as D5W is osmotically active.  The following intermittent IV drips / infusions have been adjusted to saline:  o None at this time   The following medications must remain in D5W due to incompatibility with normal saline:  o Amiodarone Infusion  o Amphotericin  o Mycophenolate  o Nitroprusside  o Penicillin G Potassium   Note: Patient has dextrose ordered as part of hypoglycemia treatment protocol.  Total IV fluid delivered to patient over last 24 hrs: n/a   [facility] will follow daily to ensure all IVPBs / drips are in NS where possible. Thank you for consulting Pharmacy! Kvng Keys, PharmD, [facility] 5/18/2022 9:57 PM        Interval update: initiation    Subjective/Objective: Mr. Nevin Johnson is a 64 y.o. male with a PMHx significant for hypertension, gout, CAD, hyperlipidemia, MI, fatty liver, rheumatoid arthritis, anxiety, depression, history of CVA, tobacco use, hepatitis C, peripheral vascular disease, admitted for intracerebral hemorhage. Pharmacy has been consulted to change all IVs to NS.     Ht Readings from Last 1 Encounters:   12/29/21 6' (1.829 m)     Wt Readings from Last 1 Encounters:   05/18/22 188 lb (85.3 kg)

## 2022-05-19 NOTE — CARE COORDINATION
Case Management Assessment           Initial Evaluation                Date / Time of Evaluation: 5/19/2022 12:59 PM                 Assessment Completed by: Masha Rivas RN     Mr. María Enamorado is presently in the ICU intubated and sedated. His family has decided they do not want to pursue aggressive treatment. The plan is to compassionately extubate and refer to hospice. Waiting to hear if pt's daughter in [place] is driving to [place]. Anticipate a referral to [facility] with the preference of the inpatient unit in [facility] in the hospital.    Patient Name: Syeda Kilgore     YOB: 1960  Diagnosis: Acute intraparenchymal spont hemorrhage presume-germinal matrix [P52.4]  Intracerebral hemorrhage, nontraumatic ([facility] [address] [I61.9]     Date / Time: 5/18/2022  8:49 PM    Patient Admission Status: Inpatient    If patient is discharged prior to next notation, then this note serves as note for discharge by case management.      Current PCP: NENA Lynn CNP    Chart Reviewed: Yes  Patient/ Family Interviewed: Yes    Initial assessment completed at bedside with: Pt's daughter, ROMANA HSU    Emergency Contacts:  Extended Emergency Contact Information  Primary Emergency Contact: Graham sonya, [address] Phone: [phone]  Relation: Child  [facility] Phone: [phone]  Relation: Child    Advance Directives:   Code Status: DNR-CC    Financial  Payor: MEDICAID [place] / Plan: [place] DEPT OF JOB / Product Type: *No Product type* /     [address] [facility], [address] [facility] [address] [address] [facility] [phone] [facility] [phone]  [address] [place] [address]  Phone: [phone] Fax: [phone]    [address]  [address] [facility] [address] [place] [address]  Phone: [phone] Fax: [phone]    ADLS Needed some assistance with self care  Support Systems: Children    HOUSING  Home Environment: from home with daughter    Plans to RETURN to current housing: No    Home Care Information  Currently ACTIVE with [address]: No    DISCHARGE PLAN:  Disposition: Hospice    Transportation PLAN for discharge: EMS transportation       The Patient and/or patient representative Natacha Wisdom and his family were provided with a choice of provider and agrees with the discharge plan Yes    Freedom of choice list was provided with basic dialogue that supports the patient's individualized plan of care/goals and shares the quality data associated with the providers.  Yes      Vamshi Shepherd RN  [facility] [employer]  Case Management Department  [phone]

## 2022-05-19 NOTE — ED PROVIDER NOTES
I independently performed a history and physical on [employer]. All diagnostic, treatment, and disposition decisions were made by myself in conjunction with the advanced practice provider/resident.      Labs Reviewed   CBC WITH AUTO DIFFERENTIAL - Abnormal; Notable for the following components:       Result Value    RDW 17.0 (*)     All other components within normal limits   COMPREHENSIVE METABOLIC PANEL W/ REFLEX TO MG FOR LOW K - Abnormal; Notable for the following components:    Sodium 134 (*)     Chloride 94 (*)     CO2 18 (*)     Anion Gap 22 (*)     Glucose 197 (*)     BUN 4 (*)     CREATININE 0.6 (*)     Alkaline Phosphatase 184 (*)     ALT 42 (*)     AST 93 (*)     All other components within normal limits   SALICYLATE LEVEL - Abnormal; Notable for the following components:    Salicylate, Serum <3.3 (*)     All other components within normal limits   URINE DRUG SCREEN - Abnormal; Notable for the following components:    Oxycodone Urine POSITIVE (*)     All other components within normal limits   BLOOD GAS, VENOUS - Abnormal; Notable for the following components:    pCO2, Tonio 34.4 (*)     pO2, Tonio 89.9 (*)     HCO3, Venous 18.9 (*)     Base Excess, Tonio -5.6 (*)     Carboxyhemoglobin 4.1 (*)     All other components within normal limits   ACETAMINOPHEN LEVEL - Abnormal; Notable for the following components:    Acetaminophen Level <5 (*)     All other components within normal limits   URINALYSIS WITH REFLEX TO CULTURE - Abnormal; Notable for the following components:    Glucose, Ur 250 (*)     Blood, Urine SMALL (*)     Protein,  (*)     All other components within normal limits   LACTATE, SEPSIS - Abnormal; Notable for the following components:    Lactic Acid, Sepsis 10.0 (*)     All other components within normal limits    Narrative:     CALL  [facility] tel. [phone],  Chemistry results called to and read back by Anthony Coreas RN, 05/18/2022 19:16,  by Luis Degroot - Abnormal; Notable for the following components:    Protime 14.1 (*)     INR 1.24 (*)     All other components within normal limits   CBC - Abnormal; Notable for the following components:    Hemoglobin 13.4 (*)     Hematocrit 40.2 (*)     RDW 16.7 (*)     All other components within normal limits   BASIC METABOLIC PANEL - Abnormal; Notable for the following components:    Potassium 2.4 (*)     CO2 17 (*)     Glucose 208 (*)     BUN 3 (*)     CREATININE <0.5 (*)     Calcium 5.5 (*)     All other components within normal limits    Narrative:     Daniaepifanio Zamora tel. [phone],  Chemistry results called to and read back by alcides Lopez RN, 05/18/2022  19:40, by Indu Wheeler   COVID-19 & INFLUENZA COMBO   TROPONIN   ETHANOL   MICROSCOPIC URINALYSIS   POCT GLUCOSE   TYPE AND SCREEN   PREPARE PLATELETS     XR CHEST PORTABLE   Final Result   Increased lung markings bilaterally, may be related to mild pulmonary   vascular congestion versus bronchitis. XR CHEST PORTABLE   Final Result   1. Cardiomegaly and pulmonary vascular congestion. 2.  No focal pulmonary consolidation. Short-term follow-up PA and lateral chest radiographs may be helpful for   further evaluation. CT HEAD WO CONTRAST   Final Result   Large 8 cm acute parenchymal hematoma in the left temporoparietal lobes, with   associated mass effect and surrounding vasogenic edema. 1.1 cm left to right midline shift. Decompression into the left lateral ventricle. Additional intraventricular   hemorrhage in the right lateral ventricle and the 3rd ventricle. Mild   hydrocephalus. Trace acute subarachnoid hemorrhage in the left parietal lobe      Question of trace acute subdural hematoma along the left aspect of the falx   and the bilateral tentorium. Critical results reported to SILAS Duke at 5:37 p.m. on May 18, 2022.            For further details of lEma Carrasco's emergency department encounter, please see the JOSEFA/resident's documentation. I personally saw the patient and performed a substantive portion of the visit including all aspects of the medical decision making. Briefly, this is a 80-year-old male presenting with concerns for altered mental status and seizure activity. Upon arrival in the ED, was found to be markedly hypertensive. Did seem to be moving both side of his body, although right did seem somewhat weaker than left but cannot follow commands for full neurologic exam.  CT showed significant intracranial hemorrhage with midline shift. Neurosurgery was consulted, patient is anticoagulated and was reversed with Kcentra. Also blood pressure was controlled with Cardene drip. Given concern for airway compromise, intubation was performed as detailed in procedure note below. Platelets were ordered here as well but were not able to be given. Patient transferred emergently to [facility] for neurosurgical evaluation. Procedure Note - Intubation:  Consent was implied for the procedure. Pre-oxygenation was administered and the appropriate equipment and staff were made available at the bedside. Bertram Boyce was sedated/paralyzed; please see the chart for the drugs and dosages administered. A video laryngoscope blade was used  and a 8.0mm endotracheal tube was viewed to pass through the cords on the first attempt. The tube was secured at 23cm to the lip. Tracheal position was confirmed using a colorimetric end-tidal CO2 detector, tube condensation and chest auscultation/visualization. Respiratory therapy is at the bedside and is assisting with ventilatory management. 1. Intraparenchymal hemorrhage of brain ([address]      I personally saw the patient and independently provided 45 minutes of non-concurrent critical care out of the total shared critical care time provided.      Comment: Please note this report has been produced using speech recognition software and may contain errors related to that system including errors in grammar, punctuation, and spelling, as well as words and phrases that may be inappropriate. If there are any questions or concerns please feel free to contact the dictating provider for clarification.        Syed Womack MD  05/18/22 1181

## 2022-05-19 NOTE — PROGRESS NOTES
4 Eyes Admission Assessment     I agree as the admission nurse that 2 RN's have performed a thorough Head to Toe Skin Assessment on the patient. ALL assessment sites listed below have been assessed on admission. Areas assessed by both nurses:   [x]   Head, Face, and Ears   [x]   Shoulders, Back, and Chest  [x]   Arms, Elbows, and Hands   [x]   Coccyx, Sacrum, and Ischium  [x]   Legs, Feet, and Heels        Does the Patient have Skin Breakdown?   Yes a wound was noted on the Admission Assessment and an LDA was Initiated documentation include the Rayna-wound, Wound Assessment, Measurements, Dressing Treatment, Drainage, and Color\",  Scattered bruising, blanchable redness on sacrum, skin tear on R knee        Nolberto Prevention initiated:  Yes   Wound Care Orders initiated:  No      C nurse consulted for Pressure Injury (Stage 3,4, Unstageable, DTI, NWPT, and Complex wounds) or Nolbreto score 18 or lower:  No      Nurse 1 eSignature: Electronically signed by Norma Brown RN on 5/19/22 at 1:15 AM EDT    **SHARE this note so that the co-signing nurse is able to place an eSignature**    Nurse 2 eSignature: Electronically signed by Edison Franklin RN on 5/19/22 at 5:19 AM EDT

## 2022-05-19 NOTE — PROGRESS NOTES
Clinical Pharmacy Progress Note    IV in NS - Management by Pharmacy    Consult Date(s): 5/18/22  Consulting Provider(s): Adron Bence    Assessment / Plan    Intracerebral Hemorrhage - All IVs in Normal Saline   Drips will be adjusted to normal saline as appropriate based on compatibility, in an effort to avoid fluid shifts, as D5W is osmotically active.  The following intermittent IV drips / infusions have been adjusted to saline:  o None at this time   The following medications must remain in D5W due to incompatibility with normal saline:  o Amiodarone Infusion  o Amphotericin  o Mycophenolate  o Nitroprusside  o Penicillin G Potassium   Note: Patient has dextrose ordered as part of hypoglycemia treatment protocol.  Total IV fluid delivered to patient over last 24 hrs: TBD tomorrow   [facility] will follow daily to ensure all IVPBs / drips are in NS where possible. Thank you for consulting Pharmacy!     Thor Duverney, PharmD  PGY-1 Pharmacy Resident  [id number]  5/19/2022 3:02 PM

## 2022-05-19 NOTE — CARE COORDINATION
A referral to [address] [place] [address] was called into [facility] in intake [phone]. A fce sheet was faxed to [phone].     Electronically signed by TASHIA Schultz RN-[facility]  [phone]
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
## 2022-05-19 NOTE — PLAN OF CARE
Care plan reviewed and implemented during shift. Problem: Chronic Conditions and Co-morbidities  Goal: Patient's chronic conditions and co-morbidity symptoms are monitored and maintained or improved  5/19/2022 1141 by Ry Lopez RN  Outcome: Not Progressing     Problem: Discharge Planning  Goal: Discharge to home or other facility with appropriate resources  5/19/2022 1141 by Ry Lopez RN  Outcome: Not Progressing     Problem: Safety - Medical Restraint  Goal: Remains free of injury from restraints (Restraint for Interference with Medical Device)  Description: INTERVENTIONS:  1. Determine that other, less restrictive measures have been tried or would not be effective before applying the restraint  2. Evaluate the patient's condition at the time of restraint application  3. Inform patient/family regarding the reason for restraint  4. Q2H: Monitor safety, psychosocial status, comfort, nutrition and hydration  5/19/2022 1141 by Ry Lopez RN  Outcome: Not Progressing  Flowsheets (Taken 5/19/2022 0900)  Remains free of injury from restraints (restraint for interference with medical device):   Determine that other, less restrictive measures have been tried or would not be effective before applying the restraint   Every 2 hours: Monitor safety, psychosocial status, comfort, nutrition and hydration     Problem: Pain  Goal: Verbalizes/displays adequate comfort level or baseline comfort level  5/19/2022 1141 by Ry Lopez RN  Outcome: Not Progressing     Problem: Safety - Adult  Goal: Free from fall injury  Outcome: Not Progressing     Problem: Skin/Tissue Integrity  Goal: Absence of new skin breakdown  Description: 1. Monitor for areas of redness and/or skin breakdown  2. Assess vascular access sites hourly  3. Every 4-6 hours minimum:  Change oxygen saturation probe site  4.   Every 4-6 hours:  If on nasal continuous positive airway pressure, respiratory therapy assess nares and determine need for appliance change or resting period.   Outcome: Not Progressing     Problem: ABCDS Injury Assessment  Goal: Absence of physical injury  Outcome: Not Progressing

## 2022-05-20 NOTE — PROGRESS NOTES
ICU Consult Note    Admit Date: 5/18/2022  Day: 2  Vent Day: 2  IV Access:Peripheral  IV Fluids:None  Vasopressors:None                Antibiotics: None  Diet: Diet NPO    CC: Unresponsive      Interval history:   NAEON. Patient is unresponsive to verbal or painful stimuli. Off nicardipine. Fentanyl and propofol. AC/PRVC 16/500/40%/5. Following up with palliative care and family for hospice planning      HPI:     Noe Vega is a 77-year-old who presents to the hospital after found to have a parenchymal hematoma with a subarachnoid hemorrhage. PMH includes HTN, drug abuse, CABG 2016, history CVA, hep C, history A. fib on Eliquis. The remainder of the history is gathered from chart review/daughter as patient is intubated and nonverbal.  Since 5/13 patient has been having headaches and daughter noticed patient was dropping fork and other items from hand. No slurred speech or any deficit and was able to ambulate at home. Last seen well in the evening of 5/17. Daughter checked on patient at around 15 PM on 5/18 and was found laying face down after what appeared to be fall from bed. Was more lethargic than normal in the a.m. prior to being found on the ground. Was rushed to the ED and in transit had what appeared to be tonic-clonic seizure-like activity (per ED note Ativan not administered however per verbal signout by nursing Ativan was administered). CT head was completed while in the ED which showed large hematoma with mass affect.      No immediate intervention was planned but neurovascular on call was made aware. CT head and CTA was ordered on arrival to ICU at [facility]. Had in-depth conversation with daughters about goals of care which ultimately both had decided that no surgery would be completed given best prognosis after surgery. Further discussion was held with family by Dr. August Lopez about goals of care and it was ultimately decided that patient would be elected for comfort care.  Comfort care orders were added and hospice was reached.      Past Medical History:   Diagnosis Date    Allergic rhinitis      Anxiety 9/3/2014    Back pain      CAD (coronary artery disease)       H/O CABG    [facility]       r sided weakness uses cane at home    DDD (degenerative disc disease) 9/3/2014    Depression 4/17/2014    Fatty liver      Gout      Hepatitis C antibody positive in blood 10/24/2018    History of blood transfusion      Hyperlipidemia      Hypertension      Lumbar radiculopathy 9/3/2014    [facility]      Polyp of colon      PVD (peripheral vascular disease) (H[facility])      Rheumatoid arthritis(714.0)      Tobacco abuse disorder      Social Hx  Unobtainable - Patient intubated    Family history  Hypertension  Colon cancer  Glaucoma    Medications:     Scheduled Meds:   atorvastatin  80 mg Oral Nightly    levetiracetam  500 mg IntraVENous Q12H    pantoprazole  40 mg IntraVENous Daily    amiodarone  200 mg Oral Daily    scopolamine  1 patch TransDERmal Q72H     Continuous Infusions:   fentaNYL 50 mcg/hr (05/19/22 2019)    niCARdipine Stopped (05/19/22 1025)    propofol 50 mcg/kg/min (05/20/22 0453)     PRN Meds:acetaminophen, ondansetron **OR** ondansetron, LORazepam, HYDROmorphone **OR** HYDROmorphone    Objective:   Vitals:   T-max:  Patient Vitals for the past 8 hrs:   BP Temp Temp src Pulse Resp SpO2 Weight   05/20/22 0800 108/69 98.2 °F (36.8 °C) Axillary 66 20 97 % --   05/20/22 0715 -- -- -- 66 21 96 % --   05/20/22 0700 117/72 -- -- 66 23 97 % --   05/20/22 0615 107/71 -- -- 67 -- -- --   05/20/22 0600 105/74 -- -- 67 21 97 % --   05/20/22 0530 109/74 -- -- 69 21 97 % 188 lb 11.4 oz (85.6 kg)   05/20/22 0505 -- -- -- 70 22 96 % --   05/20/22 0500 124/77 -- -- 68 -- -- --   05/20/22 0430 116/71 -- -- 68 -- -- --   05/20/22 0400 115/70 98.9 °F (37.2 °C) Axillary 71 22 97 % --   05/20/22 0300 115/80 -- -- 71 25 97 % --   05/20/22 0200 116/77 in the last 72 hours. Cultures:  -----------------------------------------------------------------  RAD:   CT head without contrast   Final Result   1. Slight increase in size and slight increase in the intraventricular extension of hemorrhage as described above. There is slight increase in the midline shift compared to the previous exam. Total hematoma volume estimated at 107 mL. CTA head neck with contrast   Final Result      Neck      1. Bilateral carotid calcifications with no stenosis. 2. Very small right vertebral artery ending before the intracranial portion with distal reconstitution from the left. Head      1. No significant intracranial vascular abnormality. 2. Large left cerebral hematoma with extensive intraventricular extension as described on the noncontrast head CT. Assessment/Plan:     Pretty Shah is a 64 y.o. male, who has a PMH of:  HTN, drug abuse, CABG 2016, history CVA, hep C, history A. fib on Eliquis     Intracerebral hemorrhage  Parenchymal Hematoma in the left temporoparietal lobe with mass effect  Midline shift of 1.1 cm  Acute subarachnoid hemorrhage in left parietal lobe  - After lengthy discussion no plans for surgery and would pursue hospice care  -Comfort care orders admin  - On nicardipine with a SBP <160  - On keppra 500mg BID  - On propofol  - Intubated with vent settings 16/400/40%/5        Chronic Disease  Afib - On amio 200mg daily  HLD - Continue home lipitor  CAD s/p CABG - holding asa and eliquis  Hep C - Continue to monitor     Code Status:Full Code  FEN: Diet NPO  PPX:  Protonix              DISPO: Hospice    Code Status: DNR-CC  FEN: NPO  PPX: protonix  DISPO: ICU    Nader Jeong MD, PGY-1  05/20/22  8:59 AM    This patient has been staffed and discussed with Dr Jordin Guevara MD.     THE [facility] AT [place]     Patient seen and examined.  I agree with Dr. Al Barney history, physical, lab findings, assessment and plan.     Unfortunately Marc Nixon has had a catastrophic intracranial hemorrhage measuring 7.5 x 5 cm with intraventricular extension. His Eliquis was reversed with Kcentra on admission     He was transferred to [facility]. ICU for neurosurgical evaluation and management. Family was offered surgical decompression to survive but have declined that option given his expected degree of disability. He currently is DNR CC. [facility] has been consulted and spoke with daughter Kim Yu on the phone. Quentin Vance will be coming to the hospital today around 2 PM with anticipated compassionate extubation to comfort care. Patient does breathe over the ventilator but has very weak cough. He is on propofol at 50 micrograms per kilogram per minute and fentanyl 50 mcg/h for comfort. Once extubated he will have as needed Dilaudid and Ativan for comfort. A scopolamine patch has already been placed.   Extubation order has been placed to implement when family ready    Will monitor patient after extubation and if unstable will keep in hospital but if death not immediately imminent then will consider transfer to hospice of Demetra Vu MD

## 2022-05-20 NOTE — PROGRESS NOTES
Pt unresponsive to noxious stimuli. Pupils equal/reactive/sluggish. + cough and + gag. VSS. Propofol 50/fent 50. RR easy and unlabored on vent. Minimal secretions. Will continue to monitor.

## 2022-05-20 NOTE — CARE COORDINATION
Case management continues to follow for discharge planning. Mr. Robbi Chamorro remains in the ICU intubated and sedated. [facility] plans to meet with he family today. Dr. Michaela Weber plans to partner at the hospital. Anticipate compassionate extubation. If Mr. Robbi Chamorro stabilizes, the plan is to transfer to the [address] [place] [address] inpatient unit in [facility].     Electronically signed by TASHIA Ortiz RN-BC [id number]  [phone]

## 2022-05-20 NOTE — PROGRESS NOTES
Physician Progress Note      Jose F Pichardo  CSN #:                  [id number]  :                       1960  ADMIT DATE:       2022 8:49 PM  [address] [place] DATE:  RESPONDING  PROVIDER #:        Tanmay King MD          QUERY TEXT:    Pt admitted with  [address]. Pt noted to have [address] (Parenchymal Hematoma in the left   temporoparietal lobe with mass effect  Midline shift of 1.1 cm; Acute subarachnoid hemorrhage in left parietal lobe   ). If possible, please document in the progress notes and discharge summary if   you are evaluating and/or treating any of the following: The medical record reflects the following:  Risk Factors: ICH with mass effect; Coma;  GCS: E2 V1T M2 (5T)  Clinical Indicators: Intubated with vent settings AC/PRV 16/400/40%/5; from   [facility]\"  Treatment: Mechanical Ventilation (received from [facility] already   intubated  Options provided:  -- Acute respiratory failure with hypoxia  -- Acute respiratory failure with hypercapnia  -- Acute respiratory failure with hypoxia and hypercapnia  -- Other - I will add my own diagnosis  -- Disagree - Not applicable / Not valid  -- Disagree - Clinically unable to determine / Unknown  -- Refer to Clinical Documentation Reviewer    PROVIDER RESPONSE TEXT:    This patient is in acute respiratory failure with hypoxia.     Query created by: Kristen Lima on 2022 1:25 PM      Electronically signed by:  Tanmay King MD 2022 8:19 AM

## 2022-05-20 NOTE — PLAN OF CARE
Problem: Chronic Conditions and Co-morbidities  Goal: Patient's chronic conditions and co-morbidity symptoms are monitored and maintained or improved  Outcome: Not Progressing  Flowsheets (Taken 5/20/2022 0800)  Care Plan - Patient's Chronic Conditions and Co-Morbidity Symptoms are Monitored and Maintained or Improved:   Monitor and assess patient's chronic conditions and comorbid symptoms for stability, deterioration, or improvement   Collaborate with multidisciplinary team to address chronic and comorbid conditions and prevent exacerbation or deterioration   Update acute care plan with appropriate goals if chronic or comorbid symptoms are exacerbated and prevent overall improvement and discharge     Problem: Discharge Planning  Goal: Discharge to home or other facility with appropriate resources  Outcome: Not Progressing  Flowsheets (Taken 5/20/2022 0800)  Discharge to home or other facility with appropriate resources:   Arrange for needed discharge resources and transportation as appropriate   Identify discharge learning needs (meds, wound care, etc)     Problem: Safety - Medical Restraint  Goal: Remains free of injury from restraints (Restraint for Interference with Medical Device)  Description: INTERVENTIONS:  1. Determine that other, less restrictive measures have been tried or would not be effective before applying the restraint  2. Evaluate the patient's condition at the time of restraint application  3. Inform patient/family regarding the reason for restraint  4.  Q2H: Monitor safety, psychosocial status, comfort, nutrition and hydration  Outcome: Not Progressing  Flowsheets  Taken 5/20/2022 1000  Remains free of injury from restraints (restraint for interference with medical device): Every 2 hours: Monitor safety, psychosocial status, comfort, nutrition and hydration  Taken 5/20/2022 0900  Remains free of injury from restraints (restraint for interference with medical device): Evaluate the patient's condition at the time of restraint application  Taken 3/23/3679 0800  Remains free of injury from restraints (restraint for interference with medical device):   Every 2 hours: Monitor safety, psychosocial status, comfort, nutrition and hydration   Inform patient/family regarding the reason for restraint     Problem: Pain  Goal: Verbalizes/displays adequate comfort level or baseline comfort level  Outcome: Not Progressing  Flowsheets (Taken 5/20/2022 0800)  Verbalizes/displays adequate comfort level or baseline comfort level: Administer analgesics based on type and severity of pain and evaluate response     Problem: Safety - Adult  Goal: Free from fall injury  Outcome: Not Progressing     Problem: Skin/Tissue Integrity  Goal: Absence of new skin breakdown  Description: 1. Monitor for areas of redness and/or skin breakdown  2. Assess vascular access sites hourly  3. Every 4-6 hours minimum:  Change oxygen saturation probe site  4. Every 4-6 hours:  If on nasal continuous positive airway pressure, respiratory therapy assess nares and determine need for appliance change or resting period.   Outcome: Not Progressing     Problem: ABCDS Injury Assessment  Goal: Absence of physical injury  Outcome: Not Progressing     Problem: Neurosensory - Adult  Goal: Achieves stable or improved neurological status  Outcome: Not Progressing  Goal: Absence of seizures  Outcome: Not Progressing  Goal: Remains free of injury related to seizures activity  Outcome: Not Progressing  Goal: Achieves maximal functionality and self care  Outcome: Not Progressing

## 2022-05-20 NOTE — PROGRESS NOTES
Pt has binu discharged to hospice care and orders placed. Will continue to provide comfort measures.

## 2022-05-20 NOTE — PROGRESS NOTES
Palliative Medicine Progress Note    Admit Date: 5/18/2022  Hospital Day:  Hospital Day: 3     CC: AMS  HPI: Chelle Baig is a 64 y.o. male with PMH as below notable for HTN, drug abuse, CABG 2016, history CVA, hep C, history A. fib on Eliquis who presented with altered mental status. Pt's daughter reported since 5/13 patient has been having headaches and daughter noticed patient was dropping fork and other items from hand.  No slurred speech or any deficit and was able to ambulate at home. Narvis Situ seen well in the evening of 5/17.  Daughter checked on patient at around 12 PM on 5/18 and was found laying face down after what appeared to be fall from bed.  Was more lethargic than normal in the a.m. prior to being found on the ground.  Was rushed to the ED and in transit had what appeared to be tonic-clonic seizure-like activity (per ED note Ativan not administered however per verbal signout by nursing Ativan was administered).  CT head was completed while in the ED which showed large hematoma with mass affect. This was discussed with pt's daughters by Dr. Seferino Marcial, they elected to pursue comfort care. Pt remains intubated at this time. Recommendations:     Saw pt at the bedside. He remains unresponsive. Spoke with pt's daughter Venessa, she endorses that pt's other daughter Concepción Ruffin will not be coming into town. She will be at the hospital this afternoon. Plan for compassionate extubation around 2pm. D/w TONIA Cook, [facility] liaison Zaira Mc. Made referral to spiritual care at [facility] request.     1. Goals of Care/Advanced Care planning/Code status: [facility], goals are comfort directed. Plan for compassionate extubation at 2pm.   2. Pain: Pt unable to state, has orders for dilaudid q3h prn.   3. SOB: pt mechanically ventilated  4. ICH: After lengthy discussion no plans for surgery and would pursue hospice care, discussed in depth as above.    5. Disposition: hospice GIP vs IPU    Subjective:     Scheduled Meds:   atorvastatin  80 mg Oral Nightly    levetiracetam  500 mg IntraVENous Q12H    pantoprazole  40 mg IntraVENous Daily    amiodarone  200 mg Oral Daily    scopolamine  1 patch TransDERmal Q72H       Continuous Infusions:   fentaNYL 50 mcg/hr (05/19/22 2019)    niCARdipine Stopped (05/19/22 1025)    propofol 50 mcg/kg/min (05/20/22 0453)       PRN Meds:acetaminophen, ondansetron **OR** ondansetron, LORazepam, HYDROmorphone **OR** HYDROmorphone    Review of Systems. Review of Systems - History obtained from unobtainable from patient due to mental status      Performance status 30      Objective:     Patient Vitals for the past 8 hrs:   BP Temp Temp src Pulse Resp SpO2 Weight   05/20/22 0902 -- -- -- 66 21 96 % --   05/20/22 0900 108/70 -- -- 66 21 95 % --   05/20/22 0800 108/69 98.2 °F (36.8 °C) Axillary 66 20 97 % --   05/20/22 0715 -- -- -- 66 21 96 % --   05/20/22 0700 117/72 -- -- 66 23 97 % --   05/20/22 0615 107/71 -- -- 67 -- -- --   05/20/22 0600 105/74 -- -- 67 21 97 % --   05/20/22 0530 109/74 -- -- 69 21 97 % 188 lb 11.4 oz (85.6 kg)   05/20/22 0505 -- -- -- 70 22 96 % --   05/20/22 0500 124/77 -- -- 68 -- -- --   05/20/22 0430 116/71 -- -- 68 -- -- --   05/20/22 0400 115/70 98.9 °F (37.2 °C) Axillary 71 22 97 % --   05/20/22 0300 115/80 -- -- 71 25 97 % --   05/20/22 0200 116/77 -- -- 77 25 97 % --     I/O last 3 completed shifts: In: 3719 [I.V.:2295]  Out: 2340 [Urine:2340]  I/O this shift:  In: -   Out: 200 [Urine:200]    Physical Exam  Constitutional:       Appearance: He is ill-appearing. Cardiovascular:      Rate and Rhythm: Normal rate and regular rhythm. Heart sounds: Normal heart sounds. Pulmonary:      Comments: Mechanically ventilated  Abdominal:      General: Bowel sounds are normal.      Palpations: Abdomen is soft. Musculoskeletal:      Right lower leg: No edema. Left lower leg: No edema. Skin:     General: Skin is warm and dry.    Neurological:      Comments: Unresponsive, does not withdraw to pain. WBC/Hgb/Hct/Plts:  15.7/13.0/38.8/147 (05/20 3679)           Assessment:     Principal Problem:    Acute intraparenchymal spont hemorrhage presume-germinal matrix  Active Problems:    Intracerebral hemorrhage, nontraumatic (HCC)  Resolved Problems:    * No resolved hospital problems. *      Time spent with patient and/or family: 15  Time reviewing records: 5  Time communicating with providers: 5    A total of 25 minutes spent with the patient and family on unit greater than 50% face to face time in counseling regarding palliative care and goals of care for the patient.        [address] [facility]  Inpatient Palliative Care  [phone]

## 2022-05-20 NOTE — PROGRESS NOTES
Progress Note  Admit Date: 5/18/2022       Overnight Events: admitted    CC: F/U for  parenchymal hematoma with a subarachnoid hemorrhage. Interval History:     Pt is currently sedated, intubated. Family has chosen hospice/ comfort care. When family arrives later plan is to extubate, comfort measures. If pt stabilizes in next 24 hrs, can consider transfer to [facility], however at this time not clear if he would be stable for transfer so will monitor after extubation.  atorvastatin  80 mg Oral Nightly    levetiracetam  500 mg IntraVENous Q12H    pantoprazole  40 mg IntraVENous Daily    amiodarone  200 mg Oral Daily    scopolamine  1 patch TransDERmal Q72H      PRN Medications: acetaminophen, ondansetron **OR** ondansetron, LORazepam, HYDROmorphone **OR** HYDROmorphone  Diet: Diet NPO  Continuous Infusions:   fentaNYL 50 mcg/hr (05/20/22 1124)    niCARdipine Stopped (05/19/22 1025)    propofol 50 mcg/kg/min (05/20/22 0942)     PHYSICAL EXAM:  /68   Pulse 65   Temp 98.2 °F (36.8 °C) (Axillary)   Resp 20   Ht 6' (1.829 m)   Wt 188 lb 11.4 oz (85.6 kg)   SpO2 97%   BMI 25.59 kg/m²   No results for input(s): POCGLU in the last 72 hours. Intake/Output Summary (Last 24 hours) at 5/20/2022 1142  Last data filed at 5/20/2022 1000  Gross per 24 hour   Intake 696 ml   Output 340 ml   Net 356 ml       Constitutional:       Appearance: Normal appearance. HENT:      Head: Normocephalic and atraumatic. Cardiovascular:      Rate and Rhythm: Normal rate and regular rhythm. Pulses: Normal pulses. Heart sounds: Normal heart sounds. Pulmonary:      Effort: Pulmonary effort is normal.      Breath sounds: Normal breath sounds. Musculoskeletal:      Right lower leg: No edema. Left lower leg: No edema.    Neurological:      Mental Status: not responsive to verbal or painful stimuli     LABS:  Recent Labs     05/18/22  2142 05/19/22  0415 05/20/22  0459   WBC 14.7* 13.5* 15.7*   HGB 14.1 13.9 13.0*   HCT 41.9 40.6 38.8*    205 147                                                                    Recent Labs     05/18/22  2142 05/19/22  0415 05/20/22  0459   * 130* 137   K 3.2* 3.6 3.2*   CL 92* 91* 98*   CO2 26 24 23   BUN 6* 5* 11   CREATININE <0.5* <0.5* 1.2   GLUCOSE 220* 147* 128*     Recent Labs     05/18/22  1650   AST 93*   ALT 42*   BILITOT 0.8   ALKPHOS 184*     Recent Labs     05/18/22  1650   TROPONINI <0.01     No results for input(s): BNP in the last 72 hours. No results for input(s): CHOL, HDL in the last 72 hours. Invalid input(s): LDLCALCU  Recent Labs     05/18/22  1650   INR 1.24*     Assessment & Plan:    Patient Active Problem List:    Hipolito Conde is a 64 y.o. male, who has a PMH of:  HTN, drug abuse, CABG 2016, history CVA, hep C, history A. fib on Eliquis     Intracerebral hemorrhage  As above, hospice/ comfort care initiated. Plan extubation later when family arrives.        Jean Lindsay MD           [facility]

## 2022-05-20 NOTE — PROGRESS NOTES
Hospice [facility]:  Met supportively with pt's daughter Jacquie Marcusr and other family members at bedside while pt. Was extubated. Jacquie [place] elected to have [facility] services for her father. Spoke to Dr. Peyton Cage this am and she preferred to keep the patient at the hospital under hospice services today and re-evaluate his ability to transfer to the hospice inpatient center over the weekend. Discharge/Re-admission completed. Comfort medications are in place. CM updated. Please call DIETER 24 hours a day with questions,concerns or when the patient passes away. [address] Thank you for caring for this [facility] patient. DIETER RN will follow up tomorrow.      Sophie Olson RN [address] [place] [address] [phone]

## 2022-05-20 NOTE — PROGRESS NOTES
Clinical Pharmacy Progress Note    All IVs in NS - Management by Pharmacy    Consult Date(s): 5/18/22  Consulting Provider(s): Jenny Barragan    Assessment / Plan    Intracerebral Hemorrhage - All IVs in Normal Saline   Drips will be adjusted to normal saline as appropriate based on compatibility, in an effort to avoid fluid shifts, as D5W is osmotically active.  The following intermittent IV drips / infusions have been adjusted to saline:  o Fentanyl (already in NS)   Note: Patient may have dextrose ordered as part of hypoglycemia treatment protocol.  [facility] will follow daily to ensure all IVPBs / drips are in NS where possible. Family planning for hospice / comfort care today. Please call with any questions.     Oneyda Luu PharmD, [facility]  Wireless: [phone]  5/20/2022 2:24 PM PAST MEDICAL HISTORY:  No pertinent past medical history

## 2022-05-20 NOTE — PROGRESS NOTES
Spoke with [employer]. All questions answered. Will update [place] with changes. Will continue to monitor.

## 2022-05-20 NOTE — PROGRESS NOTES
PM assessment complete,VSS at this time. No acute S/S of distress noted at this time. Will continue to monitor.

## 2022-05-21 NOTE — DEATH NOTES
Notice of Death:  Time of Death: 21:45  Time Pronounced: 21:45    I personally evaluated the Pt at bedside. Pt was found to be without cardiac activity on monitor, absent brain stem reflexes; no pupillary response, pupils were fixed, dilated, no corneal reflex, no doll's eyes. Pt was areflexic, without spontaneous respirations or respiratory movements. No palpable pulses throughout. No response to painful stimuli. GCS of 3.     Physical Exam:  VS: No palpable pulse, no blood pressure   GEN: Pallor with no spontaneous movement   HEENT: Pupils fixed and dilated   CVS: No heart sounds audible   Chest: No audible lung sounds  Neuro: Absent reflexes    Amber Mondragon MD  PGY-2

## 2022-05-21 NOTE — PROGRESS NOTES
Pt asystole on monitor, RN and ICU resident at bedside. Death exam performed by Dr. Max Sharp, time of death 21:45.
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
## 2022-05-24 NOTE — PROGRESS NOTES
Physician Progress Note      Les Barton  [facility] #:                  [id number]  :                       1960  ADMIT DATE:       2022 8:49 PM  [address] [place] DATE:        2022 3:26 PM  RESPONDING  PROVIDER #:        Cheryle Mccune MD          QUERY TEXT:    Patient admitted - with ICH and questionable seizure. Noted   documentation of appeared to be fall from bed in the H/P. If possible, please   document if you are treating/evaluating any of the following: The medical record reflects the following:  Risk Factors: \"fall from bed\"; Eliquis (hx of Afib)  Clinical Indicators: per HPI \"was found laying face down after what appeared   to be fall from bed.  Was more lethargic than normal in the a.m. prior to being  found on the ground\"  Was rushed to the ED and in transit had what appeared to   be tonic-clonic seizure-like activity \"; INR=1.24; Eliquis  Treatment: Neuro consult; CT head; [facility] ED; Meds: scheduled Keppra  Options provided:  -- Nontraumatic  Intracerebral hemorrhage  -- Traumatic ICH without LOC  -- Traumatic  ICH with LOC, Please specify duration, if known  -- Other - I will add my own diagnosis  -- Disagree - Not applicable / Not valid  -- Disagree - Clinically unable to determine / Unknown  -- Refer to Clinical Documentation Reviewer    PROVIDER RESPONSE TEXT:    This patient has a traumatic ICH with LOC lasting unknown    Query created by: Agus Johnson on 2022 3:39 PM      Electronically signed by:  Cheryle Mccune MD 2022 4:00 PM

## 2022-06-29 NOTE — DISCHARGE SUMMARY
Hospital Discharge Summary    Patient's PCP: NENA You CNP  Admit Date: 5/18/2022   Discharge Date: 05/20/2022    Admitting Physician: Jose Alejandro Reyes MD  Discharge Physician: Abebe Dai MD     Brief hospital course:        26-year-old who presents to the hospital after found to have a parenchymal hematoma with a subarachnoid hemorrhage.       No immediate intervention was planned but neurovascular on call was made aware. CT head and CTA was ordered on arrival to ICU at [facility]. Had in-depth conversation with daughters about goals of care which ultimately both had decided that no surgery would be completed given best prognosis after surgery. Further discussion was held with family by Dr. Patsy Meadows about goals of care and it was ultimately decided that patient would be elected for comfort care. Pt was enrolled in hospice care, comfort measures were initiated. Pt passed away peacefully, pronounced at 21:45.        Discharge Diagnoses:     parynchymal hematoma with subarachnoid hemorrhage      Patient Active Problem List   Diagnosis    Gout    Back pain, lumbosacral    Rheumatoid arthritis, adult ([address]    Depression    Vitamin D deficiency    Hypotestosteronism    DDD (degenerative disc disease)    Anxiety    Lumbar radiculopathy    Bilateral leg numbness    Bilateral leg pain    DDD (degenerative disc disease), lumbar    Pain medication agreement broken    Face lesion    Unstable angina ([address]    NSTEMI (non-ST elevated myocardial infarction) ([address]    Alcohol dependence with withdrawal delirium ([address]    S/P CABG x 4    Hypomagnesemia    Tobacco dependence in remission    Primary insomnia    History of tobacco abuse    Polyp of transverse colon    Abnormal liver enzymes    Severe claudication (HCC)    Wound of left groin    Infection    Alcohol use    Fatty liver    Colon cancer ([address]    Incisional hernia with obstruction but no gangrene    Altered mental status    Acute respiratory failure with hypoxia and hypercarbia (HCC)    [facility]ysubstance abuse [facility]    Community acquired pneumonia of right lower lobe of lung    COPD exacerbation (HCC)    Thrombocytopenia (HCC)    Acute respiratory failure with hypoxia (HCC)    Multifocal pneumonia    Atrial fibrillation with RVR (HCC)    Acute encephalopathy    Acute intraparenchymal spont hemorrhage presume-germinal matrix    Intracerebral hemorrhage, nontraumatic (HCC)     Disposition:   Discharged Condition:

## (undated) DEVICE — SUTURE VCRL SZ 0 L27IN ABSRB UD L36MM CT-1 1/2 CIR J260H

## (undated) DEVICE — SYRINGE MED 10ML TRNSLUC BRL PLUNG BLK MRK POLYPR CTRL

## (undated) DEVICE — PICO SINGLE USE NEGATIVE PRESSURE WOUND THERAPY SYSTEM 10CM X 20CM 4IN. X 8IN.: Brand: PICO

## (undated) DEVICE — SUTURE MCRYL SZ 4-0 L27IN ABSRB UD L19MM PS-2 1/2 CIR PRIM Y426H

## (undated) DEVICE — COVER LT HNDL BLU PLAS

## (undated) DEVICE — STAPLER EXT SKIN 35 WIDE S STL STPL SQUEEZE HNDL VISISTAT

## (undated) DEVICE — SYSTEM SMK EVAC LAP TBNG FILTER HSNG BENT STYL PNK SEE CLR

## (undated) DEVICE — SUTURE PROL SZ 6-0 L24IN NONABSORBABLE BLU L9.3MM BV-1 VISI 8305H

## (undated) DEVICE — ACCESS PLATFORM FOR MINIMALLY INVASIVE SURGERY.: Brand: GELPORT® LAPAROSCOPIC  SYSTEM

## (undated) DEVICE — DRESSING WND VAC SM GRANUFOAM SENSATRAC

## (undated) DEVICE — CANISTER NEG PRSS 500ML WHT SENSATRAC TBNG CLMP CONN

## (undated) DEVICE — SUTURE PERMAHAND SZ 3-0 L30IN NONABSORBABLE BLK SH L26MM K832H

## (undated) DEVICE — BOWL MED L 32OZ PLAS W/ MOLD GRAD EZ OPN PEEL PCH

## (undated) DEVICE — PAD, DEFIB, ADULT, RADIOTRANS, ZOLL: Brand: MEDLINE

## (undated) DEVICE — CATHETER DRNGE 32FR PROPORTIONATE HD 2 EYE REINF TIP

## (undated) DEVICE — SOLUTION IV 1000ML 0.9% SOD CHL

## (undated) DEVICE — SNAP KOVER: Brand: UNBRANDED

## (undated) DEVICE — 3L THIN WALL CAN: Brand: CRD

## (undated) DEVICE — TRAY PREP DRY W/ PREM GLV 2 APPL 6 SPNG 2 UNDPD 1 OVERWRAP

## (undated) DEVICE — GLOVE ORANGE PI 8 1/2   MSG9085

## (undated) DEVICE — SUTURE PERMAHAND SZ 3-0 L18IN NONABSORBABLE BLK L26MM SH C013D

## (undated) DEVICE — ELECTROSURGICAL PENCIL ROCKER SWITCH NON COATED BLADE ELECTRODE 10 FT (3 M) CORD HOLSTER: Brand: MEGADYNE

## (undated) DEVICE — Device

## (undated) DEVICE — SCISSORS ENDOSCP DIA5MM CRV MPLR CAUT W/ RATCH HNDL

## (undated) DEVICE — PUMP SUC IRR TBNG L10FT W/ HNDPC ASSEMB STRYKEFLOW 2

## (undated) DEVICE — GLOVE SURG SZ 8 L11.77IN FNGR THK9.8MIL STRW LTX POLYMER

## (undated) DEVICE — SUTURE PDS II SZ 0 L60IN ABSRB VLT L48MM CTX 1/2 CIR Z990G

## (undated) DEVICE — SPONGE,NEURO,0.5"X0.5",XR,STRL,10/PK: Brand: MEDLINE

## (undated) DEVICE — TOWEL,STOP FLAG GOLD N-W: Brand: MEDLINE

## (undated) DEVICE — ENDO CARRY-ON PROCEDURE KIT INCLUDES SUCTION TUBING, LUBRICANT, GAUZE, BIOHAZARD STICKER, TRANSPORT PAD AND INTERCEPT BEDSIDE KIT.: Brand: ENDO CARRY-ON PROCEDURE KIT

## (undated) DEVICE — SUTURE NONABSORBABLE MONOFILAMENT 5-0 C-1 1X24 IN PROLENE 8725H

## (undated) DEVICE — SOLUTION ANTIFOG VIS SYS CLEARIFY LAPSCP

## (undated) DEVICE — GARMENT,MEDLINE,DVT,INT,CALF,MED, GEN2: Brand: MEDLINE

## (undated) DEVICE — STANDARD HYPODERMIC NEEDLE,POLYPROPYLENE HUB: Brand: MONOJECT

## (undated) DEVICE — GOWN SIRUS NONREIN XL W/TWL: Brand: MEDLINE INDUSTRIES, INC.

## (undated) DEVICE — RADIFOCUS GLIDECATH: Brand: GLIDECATH

## (undated) DEVICE — FOGARTY - HYDRAGRIP SURGICAL - CLAMP INSERTS: Brand: FOGARTY SOFTJAW

## (undated) DEVICE — INTENDED FOR TISSUE SEPARATION, AND OTHER PROCEDURES THAT REQUIRE A SHARP SURGICAL BLADE TO PUNCTURE OR CUT.: Brand: BARD-PARKER ® CARBON RIB-BACK BLADES

## (undated) DEVICE — JEWISH HOSPITAL TURNOVER KIT: Brand: MEDLINE INDUSTRIES, INC.

## (undated) DEVICE — TROCAR: Brand: KII FIOS FIRST ENTRY

## (undated) DEVICE — PINNACLE PRECISION ACCESS SYSTEM INTRODUCER SHEATH: Brand: PINNACLE PRECISION ACCESS SYSTEM

## (undated) DEVICE — 3M™ STERI-STRIP™ REINFORCED ADHESIVE SKIN CLOSURES, R1548, 1 IN X 5 IN (25 MM X 125 MM), 4 STRIPS/ENVELOPE: Brand: 3M™ STERI-STRIP™

## (undated) DEVICE — GOWN,SIRUS,POLYRNF,BRTHSLV,LG,30/CS: Brand: MEDLINE

## (undated) DEVICE — SURGICAL SET UP - SURE SET: Brand: MEDLINE INDUSTRIES, INC.

## (undated) DEVICE — SHEET,DRAPE,53X77,STERILE: Brand: MEDLINE

## (undated) DEVICE — SPONGE GZ W4XL8IN COT WVN 12 PLY

## (undated) DEVICE — DISH PETRI ST LF

## (undated) DEVICE — SPONGE,LAP,18"X18",DLX,XR,ST,5/PK,40/PK: Brand: MEDLINE

## (undated) DEVICE — SUTURE ETHBND EXCEL SZ 0 L18IN NONABSORBABLE GRN L36MM CT-1 CX21D

## (undated) DEVICE — SUTURE VCRL SZ 2-0 L18IN ABSRB VLT L26MM SH 1/2 CIR J775D

## (undated) DEVICE — HANDPIECE SET WITH HIGH FLOW TIP AND SUCTION TUBE: Brand: INTERPULSE

## (undated) DEVICE — CHLORAPREP 26ML ORANGE

## (undated) DEVICE — 40583 XL ADVANCED TRENDELENBURG POSITIONING KIT: Brand: 40583 XL ADVANCED TRENDELENBURG POSITIONING KIT

## (undated) DEVICE — ADHESIVE SKIN CLSR 0.7ML TOP DERMBND ADV

## (undated) DEVICE — SUTURE NRLN SZ 4-0 L18IN NONABSORBABLE BLK L13MM TF 1/2 CIR C584D

## (undated) DEVICE — GUIDEWIRE VASC L260CM DIA0.035IN L15CM STR TIP PTFE S STL

## (undated) DEVICE — COVER LT HNDL CAM BLU DISP W/ SURG CTRL

## (undated) DEVICE — SEALER LAP L37CM MARYLAND JAW OPN NANO COAT MULTIFUNCTIONAL

## (undated) DEVICE — SPONGE,NEURO,0.5"X3",XR,STRL,LF,10/PK: Brand: MEDLINE

## (undated) DEVICE — VALVE ANGIO ID0.11IN HEMSTAT MTL GUID WIRE INSRT TOOL AND

## (undated) DEVICE — NEEDLE INSUF L120MM DIA2MM DISP FOR PNEUMOPERI ENDOPATH

## (undated) DEVICE — SOLUTION IV 1000ML 0.9% SOD CHL PH 5 INJ USP VIAFLX PLAS

## (undated) DEVICE — GOWN,SIRUS,POLYRNF,BRTHSLV,XLN/XL,20/CS: Brand: MEDLINE

## (undated) DEVICE — SYRINGE ANGIO 12ML COR CTRL ROT ADPT SLD PLUNG CLR BRL M

## (undated) DEVICE — PAD DRY FLOOR ABS 32X58IN GRN

## (undated) DEVICE — TOWEL,OR,DSP,ST,BLUE,STD,6/PK,12PK/CS: Brand: MEDLINE

## (undated) DEVICE — TROCAR: Brand: KII OPTICAL ACCESS SYSTEM

## (undated) DEVICE — TRAY CATHETER 16FR F INCLUDE BARDX IC COMPLT CARE DRNGE BG

## (undated) DEVICE — PLATE ES AD W 9FT CRD 2

## (undated) DEVICE — SUTURE PERMAHAND SZ 2-0 L12X18IN NONABSORBABLE BLK SILK A185H

## (undated) DEVICE — SUTURE NONABSORBABLE MONOFILAMENT 6-0 C-1 1X30 IN PROLENE 8706H

## (undated) DEVICE — COVER XR CASS W20XL41IN UNIV ADH STRP

## (undated) DEVICE — STAPLER SKIN H3.9MM WIRE DIA0.58MM CRWN 6.9MM 35 STPL ROT

## (undated) DEVICE — SURE SET-DOUBLE BASIN-LF: Brand: MEDLINE INDUSTRIES, INC.

## (undated) DEVICE — STAPLER INT L75MM CUT LN L73MM STPL LN L77MM BLU B FRM 8

## (undated) DEVICE — GLOVE ORANGE PI 8   MSG9080

## (undated) DEVICE — DRAIN,WOUND,15FR,3/16,FULL-FLUTED: Brand: MEDLINE

## (undated) DEVICE — SOLUTION IV 1000ML LAC RINGERS PH 6.5 INJ USP VIAFLX PLAS

## (undated) DEVICE — SUTURE ETHLN SZ 2 L30IN NONABSORBABLE BLK L75MM LR 3/8 CIR 490T

## (undated) DEVICE — DRESSING NEG PRSS M BLK POLYUR FOAM WND HYDROPHOBIC VAC

## (undated) DEVICE — GOWN,SIRUS,POLYRNF,BRTHSLV,XL,30/CS: Brand: MEDLINE

## (undated) DEVICE — RESERVOIR,SUCTION,100CC,SILICONE: Brand: MEDLINE

## (undated) DEVICE — RELOAD STPL L75MM OPN H3.8MM CLS 1.5MM WIRE DIA0.2MM REG

## (undated) DEVICE — SOLUTION IV IRRIG POUR BRL 0.9% SODIUM CHL 2F7124

## (undated) DEVICE — GOWN SIRUS NONREIN LG W/TWL: Brand: MEDLINE INDUSTRIES, INC.

## (undated) DEVICE — SYRINGE CATH TIP 50ML

## (undated) DEVICE — GUIDEWIRE VASC L150CM DIA0.035IN STR TIP PTFE FIX COR

## (undated) DEVICE — RETAINER VISCERA GLASSMAN FISH DISP ST

## (undated) DEVICE — CRANI: Brand: MEDLINE INDUSTRIES, INC.

## (undated) DEVICE — SUTURE ABSORBABLE BRAIDED 2-0 CT-1 27 IN UD VICRYL J259H

## (undated) DEVICE — GLOVE,SURG,SENSICARE SLT,LF,PF,7: Brand: MEDLINE

## (undated) DEVICE — RADIFOCUS GLIDEWIRE: Brand: GLIDEWIRE

## (undated) DEVICE — SUTURE VCRL SZ 2-0 L18IN ABSRB UD CT-1 L36MM 1/2 CIR J839D

## (undated) DEVICE — FORCEPS BX L240CM JAW DIA2.4MM ORNG L CAP W/ NDL DISP RAD

## (undated) DEVICE — SUTURE VCRL + SZ 3-0 L27IN ABSRB UD L26MM SH 1/2 CIR VCP416H

## (undated) DEVICE — SURGIFOAM SPNG SZ 12-7

## (undated) DEVICE — SOLUTION INJ LR VISIV 1000ML BG

## (undated) DEVICE — TOTAL TRAY, DB, 100% SILI FOLEY, 16FR 10: Brand: MEDLINE

## (undated) DEVICE — DRAPE,LAP,CHOLE,W/TROUGHS,STERILE: Brand: MEDLINE

## (undated) DEVICE — KIT COLL SWAB FLX MINI

## (undated) DEVICE — DRESSING GERM DIA1IN CNTR H DIA7MM BLU CHG ANTIMIC PROTCT

## (undated) DEVICE — SNAP KAP: Brand: UNBRANDED

## (undated) DEVICE — [HIGH FLOW INSUFFLATOR,  DO NOT USE IF PACKAGE IS DAMAGED,  KEEP DRY,  KEEP AWAY FROM SUNLIGHT,  PROTECT FROM HEAT AND RADIOACTIVE SOURCES.]: Brand: PNEUMOSURE

## (undated) DEVICE — PACK LAP IV REINF TBL CVR ADH UTIL UNDERBUTTOCK DRP W CUF

## (undated) DEVICE — PRECISION SPECIMEN CONTAINER 4 OZ (118 ML) • POSITIVE SEAL INDICATOR OR PACKAGED: Brand: PRECISION

## (undated) DEVICE — GAUZE,SPONGE,2"X2",8PLY,STERILE,LF,2'S: Brand: MEDLINE

## (undated) DEVICE — SYRINGE MED 3ML CLR PLAS STD N CTRL LUERLOCK TIP DISP

## (undated) DEVICE — TOOL 10BA50-MN LEGEND 10CM 5MM BA: Brand: MIDAS REX™

## (undated) DEVICE — BLANKET WRM W29.9XL79.1IN UP BODY FORC AIR MISTRAL-AIR

## (undated) DEVICE — TOOL F2/8TA23 LEGEND 8CM 2.3MM TAPER: Brand: MIDAS REX™

## (undated) DEVICE — SUTURE MCRYL SZ 4-0 L18IN ABSRB UD L19MM PS-2 3/8 CIR PRIM Y496G